# Patient Record
Sex: MALE | Race: BLACK OR AFRICAN AMERICAN | NOT HISPANIC OR LATINO | Employment: UNEMPLOYED | URBAN - NONMETROPOLITAN AREA
[De-identification: names, ages, dates, MRNs, and addresses within clinical notes are randomized per-mention and may not be internally consistent; named-entity substitution may affect disease eponyms.]

---

## 2017-08-07 ENCOUNTER — HOSPITAL ENCOUNTER (EMERGENCY)
Facility: HOSPITAL | Age: 57
Discharge: HOME | End: 2017-08-07
Attending: EMERGENCY MEDICINE | Admitting: EMERGENCY MEDICINE

## 2017-08-07 VITALS
DIASTOLIC BLOOD PRESSURE: 85 MMHG | BODY MASS INDEX: 37.3 KG/M2 | HEART RATE: 61 BPM | SYSTOLIC BLOOD PRESSURE: 169 MMHG | OXYGEN SATURATION: 97 % | TEMPERATURE: 97.3 F | HEIGHT: 75 IN | WEIGHT: 300 LBS | RESPIRATION RATE: 17 BRPM

## 2017-08-07 DIAGNOSIS — K04.7 DENTAL ABSCESS: ICD-10-CM

## 2017-08-07 DIAGNOSIS — R22.0 RIGHT FACIAL SWELLING: Primary | ICD-10-CM

## 2017-08-07 PROCEDURE — 96372 THER/PROPH/DIAG INJ SC/IM: CPT

## 2017-08-07 PROCEDURE — 63710000001 PREDNISONE PER 1 MG: Performed by: EMERGENCY MEDICINE

## 2017-08-07 PROCEDURE — 99283 EMERGENCY DEPT VISIT LOW MDM: CPT

## 2017-08-07 PROCEDURE — 25010000002 KETOROLAC TROMETHAMINE PER 15 MG: Performed by: EMERGENCY MEDICINE

## 2017-08-07 RX ORDER — PREDNISONE 10 MG/1
TABLET ORAL
Qty: 20 TABLET | Refills: 0 | Status: SHIPPED | OUTPATIENT
Start: 2017-08-07 | End: 2017-10-02 | Stop reason: HOSPADM

## 2017-08-07 RX ORDER — KETOROLAC TROMETHAMINE 10 MG/1
10 TABLET, FILM COATED ORAL EVERY 6 HOURS PRN
Qty: 12 TABLET | Refills: 0 | Status: SHIPPED | OUTPATIENT
Start: 2017-08-07 | End: 2017-10-02 | Stop reason: HOSPADM

## 2017-08-07 RX ORDER — KETOROLAC TROMETHAMINE 30 MG/ML
60 INJECTION, SOLUTION INTRAMUSCULAR; INTRAVENOUS ONCE
Status: COMPLETED | OUTPATIENT
Start: 2017-08-07 | End: 2017-08-07

## 2017-08-07 RX ORDER — PREDNISONE 20 MG/1
60 TABLET ORAL ONCE
Status: COMPLETED | OUTPATIENT
Start: 2017-08-07 | End: 2017-08-07

## 2017-08-07 RX ORDER — CLINDAMYCIN HYDROCHLORIDE 150 MG/1
300 CAPSULE ORAL EVERY 6 HOURS SCHEDULED
Status: DISCONTINUED | OUTPATIENT
Start: 2017-08-07 | End: 2017-08-07 | Stop reason: HOSPADM

## 2017-08-07 RX ORDER — CLINDAMYCIN HYDROCHLORIDE 300 MG/1
300 CAPSULE ORAL 3 TIMES DAILY
Qty: 30 CAPSULE | Refills: 0 | Status: SHIPPED | OUTPATIENT
Start: 2017-08-07 | End: 2017-10-02 | Stop reason: HOSPADM

## 2017-08-07 RX ADMIN — PREDNISONE 60 MG: 20 TABLET ORAL at 05:10

## 2017-08-07 RX ADMIN — KETOROLAC TROMETHAMINE 60 MG: 30 INJECTION, SOLUTION INTRAMUSCULAR at 05:12

## 2017-08-07 RX ADMIN — CLINDAMYCIN HYDROCHLORIDE 300 MG: 150 CAPSULE ORAL at 05:24

## 2017-08-07 NOTE — ED NOTES
Pt states right sided facial swelling began around 01:00 this morning.  Upon assessment, pt's right side of the face is slightly swollen.     Lilo Wiseman RN  08/07/17 0403

## 2017-08-07 NOTE — ED PROVIDER NOTES
Subjective   HPI Comments: Patient complaining of facial pain on the right for the past 5 days.  He has noticed that the the right upper cheek has progressively become more inflamed.  He states that today the swelling was so advanced that if he felt like he had cut off the his ability to breathe through his right nose.  He use over-the-counter medicine that helps with sinus congestion which he states helped relieve that sensation.  Patient has not had any fever associated with the swelling.      History provided by:  Patient      Review of Systems   Constitutional: Negative for activity change, appetite change, chills, diaphoresis, fatigue and fever.   HENT: Positive for congestion, dental problem and facial swelling. Negative for ear pain, nosebleeds, postnasal drip and sinus pressure.    Eyes: Negative for photophobia and pain.   Respiratory: Negative for cough, chest tightness, shortness of breath and wheezing.    Cardiovascular: Negative for chest pain.   Gastrointestinal: Negative for abdominal pain, blood in stool, diarrhea, nausea and vomiting.   Endocrine: Negative for cold intolerance and heat intolerance.   Genitourinary: Negative for difficulty urinating, dysuria and flank pain.   Musculoskeletal: Negative for arthralgias, back pain, neck pain and neck stiffness.   Skin: Negative for color change and rash.   Neurological: Negative for dizziness, weakness and headaches.   Hematological: Negative for adenopathy. Does not bruise/bleed easily.   Psychiatric/Behavioral: Negative for confusion and sleep disturbance. The patient is not nervous/anxious.        Past Medical History:   Diagnosis Date   • Hyperlipidemia    • Hypertension    • Hypertrophic cardiomyopathy     s/p AICD   • Migraine    • Tachycardia    • Ventricular tachycardia        No Known Allergies    Past Surgical History:   Procedure Laterality Date   • CARDIAC ABLATION  01/28/2016 2/2016, 10/18/2016 (Dr. Doss in [place], [place])   • CARDIAC  CATHETERIZATION     • CARDIAC DEFIBRILLATOR PLACEMENT     • CARDIAC DEFIBRILLATOR PLACEMENT         Family History   Problem Relation Age of Onset   • Family history unknown: Yes       Social History     Social History   • Marital status: Single     Spouse name: N/A   • Number of children: N/A   • Years of education: N/A     Social History Main Topics   • Smoking status: Never Smoker   • Smokeless tobacco: None   • Alcohol use No   • Drug use: No   • Sexual activity: Defer     Other Topics Concern   • None     Social History Narrative   • None           Objective   Physical Exam   Constitutional: He is oriented to person, place, and time. He appears well-developed and well-nourished. No distress.   HENT:   Head: Normocephalic and atraumatic. Head is without contusion and without right periorbital erythema.       Mouth/Throat: Oropharynx is clear and moist and mucous membranes are normal. Abnormal dentition. Dental caries present. No dental abscesses. No oropharyngeal exudate or tonsillar abscesses.   Neck: Normal range of motion. No rigidity. Normal range of motion present.   Lymphadenopathy:     He has no cervical adenopathy.   Neurological: He is alert and oriented to person, place, and time. No cranial nerve deficit.   Skin: Skin is warm and dry. No erythema.   Psychiatric: He has a normal mood and affect. His behavior is normal. Judgment and thought content normal.   Nursing note and vitals reviewed.      Procedures         ED Course  ED Course                  MDM  Number of Diagnoses or Management Options  Dental abscess: new and requires workup  Right facial swelling: new and requires workup  Diagnosis management comments: Patient most likely has a abscess underneath the right upper teeth.  It is most likely the source of his pain and swelling.  I advised him that he needs to see a dentist for further evaluation.  In the meantime I will place him on steroids, NSAIDs and an antibiotic.  Patient will need  follow-up with his primary care.  Instructed to return to the ED if any further issues or new complaints.    Risk of Complications, Morbidity, and/or Mortality  Presenting problems: low  Diagnostic procedures: low  Management options: low    Patient Progress  Patient progress: stable      Final diagnoses:   Right facial swelling   Dental abscess            Vj Ruiz MD  08/07/17 0516

## 2017-09-30 ENCOUNTER — APPOINTMENT (OUTPATIENT)
Dept: GENERAL RADIOLOGY | Facility: HOSPITAL | Age: 57
End: 2017-09-30

## 2017-09-30 ENCOUNTER — HOSPITAL ENCOUNTER (INPATIENT)
Facility: HOSPITAL | Age: 57
LOS: 2 days | Discharge: ANOTHER HOSPITAL | End: 2017-10-02
Attending: EMERGENCY MEDICINE | Admitting: INTERNAL MEDICINE

## 2017-09-30 DIAGNOSIS — R07.9 CHEST PAIN IN ADULT: Primary | ICD-10-CM

## 2017-09-30 DIAGNOSIS — I47.20 VENTRICULAR TACHYCARDIA (HCC): ICD-10-CM

## 2017-09-30 LAB
ALBUMIN SERPL-MCNC: 3.9 G/DL (ref 3.5–5)
ALBUMIN/GLOB SERPL: 1.4 G/DL (ref 1.1–2.5)
ALP SERPL-CCNC: 54 U/L (ref 24–120)
ALT SERPL W P-5'-P-CCNC: 95 U/L (ref 0–54)
ANION GAP SERPL CALCULATED.3IONS-SCNC: 10 MMOL/L (ref 4–13)
ANION GAP SERPL CALCULATED.3IONS-SCNC: 13 MMOL/L (ref 4–13)
APTT PPP: 27.1 SECONDS (ref 24.1–34.8)
AST SERPL-CCNC: 65 U/L (ref 7–45)
BASOPHILS # BLD AUTO: 0.02 10*3/MM3 (ref 0–0.2)
BASOPHILS NFR BLD AUTO: 0.3 % (ref 0–2)
BILIRUB SERPL-MCNC: 0.4 MG/DL (ref 0.1–1)
BUN BLD-MCNC: 14 MG/DL (ref 5–21)
BUN BLD-MCNC: 15 MG/DL (ref 5–21)
BUN/CREAT SERPL: 10.8 (ref 7–25)
BUN/CREAT SERPL: 11.1 (ref 7–25)
CALCIUM SPEC-SCNC: 9 MG/DL (ref 8.4–10.4)
CALCIUM SPEC-SCNC: 9.1 MG/DL (ref 8.4–10.4)
CHLORIDE SERPL-SCNC: 106 MMOL/L (ref 98–110)
CHLORIDE SERPL-SCNC: 107 MMOL/L (ref 98–110)
CO2 SERPL-SCNC: 20 MMOL/L (ref 24–31)
CO2 SERPL-SCNC: 22 MMOL/L (ref 24–31)
CREAT BLD-MCNC: 1.3 MG/DL (ref 0.5–1.4)
CREAT BLD-MCNC: 1.35 MG/DL (ref 0.5–1.4)
D DIMER PPP FEU-MCNC: 1.26 MG/L (FEU) (ref 0–0.5)
DEPRECATED RDW RBC AUTO: 52.4 FL (ref 40–54)
EOSINOPHIL # BLD AUTO: 0.07 10*3/MM3 (ref 0–0.7)
EOSINOPHIL NFR BLD AUTO: 1.1 % (ref 0–4)
ERYTHROCYTE [DISTWIDTH] IN BLOOD BY AUTOMATED COUNT: 16.3 % (ref 12–15)
GFR SERPL CREATININE-BSD FRML MDRD: 66 ML/MIN/1.73
GFR SERPL CREATININE-BSD FRML MDRD: 69 ML/MIN/1.73
GLOBULIN UR ELPH-MCNC: 2.7 GM/DL
GLUCOSE BLD-MCNC: 109 MG/DL (ref 70–100)
GLUCOSE BLD-MCNC: 133 MG/DL (ref 70–100)
HCT VFR BLD AUTO: 46.5 % (ref 40–52)
HGB BLD-MCNC: 15.7 G/DL (ref 14–18)
HOLD SPECIMEN: NORMAL
HOLD SPECIMEN: NORMAL
IMM GRANULOCYTES # BLD: 0.01 10*3/MM3 (ref 0–0.03)
IMM GRANULOCYTES NFR BLD: 0.2 % (ref 0–5)
INR PPP: 1.09 (ref 0.91–1.09)
LYMPHOCYTES # BLD AUTO: 1.99 10*3/MM3 (ref 0.72–4.86)
LYMPHOCYTES NFR BLD AUTO: 31.8 % (ref 15–45)
MAGNESIUM SERPL-MCNC: 2.2 MG/DL (ref 1.4–2.2)
MCH RBC QN AUTO: 30.4 PG (ref 28–32)
MCHC RBC AUTO-ENTMCNC: 33.8 G/DL (ref 33–36)
MCV RBC AUTO: 90.1 FL (ref 82–95)
MONOCYTES # BLD AUTO: 0.47 10*3/MM3 (ref 0.19–1.3)
MONOCYTES NFR BLD AUTO: 7.5 % (ref 4–12)
NEUTROPHILS # BLD AUTO: 3.69 10*3/MM3 (ref 1.87–8.4)
NEUTROPHILS NFR BLD AUTO: 59.1 % (ref 39–78)
NT-PROBNP SERPL-MCNC: 1980 PG/ML (ref 0–900)
PLATELET # BLD AUTO: 147 10*3/MM3 (ref 130–400)
PMV BLD AUTO: 13.9 FL (ref 6–12)
POTASSIUM BLD-SCNC: 4.5 MMOL/L (ref 3.5–5.3)
POTASSIUM BLD-SCNC: 4.9 MMOL/L (ref 3.5–5.3)
PROT SERPL-MCNC: 6.6 G/DL (ref 6.3–8.7)
PROTHROMBIN TIME: 14.5 SECONDS (ref 11.9–14.6)
RBC # BLD AUTO: 5.16 10*6/MM3 (ref 4.8–5.9)
SODIUM BLD-SCNC: 139 MMOL/L (ref 135–145)
SODIUM BLD-SCNC: 139 MMOL/L (ref 135–145)
TROPONIN I SERPL-MCNC: 0.03 NG/ML (ref 0–0.03)
TROPONIN I SERPL-MCNC: 0.04 NG/ML (ref 0–0.03)
TROPONIN I SERPL-MCNC: 0.04 NG/ML (ref 0–0.03)
TROPONIN I SERPL-MCNC: 0.05 NG/ML (ref 0–0.03)
WBC NRBC COR # BLD: 6.25 10*3/MM3 (ref 4.8–10.8)
WHOLE BLOOD HOLD SPECIMEN: NORMAL
WHOLE BLOOD HOLD SPECIMEN: NORMAL

## 2017-09-30 PROCEDURE — 80053 COMPREHEN METABOLIC PANEL: CPT | Performed by: EMERGENCY MEDICINE

## 2017-09-30 PROCEDURE — 83880 ASSAY OF NATRIURETIC PEPTIDE: CPT | Performed by: EMERGENCY MEDICINE

## 2017-09-30 PROCEDURE — 93005 ELECTROCARDIOGRAM TRACING: CPT

## 2017-09-30 PROCEDURE — 84484 ASSAY OF TROPONIN QUANT: CPT | Performed by: INTERNAL MEDICINE

## 2017-09-30 PROCEDURE — 85025 COMPLETE CBC W/AUTO DIFF WBC: CPT | Performed by: EMERGENCY MEDICINE

## 2017-09-30 PROCEDURE — 25010000002 AMIODARONE IN DEXTROSE 5% 150-4.21 MG/100ML-% SOLUTION: Performed by: EMERGENCY MEDICINE

## 2017-09-30 PROCEDURE — 83735 ASSAY OF MAGNESIUM: CPT | Performed by: EMERGENCY MEDICINE

## 2017-09-30 PROCEDURE — 84484 ASSAY OF TROPONIN QUANT: CPT | Performed by: EMERGENCY MEDICINE

## 2017-09-30 PROCEDURE — 99223 1ST HOSP IP/OBS HIGH 75: CPT | Performed by: INTERNAL MEDICINE

## 2017-09-30 PROCEDURE — 85730 THROMBOPLASTIN TIME PARTIAL: CPT | Performed by: EMERGENCY MEDICINE

## 2017-09-30 PROCEDURE — 25010000002 AMIODARONE IN DEXTROSE 5% 360-4.14 MG/200ML-% SOLUTION: Performed by: EMERGENCY MEDICINE

## 2017-09-30 PROCEDURE — 25010000002 MAGNESIUM SULFATE 2 GM/50ML SOLUTION: Performed by: INTERNAL MEDICINE

## 2017-09-30 PROCEDURE — 85379 FIBRIN DEGRADATION QUANT: CPT | Performed by: EMERGENCY MEDICINE

## 2017-09-30 PROCEDURE — 93010 ELECTROCARDIOGRAM REPORT: CPT | Performed by: INTERNAL MEDICINE

## 2017-09-30 PROCEDURE — 71010 HC CHEST PA OR AP: CPT

## 2017-09-30 PROCEDURE — 25010000002 LIDOCAINE PER 10 MG: Performed by: INTERNAL MEDICINE

## 2017-09-30 PROCEDURE — 99284 EMERGENCY DEPT VISIT MOD MDM: CPT

## 2017-09-30 PROCEDURE — 85610 PROTHROMBIN TIME: CPT | Performed by: EMERGENCY MEDICINE

## 2017-09-30 RX ORDER — MIDAZOLAM HYDROCHLORIDE 1 MG/ML
INJECTION INTRAMUSCULAR; INTRAVENOUS
Status: DISCONTINUED
Start: 2017-09-30 | End: 2017-09-30 | Stop reason: WASHOUT

## 2017-09-30 RX ORDER — MAGNESIUM SULFATE HEPTAHYDRATE 40 MG/ML
2 INJECTION, SOLUTION INTRAVENOUS ONCE
Status: COMPLETED | OUTPATIENT
Start: 2017-09-30 | End: 2017-09-30

## 2017-09-30 RX ORDER — LIDOCAINE HYDROCHLORIDE ANHYDROUS AND DEXTROSE MONOHYDRATE 5; 400 G/100ML; MG/100ML
2 INJECTION, SOLUTION INTRAVENOUS CONTINUOUS
Status: DISCONTINUED | OUTPATIENT
Start: 2017-09-30 | End: 2017-09-30

## 2017-09-30 RX ORDER — ATORVASTATIN CALCIUM 10 MG/1
10 TABLET, FILM COATED ORAL DAILY
Status: DISCONTINUED | OUTPATIENT
Start: 2017-09-30 | End: 2017-10-02 | Stop reason: HOSPADM

## 2017-09-30 RX ORDER — SODIUM CHLORIDE 0.9 % (FLUSH) 0.9 %
1-10 SYRINGE (ML) INJECTION AS NEEDED
Status: DISCONTINUED | OUTPATIENT
Start: 2017-09-30 | End: 2017-10-02 | Stop reason: HOSPADM

## 2017-09-30 RX ORDER — CLONIDINE HYDROCHLORIDE 0.1 MG/1
0.1 TABLET ORAL 2 TIMES DAILY
Status: DISCONTINUED | OUTPATIENT
Start: 2017-09-30 | End: 2017-10-02 | Stop reason: HOSPADM

## 2017-09-30 RX ORDER — LIDOCAINE HYDROCHLORIDE ANHYDROUS AND DEXTROSE MONOHYDRATE 5; 400 G/100ML; MG/100ML
2 INJECTION, SOLUTION INTRAVENOUS CONTINUOUS
Status: DISCONTINUED | OUTPATIENT
Start: 2017-09-30 | End: 2017-10-02 | Stop reason: HOSPADM

## 2017-09-30 RX ORDER — METOPROLOL TARTRATE 5 MG/5ML
5 INJECTION INTRAVENOUS EVERY 6 HOURS PRN
Status: DISCONTINUED | OUTPATIENT
Start: 2017-09-30 | End: 2017-10-02 | Stop reason: HOSPADM

## 2017-09-30 RX ORDER — VERAPAMIL HYDROCHLORIDE 80 MG/1
80 TABLET ORAL EVERY 8 HOURS SCHEDULED
Status: DISCONTINUED | OUTPATIENT
Start: 2017-09-30 | End: 2017-10-02 | Stop reason: HOSPADM

## 2017-09-30 RX ORDER — VALPROIC ACID 250 MG/1
250 CAPSULE, LIQUID FILLED ORAL DAILY
Status: DISCONTINUED | OUTPATIENT
Start: 2017-09-30 | End: 2017-10-02 | Stop reason: HOSPADM

## 2017-09-30 RX ORDER — AMIODARONE HYDROCHLORIDE 200 MG/1
200 TABLET ORAL EVERY 12 HOURS SCHEDULED
Status: DISCONTINUED | OUTPATIENT
Start: 2017-09-30 | End: 2017-10-02 | Stop reason: HOSPADM

## 2017-09-30 RX ORDER — KETOROLAC TROMETHAMINE 10 MG/1
10 TABLET, FILM COATED ORAL EVERY 6 HOURS PRN
Status: DISCONTINUED | OUTPATIENT
Start: 2017-09-30 | End: 2017-10-02 | Stop reason: HOSPADM

## 2017-09-30 RX ADMIN — AMIODARONE HYDROCHLORIDE 200 MG: 200 TABLET ORAL at 10:50

## 2017-09-30 RX ADMIN — MAGNESIUM SULFATE HEPTAHYDRATE 2 G: 40 INJECTION, SOLUTION INTRAVENOUS at 06:16

## 2017-09-30 RX ADMIN — CLONIDINE HYDROCHLORIDE 0.1 MG: 0.1 TABLET ORAL at 17:46

## 2017-09-30 RX ADMIN — VERAPAMIL HYDROCHLORIDE 80 MG: 80 TABLET, FILM COATED ORAL at 06:16

## 2017-09-30 RX ADMIN — VERAPAMIL HYDROCHLORIDE 80 MG: 80 TABLET, FILM COATED ORAL at 23:04

## 2017-09-30 RX ADMIN — ATORVASTATIN CALCIUM 10 MG: 10 TABLET, FILM COATED ORAL at 08:04

## 2017-09-30 RX ADMIN — AMIODARONE HYDROCHLORIDE 1 MG/MIN: 1.8 INJECTION, SOLUTION INTRAVENOUS at 08:04

## 2017-09-30 RX ADMIN — VERAPAMIL HYDROCHLORIDE 80 MG: 80 TABLET, FILM COATED ORAL at 14:42

## 2017-09-30 RX ADMIN — AMIODARONE HYDROCHLORIDE 200 MG: 200 TABLET ORAL at 23:03

## 2017-09-30 RX ADMIN — CLONIDINE HYDROCHLORIDE 0.1 MG: 0.1 TABLET ORAL at 08:04

## 2017-09-30 RX ADMIN — LIDOCAINE HYDROCHLORIDE 2 MG/MIN: 4 INJECTION, SOLUTION INTRAVENOUS at 06:16

## 2017-09-30 RX ADMIN — AMIODARONE HYDROCHLORIDE 150 MG: 1.5 INJECTION, SOLUTION INTRAVENOUS at 01:32

## 2017-09-30 RX ADMIN — VALPROIC ACID 250 MG: 250 CAPSULE, LIQUID FILLED ORAL at 08:04

## 2017-09-30 RX ADMIN — LIDOCAINE HYDROCHLORIDE 2 MG/MIN: 4 INJECTION, SOLUTION INTRAVENOUS at 23:49

## 2017-09-30 RX ADMIN — AMIODARONE HYDROCHLORIDE 1 MG/MIN: 1.8 INJECTION, SOLUTION INTRAVENOUS at 01:59

## 2017-10-01 LAB
ANION GAP SERPL CALCULATED.3IONS-SCNC: 7 MMOL/L (ref 4–13)
BUN BLD-MCNC: 12 MG/DL (ref 5–21)
BUN/CREAT SERPL: 9.6 (ref 7–25)
CALCIUM SPEC-SCNC: 8.4 MG/DL (ref 8.4–10.4)
CHLORIDE SERPL-SCNC: 108 MMOL/L (ref 98–110)
CO2 SERPL-SCNC: 23 MMOL/L (ref 24–31)
CREAT BLD-MCNC: 1.25 MG/DL (ref 0.5–1.4)
GFR SERPL CREATININE-BSD FRML MDRD: 72 ML/MIN/1.73
GLUCOSE BLD-MCNC: 91 MG/DL (ref 70–100)
POTASSIUM BLD-SCNC: 4.9 MMOL/L (ref 3.5–5.3)
SODIUM BLD-SCNC: 138 MMOL/L (ref 135–145)

## 2017-10-01 PROCEDURE — 80048 BASIC METABOLIC PNL TOTAL CA: CPT | Performed by: INTERNAL MEDICINE

## 2017-10-01 PROCEDURE — 99233 SBSQ HOSP IP/OBS HIGH 50: CPT | Performed by: INTERNAL MEDICINE

## 2017-10-01 PROCEDURE — 25010000002 LIDOCAINE PER 10 MG: Performed by: INTERNAL MEDICINE

## 2017-10-01 RX ORDER — ECHINACEA PURPUREA EXTRACT 125 MG
2 TABLET ORAL AS NEEDED
Status: DISCONTINUED | OUTPATIENT
Start: 2017-10-01 | End: 2017-10-02 | Stop reason: HOSPADM

## 2017-10-01 RX ORDER — GUAIFENESIN 600 MG/1
600 TABLET, EXTENDED RELEASE ORAL EVERY 12 HOURS SCHEDULED
Status: DISCONTINUED | OUTPATIENT
Start: 2017-10-01 | End: 2017-10-02 | Stop reason: HOSPADM

## 2017-10-01 RX ADMIN — VERAPAMIL HYDROCHLORIDE 80 MG: 80 TABLET, FILM COATED ORAL at 06:33

## 2017-10-01 RX ADMIN — AMIODARONE HYDROCHLORIDE 200 MG: 200 TABLET ORAL at 21:08

## 2017-10-01 RX ADMIN — VERAPAMIL HYDROCHLORIDE 80 MG: 80 TABLET, FILM COATED ORAL at 13:31

## 2017-10-01 RX ADMIN — CLONIDINE HYDROCHLORIDE 0.1 MG: 0.1 TABLET ORAL at 17:20

## 2017-10-01 RX ADMIN — VERAPAMIL HYDROCHLORIDE 80 MG: 80 TABLET, FILM COATED ORAL at 21:07

## 2017-10-01 RX ADMIN — CLONIDINE HYDROCHLORIDE 0.1 MG: 0.1 TABLET ORAL at 08:46

## 2017-10-01 RX ADMIN — ATORVASTATIN CALCIUM 10 MG: 10 TABLET, FILM COATED ORAL at 08:46

## 2017-10-01 RX ADMIN — AMIODARONE HYDROCHLORIDE 200 MG: 200 TABLET ORAL at 08:46

## 2017-10-01 RX ADMIN — LIDOCAINE HYDROCHLORIDE 2 MG/MIN: 4 INJECTION, SOLUTION INTRAVENOUS at 19:47

## 2017-10-01 RX ADMIN — VALPROIC ACID 250 MG: 250 CAPSULE, LIQUID FILLED ORAL at 08:46

## 2017-10-01 RX ADMIN — GUAIFENESIN 600 MG: 600 TABLET, EXTENDED RELEASE ORAL at 21:08

## 2017-10-01 RX ADMIN — GUAIFENESIN 600 MG: 600 TABLET, EXTENDED RELEASE ORAL at 12:40

## 2017-10-02 VITALS
DIASTOLIC BLOOD PRESSURE: 82 MMHG | TEMPERATURE: 97.4 F | HEART RATE: 61 BPM | HEIGHT: 75 IN | OXYGEN SATURATION: 100 % | BODY MASS INDEX: 37.72 KG/M2 | SYSTOLIC BLOOD PRESSURE: 134 MMHG | WEIGHT: 303.4 LBS | RESPIRATION RATE: 15 BRPM

## 2017-10-02 LAB
ANION GAP SERPL CALCULATED.3IONS-SCNC: 12 MMOL/L (ref 4–13)
BUN BLD-MCNC: 12 MG/DL (ref 5–21)
BUN/CREAT SERPL: 9.6 (ref 7–25)
CALCIUM SPEC-SCNC: 8.8 MG/DL (ref 8.4–10.4)
CHLORIDE SERPL-SCNC: 107 MMOL/L (ref 98–110)
CO2 SERPL-SCNC: 21 MMOL/L (ref 24–31)
CREAT BLD-MCNC: 1.25 MG/DL (ref 0.5–1.4)
GFR SERPL CREATININE-BSD FRML MDRD: 72 ML/MIN/1.73
GLUCOSE BLD-MCNC: 121 MG/DL (ref 70–100)
POTASSIUM BLD-SCNC: 4.5 MMOL/L (ref 3.5–5.3)
SODIUM BLD-SCNC: 140 MMOL/L (ref 135–145)

## 2017-10-02 PROCEDURE — 80048 BASIC METABOLIC PNL TOTAL CA: CPT | Performed by: INTERNAL MEDICINE

## 2017-10-02 RX ORDER — METOPROLOL TARTRATE 5 MG/5ML
5 INJECTION INTRAVENOUS EVERY 6 HOURS PRN
Qty: 15 ML
Start: 2017-10-02

## 2017-10-02 RX ORDER — KETOROLAC TROMETHAMINE 10 MG/1
10 TABLET, FILM COATED ORAL EVERY 6 HOURS PRN
Start: 2017-10-02 | End: 2017-10-04

## 2017-10-02 RX ORDER — VERAPAMIL HYDROCHLORIDE 80 MG/1
80 TABLET ORAL EVERY 8 HOURS SCHEDULED
Start: 2017-10-02

## 2017-10-02 RX ORDER — ECHINACEA PURPUREA EXTRACT 125 MG
2 TABLET ORAL AS NEEDED
Refills: 12
Start: 2017-10-02 | End: 2017-10-08

## 2017-10-02 RX ORDER — GUAIFENESIN 600 MG/1
600 TABLET, EXTENDED RELEASE ORAL EVERY 12 HOURS SCHEDULED
Start: 2017-10-02

## 2017-10-02 RX ORDER — LIDOCAINE HYDROCHLORIDE ANHYDROUS AND DEXTROSE MONOHYDRATE 5; 400 G/100ML; MG/100ML
2 INJECTION, SOLUTION INTRAVENOUS CONTINUOUS
Start: 2017-10-02

## 2017-10-02 RX ORDER — AMIODARONE HYDROCHLORIDE 200 MG/1
200 TABLET ORAL EVERY 12 HOURS SCHEDULED
Start: 2017-10-02

## 2017-10-02 RX ADMIN — GUAIFENESIN 600 MG: 600 TABLET, EXTENDED RELEASE ORAL at 08:10

## 2017-10-02 RX ADMIN — ATORVASTATIN CALCIUM 10 MG: 10 TABLET, FILM COATED ORAL at 08:10

## 2017-10-02 RX ADMIN — VALPROIC ACID 250 MG: 250 CAPSULE, LIQUID FILLED ORAL at 08:10

## 2017-10-02 RX ADMIN — VERAPAMIL HYDROCHLORIDE 80 MG: 80 TABLET, FILM COATED ORAL at 08:11

## 2017-10-02 RX ADMIN — CLONIDINE HYDROCHLORIDE 0.1 MG: 0.1 TABLET ORAL at 08:10

## 2017-10-02 RX ADMIN — AMIODARONE HYDROCHLORIDE 200 MG: 200 TABLET ORAL at 08:10

## 2017-10-02 RX ADMIN — KETOROLAC TROMETHAMINE 10 MG: 10 TABLET, FILM COATED ORAL at 02:28

## 2017-10-02 RX ADMIN — Medication 2 SPRAY: at 01:04

## 2018-09-29 ENCOUNTER — APPOINTMENT (OUTPATIENT)
Dept: GENERAL RADIOLOGY | Facility: HOSPITAL | Age: 58
End: 2018-09-29

## 2018-09-29 ENCOUNTER — HOSPITAL ENCOUNTER (EMERGENCY)
Facility: HOSPITAL | Age: 58
Discharge: HOME | End: 2018-09-29
Attending: EMERGENCY MEDICINE | Admitting: EMERGENCY MEDICINE

## 2018-09-29 VITALS
RESPIRATION RATE: 19 BRPM | HEART RATE: 52 BPM | DIASTOLIC BLOOD PRESSURE: 71 MMHG | TEMPERATURE: 98.3 F | WEIGHT: 299 LBS | BODY MASS INDEX: 37.18 KG/M2 | SYSTOLIC BLOOD PRESSURE: 132 MMHG | OXYGEN SATURATION: 98 % | HEIGHT: 75 IN

## 2018-09-29 DIAGNOSIS — R07.9 CHEST PAIN, UNSPECIFIED TYPE: Primary | ICD-10-CM

## 2018-09-29 LAB
ALBUMIN SERPL-MCNC: 3.9 G/DL (ref 3.5–5)
ALBUMIN/GLOB SERPL: 1.3 G/DL (ref 1.1–2.5)
ALP SERPL-CCNC: 61 U/L (ref 24–120)
ALT SERPL W P-5'-P-CCNC: 26 U/L (ref 0–54)
ANION GAP SERPL CALCULATED.3IONS-SCNC: 10 MMOL/L (ref 4–13)
AST SERPL-CCNC: 23 U/L (ref 7–45)
BASOPHILS # BLD AUTO: 0.03 10*3/MM3 (ref 0–0.2)
BASOPHILS NFR BLD AUTO: 0.6 % (ref 0–2)
BILIRUB SERPL-MCNC: 0.6 MG/DL (ref 0.1–1)
BUN BLD-MCNC: 19 MG/DL (ref 5–21)
BUN/CREAT SERPL: 16.2 (ref 7–25)
CALCIUM SPEC-SCNC: 9.1 MG/DL (ref 8.4–10.4)
CHLORIDE SERPL-SCNC: 108 MMOL/L (ref 98–110)
CO2 SERPL-SCNC: 22 MMOL/L (ref 24–31)
CREAT BLD-MCNC: 1.17 MG/DL (ref 0.5–1.4)
DEPRECATED RDW RBC AUTO: 50.3 FL (ref 40–54)
EOSINOPHIL # BLD AUTO: 0.08 10*3/MM3 (ref 0–0.7)
EOSINOPHIL NFR BLD AUTO: 1.7 % (ref 0–4)
ERYTHROCYTE [DISTWIDTH] IN BLOOD BY AUTOMATED COUNT: 16.8 % (ref 12–15)
GFR SERPL CREATININE-BSD FRML MDRD: 77 ML/MIN/1.73
GLOBULIN UR ELPH-MCNC: 3.1 GM/DL
GLUCOSE BLD-MCNC: 105 MG/DL (ref 70–100)
HCT VFR BLD AUTO: 49.5 % (ref 40–52)
HGB BLD-MCNC: 16.8 G/DL (ref 14–18)
HOLD SPECIMEN: NORMAL
HOLD SPECIMEN: NORMAL
INR PPP: 0.97 (ref 0.91–1.09)
LYMPHOCYTES # BLD AUTO: 1.84 10*3/MM3 (ref 0.72–4.86)
LYMPHOCYTES NFR BLD AUTO: 39 % (ref 15–45)
MCH RBC QN AUTO: 29.1 PG (ref 28–32)
MCHC RBC AUTO-ENTMCNC: 33.9 G/DL (ref 33–36)
MCV RBC AUTO: 85.8 FL (ref 82–95)
MONOCYTES # BLD AUTO: 0.54 10*3/MM3 (ref 0.19–1.3)
MONOCYTES NFR BLD AUTO: 11.4 % (ref 4–12)
NEUTROPHILS # BLD AUTO: 2.21 10*3/MM3 (ref 1.87–8.4)
NEUTROPHILS NFR BLD AUTO: 46.9 % (ref 39–78)
PLATELET # BLD AUTO: 166 10*3/MM3 (ref 130–400)
PMV BLD AUTO: 13 FL (ref 6–12)
POTASSIUM BLD-SCNC: 4.3 MMOL/L (ref 3.5–5.3)
PROT SERPL-MCNC: 7 G/DL (ref 6.3–8.7)
PROTHROMBIN TIME: 13.2 SECONDS (ref 11.9–14.6)
RBC # BLD AUTO: 5.77 10*6/MM3 (ref 4.8–5.9)
SODIUM BLD-SCNC: 140 MMOL/L (ref 135–145)
TROPONIN I SERPL-MCNC: 0.02 NG/ML (ref 0–0.03)
TROPONIN I SERPL-MCNC: 0.02 NG/ML (ref 0–0.03)
WBC NRBC COR # BLD: 4.72 10*3/MM3 (ref 4.8–10.8)
WHOLE BLOOD HOLD SPECIMEN: NORMAL
WHOLE BLOOD HOLD SPECIMEN: NORMAL

## 2018-09-29 PROCEDURE — 85610 PROTHROMBIN TIME: CPT | Performed by: EMERGENCY MEDICINE

## 2018-09-29 PROCEDURE — 93005 ELECTROCARDIOGRAM TRACING: CPT | Performed by: EMERGENCY MEDICINE

## 2018-09-29 PROCEDURE — 93010 ELECTROCARDIOGRAM REPORT: CPT | Performed by: INTERNAL MEDICINE

## 2018-09-29 PROCEDURE — 80053 COMPREHEN METABOLIC PANEL: CPT | Performed by: EMERGENCY MEDICINE

## 2018-09-29 PROCEDURE — 99284 EMERGENCY DEPT VISIT MOD MDM: CPT

## 2018-09-29 PROCEDURE — 84484 ASSAY OF TROPONIN QUANT: CPT | Performed by: EMERGENCY MEDICINE

## 2018-09-29 PROCEDURE — 93005 ELECTROCARDIOGRAM TRACING: CPT

## 2018-09-29 PROCEDURE — 71045 X-RAY EXAM CHEST 1 VIEW: CPT

## 2018-09-29 PROCEDURE — 85025 COMPLETE CBC W/AUTO DIFF WBC: CPT | Performed by: EMERGENCY MEDICINE

## 2018-09-29 RX ORDER — SILDENAFIL 50 MG/1
50 TABLET, FILM COATED ORAL DAILY PRN
COMMUNITY

## 2018-09-29 RX ORDER — CYCLOBENZAPRINE HCL 10 MG
10 TABLET ORAL 3 TIMES DAILY PRN
COMMUNITY

## 2018-09-29 RX ORDER — COLCHICINE 0.6 MG/1
0.6 TABLET ORAL DAILY
COMMUNITY

## 2018-09-29 RX ORDER — FUROSEMIDE 40 MG/1
40 TABLET ORAL 2 TIMES DAILY
COMMUNITY

## 2018-09-29 RX ORDER — PRAVASTATIN SODIUM 40 MG
40 TABLET ORAL DAILY
COMMUNITY

## 2018-09-29 RX ORDER — AMOXICILLIN AND CLAVULANATE POTASSIUM 875; 125 MG/1; MG/1
1 TABLET, FILM COATED ORAL 2 TIMES DAILY
COMMUNITY

## 2018-09-29 RX ORDER — INDOMETHACIN 50 MG/1
50 CAPSULE ORAL 3 TIMES DAILY PRN
COMMUNITY

## 2018-09-29 RX ORDER — ASPIRIN 81 MG/1
324 TABLET, CHEWABLE ORAL ONCE
Status: COMPLETED | OUTPATIENT
Start: 2018-09-29 | End: 2018-09-29

## 2018-09-29 RX ORDER — SODIUM CHLORIDE 0.9 % (FLUSH) 0.9 %
10 SYRINGE (ML) INJECTION AS NEEDED
Status: DISCONTINUED | OUTPATIENT
Start: 2018-09-29 | End: 2018-09-29 | Stop reason: HOSPADM

## 2018-09-29 RX ORDER — ALLOPURINOL 300 MG/1
300 TABLET ORAL DAILY
COMMUNITY

## 2018-09-29 RX ORDER — CLONIDINE HYDROCHLORIDE 0.1 MG/1
0.1 TABLET ORAL ONCE
Status: COMPLETED | OUTPATIENT
Start: 2018-09-29 | End: 2018-09-29

## 2018-09-29 RX ORDER — LISINOPRIL 20 MG/1
20 TABLET ORAL DAILY
COMMUNITY

## 2018-09-29 RX ORDER — TRAMADOL HYDROCHLORIDE 50 MG/1
50 TABLET ORAL EVERY 6 HOURS PRN
COMMUNITY

## 2018-09-29 RX ADMIN — CLONIDINE HYDROCHLORIDE 0.1 MG: 0.1 TABLET ORAL at 10:45

## 2018-09-29 RX ADMIN — ASPIRIN 81 MG CHEWABLE TABLET 81 MG: 81 TABLET CHEWABLE at 10:09

## 2018-10-18 RX ORDER — SIMVASTATIN 20 MG
20 TABLET ORAL NIGHTLY
COMMUNITY

## 2018-10-18 RX ORDER — CLONIDINE HYDROCHLORIDE 0.1 MG/1
0.2 TABLET ORAL DAILY
Status: ON HOLD | COMMUNITY
End: 2022-03-19 | Stop reason: HOSPADM

## 2018-11-01 ENCOUNTER — TELEPHONE (OUTPATIENT)
Dept: GASTROENTEROLOGY | Age: 58
End: 2018-11-01

## 2019-06-30 ENCOUNTER — HOSPITAL ENCOUNTER (EMERGENCY)
Facility: HOSPITAL | Age: 59
Discharge: HOME | End: 2019-07-01
Admitting: EMERGENCY MEDICINE

## 2019-06-30 ENCOUNTER — APPOINTMENT (OUTPATIENT)
Dept: GENERAL RADIOLOGY | Facility: HOSPITAL | Age: 59
End: 2019-06-30

## 2019-06-30 DIAGNOSIS — R60.0 LOWER EXTREMITY EDEMA: ICD-10-CM

## 2019-06-30 DIAGNOSIS — I47.29 NON-SUSTAINED VENTRICULAR TACHYCARDIA (HCC): Primary | ICD-10-CM

## 2019-06-30 LAB
ALBUMIN SERPL-MCNC: 4.3 G/DL (ref 3.5–5)
ALBUMIN/GLOB SERPL: 1.3 G/DL (ref 1.1–2.5)
ALP SERPL-CCNC: 85 U/L (ref 24–120)
ALT SERPL W P-5'-P-CCNC: 34 U/L (ref 0–54)
ANION GAP SERPL CALCULATED.3IONS-SCNC: 11 MMOL/L (ref 4–13)
AST SERPL-CCNC: 31 U/L (ref 7–45)
BASOPHILS # BLD AUTO: 0.03 10*3/MM3 (ref 0–0.2)
BASOPHILS NFR BLD AUTO: 0.4 % (ref 0–2)
BILIRUB SERPL-MCNC: 1.1 MG/DL (ref 0.1–1)
BUN BLD-MCNC: 12 MG/DL (ref 5–21)
BUN/CREAT SERPL: 9 (ref 7–25)
CALCIUM SPEC-SCNC: 9.3 MG/DL (ref 8.4–10.4)
CHLORIDE SERPL-SCNC: 110 MMOL/L (ref 98–110)
CO2 SERPL-SCNC: 23 MMOL/L (ref 24–31)
CREAT BLD-MCNC: 1.33 MG/DL (ref 0.5–1.4)
DEPRECATED RDW RBC AUTO: 50.4 FL (ref 40–54)
EOSINOPHIL # BLD AUTO: 0.04 10*3/MM3 (ref 0–0.7)
EOSINOPHIL NFR BLD AUTO: 0.5 % (ref 0–4)
ERYTHROCYTE [DISTWIDTH] IN BLOOD BY AUTOMATED COUNT: 17 % (ref 12–15)
GFR SERPL CREATININE-BSD FRML MDRD: 66 ML/MIN/1.73
GLOBULIN UR ELPH-MCNC: 3.2 GM/DL
GLUCOSE BLD-MCNC: 116 MG/DL (ref 70–100)
HCT VFR BLD AUTO: 48.4 % (ref 40–52)
HGB BLD-MCNC: 16.4 G/DL (ref 14–18)
HOLD SPECIMEN: NORMAL
HOLD SPECIMEN: NORMAL
IMM GRANULOCYTES # BLD AUTO: 0.04 10*3/MM3 (ref 0–0.05)
IMM GRANULOCYTES NFR BLD AUTO: 0.5 % (ref 0–5)
LYMPHOCYTES # BLD AUTO: 1.24 10*3/MM3 (ref 0.72–4.86)
LYMPHOCYTES NFR BLD AUTO: 15.8 % (ref 15–45)
MCH RBC QN AUTO: 28.8 PG (ref 28–32)
MCHC RBC AUTO-ENTMCNC: 33.9 G/DL (ref 33–36)
MCV RBC AUTO: 85.1 FL (ref 82–95)
MONOCYTES # BLD AUTO: 0.61 10*3/MM3 (ref 0.19–1.3)
MONOCYTES NFR BLD AUTO: 7.8 % (ref 4–12)
NEUTROPHILS # BLD AUTO: 5.9 10*3/MM3 (ref 1.87–8.4)
NEUTROPHILS NFR BLD AUTO: 75 % (ref 39–78)
NRBC BLD AUTO-RTO: 0 /100 WBC (ref 0–0.2)
PLATELET # BLD AUTO: 170 10*3/MM3 (ref 130–400)
PMV BLD AUTO: 12 FL (ref 6–12)
POTASSIUM BLD-SCNC: 4.1 MMOL/L (ref 3.5–5.3)
PROT SERPL-MCNC: 7.5 G/DL (ref 6.3–8.7)
RBC # BLD AUTO: 5.69 10*6/MM3 (ref 4.8–5.9)
SODIUM BLD-SCNC: 144 MMOL/L (ref 135–145)
TROPONIN I SERPL-MCNC: 0.03 NG/ML (ref 0–0.03)
WBC NRBC COR # BLD: 7.86 10*3/MM3 (ref 4.8–10.8)
WHOLE BLOOD HOLD SPECIMEN: NORMAL
WHOLE BLOOD HOLD SPECIMEN: NORMAL

## 2019-06-30 PROCEDURE — 85025 COMPLETE CBC W/AUTO DIFF WBC: CPT

## 2019-06-30 PROCEDURE — 84484 ASSAY OF TROPONIN QUANT: CPT

## 2019-06-30 PROCEDURE — 80053 COMPREHEN METABOLIC PANEL: CPT

## 2019-06-30 PROCEDURE — 93010 ELECTROCARDIOGRAM REPORT: CPT | Performed by: INTERNAL MEDICINE

## 2019-06-30 PROCEDURE — 71045 X-RAY EXAM CHEST 1 VIEW: CPT

## 2019-06-30 PROCEDURE — 99284 EMERGENCY DEPT VISIT MOD MDM: CPT

## 2019-06-30 PROCEDURE — 93005 ELECTROCARDIOGRAM TRACING: CPT

## 2019-06-30 RX ORDER — ASPIRIN 81 MG/1
324 TABLET, CHEWABLE ORAL ONCE
Status: COMPLETED | OUTPATIENT
Start: 2019-06-30 | End: 2019-06-30

## 2019-06-30 RX ORDER — SODIUM CHLORIDE 0.9 % (FLUSH) 0.9 %
10 SYRINGE (ML) INJECTION AS NEEDED
Status: DISCONTINUED | OUTPATIENT
Start: 2019-06-30 | End: 2019-07-01 | Stop reason: HOSPADM

## 2019-06-30 RX ADMIN — ASPIRIN 81 MG 324 MG: 81 TABLET ORAL at 22:01

## 2019-07-01 VITALS
HEART RATE: 50 BPM | OXYGEN SATURATION: 99 % | WEIGHT: 299 LBS | BODY MASS INDEX: 37.18 KG/M2 | HEIGHT: 75 IN | SYSTOLIC BLOOD PRESSURE: 158 MMHG | RESPIRATION RATE: 16 BRPM | DIASTOLIC BLOOD PRESSURE: 79 MMHG | TEMPERATURE: 98.9 F

## 2019-07-01 PROCEDURE — 93010 ELECTROCARDIOGRAM REPORT: CPT | Performed by: INTERNAL MEDICINE

## 2019-07-01 PROCEDURE — 93005 ELECTROCARDIOGRAM TRACING: CPT

## 2019-07-01 RX ORDER — FUROSEMIDE 20 MG/1
20 TABLET ORAL DAILY
Qty: 3 TABLET | Refills: 0 | Status: SHIPPED | OUTPATIENT
Start: 2019-07-01

## 2019-07-01 NOTE — ED PROVIDER NOTES
Subjective   59-year-old male presents with a chief complaint of feeling his heart rate elevating.  He reports he has a pacemaker defibrillator.  He notes that this is happened in the past multiple times and usually resolves, however tonight he said it lasted longer.  The patient denies any symptoms presently he has no chest pain nausea vomiting diarrhea or shortness of breath.  He reports he was in an argument and this is what prompted it.  No other complaints.            Review of Systems   All other systems reviewed and are negative.      Past Medical History:   Diagnosis Date   • Hyperlipidemia    • Hypertension    • Hypertrophic cardiomyopathy (CMS/HCC)     s/p AICD   • Migraine    • Tachycardia    • Ventricular tachycardia (CMS/HCC)    • Ventricular tachycardia, sustained (CMS/HCC) 10/14/2016       No Known Allergies    Past Surgical History:   Procedure Laterality Date   • CARDIAC ABLATION  01/28/2016 2/2016, 10/18/2016 (Dr. Doss in [place], [place])   • CARDIAC ABLATION      2018   • CARDIAC CATHETERIZATION     • CARDIAC DEFIBRILLATOR PLACEMENT     • CARDIAC DEFIBRILLATOR PLACEMENT         Family History   Family history unknown: Yes       Social History     Socioeconomic History   • Marital status: Single     Spouse name: Not on file   • Number of children: Not on file   • Years of education: Not on file   • Highest education level: Not on file   Tobacco Use   • Smoking status: Never Smoker   Substance and Sexual Activity   • Alcohol use: No   • Drug use: No   • Sexual activity: Defer           Objective   Physical Exam   Constitutional: He is oriented to person, place, and time. He appears well-developed and well-nourished.   HENT:   Head: Normocephalic and atraumatic.   Eyes: EOM are normal. Pupils are equal, round, and reactive to light.   Neck: Normal range of motion. Neck supple.   Cardiovascular: Normal rate and regular rhythm.   Pulmonary/Chest: Effort normal and breath sounds normal.   Abdominal:  Soft. Bowel sounds are normal.   Musculoskeletal: Normal range of motion. He exhibits edema.   +2 bilateral lower extremity edema   Neurological: He is alert and oriented to person, place, and time. No cranial nerve deficit. Coordination normal.   Skin: Skin is warm and dry. Capillary refill takes less than 2 seconds.   Psychiatric: He has a normal mood and affect. His behavior is normal.   Nursing note and vitals reviewed.      Procedures           ED Course        XR Chest 1 View   Final Result   1. Stable chest exam without acute process.   2. Underlying prominent cardiomegaly with AICD. No pulmonary edema.           This report was finalized on 06/30/2019 21:37 by Dr Rafat Thomas, .        Labs Reviewed   COMPREHENSIVE METABOLIC PANEL - Abnormal; Notable for the following components:       Result Value    Glucose 116 (*)     CO2 23.0 (*)     Total Bilirubin 1.1 (*)     All other components within normal limits    Narrative:     GFR Normal >60  Chronic Kidney Disease <60  Kidney Failure <15   CBC WITH AUTO DIFFERENTIAL - Abnormal; Notable for the following components:    RDW 17.0 (*)     All other components within normal limits   TROPONIN (IN-HOUSE) - Normal   RAINBOW DRAW    Narrative:     The following orders were created for panel order [place] Draw.  Procedure                               Abnormality         Status                     ---------                               -----------         ------                     Light Blue Top[[id number]]                                   Final result               Green Top (Gel)[[id number]]                                  Final result               Lavender Top[[id number]]                                     Final result               Red Top[[id number]]                                          Final result                 Please view results for these tests on the individual orders.   TROPONIN (IN-HOUSE)   CBC AND DIFFERENTIAL    Narrative:     The following orders  were created for panel order CBC & Differential.  Procedure                               Abnormality         Status                     ---------                               -----------         ------                     CBC Auto Differential[[id number]]        Abnormal            Final result                 Please view results for these tests on the individual orders.   LIGHT BLUE TOP   GREEN TOP   LAVENDER TOP   RED TOP               MDM  Number of Diagnoses or Management Options  Lower extremity edema: new and requires workup  Non-sustained ventricular tachycardia (CMS/HCC): new and requires workup  Diagnosis management comments: The patient had his pacemaker interrogated which revealed multiple episodes of nonsustained V. tach the longest of which lasting 7 seconds.  Patient does take amiodarone, I have consulted Dr. Smith who does not recommend IV amiodarone or any other physical treatments.  He reports it appears his pacemaker is working appropriately the patient has not received any shock therapy only paced terminated episodes have been recorded.  The patient at present is in no acute distress and is stable for discharge.  Dr. Smith does however recommend he follows up with Dr. Josue.    Patient requested evaluation upon discharge of lower extremity edema, the patients cxr did not reveal pulmonary edema, discussed dependant edema with venous insufficiency, chf, high sodium diet as causative pathologies for lower extremity edema, gave recommendations and will send home with compression stockings with 3  Day supply of lasix and referral to cardiology for further evaluation        Amount and/or Complexity of Data Reviewed  Clinical lab tests: ordered and reviewed  Tests in the radiology section of CPT®: reviewed and ordered  Tests in the medicine section of CPT®: reviewed and ordered  Decide to obtain previous medical records or to obtain history from someone other than the patient: yes    Risk of Complications,  Morbidity, and/or Mortality  Presenting problems: moderate  Diagnostic procedures: moderate  Management options: moderate    Patient Progress  Patient progress: stable        Final diagnoses:   Non-sustained ventricular tachycardia (CMS/HCC)   Lower extremity edema            Luis Alberto Salgado PA-C  07/01/19 0021       Luis Alberto Salgado PA-C  07/01/19 0041

## 2019-07-01 NOTE — ED NOTES
Received call from Edward at [facility], pt has had several episodes of nonsustained vtach episodes, the longest being 7 seconds.      Heather Mullins RN  06/30/19 6091

## 2019-07-09 ENCOUNTER — APPOINTMENT (OUTPATIENT)
Dept: GENERAL RADIOLOGY | Facility: HOSPITAL | Age: 59
End: 2019-07-09

## 2019-07-09 ENCOUNTER — HOSPITAL ENCOUNTER (EMERGENCY)
Facility: HOSPITAL | Age: 59
Discharge: HOME | End: 2019-07-10
Attending: EMERGENCY MEDICINE | Admitting: EMERGENCY MEDICINE

## 2019-07-09 DIAGNOSIS — R07.9 CHEST PAIN, UNSPECIFIED TYPE: Primary | ICD-10-CM

## 2019-07-09 LAB
ALBUMIN SERPL-MCNC: 4.1 G/DL (ref 3.5–5)
ALBUMIN/GLOB SERPL: 1.4 G/DL (ref 1.1–2.5)
ALP SERPL-CCNC: 74 U/L (ref 24–120)
ALT SERPL W P-5'-P-CCNC: 23 U/L (ref 0–54)
ANION GAP SERPL CALCULATED.3IONS-SCNC: 13 MMOL/L (ref 4–13)
AST SERPL-CCNC: 28 U/L (ref 7–45)
BASOPHILS # BLD AUTO: 0.03 10*3/MM3 (ref 0–0.2)
BASOPHILS NFR BLD AUTO: 0.5 % (ref 0–2)
BILIRUB SERPL-MCNC: 1 MG/DL (ref 0.1–1)
BUN BLD-MCNC: 11 MG/DL (ref 5–21)
BUN/CREAT SERPL: 9.1 (ref 7–25)
CALCIUM SPEC-SCNC: 9.2 MG/DL (ref 8.4–10.4)
CHLORIDE SERPL-SCNC: 108 MMOL/L (ref 98–110)
CO2 SERPL-SCNC: 22 MMOL/L (ref 24–31)
CREAT BLD-MCNC: 1.21 MG/DL (ref 0.5–1.4)
DEPRECATED RDW RBC AUTO: 52.2 FL (ref 40–54)
EOSINOPHIL # BLD AUTO: 0.08 10*3/MM3 (ref 0–0.7)
EOSINOPHIL NFR BLD AUTO: 1.4 % (ref 0–4)
ERYTHROCYTE [DISTWIDTH] IN BLOOD BY AUTOMATED COUNT: 16.3 % (ref 12–15)
GFR SERPL CREATININE-BSD FRML MDRD: 74 ML/MIN/1.73
GLOBULIN UR ELPH-MCNC: 3 GM/DL
GLUCOSE BLD-MCNC: 122 MG/DL (ref 70–100)
HCT VFR BLD AUTO: 48.3 % (ref 40–52)
HGB BLD-MCNC: 15.8 G/DL (ref 14–18)
HOLD SPECIMEN: NORMAL
HOLD SPECIMEN: NORMAL
IMM GRANULOCYTES # BLD AUTO: 0.03 10*3/MM3 (ref 0–0.05)
IMM GRANULOCYTES NFR BLD AUTO: 0.5 % (ref 0–5)
LYMPHOCYTES # BLD AUTO: 2.05 10*3/MM3 (ref 0.72–4.86)
LYMPHOCYTES NFR BLD AUTO: 36.2 % (ref 15–45)
MAGNESIUM SERPL-MCNC: 2.3 MG/DL (ref 1.4–2.2)
MCH RBC QN AUTO: 28.8 PG (ref 28–32)
MCHC RBC AUTO-ENTMCNC: 32.7 G/DL (ref 33–36)
MCV RBC AUTO: 88.1 FL (ref 82–95)
MONOCYTES # BLD AUTO: 0.56 10*3/MM3 (ref 0.19–1.3)
MONOCYTES NFR BLD AUTO: 9.9 % (ref 4–12)
NEUTROPHILS # BLD AUTO: 2.91 10*3/MM3 (ref 1.87–8.4)
NEUTROPHILS NFR BLD AUTO: 51.5 % (ref 39–78)
NRBC BLD AUTO-RTO: 0 /100 WBC (ref 0–0.2)
NT-PROBNP SERPL-MCNC: 456 PG/ML (ref 0–900)
PHOSPHATE SERPL-MCNC: 3.1 MG/DL (ref 2.5–4.5)
PLATELET # BLD AUTO: 186 10*3/MM3 (ref 130–400)
PMV BLD AUTO: 10.9 FL (ref 6–12)
POTASSIUM BLD-SCNC: 4 MMOL/L (ref 3.5–5.3)
PROT SERPL-MCNC: 7.1 G/DL (ref 6.3–8.7)
RBC # BLD AUTO: 5.48 10*6/MM3 (ref 4.8–5.9)
SODIUM BLD-SCNC: 143 MMOL/L (ref 135–145)
TROPONIN I SERPL-MCNC: 0.04 NG/ML (ref 0–0.03)
WBC NRBC COR # BLD: 5.66 10*3/MM3 (ref 4.8–10.8)
WHOLE BLOOD HOLD SPECIMEN: NORMAL
WHOLE BLOOD HOLD SPECIMEN: NORMAL

## 2019-07-09 PROCEDURE — 71045 X-RAY EXAM CHEST 1 VIEW: CPT

## 2019-07-09 PROCEDURE — 85025 COMPLETE CBC W/AUTO DIFF WBC: CPT

## 2019-07-09 PROCEDURE — 80053 COMPREHEN METABOLIC PANEL: CPT | Performed by: EMERGENCY MEDICINE

## 2019-07-09 PROCEDURE — 99284 EMERGENCY DEPT VISIT MOD MDM: CPT

## 2019-07-09 PROCEDURE — 84484 ASSAY OF TROPONIN QUANT: CPT | Performed by: EMERGENCY MEDICINE

## 2019-07-09 PROCEDURE — 84100 ASSAY OF PHOSPHORUS: CPT | Performed by: EMERGENCY MEDICINE

## 2019-07-09 PROCEDURE — 93005 ELECTROCARDIOGRAM TRACING: CPT | Performed by: EMERGENCY MEDICINE

## 2019-07-09 PROCEDURE — 93005 ELECTROCARDIOGRAM TRACING: CPT

## 2019-07-09 PROCEDURE — 83880 ASSAY OF NATRIURETIC PEPTIDE: CPT | Performed by: EMERGENCY MEDICINE

## 2019-07-09 PROCEDURE — 83735 ASSAY OF MAGNESIUM: CPT | Performed by: EMERGENCY MEDICINE

## 2019-07-09 PROCEDURE — 93010 ELECTROCARDIOGRAM REPORT: CPT | Performed by: INTERNAL MEDICINE

## 2019-07-09 RX ORDER — ASPIRIN 81 MG/1
324 TABLET, CHEWABLE ORAL ONCE
Status: DISCONTINUED | OUTPATIENT
Start: 2019-07-09 | End: 2019-07-09

## 2019-07-09 RX ORDER — ASPIRIN 81 MG/1
162 TABLET, CHEWABLE ORAL ONCE
Status: DISCONTINUED | OUTPATIENT
Start: 2019-07-09 | End: 2019-07-09

## 2019-07-09 RX ORDER — SODIUM CHLORIDE 0.9 % (FLUSH) 0.9 %
10 SYRINGE (ML) INJECTION AS NEEDED
Status: DISCONTINUED | OUTPATIENT
Start: 2019-07-09 | End: 2019-07-10 | Stop reason: HOSPADM

## 2019-07-09 RX ORDER — ASPIRIN 81 MG/1
324 TABLET, CHEWABLE ORAL ONCE
Status: COMPLETED | OUTPATIENT
Start: 2019-07-09 | End: 2019-07-09

## 2019-07-09 RX ADMIN — ASPIRIN 81 MG 324 MG: 81 TABLET ORAL at 22:49

## 2019-07-10 VITALS
SYSTOLIC BLOOD PRESSURE: 150 MMHG | OXYGEN SATURATION: 95 % | HEART RATE: 52 BPM | HEIGHT: 75 IN | DIASTOLIC BLOOD PRESSURE: 83 MMHG | WEIGHT: 300 LBS | RESPIRATION RATE: 18 BRPM | TEMPERATURE: 97.8 F | BODY MASS INDEX: 37.3 KG/M2

## 2019-07-10 LAB — TROPONIN I SERPL-MCNC: 0.03 NG/ML (ref 0–0.03)

## 2019-07-10 PROCEDURE — 84484 ASSAY OF TROPONIN QUANT: CPT | Performed by: EMERGENCY MEDICINE

## 2019-07-10 PROCEDURE — 93005 ELECTROCARDIOGRAM TRACING: CPT | Performed by: EMERGENCY MEDICINE

## 2019-07-10 PROCEDURE — 93010 ELECTROCARDIOGRAM REPORT: CPT | Performed by: INTERNAL MEDICINE

## 2019-07-10 NOTE — ED PROVIDER NOTES
"Subjective   58 y/o male with history of AICD implanted for V-tach who sees Dr. Saleh arrives for evaluation of possible v-tach. He tells me that when he has episodes of v-tach he will often have tightness across his back. He noted this today and thus presents for evaluation. He denies any actual cp or SOB or any discharge from his defibrillator. He tells me he has been cath in the past and that his \"heart is fine.\" He denies any medicaiton changes and states he is taking his amio as directed. He denies any vomiting, diarrhea, fevers or chills. He arrives in NAD.             Family, social and past history reviewed as below, prior documentation of H and Ps and other documentation are reviewed:    Past Medical History:  No date: Hyperlipidemia  No date: Hypertension  No date: Hypertrophic cardiomyopathy (CMS/HCC)      Comment:  s/p AICD  No date: Migraine  No date: Tachycardia  No date: Ventricular tachycardia (CMS/HCC)  10/14/2016: Ventricular tachycardia, sustained (CMS/HCC)    Past Surgical History:  01/28/2016: CARDIAC ABLATION      Comment:  2/2016, 10/18/2016 (Dr. Doss in [place], [place])  No date: CARDIAC ABLATION      Comment:  2018  No date: CARDIAC CATHETERIZATION  No date: CARDIAC DEFIBRILLATOR PLACEMENT  No date: CARDIAC DEFIBRILLATOR PLACEMENT    Social History    Socioeconomic History      Marital status: Single      Spouse name: Not on file      Number of children: Not on file      Years of education: Not on file      Highest education level: Not on file    Tobacco Use      Smoking status: Never Smoker    Substance and Sexual Activity      Alcohol use: No      Drug use: No      Sexual activity: Defer      Family history: reviewed and noncontributory             Review of Systems   All other systems reviewed and are negative.      Past Medical History:   Diagnosis Date   • Hyperlipidemia    • Hypertension    • Hypertrophic cardiomyopathy (CMS/HCC)     s/p AICD   • Migraine    • Tachycardia    • " Ventricular tachycardia (CMS/HCC)    • Ventricular tachycardia, sustained (CMS/HCC) 10/14/2016       No Known Allergies    Past Surgical History:   Procedure Laterality Date   • CARDIAC ABLATION  01/28/2016 2/2016, 10/18/2016 (Dr. Doss in [place], [place])   • CARDIAC ABLATION      2018   • CARDIAC CATHETERIZATION     • CARDIAC DEFIBRILLATOR PLACEMENT     • CARDIAC DEFIBRILLATOR PLACEMENT         Family History   Family history unknown: Yes       Social History     Socioeconomic History   • Marital status: Single     Spouse name: Not on file   • Number of children: Not on file   • Years of education: Not on file   • Highest education level: Not on file   Tobacco Use   • Smoking status: Never Smoker   Substance and Sexual Activity   • Alcohol use: No   • Drug use: No   • Sexual activity: Defer           Objective   Physical Exam   Constitutional: He is oriented to person, place, and time. He appears well-developed and well-nourished.   HENT:   Head: Normocephalic.   Nose: Nose normal.   Eyes: Conjunctivae and EOM are normal. Pupils are equal, round, and reactive to light.   Neck: Normal range of motion. Neck supple.   Cardiovascular: Regular rhythm, normal heart sounds and intact distal pulses.   Bradycardia    Pulmonary/Chest: Effort normal and breath sounds normal.   Abdominal: Soft. Bowel sounds are normal.   Musculoskeletal: Normal range of motion.   Neurological: He is alert and oriented to person, place, and time.   Skin: Skin is warm. Capillary refill takes less than 2 seconds.   Psychiatric: He has a normal mood and affect. His behavior is normal.   Vitals reviewed.      ECG 12 Lead    Date/Time: 7/10/2019 10:41 PM  Performed by: Mauro Sage MD  Authorized by: Mauro Sage MD   Interpreted by physician  Rhythm: paced  Rate: normal  BPM: 73  QRS axis: normal      ECG 12 Lead    Date/Time: 7/10/2019 1:26 AM  Performed by: Mauro Sage MD  Authorized by: Mauro Sage MD    Interpreted by physician  Rhythm: paced  Rate: bradycardic  BPM: 52  QRS axis: normal                   ED Course      No V-tach on interrogation.     CE and EKG x2 are unrevealing (of note he has a baseline grey zone trop which is unchanged) He has never had CP and is actually sleeping soundly on re-evaluation. I do feel he is safe for Dc.         XR Chest 1 View   ED Interpretation   nad        Labs Reviewed   COMPREHENSIVE METABOLIC PANEL - Abnormal; Notable for the following components:       Result Value    Glucose 122 (*)     CO2 22.0 (*)     All other components within normal limits    Narrative:     GFR Normal >60  Chronic Kidney Disease <60  Kidney Failure <15   TROPONIN (IN-HOUSE) - Abnormal; Notable for the following components:    Troponin I 0.035 (*)     All other components within normal limits   CBC WITH AUTO DIFFERENTIAL - Abnormal; Notable for the following components:    MCHC 32.7 (*)     RDW 16.3 (*)     All other components within normal limits   MAGNESIUM - Abnormal; Notable for the following components:    Magnesium 2.3 (*)     All other components within normal limits   TROPONIN (IN-HOUSE) - Normal   BNP (IN-HOUSE) - Normal   PHOSPHORUS - Normal   RAINBOW DRAW    Narrative:     The following orders were created for panel order [place] Draw.  Procedure                               Abnormality         Status                     ---------                               -----------         ------                     Light Blue Top[[id number]]                                   Final result               Green Top (Gel)[[id number]]                                  Final result               Lavender Top[[id number]]                                     Final result               Red Top[[id number]]                                          Final result                 Please view results for these tests on the individual orders.   CBC AND DIFFERENTIAL    Narrative:     The following orders were created  for panel order CBC & Differential.  Procedure                               Abnormality         Status                     ---------                               -----------         ------                     CBC Auto Differential[[id number]]        Abnormal            Final result                 Please view results for these tests on the individual orders.   LIGHT BLUE TOP   GREEN TOP   LAVENDER TOP   RED TOP               HEART Score (for prediction of 6-week risk of major adverse cardiac event) reviewed and/or performed as part of the patient evaluation and treatment planning process.  The result associated with this review/performance is: 3       MDM      Final diagnoses:   Chest pain, unspecified type            Mauro Sage MD  07/10/19 0224

## 2019-07-10 NOTE — ED NOTES
[employer] PACEMAKER INTERROGATED AT BEDSIDE AND FAXED FOR READING.     Tom Posadas RN  07/09/19 3820

## 2019-07-10 NOTE — ED NOTES
PT INITIALLY REQUESTED IV TO BE REMOVED DUE TO PAIN AT THE SITE, BUT UPON STAFF REENTERING THE ROOM TO REMOVE AND RESTART THE IV THE PATIENT STATES THE PAIN HAS RESOLVED AND HE DOES NOT WISH FOR IT TO BE MOVED.  PT IN NO ACUTE DISTRESS AND IS RESTING IN POSITION OF COMFORT WITH FAMILY AT BEDSIDE.      Tom Posadas, RN  07/09/19 8579

## 2019-07-12 ENCOUNTER — HOSPITAL ENCOUNTER (EMERGENCY)
Facility: HOSPITAL | Age: 59
Discharge: HOME | End: 2019-07-12
Attending: FAMILY MEDICINE | Admitting: FAMILY MEDICINE

## 2019-07-12 ENCOUNTER — APPOINTMENT (OUTPATIENT)
Dept: ULTRASOUND IMAGING | Facility: HOSPITAL | Age: 59
End: 2019-07-12

## 2019-07-12 VITALS
SYSTOLIC BLOOD PRESSURE: 175 MMHG | RESPIRATION RATE: 16 BRPM | HEIGHT: 75 IN | DIASTOLIC BLOOD PRESSURE: 70 MMHG | HEART RATE: 70 BPM | TEMPERATURE: 98 F | WEIGHT: 300 LBS | BODY MASS INDEX: 37.3 KG/M2 | OXYGEN SATURATION: 99 %

## 2019-07-12 DIAGNOSIS — S86.112A STRAIN OF GASTROCNEMIUS MUSCLE OF LEFT LOWER EXTREMITY, INITIAL ENCOUNTER: Primary | ICD-10-CM

## 2019-07-12 PROCEDURE — 93970 EXTREMITY STUDY: CPT

## 2019-07-12 PROCEDURE — 99284 EMERGENCY DEPT VISIT MOD MDM: CPT

## 2019-07-12 PROCEDURE — 99283 EMERGENCY DEPT VISIT LOW MDM: CPT

## 2019-07-12 PROCEDURE — 93970 EXTREMITY STUDY: CPT | Performed by: SURGERY

## 2019-07-12 RX ORDER — IBUPROFEN 600 MG/1
600 TABLET ORAL EVERY 6 HOURS PRN
Qty: 20 TABLET | Refills: 0 | Status: SHIPPED | OUTPATIENT
Start: 2019-07-12

## 2019-07-12 RX ORDER — IBUPROFEN 800 MG/1
800 TABLET ORAL ONCE
Status: COMPLETED | OUTPATIENT
Start: 2019-07-12 | End: 2019-07-12

## 2019-07-12 RX ADMIN — IBUPROFEN 800 MG: 800 TABLET, FILM COATED ORAL at 14:49

## 2019-07-12 NOTE — ED PROVIDER NOTES
Subjective   Patient is a 59-year-old gentleman who today was walking when he felt a sudden pop in his cough muscle and since he has had difficulty ambulating.  Denies any chest pain shortness of breath fever or other systemic symptoms            Review of Systems   Musculoskeletal: Positive for myalgias.   All other systems reviewed and are negative.      Past Medical History:   Diagnosis Date   • Hyperlipidemia    • Hypertension    • Hypertrophic cardiomyopathy (CMS/HCC)     s/p AICD   • Migraine    • Tachycardia    • Ventricular tachycardia (CMS/HCC)    • Ventricular tachycardia, sustained (CMS/HCC) 10/14/2016       No Known Allergies    Past Surgical History:   Procedure Laterality Date   • CARDIAC ABLATION  01/28/2016 2/2016, 10/18/2016 (Dr. Doss in [place], [place])   • CARDIAC ABLATION      2018   • CARDIAC CATHETERIZATION     • CARDIAC DEFIBRILLATOR PLACEMENT     • CARDIAC DEFIBRILLATOR PLACEMENT         Family History   Family history unknown: Yes       Social History     Socioeconomic History   • Marital status: Single     Spouse name: Not on file   • Number of children: Not on file   • Years of education: Not on file   • Highest education level: Not on file   Tobacco Use   • Smoking status: Never Smoker   Substance and Sexual Activity   • Alcohol use: No   • Drug use: No   • Sexual activity: Defer           Objective   Physical Exam   Constitutional: He is oriented to person, place, and time. He appears well-developed and well-nourished.   HENT:   Head: Normocephalic and atraumatic.   Eyes: Conjunctivae and EOM are normal.   Neck: Normal range of motion. Neck supple.   Cardiovascular: Normal rate and regular rhythm.   Pulmonary/Chest: Effort normal.   Musculoskeletal:   Cantu test was negative for Achilles tendon rupture.  There is focal tenderness and the belly of the gastrocnemius muscle   Neurological: He is alert and oriented to person, place, and time.   Skin: Skin is warm and dry.    Psychiatric: He has a normal mood and affect. His behavior is normal.   Nursing note and vitals reviewed.      Procedures           ED Course                  MDM    Clinical presentation and the story is compatible with a tear of the gastrocnemius muscle will treat as such with rest anti-inflammatories and ice.  Venous duplex was negative for DVT.  The patient was discharged in stable condition    Final diagnoses:   Strain of gastrocnemius muscle of left lower extremity, initial encounter            Brett Mendez MD  07/12/19 1922

## 2019-07-17 NOTE — ED NOTES
"ED Call Back Questions    1. How are you doing since leaving the Emergency Department? Doing better than I was, but I still have pain     2. Do you have any questions about your discharge instructions? No     3. Have you filled your new prescriptions yet? Yes   a. Do you have any questions about those medications? No     4. Were you able to make a follow-up appointment with the physician? Yes     5. Do you have a primary care physician? Yes   a. If No, would you like for me to set you up with one? N/A  i. If Yes, “I will have our ED  give you a call right back at this number to work with you on the best time for an appointment.”    6. We are always looking to get better at what we do. Do you have any suggestions for what we can do to be even better? N/A  a. If Yes, \"Thank you for sharing your concerns. I apologize. I will follow up with our manager and patient . Would you like someone to call you back?\" N/A    7. Is there anything else I can do for you? N/A good visit       Volodymyr Resendiz  07/17/19 3619    "

## 2019-07-18 ENCOUNTER — HOSPITAL ENCOUNTER (EMERGENCY)
Age: 59
Discharge: HOME | End: 2019-07-18
Attending: EMERGENCY MEDICINE
Payer: MEDICARE

## 2019-07-18 ENCOUNTER — APPOINTMENT (OUTPATIENT)
Dept: CT IMAGING | Age: 59
End: 2019-07-18
Payer: MEDICARE

## 2019-07-18 VITALS
SYSTOLIC BLOOD PRESSURE: 158 MMHG | RESPIRATION RATE: 20 BRPM | OXYGEN SATURATION: 98 % | HEIGHT: 75 IN | HEART RATE: 78 BPM | DIASTOLIC BLOOD PRESSURE: 88 MMHG | BODY MASS INDEX: 36.68 KG/M2 | WEIGHT: 295 LBS | TEMPERATURE: 98 F

## 2019-07-18 DIAGNOSIS — L03.221 CELLULITIS OF NECK: Primary | ICD-10-CM

## 2019-07-18 LAB
ALBUMIN SERPL-MCNC: 3.6 G/DL (ref 3.5–5.2)
ALP BLD-CCNC: 67 U/L (ref 40–130)
ALT SERPL-CCNC: 17 U/L (ref 5–41)
ANION GAP SERPL CALCULATED.3IONS-SCNC: 11 MMOL/L (ref 7–19)
AST SERPL-CCNC: 17 U/L (ref 5–40)
BASOPHILS ABSOLUTE: 0 K/UL (ref 0–0.2)
BASOPHILS RELATIVE PERCENT: 0.6 % (ref 0–1)
BILIRUB SERPL-MCNC: 0.9 MG/DL (ref 0.2–1.2)
BUN BLDV-MCNC: 15 MG/DL (ref 8–23)
CALCIUM SERPL-MCNC: 8.9 MG/DL (ref 8.8–10.2)
CHLORIDE BLD-SCNC: 106 MMOL/L (ref 98–111)
CO2: 20 MMOL/L (ref 22–29)
CREAT SERPL-MCNC: 1.2 MG/DL (ref 0.5–1.2)
EOSINOPHILS ABSOLUTE: 0.1 K/UL (ref 0–0.6)
EOSINOPHILS RELATIVE PERCENT: 0.8 % (ref 0–5)
GFR NON-AFRICAN AMERICAN: >60
GLUCOSE BLD-MCNC: 116 MG/DL (ref 74–109)
HCT VFR BLD CALC: 47.7 % (ref 42–52)
HEMOGLOBIN: 15.3 G/DL (ref 14–18)
LACTIC ACID: 2.5 MMOL/L (ref 0.5–1.9)
LYMPHOCYTES ABSOLUTE: 1.4 K/UL (ref 1.1–4.5)
LYMPHOCYTES RELATIVE PERCENT: 21.9 % (ref 20–40)
MCH RBC QN AUTO: 29.3 PG (ref 27–31)
MCHC RBC AUTO-ENTMCNC: 32.1 G/DL (ref 33–37)
MCV RBC AUTO: 91.4 FL (ref 80–94)
MONOCYTES ABSOLUTE: 0.5 K/UL (ref 0–0.9)
MONOCYTES RELATIVE PERCENT: 7.4 % (ref 0–10)
NEUTROPHILS ABSOLUTE: 4.4 K/UL (ref 1.5–7.5)
NEUTROPHILS RELATIVE PERCENT: 68.8 % (ref 50–65)
PDW BLD-RTO: 16.3 % (ref 11.5–14.5)
PLATELET # BLD: 146 K/UL (ref 130–400)
PMV BLD AUTO: 11.6 FL (ref 9.4–12.4)
POTASSIUM SERPL-SCNC: 4 MMOL/L (ref 3.5–5)
RBC # BLD: 5.22 M/UL (ref 4.7–6.1)
SODIUM BLD-SCNC: 137 MMOL/L (ref 136–145)
TOTAL PROTEIN: 6.3 G/DL (ref 6.6–8.7)
WBC # BLD: 6.4 K/UL (ref 4.8–10.8)

## 2019-07-18 PROCEDURE — 99283 EMERGENCY DEPT VISIT LOW MDM: CPT

## 2019-07-18 PROCEDURE — 80053 COMPREHEN METABOLIC PANEL: CPT

## 2019-07-18 PROCEDURE — 6360000004 HC RX CONTRAST MEDICATION: Performed by: EMERGENCY MEDICINE

## 2019-07-18 PROCEDURE — 36415 COLL VENOUS BLD VENIPUNCTURE: CPT

## 2019-07-18 PROCEDURE — 99284 EMERGENCY DEPT VISIT MOD MDM: CPT | Performed by: EMERGENCY MEDICINE

## 2019-07-18 PROCEDURE — 83605 ASSAY OF LACTIC ACID: CPT

## 2019-07-18 PROCEDURE — 85025 COMPLETE CBC W/AUTO DIFF WBC: CPT

## 2019-07-18 PROCEDURE — 70491 CT SOFT TISSUE NECK W/DYE: CPT

## 2019-07-18 RX ORDER — LISINOPRIL 20 MG/1
TABLET ORAL
COMMUNITY
Start: 2019-07-04

## 2019-07-18 RX ORDER — FUROSEMIDE 20 MG/1
TABLET ORAL
COMMUNITY
Start: 2019-07-01 | End: 2019-12-16 | Stop reason: ALTCHOICE

## 2019-07-18 RX ORDER — HYDROCODONE BITARTRATE AND ACETAMINOPHEN 5; 325 MG/1; MG/1
1 TABLET ORAL EVERY 6 HOURS PRN
Qty: 10 TABLET | Refills: 0 | Status: SHIPPED | OUTPATIENT
Start: 2019-07-18 | End: 2019-07-25

## 2019-07-18 RX ORDER — AMIODARONE HYDROCHLORIDE 400 MG/1
400 TABLET ORAL DAILY
Status: ON HOLD | COMMUNITY
Start: 2015-12-18 | End: 2022-03-17

## 2019-07-18 RX ORDER — SULFAMETHOXAZOLE AND TRIMETHOPRIM 800; 160 MG/1; MG/1
1 TABLET ORAL 2 TIMES DAILY
Qty: 20 TABLET | Refills: 0 | Status: ON HOLD | OUTPATIENT
Start: 2019-07-18 | End: 2019-07-29 | Stop reason: HOSPADM

## 2019-07-18 RX ADMIN — IOPAMIDOL 90 ML: 755 INJECTION, SOLUTION INTRAVENOUS at 21:36

## 2019-07-18 SDOH — HEALTH STABILITY: MENTAL HEALTH: HOW OFTEN DO YOU HAVE A DRINK CONTAINING ALCOHOL?: NEVER

## 2019-07-18 ASSESSMENT — ENCOUNTER SYMPTOMS
TROUBLE SWALLOWING: 0
FACIAL SWELLING: 1
SHORTNESS OF BREATH: 0
VOICE CHANGE: 0
SORE THROAT: 0

## 2019-07-19 NOTE — ED PROVIDER NOTES
Vitals:    Vitals:    07/18/19 1835 07/18/19 2134 07/18/19 2202 07/18/19 2258   BP: (!) 185/97 (!) 143/88 (!) 154/83 (!) 158/88   Pulse: 83   78   Resp: 17   20   Temp: 98.5 °F (36.9 °C)   98 °F (36.7 °C)   TempSrc: Oral      SpO2: 100% 99% 96% 98%   Weight: 295 lb (133.8 kg)      Height: 6' 3\" (1.905 m)          MDM    Reassessment    I discussed with patient the importance of returning to the ED should his swelling become worse. He was started on oral antibiotics. PROCEDURES:  Unless otherwise noted below, none     Procedures    FINAL IMPRESSION      1. Cellulitis of neck          DISPOSITION/PLAN   DISPOSITION Decision To Discharge 07/18/2019 10:52:15 PM      PATIENT REFERRED TO:  Emiliano Carl, APRN  [address]  [place] [address]  [phone]            DISCHARGE MEDICATIONS:  Discharge Medication List as of 7/18/2019 10:54 PM      START taking these medications    Details   sulfamethoxazole-trimethoprim (BACTRIM DS) 800-160 MG per tablet Take 1 tablet by mouth 2 times daily for 10 days, Disp-20 tablet, R-0Print      HYDROcodone-acetaminophen (NORCO) 5-325 MG per tablet Take 1 tablet by mouth every 6 hours as needed for Pain for up to 7 days. , Disp-10 tablet, R-0Print                (Please note that portions of this note were completed with a voice recognition program.  Efforts were made to edit thedictations but occasionally words are mis-transcribed.)    Riri Van MD (electronically signed)  Attending Emergency Physician         Riri Van MD  07/18/19 8364

## 2019-07-26 ENCOUNTER — HOSPITAL ENCOUNTER (OUTPATIENT)
Age: 59
Setting detail: OBSERVATION
Discharge: HOME | End: 2019-07-29
Attending: EMERGENCY MEDICINE | Admitting: HOSPITALIST
Payer: MEDICARE

## 2019-07-26 ENCOUNTER — APPOINTMENT (OUTPATIENT)
Dept: CT IMAGING | Age: 59
End: 2019-07-26
Payer: MEDICARE

## 2019-07-26 DIAGNOSIS — L03.221 CELLULITIS OF NECK: Primary | ICD-10-CM

## 2019-07-26 PROBLEM — E87.20 LACTIC ACIDOSIS: Status: ACTIVE | Noted: 2019-07-26

## 2019-07-26 PROBLEM — I42.2 HYPERTROPHIC CARDIOMYOPATHY (HCC): Status: ACTIVE | Noted: 2019-07-26

## 2019-07-26 LAB
ALBUMIN SERPL-MCNC: 3.8 G/DL (ref 3.5–5.2)
ALP BLD-CCNC: 82 U/L (ref 40–130)
ALT SERPL-CCNC: 20 U/L (ref 5–41)
ANION GAP SERPL CALCULATED.3IONS-SCNC: 11 MMOL/L (ref 7–19)
AST SERPL-CCNC: 22 U/L (ref 5–40)
BASOPHILS ABSOLUTE: 0 K/UL (ref 0–0.2)
BASOPHILS RELATIVE PERCENT: 0.7 % (ref 0–1)
BILIRUB SERPL-MCNC: 0.3 MG/DL (ref 0.2–1.2)
BUN BLDV-MCNC: 13 MG/DL (ref 8–23)
C-REACTIVE PROTEIN: 1.58 MG/DL (ref 0–0.5)
CALCIUM SERPL-MCNC: 8.9 MG/DL (ref 8.8–10.2)
CHLORIDE BLD-SCNC: 107 MMOL/L (ref 98–111)
CO2: 21 MMOL/L (ref 22–29)
CREAT SERPL-MCNC: 1.1 MG/DL (ref 0.5–1.2)
EOSINOPHILS ABSOLUTE: 0.1 K/UL (ref 0–0.6)
EOSINOPHILS RELATIVE PERCENT: 2.1 % (ref 0–5)
GFR NON-AFRICAN AMERICAN: >60
GLUCOSE BLD-MCNC: 164 MG/DL (ref 74–109)
HCT VFR BLD CALC: 46.7 % (ref 42–52)
HEMOGLOBIN: 15 G/DL (ref 14–18)
LACTIC ACID: 2.1 MMOL/L (ref 0.5–1.9)
LYMPHOCYTES ABSOLUTE: 1.2 K/UL (ref 1.1–4.5)
LYMPHOCYTES RELATIVE PERCENT: 28.5 % (ref 20–40)
MCH RBC QN AUTO: 29.3 PG (ref 27–31)
MCHC RBC AUTO-ENTMCNC: 32.1 G/DL (ref 33–37)
MCV RBC AUTO: 91.2 FL (ref 80–94)
MONOCYTES ABSOLUTE: 0.2 K/UL (ref 0–0.9)
MONOCYTES RELATIVE PERCENT: 4.9 % (ref 0–10)
NEUTROPHILS ABSOLUTE: 2.7 K/UL (ref 1.5–7.5)
NEUTROPHILS RELATIVE PERCENT: 63.1 % (ref 50–65)
PDW BLD-RTO: 16.6 % (ref 11.5–14.5)
PLATELET # BLD: 159 K/UL (ref 130–400)
PMV BLD AUTO: 11.4 FL (ref 9.4–12.4)
POTASSIUM SERPL-SCNC: 4.1 MMOL/L (ref 3.5–5)
RBC # BLD: 5.12 M/UL (ref 4.7–6.1)
SODIUM BLD-SCNC: 139 MMOL/L (ref 136–145)
TOTAL PROTEIN: 6.7 G/DL (ref 6.6–8.7)
WBC # BLD: 4.3 K/UL (ref 4.8–10.8)

## 2019-07-26 PROCEDURE — G0378 HOSPITAL OBSERVATION PER HR: HCPCS

## 2019-07-26 PROCEDURE — 83605 ASSAY OF LACTIC ACID: CPT

## 2019-07-26 PROCEDURE — 36415 COLL VENOUS BLD VENIPUNCTURE: CPT

## 2019-07-26 PROCEDURE — 6360000004 HC RX CONTRAST MEDICATION: Performed by: NURSE PRACTITIONER

## 2019-07-26 PROCEDURE — 96375 TX/PRO/DX INJ NEW DRUG ADDON: CPT

## 2019-07-26 PROCEDURE — 6360000002 HC RX W HCPCS: Performed by: FAMILY MEDICINE

## 2019-07-26 PROCEDURE — 70491 CT SOFT TISSUE NECK W/DYE: CPT

## 2019-07-26 PROCEDURE — 85025 COMPLETE CBC W/AUTO DIFF WBC: CPT

## 2019-07-26 PROCEDURE — 99222 1ST HOSP IP/OBS MODERATE 55: CPT | Performed by: FAMILY MEDICINE

## 2019-07-26 PROCEDURE — 2580000003 HC RX 258: Performed by: NURSE PRACTITIONER

## 2019-07-26 PROCEDURE — 96374 THER/PROPH/DIAG INJ IV PUSH: CPT

## 2019-07-26 PROCEDURE — 80053 COMPREHEN METABOLIC PANEL: CPT

## 2019-07-26 PROCEDURE — 6370000000 HC RX 637 (ALT 250 FOR IP): Performed by: FAMILY MEDICINE

## 2019-07-26 PROCEDURE — 99285 EMERGENCY DEPT VISIT HI MDM: CPT | Performed by: EMERGENCY MEDICINE

## 2019-07-26 PROCEDURE — 86140 C-REACTIVE PROTEIN: CPT

## 2019-07-26 PROCEDURE — 99285 EMERGENCY DEPT VISIT HI MDM: CPT

## 2019-07-26 PROCEDURE — 2580000003 HC RX 258: Performed by: FAMILY MEDICINE

## 2019-07-26 PROCEDURE — 96366 THER/PROPH/DIAG IV INF ADDON: CPT

## 2019-07-26 PROCEDURE — 96365 THER/PROPH/DIAG IV INF INIT: CPT

## 2019-07-26 PROCEDURE — 6360000002 HC RX W HCPCS: Performed by: NURSE PRACTITIONER

## 2019-07-26 RX ORDER — HYDROCODONE BITARTRATE AND ACETAMINOPHEN 5; 325 MG/1; MG/1
1 TABLET ORAL EVERY 4 HOURS PRN
Status: DISCONTINUED | OUTPATIENT
Start: 2019-07-26 | End: 2019-07-29 | Stop reason: HOSPADM

## 2019-07-26 RX ORDER — CLONIDINE HYDROCHLORIDE 0.1 MG/1
0.1 TABLET ORAL 2 TIMES DAILY
Status: DISCONTINUED | OUTPATIENT
Start: 2019-07-26 | End: 2019-07-29 | Stop reason: HOSPADM

## 2019-07-26 RX ORDER — CEFEPIME HYDROCHLORIDE 2 G/50ML
2 INJECTION, SOLUTION INTRAVENOUS EVERY 12 HOURS
Status: DISCONTINUED | OUTPATIENT
Start: 2019-07-26 | End: 2019-07-26 | Stop reason: SDUPTHER

## 2019-07-26 RX ORDER — ASPIRIN 81 MG/1
162 TABLET, CHEWABLE ORAL DAILY
Status: DISCONTINUED | OUTPATIENT
Start: 2019-07-27 | End: 2019-07-29 | Stop reason: HOSPADM

## 2019-07-26 RX ORDER — ACETAMINOPHEN 325 MG/1
650 TABLET ORAL EVERY 6 HOURS PRN
Status: DISCONTINUED | OUTPATIENT
Start: 2019-07-26 | End: 2019-07-29 | Stop reason: HOSPADM

## 2019-07-26 RX ORDER — FUROSEMIDE 20 MG/1
20 TABLET ORAL DAILY
Status: DISCONTINUED | OUTPATIENT
Start: 2019-07-27 | End: 2019-07-29 | Stop reason: HOSPADM

## 2019-07-26 RX ORDER — SODIUM CHLORIDE 0.9 % (FLUSH) 0.9 %
10 SYRINGE (ML) INJECTION EVERY 12 HOURS SCHEDULED
Status: DISCONTINUED | OUTPATIENT
Start: 2019-07-26 | End: 2019-07-29 | Stop reason: HOSPADM

## 2019-07-26 RX ORDER — ONDANSETRON 2 MG/ML
4 INJECTION INTRAMUSCULAR; INTRAVENOUS EVERY 6 HOURS PRN
Status: DISCONTINUED | OUTPATIENT
Start: 2019-07-26 | End: 2019-07-29 | Stop reason: HOSPADM

## 2019-07-26 RX ORDER — SIMVASTATIN 20 MG
20 TABLET ORAL NIGHTLY
Status: DISCONTINUED | OUTPATIENT
Start: 2019-07-26 | End: 2019-07-29 | Stop reason: HOSPADM

## 2019-07-26 RX ORDER — LISINOPRIL 20 MG/1
20 TABLET ORAL DAILY
Status: DISCONTINUED | OUTPATIENT
Start: 2019-07-27 | End: 2019-07-29 | Stop reason: HOSPADM

## 2019-07-26 RX ORDER — AMIODARONE HYDROCHLORIDE 200 MG/1
200 TABLET ORAL 2 TIMES DAILY
Status: DISCONTINUED | OUTPATIENT
Start: 2019-07-26 | End: 2019-07-29 | Stop reason: HOSPADM

## 2019-07-26 RX ORDER — HYDROCODONE BITARTRATE AND ACETAMINOPHEN 5; 325 MG/1; MG/1
2 TABLET ORAL EVERY 4 HOURS PRN
Status: DISCONTINUED | OUTPATIENT
Start: 2019-07-26 | End: 2019-07-29 | Stop reason: HOSPADM

## 2019-07-26 RX ORDER — SODIUM CHLORIDE 0.9 % (FLUSH) 0.9 %
10 SYRINGE (ML) INJECTION PRN
Status: DISCONTINUED | OUTPATIENT
Start: 2019-07-26 | End: 2019-07-29 | Stop reason: HOSPADM

## 2019-07-26 RX ADMIN — AMIODARONE HYDROCHLORIDE 200 MG: 200 TABLET ORAL at 22:19

## 2019-07-26 RX ADMIN — Medication 1000 MG: at 19:16

## 2019-07-26 RX ADMIN — VANCOMYCIN HYDROCHLORIDE 750 MG: 750 INJECTION, POWDER, LYOPHILIZED, FOR SOLUTION INTRAVENOUS at 22:19

## 2019-07-26 RX ADMIN — CLONIDINE HYDROCHLORIDE 0.1 MG: 0.1 TABLET ORAL at 22:18

## 2019-07-26 RX ADMIN — IOPAMIDOL 90 ML: 755 INJECTION, SOLUTION INTRAVENOUS at 18:39

## 2019-07-26 RX ADMIN — SIMVASTATIN 20 MG: 20 TABLET, FILM COATED ORAL at 22:19

## 2019-07-26 RX ADMIN — CEFEPIME HYDROCHLORIDE 2 G: 2 INJECTION, POWDER, FOR SOLUTION INTRAVENOUS at 19:16

## 2019-07-26 RX ADMIN — Medication 10 ML: at 22:19

## 2019-07-26 ASSESSMENT — ENCOUNTER SYMPTOMS
VOMITING: 0
TROUBLE SWALLOWING: 0

## 2019-07-26 ASSESSMENT — PAIN SCALES - GENERAL
PAINLEVEL_OUTOF10: 9
PAINLEVEL_OUTOF10: 8

## 2019-07-26 NOTE — ED NOTES
Pt transported to CT with radiology tech via ER chair.      Abisai, [facility]  07/26/19 8380

## 2019-07-26 NOTE — ED PROVIDER NOTES
(PACERONE) 400 MG tablet Take 1 tablet three times a day for one week then take 1 tablet twice a dayHistorical Med      lisinopril (PRINIVIL;ZESTRIL) 20 MG tablet lisinopril 20 mg tabletHistorical Med      furosemide (LASIX) 20 MG tablet furosemide 20 mg tabletHistorical Med      aspirin 81 MG tablet Take 162 mg by mouth dailyHistorical Med      simvastatin (ZOCOR) 20 MG tablet Take 20 mg by mouth nightlyHistorical Med      cloNIDine (CATAPRES) 0.1 MG tablet Take 0.1 mg by mouth 2 times dailyHistorical Med             ALLERGIES     Patient has no known allergies. FAMILY HISTORY     No family history on file.        SOCIAL HISTORY       Social History     Socioeconomic History    Marital status: Single     Spouse name: Not on file    Number of children: Not on file    Years of education: Not on file    Highest education level: Not on file   Occupational History    Not on file   Social Needs    Financial resource strain: Not on file    Food insecurity:     Worry: Not on file     Inability: Not on file    Transportation needs:     Medical: Not on file     Non-medical: Not on file   Tobacco Use    Smoking status: Never Smoker    Smokeless tobacco: Never Used   Substance and Sexual Activity    Alcohol use: Never     Frequency: Never    Drug use: Never    Sexual activity: Not on file   Lifestyle    Physical activity:     Days per week: Not on file     Minutes per session: Not on file    Stress: Not on file   Relationships    Social connections:     Talks on phone: Not on file     Gets together: Not on file     Attends Sabianist service: Not on file     Active member of club or organization: Not on file     Attends meetings of clubs or organizations: Not on file     Relationship status: Not on file    Intimate partner violence:     Fear of current or ex partner: Not on file     Emotionally abused: Not on file     Physically abused: Not on file     Forced sexual activity: Not on file   Other Topics

## 2019-07-27 LAB
ANION GAP SERPL CALCULATED.3IONS-SCNC: 13 MMOL/L (ref 7–19)
BASOPHILS ABSOLUTE: 0 K/UL (ref 0–0.2)
BASOPHILS RELATIVE PERCENT: 0.9 % (ref 0–1)
BUN BLDV-MCNC: 12 MG/DL (ref 8–23)
C-REACTIVE PROTEIN: 1.22 MG/DL (ref 0–0.5)
CALCIUM SERPL-MCNC: 8.8 MG/DL (ref 8.8–10.2)
CHLORIDE BLD-SCNC: 107 MMOL/L (ref 98–111)
CO2: 21 MMOL/L (ref 22–29)
CREAT SERPL-MCNC: 1 MG/DL (ref 0.5–1.2)
EOSINOPHILS ABSOLUTE: 0.1 K/UL (ref 0–0.6)
EOSINOPHILS RELATIVE PERCENT: 2.4 % (ref 0–5)
GFR NON-AFRICAN AMERICAN: >60
GLUCOSE BLD-MCNC: 107 MG/DL (ref 70–99)
GLUCOSE BLD-MCNC: 95 MG/DL (ref 74–109)
HCT VFR BLD CALC: 45 % (ref 42–52)
HEMOGLOBIN: 14.5 G/DL (ref 14–18)
LYMPHOCYTES ABSOLUTE: 1.9 K/UL (ref 1.1–4.5)
LYMPHOCYTES RELATIVE PERCENT: 40.2 % (ref 20–40)
MCH RBC QN AUTO: 29.8 PG (ref 27–31)
MCHC RBC AUTO-ENTMCNC: 32.2 G/DL (ref 33–37)
MCV RBC AUTO: 92.4 FL (ref 80–94)
MONOCYTES ABSOLUTE: 0.4 K/UL (ref 0–0.9)
MONOCYTES RELATIVE PERCENT: 9.4 % (ref 0–10)
NEUTROPHILS ABSOLUTE: 2.2 K/UL (ref 1.5–7.5)
NEUTROPHILS RELATIVE PERCENT: 46.2 % (ref 50–65)
PDW BLD-RTO: 16.7 % (ref 11.5–14.5)
PERFORMED ON: ABNORMAL
PLATELET # BLD: 151 K/UL (ref 130–400)
PMV BLD AUTO: 12.5 FL (ref 9.4–12.4)
POTASSIUM REFLEX MAGNESIUM: 4.7 MMOL/L (ref 3.5–5)
RBC # BLD: 4.87 M/UL (ref 4.7–6.1)
SODIUM BLD-SCNC: 141 MMOL/L (ref 136–145)
WBC # BLD: 4.7 K/UL (ref 4.8–10.8)

## 2019-07-27 PROCEDURE — 85025 COMPLETE CBC W/AUTO DIFF WBC: CPT

## 2019-07-27 PROCEDURE — G0378 HOSPITAL OBSERVATION PER HR: HCPCS

## 2019-07-27 PROCEDURE — 2580000003 HC RX 258: Performed by: FAMILY MEDICINE

## 2019-07-27 PROCEDURE — 96372 THER/PROPH/DIAG INJ SC/IM: CPT

## 2019-07-27 PROCEDURE — 36415 COLL VENOUS BLD VENIPUNCTURE: CPT

## 2019-07-27 PROCEDURE — 6370000000 HC RX 637 (ALT 250 FOR IP): Performed by: FAMILY MEDICINE

## 2019-07-27 PROCEDURE — 6360000002 HC RX W HCPCS: Performed by: FAMILY MEDICINE

## 2019-07-27 PROCEDURE — 6360000002 HC RX W HCPCS: Performed by: NURSE PRACTITIONER

## 2019-07-27 PROCEDURE — 99226 PR SBSQ OBSERVATION CARE/DAY 35 MINUTES: CPT | Performed by: HOSPITALIST

## 2019-07-27 PROCEDURE — 2580000003 HC RX 258: Performed by: NURSE PRACTITIONER

## 2019-07-27 PROCEDURE — 82948 REAGENT STRIP/BLOOD GLUCOSE: CPT

## 2019-07-27 PROCEDURE — 96366 THER/PROPH/DIAG IV INF ADDON: CPT

## 2019-07-27 PROCEDURE — 86140 C-REACTIVE PROTEIN: CPT

## 2019-07-27 PROCEDURE — 80048 BASIC METABOLIC PNL TOTAL CA: CPT

## 2019-07-27 PROCEDURE — 96376 TX/PRO/DX INJ SAME DRUG ADON: CPT

## 2019-07-27 RX ADMIN — CEFEPIME HYDROCHLORIDE 2 G: 2 INJECTION, POWDER, FOR SOLUTION INTRAVENOUS at 08:39

## 2019-07-27 RX ADMIN — LISINOPRIL 20 MG: 20 TABLET ORAL at 08:40

## 2019-07-27 RX ADMIN — Medication 10 ML: at 08:39

## 2019-07-27 RX ADMIN — FUROSEMIDE 20 MG: 20 TABLET ORAL at 08:40

## 2019-07-27 RX ADMIN — Medication 10 ML: at 20:49

## 2019-07-27 RX ADMIN — CLONIDINE HYDROCHLORIDE 0.1 MG: 0.1 TABLET ORAL at 20:48

## 2019-07-27 RX ADMIN — AMIODARONE HYDROCHLORIDE 200 MG: 200 TABLET ORAL at 20:48

## 2019-07-27 RX ADMIN — CLONIDINE HYDROCHLORIDE 0.1 MG: 0.1 TABLET ORAL at 08:40

## 2019-07-27 RX ADMIN — SIMVASTATIN 20 MG: 20 TABLET, FILM COATED ORAL at 20:48

## 2019-07-27 RX ADMIN — AMIODARONE HYDROCHLORIDE 200 MG: 200 TABLET ORAL at 08:40

## 2019-07-27 RX ADMIN — VANCOMYCIN HYDROCHLORIDE 1750 MG: 10 INJECTION, POWDER, LYOPHILIZED, FOR SOLUTION INTRAVENOUS at 08:39

## 2019-07-27 RX ADMIN — ENOXAPARIN SODIUM 40 MG: 40 INJECTION, SOLUTION INTRAVENOUS; SUBCUTANEOUS at 08:39

## 2019-07-27 RX ADMIN — CEFEPIME HYDROCHLORIDE 2 G: 2 INJECTION, POWDER, FOR SOLUTION INTRAVENOUS at 18:54

## 2019-07-27 RX ADMIN — ASPIRIN 81 MG 162 MG: 81 TABLET ORAL at 08:40

## 2019-07-27 RX ADMIN — VANCOMYCIN HYDROCHLORIDE 1750 MG: 10 INJECTION, POWDER, LYOPHILIZED, FOR SOLUTION INTRAVENOUS at 20:48

## 2019-07-27 NOTE — PROGRESS NOTES
field-of-view. However, no abscess is   identified. Signed by Dr Garrison Davalos. Wili on 7/26/2019 6:51 PM        Micro: No results found for: BC, No components found for: LABURINE  Patient Active Problem List    Diagnosis Date Noted    Cellulitis of neck 07/26/2019    Hypertrophic cardiomyopathy ([facility] [address] 07/26/2019    Lactic acidosis 07/26/2019       Assessment/plan:   Principal Problem:    Cellulitis of neck  Active Problems:    Hypertrophic cardiomyopathy (HCC)    Lactic acidosis  Resolved Problems:    * No resolved hospital problems.  *      Plan:   - cont IV abx,  and painful, pain control, warm compress to area  - trend labs, CRP elevated  - cont home meds    DVT Prophylaxis: jann Walker MD  Hospitalist Service  7/27/2019  12:12 PM

## 2019-07-27 NOTE — H&P
Hospital Medicine  History and Physical    Patient:  Sharron Tate  MRN: [id number]    CHIEF COMPLAINT:  Insect bite/sting    History Obtained From: patient, chart    PCP: BALBIR Schilling    HISTORY OF PRESENT ILLNESS:  60year old black male presents to the emergency department with complaint of painful swelling of his left submental/submandibular neck since a bite or sting sustained within his home about 10 days ago. He states that he squished the bug before he thought to examine it and is unsure what it was. He was seen in the ED July 18 and prescribed Bactrim DS. He states that he has taken this but it has worsened. He saw his PCP, who instructed him to come to the ED if it continued to worsen to prevent swelling from impacting his airway. He is breathing fine at present and feels no encroachment on his airway, but he is concerned. Pain is uncontrolled on OTC medications. REVIEW OF SYSTEMS:  14 systems reviewed and negative except as noted above. Past Medical History:      Diagnosis Date    Cardiomyopathy [facility])     per pt    CHF (congestive heart failure) (HCC)     Hyperlipidemia     Hypertension        Past Surgical History:      Procedure Laterality Date    CARDIAC DEFIBRILLATOR PLACEMENT      PACEMAKER INSERTION         Medications Prior to Admission:    Prior to Admission medications    Medication Sig Start Date End Date Taking?  Authorizing Provider   amiodarone (PACERONE) 400 MG tablet Take 1 tablet three times a day for one week then take 1 tablet twice a day 12/18/15   Historical Provider, MD   lisinopril (PRINIVIL;ZESTRIL) 20 MG tablet lisinopril 20 mg tablet 7/4/19   Historical Provider, MD   furosemide (LASIX) 20 MG tablet furosemide 20 mg tablet 7/1/19   Historical Provider, MD   aspirin 81 MG tablet Take 162 mg by mouth daily    Historical Provider, MD   sulfamethoxazole-trimethoprim (BACTRIM DS) 800-160 MG per tablet Take 1 tablet by mouth 2 times daily for 10 days 7/18/19 7/28/19  Anuj Ramirez

## 2019-07-27 NOTE — ED PROVIDER NOTES
the following components:       Result Value    WBC 4.3 (*)     MCHC 32.1 (*)     RDW 16.6 (*)     All other components within normal limits   COMPREHENSIVE METABOLIC PANEL - Abnormal; Notable for the following components:    CO2 21 (*)     Glucose 164 (*)     All other components within normal limits   LACTIC ACID, PLASMA - Abnormal; Notable for the following components:    Lactic Acid 2.1 (*)     All other components within normal limits    Narrative:     CALL  [facility]  [facility] tel. ,  Chemistry results called to and read back by Nohemi De Jesus in, 07/26/2019 16:19,  by Ti Macias   C-REACTIVE PROTEIN - Abnormal; Notable for the following components:    CRP 1.58 (*)     All other components within normal limits   BASIC METABOLIC PANEL W/ REFLEX TO MG FOR LOW K   CBC WITH AUTO DIFFERENTIAL   C-REACTIVE PROTEIN       All other labs were within normal range or not returned as of this dictation. EMERGENCY DEPARTMENT COURSE and DIFFERENTIAL DIAGNOSIS/MDM:   Vitals:    Vitals:    07/26/19 1512 07/26/19 1800 07/26/19 1945 07/26/19 2054   BP: (!) 173/91 (!) 159/88 (!) 163/90 (!) 161/91   Pulse: 63 74 59 57   Resp: 16 16 16 18   Temp: 98.2 °F (36.8 °C)  97.4 °F (36.3 °C) 96.9 °F (36.1 °C)   TempSrc: Oral   Temporal   SpO2: 95% 96%  100%   Weight: 295 lb (133.8 kg)   296 lb (134.3 kg)   Height: 6' 3\" (1.905 m)          MDM      CONSULTS:  PHARMACY TO DOSE VANCOMYCIN    PROCEDURES:  Unless otherwise noted below, none      Procedures    FINAL IMPRESSION      1.  Cellulitis of neck          DISPOSITION/PLAN   DISPOSITION Admitted    PATIENT REFERRED TO:  Maxi Sexton, APRN  [address]  [place] [address]  [phone]            DISCHARGE MEDICATIONS:  Current Discharge Medication List             (Please note that portions of this note were completed with a voice recognition program.  Efforts were made to edit the dictations but occasionally words aremis-transcribed.)    Reynaldo Shultz MD (electronically signed)  Attending Emergency Physician            Yani Joyce MD  07/26/19 9977

## 2019-07-28 ENCOUNTER — APPOINTMENT (OUTPATIENT)
Dept: ULTRASOUND IMAGING | Age: 59
End: 2019-07-28
Payer: MEDICARE

## 2019-07-28 LAB
ANION GAP SERPL CALCULATED.3IONS-SCNC: 11 MMOL/L (ref 7–19)
BASOPHILS ABSOLUTE: 0 K/UL (ref 0–0.2)
BASOPHILS RELATIVE PERCENT: 0.7 % (ref 0–1)
BUN BLDV-MCNC: 13 MG/DL (ref 8–23)
C-REACTIVE PROTEIN: 0.76 MG/DL (ref 0–0.5)
CALCIUM SERPL-MCNC: 8.8 MG/DL (ref 8.8–10.2)
CHLORIDE BLD-SCNC: 103 MMOL/L (ref 98–111)
CO2: 23 MMOL/L (ref 22–29)
CREAT SERPL-MCNC: 1.2 MG/DL (ref 0.5–1.2)
EOSINOPHILS ABSOLUTE: 0.1 K/UL (ref 0–0.6)
EOSINOPHILS RELATIVE PERCENT: 3.1 % (ref 0–5)
GFR NON-AFRICAN AMERICAN: >60
GLUCOSE BLD-MCNC: 107 MG/DL (ref 74–109)
HCT VFR BLD CALC: 47.8 % (ref 42–52)
HEMOGLOBIN: 15.4 G/DL (ref 14–18)
LYMPHOCYTES ABSOLUTE: 1.1 K/UL (ref 1.1–4.5)
LYMPHOCYTES RELATIVE PERCENT: 26.9 % (ref 20–40)
MCH RBC QN AUTO: 29.4 PG (ref 27–31)
MCHC RBC AUTO-ENTMCNC: 32.2 G/DL (ref 33–37)
MCV RBC AUTO: 91.2 FL (ref 80–94)
MONOCYTES ABSOLUTE: 0.3 K/UL (ref 0–0.9)
MONOCYTES RELATIVE PERCENT: 7.2 % (ref 0–10)
NEUTROPHILS ABSOLUTE: 2.6 K/UL (ref 1.5–7.5)
NEUTROPHILS RELATIVE PERCENT: 61.4 % (ref 50–65)
PDW BLD-RTO: 16.7 % (ref 11.5–14.5)
PLATELET # BLD: 145 K/UL (ref 130–400)
PMV BLD AUTO: 12.5 FL (ref 9.4–12.4)
POTASSIUM REFLEX MAGNESIUM: 4.8 MMOL/L (ref 3.5–5)
RBC # BLD: 5.24 M/UL (ref 4.7–6.1)
SODIUM BLD-SCNC: 137 MMOL/L (ref 136–145)
VANCOMYCIN TROUGH: 14.8 UG/ML (ref 10–20)
WBC # BLD: 4.2 K/UL (ref 4.8–10.8)

## 2019-07-28 PROCEDURE — 80048 BASIC METABOLIC PNL TOTAL CA: CPT

## 2019-07-28 PROCEDURE — 99226 PR SBSQ OBSERVATION CARE/DAY 35 MINUTES: CPT | Performed by: HOSPITALIST

## 2019-07-28 PROCEDURE — 80202 ASSAY OF VANCOMYCIN: CPT

## 2019-07-28 PROCEDURE — 86140 C-REACTIVE PROTEIN: CPT

## 2019-07-28 PROCEDURE — 36415 COLL VENOUS BLD VENIPUNCTURE: CPT

## 2019-07-28 PROCEDURE — 96376 TX/PRO/DX INJ SAME DRUG ADON: CPT

## 2019-07-28 PROCEDURE — 96366 THER/PROPH/DIAG IV INF ADDON: CPT

## 2019-07-28 PROCEDURE — 6360000002 HC RX W HCPCS: Performed by: NURSE PRACTITIONER

## 2019-07-28 PROCEDURE — 2580000003 HC RX 258: Performed by: NURSE PRACTITIONER

## 2019-07-28 PROCEDURE — G0378 HOSPITAL OBSERVATION PER HR: HCPCS

## 2019-07-28 PROCEDURE — 76536 US EXAM OF HEAD AND NECK: CPT

## 2019-07-28 PROCEDURE — 85025 COMPLETE CBC W/AUTO DIFF WBC: CPT

## 2019-07-28 PROCEDURE — 2580000003 HC RX 258: Performed by: FAMILY MEDICINE

## 2019-07-28 PROCEDURE — 6360000002 HC RX W HCPCS: Performed by: FAMILY MEDICINE

## 2019-07-28 PROCEDURE — 96372 THER/PROPH/DIAG INJ SC/IM: CPT

## 2019-07-28 PROCEDURE — 6370000000 HC RX 637 (ALT 250 FOR IP): Performed by: FAMILY MEDICINE

## 2019-07-28 RX ADMIN — ENOXAPARIN SODIUM 40 MG: 40 INJECTION, SOLUTION INTRAVENOUS; SUBCUTANEOUS at 12:02

## 2019-07-28 RX ADMIN — LISINOPRIL 20 MG: 20 TABLET ORAL at 09:04

## 2019-07-28 RX ADMIN — CEFEPIME HYDROCHLORIDE 2 G: 2 INJECTION, POWDER, FOR SOLUTION INTRAVENOUS at 09:10

## 2019-07-28 RX ADMIN — VANCOMYCIN HYDROCHLORIDE 1750 MG: 10 INJECTION, POWDER, LYOPHILIZED, FOR SOLUTION INTRAVENOUS at 12:01

## 2019-07-28 RX ADMIN — CLONIDINE HYDROCHLORIDE 0.1 MG: 0.1 TABLET ORAL at 09:04

## 2019-07-28 RX ADMIN — CLONIDINE HYDROCHLORIDE 0.1 MG: 0.1 TABLET ORAL at 20:25

## 2019-07-28 RX ADMIN — FUROSEMIDE 20 MG: 20 TABLET ORAL at 09:04

## 2019-07-28 RX ADMIN — Medication 10 ML: at 09:11

## 2019-07-28 RX ADMIN — AMIODARONE HYDROCHLORIDE 200 MG: 200 TABLET ORAL at 09:04

## 2019-07-28 RX ADMIN — AMIODARONE HYDROCHLORIDE 200 MG: 200 TABLET ORAL at 20:25

## 2019-07-28 RX ADMIN — VANCOMYCIN HYDROCHLORIDE 1750 MG: 10 INJECTION, POWDER, LYOPHILIZED, FOR SOLUTION INTRAVENOUS at 19:21

## 2019-07-28 RX ADMIN — CEFEPIME HYDROCHLORIDE 2 G: 2 INJECTION, POWDER, FOR SOLUTION INTRAVENOUS at 19:21

## 2019-07-28 RX ADMIN — ASPIRIN 81 MG 162 MG: 81 TABLET ORAL at 09:04

## 2019-07-28 RX ADMIN — SIMVASTATIN 20 MG: 20 TABLET, FILM COATED ORAL at 20:25

## 2019-07-28 ASSESSMENT — PAIN SCALES - GENERAL: PAINLEVEL_OUTOF10: 7

## 2019-07-29 VITALS
TEMPERATURE: 97.5 F | SYSTOLIC BLOOD PRESSURE: 131 MMHG | WEIGHT: 296 LBS | DIASTOLIC BLOOD PRESSURE: 80 MMHG | OXYGEN SATURATION: 97 % | HEART RATE: 51 BPM | BODY MASS INDEX: 36.8 KG/M2 | HEIGHT: 75 IN | RESPIRATION RATE: 18 BRPM

## 2019-07-29 LAB
ANION GAP SERPL CALCULATED.3IONS-SCNC: 10 MMOL/L (ref 7–19)
BASOPHILS ABSOLUTE: 0 K/UL (ref 0–0.2)
BASOPHILS RELATIVE PERCENT: 0.4 % (ref 0–1)
BUN BLDV-MCNC: 14 MG/DL (ref 8–23)
C-REACTIVE PROTEIN: 0.6 MG/DL (ref 0–0.5)
CALCIUM SERPL-MCNC: 9 MG/DL (ref 8.8–10.2)
CHLORIDE BLD-SCNC: 104 MMOL/L (ref 98–111)
CO2: 21 MMOL/L (ref 22–29)
CREAT SERPL-MCNC: 1.1 MG/DL (ref 0.5–1.2)
EOSINOPHILS ABSOLUTE: 0.1 K/UL (ref 0–0.6)
EOSINOPHILS RELATIVE PERCENT: 2.6 % (ref 0–5)
GFR NON-AFRICAN AMERICAN: >60
GLUCOSE BLD-MCNC: 105 MG/DL (ref 74–109)
HCT VFR BLD CALC: 47.7 % (ref 42–52)
HEMOGLOBIN: 14.9 G/DL (ref 14–18)
LYMPHOCYTES ABSOLUTE: 1.8 K/UL (ref 1.1–4.5)
LYMPHOCYTES RELATIVE PERCENT: 35.3 % (ref 20–40)
MCH RBC QN AUTO: 28.9 PG (ref 27–31)
MCHC RBC AUTO-ENTMCNC: 31.2 G/DL (ref 33–37)
MCV RBC AUTO: 92.4 FL (ref 80–94)
MONOCYTES ABSOLUTE: 0.4 K/UL (ref 0–0.9)
MONOCYTES RELATIVE PERCENT: 8.2 % (ref 0–10)
NEUTROPHILS ABSOLUTE: 2.6 K/UL (ref 1.5–7.5)
NEUTROPHILS RELATIVE PERCENT: 52.7 % (ref 50–65)
PDW BLD-RTO: 16.7 % (ref 11.5–14.5)
PLATELET # BLD: 156 K/UL (ref 130–400)
PMV BLD AUTO: 12 FL (ref 9.4–12.4)
POTASSIUM REFLEX MAGNESIUM: 4.4 MMOL/L (ref 3.5–5)
RBC # BLD: 5.16 M/UL (ref 4.7–6.1)
SODIUM BLD-SCNC: 135 MMOL/L (ref 136–145)
WBC # BLD: 5 K/UL (ref 4.8–10.8)

## 2019-07-29 PROCEDURE — 6360000002 HC RX W HCPCS: Performed by: NURSE PRACTITIONER

## 2019-07-29 PROCEDURE — 96376 TX/PRO/DX INJ SAME DRUG ADON: CPT

## 2019-07-29 PROCEDURE — 96366 THER/PROPH/DIAG IV INF ADDON: CPT

## 2019-07-29 PROCEDURE — 36415 COLL VENOUS BLD VENIPUNCTURE: CPT

## 2019-07-29 PROCEDURE — 96372 THER/PROPH/DIAG INJ SC/IM: CPT

## 2019-07-29 PROCEDURE — 86140 C-REACTIVE PROTEIN: CPT

## 2019-07-29 PROCEDURE — 85025 COMPLETE CBC W/AUTO DIFF WBC: CPT

## 2019-07-29 PROCEDURE — 6370000000 HC RX 637 (ALT 250 FOR IP): Performed by: FAMILY MEDICINE

## 2019-07-29 PROCEDURE — G0378 HOSPITAL OBSERVATION PER HR: HCPCS

## 2019-07-29 PROCEDURE — 2580000003 HC RX 258: Performed by: NURSE PRACTITIONER

## 2019-07-29 PROCEDURE — 2580000003 HC RX 258: Performed by: FAMILY MEDICINE

## 2019-07-29 PROCEDURE — 6360000002 HC RX W HCPCS: Performed by: FAMILY MEDICINE

## 2019-07-29 PROCEDURE — 99217 PR OBSERVATION CARE DISCHARGE MANAGEMENT: CPT | Performed by: HOSPITALIST

## 2019-07-29 PROCEDURE — 80048 BASIC METABOLIC PNL TOTAL CA: CPT

## 2019-07-29 RX ORDER — CLINDAMYCIN HYDROCHLORIDE 300 MG/1
300 CAPSULE ORAL 3 TIMES DAILY
Qty: 30 CAPSULE | Refills: 0 | Status: SHIPPED | OUTPATIENT
Start: 2019-07-29 | End: 2019-08-08

## 2019-07-29 RX ORDER — OXYCODONE HYDROCHLORIDE AND ACETAMINOPHEN 5; 325 MG/1; MG/1
1 TABLET ORAL EVERY 6 HOURS PRN
Qty: 10 TABLET | Refills: 0 | Status: SHIPPED | OUTPATIENT
Start: 2019-07-29 | End: 2019-08-01

## 2019-07-29 RX ADMIN — CEFEPIME HYDROCHLORIDE 2 G: 2 INJECTION, POWDER, FOR SOLUTION INTRAVENOUS at 08:13

## 2019-07-29 RX ADMIN — AMIODARONE HYDROCHLORIDE 200 MG: 200 TABLET ORAL at 10:12

## 2019-07-29 RX ADMIN — Medication 10 ML: at 10:13

## 2019-07-29 RX ADMIN — CLONIDINE HYDROCHLORIDE 0.1 MG: 0.1 TABLET ORAL at 10:13

## 2019-07-29 RX ADMIN — VANCOMYCIN HYDROCHLORIDE 1750 MG: 10 INJECTION, POWDER, LYOPHILIZED, FOR SOLUTION INTRAVENOUS at 08:13

## 2019-07-29 RX ADMIN — FUROSEMIDE 20 MG: 20 TABLET ORAL at 10:13

## 2019-07-29 RX ADMIN — ASPIRIN 81 MG 162 MG: 81 TABLET ORAL at 10:12

## 2019-07-29 RX ADMIN — LISINOPRIL 20 MG: 20 TABLET ORAL at 10:12

## 2019-07-29 RX ADMIN — ENOXAPARIN SODIUM 40 MG: 40 INJECTION, SOLUTION INTRAVENOUS; SUBCUTANEOUS at 10:12

## 2019-07-29 ASSESSMENT — PAIN SCALES - GENERAL: PAINLEVEL_OUTOF10: 2

## 2019-07-29 NOTE — DISCHARGE SUMMARY
home about 10 days ago. He states that he squished the bug before he thought to examine it and is unsure what it was. He was seen in the ED July 18 and prescribed Bactrim DS. He states that he has taken this but it has worsened. He saw his PCP, who instructed him to come to the ED if it continued to worsen to prevent swelling from impacting his airway. He is breathing fine at present and feels no encroachment on his airway, but he is concerned. Pain is uncontrolled on OTC medications. Patient placed on IV abx and pain medication, his symptoms improved and his inflammatory markers trended down. He had imaging of neck done that showed no abscess formation and reactive lymph nodes. Patient will go home on oral abx and oral pain meds. Patient also recommended to apply warm compress to left side of neck. He has no issues swallowing and pain is much improved as well as the swelling has improved. Physical Exam:    Vitals: /80   Pulse 51   Temp 97.5 °F (36.4 °C) (Temporal)   Resp 18   Ht 6' 3\" (1.905 m)   Wt 296 lb (134.3 kg)   SpO2 97%   BMI 37.00 kg/m²   General appearance: alert, appears stated age and cooperative  Skin: Skin color, texture, turgor normal. No rashes or lesions   HEENT: Head: Normocephalic, no lesions, without obvious abnormality.   Neck: Tenderness and edema to left side of neck, indurated  Lungs: clear to auscultation bilaterally  Heart: regular rate and rhythm, S1, S2 normal, no murmur, click, rub or gallop  Abdomen: soft, non-tender; bowel sounds normal; no masses,  no organomegaly  Extremities: extremities normal, atraumatic, no cyanosis or edema  Neurologic: Mental status: Alert, oriented, thought content appropriate    Discharge Medications:       Kassandra Patino   Home Medication Instructions CIZ:[id number]    Printed on:07/29/19 1023   Medication Information                      amiodarone (PACERONE) 400 MG tablet  Take 1 tablet three times a day for one week then take 1 tablet

## 2019-08-09 ENCOUNTER — HOSPITAL ENCOUNTER (EMERGENCY)
Age: 59
Discharge: HOME | End: 2019-08-10
Attending: EMERGENCY MEDICINE
Payer: MEDICARE

## 2019-08-09 ENCOUNTER — APPOINTMENT (OUTPATIENT)
Dept: GENERAL RADIOLOGY | Age: 59
End: 2019-08-09
Payer: MEDICARE

## 2019-08-09 DIAGNOSIS — R00.2 PALPITATIONS: Primary | ICD-10-CM

## 2019-08-09 PROCEDURE — 71045 X-RAY EXAM CHEST 1 VIEW: CPT

## 2019-08-09 PROCEDURE — 93005 ELECTROCARDIOGRAM TRACING: CPT

## 2019-08-09 PROCEDURE — 99285 EMERGENCY DEPT VISIT HI MDM: CPT

## 2019-08-10 VITALS
BODY MASS INDEX: 35.68 KG/M2 | HEIGHT: 75 IN | DIASTOLIC BLOOD PRESSURE: 90 MMHG | SYSTOLIC BLOOD PRESSURE: 150 MMHG | RESPIRATION RATE: 17 BRPM | TEMPERATURE: 97.3 F | OXYGEN SATURATION: 95 % | WEIGHT: 287 LBS | HEART RATE: 50 BPM

## 2019-08-10 LAB
ALBUMIN SERPL-MCNC: 4 G/DL (ref 3.5–5.2)
ALP BLD-CCNC: 77 U/L (ref 40–130)
ALT SERPL-CCNC: 18 U/L (ref 5–41)
ANION GAP SERPL CALCULATED.3IONS-SCNC: 12 MMOL/L (ref 7–19)
APTT: 27.2 SEC (ref 26–36.2)
AST SERPL-CCNC: 22 U/L (ref 5–40)
BASOPHILS ABSOLUTE: 0 K/UL (ref 0–0.2)
BASOPHILS RELATIVE PERCENT: 0.5 % (ref 0–1)
BILIRUB SERPL-MCNC: 0.6 MG/DL (ref 0.2–1.2)
BUN BLDV-MCNC: 17 MG/DL (ref 8–23)
CALCIUM SERPL-MCNC: 9.2 MG/DL (ref 8.8–10.2)
CHLORIDE BLD-SCNC: 103 MMOL/L (ref 98–111)
CO2: 26 MMOL/L (ref 22–29)
CREAT SERPL-MCNC: 1.5 MG/DL (ref 0.5–1.2)
EOSINOPHILS ABSOLUTE: 0 K/UL (ref 0–0.6)
EOSINOPHILS RELATIVE PERCENT: 0.6 % (ref 0–5)
GFR NON-AFRICAN AMERICAN: 48
GLUCOSE BLD-MCNC: 88 MG/DL (ref 74–109)
HCT VFR BLD CALC: 46.6 % (ref 42–52)
HEMOGLOBIN: 15.2 G/DL (ref 14–18)
INR BLD: 1.22 (ref 0.88–1.18)
LYMPHOCYTES ABSOLUTE: 2 K/UL (ref 1.1–4.5)
LYMPHOCYTES RELATIVE PERCENT: 29.8 % (ref 20–40)
MAGNESIUM: 2.1 MG/DL (ref 1.6–2.4)
MCH RBC QN AUTO: 29.5 PG (ref 27–31)
MCHC RBC AUTO-ENTMCNC: 32.6 G/DL (ref 33–37)
MCV RBC AUTO: 90.3 FL (ref 80–94)
MONOCYTES ABSOLUTE: 0.5 K/UL (ref 0–0.9)
MONOCYTES RELATIVE PERCENT: 7.9 % (ref 0–10)
NEUTROPHILS ABSOLUTE: 4 K/UL (ref 1.5–7.5)
NEUTROPHILS RELATIVE PERCENT: 60.9 % (ref 50–65)
PDW BLD-RTO: 16.7 % (ref 11.5–14.5)
PLATELET # BLD: 163 K/UL (ref 130–400)
PMV BLD AUTO: 10.9 FL (ref 9.4–12.4)
POTASSIUM SERPL-SCNC: 4 MMOL/L (ref 3.5–5)
PRO-BNP: 472 PG/ML (ref 0–900)
PROTHROMBIN TIME: 14.8 SEC (ref 12–14.6)
RBC # BLD: 5.16 M/UL (ref 4.7–6.1)
SODIUM BLD-SCNC: 141 MMOL/L (ref 136–145)
TOTAL PROTEIN: 7.1 G/DL (ref 6.6–8.7)
TROPONIN: 0.01 NG/ML (ref 0–0.03)
TROPONIN: 0.01 NG/ML (ref 0–0.03)
TSH REFLEX FT4: 0.76 UIU/ML (ref 0.35–5.5)
WBC # BLD: 6.6 K/UL (ref 4.8–10.8)

## 2019-08-10 PROCEDURE — 84484 ASSAY OF TROPONIN QUANT: CPT

## 2019-08-10 PROCEDURE — 83735 ASSAY OF MAGNESIUM: CPT

## 2019-08-10 PROCEDURE — 85610 PROTHROMBIN TIME: CPT

## 2019-08-10 PROCEDURE — 80053 COMPREHEN METABOLIC PANEL: CPT

## 2019-08-10 PROCEDURE — 99284 EMERGENCY DEPT VISIT MOD MDM: CPT | Performed by: EMERGENCY MEDICINE

## 2019-08-10 PROCEDURE — 85730 THROMBOPLASTIN TIME PARTIAL: CPT

## 2019-08-10 PROCEDURE — 36415 COLL VENOUS BLD VENIPUNCTURE: CPT

## 2019-08-10 PROCEDURE — 84443 ASSAY THYROID STIM HORMONE: CPT

## 2019-08-10 PROCEDURE — 85025 COMPLETE CBC W/AUTO DIFF WBC: CPT

## 2019-08-10 PROCEDURE — 83880 ASSAY OF NATRIURETIC PEPTIDE: CPT

## 2019-08-10 ASSESSMENT — ENCOUNTER SYMPTOMS
ABDOMINAL PAIN: 0
BACK PAIN: 0
SORE THROAT: 0
RHINORRHEA: 0
NAUSEA: 0
SHORTNESS OF BREATH: 1
COUGH: 0
DIARRHEA: 0
VOMITING: 0

## 2019-08-10 NOTE — ED NOTES
[facility]       Anup Esparza RN  08/10/19 [id number]

## 2019-08-10 NOTE — ED PROVIDER NOTES
[facility] EMERGENCY DEPT  eMERGENCY dEPARTMENT eNCOUnter      Pt Name: Yoandy Fagan  MRN: [id number]  [place] 1960  Date of evaluation: 8/9/2019  Provider: Davy Salcedo MD    CHIEF COMPLAINT       Chief Complaint   Patient presents with    Shortness of Breath     felt his heart racing, dyspneic, wants pacemaker interrogated     Assumed care at end of shift. Pt with palpitations and dyspnea. currently resolved. Radha Mentor in the past. Pacer interrogation was negative for [facility] episodes. EKG normal. No ho CAD. Repeat ekg. LBBB. Paced. No acute. Pt pain free. Comfortable with plan for dishcharge home.     PHYSICAL EXAM    (up to 7 for level 4, 8 or more for level 5)     ED Triage Vitals   BP Temp Temp Source Pulse Resp SpO2 Height Weight   08/09/19 2015 08/09/19 2013 08/09/19 2013 08/09/19 2015 08/09/19 2015 08/09/19 2015 08/09/19 2014 08/09/19 2014   (!) 162/78 97.3 °F (36.3 °C) Tympanic 71 16 95 % 6' 3\" (1.905 m) 287 lb (130.2 kg)       Physical Exam    DIAGNOSTIC RESULTS     EKG: All EKG's are interpreted by theEmergency Department Physician who either signs or Co-signs this chart in the absence of a cardiologist.      RADIOLOGY:   Non-plain film images such as CT, Ultrasound and MRI are read by the radiologist. Plain radiographic images are visualized and preliminarily interpreted by the emergencyphysician with the below findings:        Interpretation per the Radiologist below, if available at the time of thisnote:    XR CHEST PORTABLE    (Results Pending)         ED BEDSIDE ULTRASOUND:   Performed by ED Physician - none    LABS:  Labs Reviewed   CBC WITH AUTO DIFFERENTIAL - Abnormal; Notable for the following components:       Result Value    MCHC 32.6 (*)     RDW 16.7 (*)     All other components within normal limits   COMPREHENSIVE METABOLIC PANEL - Abnormal; Notable for the following components:    CREATININE 1.5 (*)     GFR Non- 48 (*)     All other components within normal limits   PROTIME-INR - Abnormal; Notable for the following components:    Protime 14.8 (*)     INR 1.22 (*)     All other components within normal limits   APTT   MAGNESIUM   TROPONIN   TSH WITH REFLEX TO FT4   BRAIN NATRIURETIC PEPTIDE   TROPONIN       All other labs were within normal range or not returned as of this dictation. EMERGENCY DEPARTMENT COURSE and DIFFERENTIAL DIAGNOSIS/MDM:   Vitals:    Vitals:    08/09/19 2014 08/09/19 2015 08/10/19 0032 08/10/19 0154   BP:  (!) 162/78 (!) 153/82 (!) 146/76   Pulse:  71 50 (!) 46   Resp:  16 20 18   Temp:       TempSrc:       SpO2:  95% 92% 96%   Weight: 287 lb (130.2 kg)      Height: 6' 3\" (1.905 m)          MDM      CONSULTS:  None    PROCEDURES:  Unless otherwise noted below, none      Procedures    FINAL IMPRESSION      1.  Palpitations          DISPOSITION/PLAN   DISPOSITION     PATIENT REFERRED TO:  BALBIR Smith  [address]  [phone]    In 2 days      Aspen Schwartz MD  [address] [facility] [address] [facility] [place]  [phone]    In 2 days        DISCHARGE MEDICATIONS:  New Prescriptions    No medications on file          (Please note that portions of this note were completed with a voice recognition program.  Efforts were made to edit the dictations but occasionally words aremis-transcribed.)    Merry Clayton MD (electronically signed)  Attending Emergency Physician            Merry Clayton MD  08/10/19 4316

## 2019-08-10 NOTE — ED PROVIDER NOTES
Discharge Medication List as of 8/10/2019  2:11 AM      CONTINUE these medications which have NOT CHANGED    Details   amiodarone (PACERONE) 400 MG tablet Take 1 tablet three times a day for one week then take 1 tablet twice a dayHistorical Med      lisinopril (PRINIVIL;ZESTRIL) 20 MG tablet lisinopril 20 mg tabletHistorical Med      furosemide (LASIX) 20 MG tablet furosemide 20 mg tabletHistorical Med      aspirin 81 MG tablet Take 162 mg by mouth dailyHistorical Med      simvastatin (ZOCOR) 20 MG tablet Take 20 mg by mouth nightlyHistorical Med      cloNIDine (CATAPRES) 0.1 MG tablet Take 0.1 mg by mouth 2 times dailyHistorical Med             ALLERGIES     Patient has no known allergies. FAMILY HISTORY     No family history on file.        SOCIAL HISTORY       Social History     Socioeconomic History    Marital status: Single     Spouse name: Not on file    Number of children: Not on file    Years of education: Not on file    Highest education level: Not on file   Occupational History    Not on file   Social Needs    Financial resource strain: Not on file    Food insecurity:     Worry: Not on file     Inability: Not on file    Transportation needs:     Medical: Not on file     Non-medical: Not on file   Tobacco Use    Smoking status: Never Smoker    Smokeless tobacco: Never Used   Substance and Sexual Activity    Alcohol use: Never     Frequency: Never    Drug use: Never    Sexual activity: Not on file   Lifestyle    Physical activity:     Days per week: Not on file     Minutes per session: Not on file    Stress: Not on file   Relationships    Social connections:     Talks on phone: Not on file     Gets together: Not on file     Attends Episcopalian service: Not on file     Active member of club or organization: Not on file     Attends meetings of clubs or organizations: Not on file     Relationship status: Not on file    Intimate partner violence:     Fear of current or ex partner: Not on TO:  Jerel Barba, APRN  [address]  [phone]    In 2 days      Kenroy Menendez [address] [place] [address] [place]  [phone]    In 2 days        DISCHARGE MEDICATIONS:  Discharge Medication List as of 8/10/2019  2:11 AM             (Please note that portions of this note were completed with a voice recognition program.  Efforts were made to edit thedictations but occasionally words are mis-transcribed.)    Lynn Mariano MD (electronically signed)  Attending Emergency Physician        Lazara Zacarias MD  08/10/19 6635

## 2019-08-13 LAB
EKG P AXIS: 149 DEGREES
EKG P AXIS: 159 DEGREES
EKG P-R INTERVAL: 266 MS
EKG P-R INTERVAL: 288 MS
EKG Q-T INTERVAL: 526 MS
EKG Q-T INTERVAL: 538 MS
EKG QRS DURATION: 152 MS
EKG QRS DURATION: 154 MS
EKG QTC CALCULATION (BAZETT): 512 MS
EKG QTC CALCULATION (BAZETT): 521 MS
EKG T AXIS: -169 DEGREES
EKG T AXIS: 156 DEGREES

## 2019-11-04 ENCOUNTER — OFFICE VISIT (OUTPATIENT)
Dept: UROLOGY | Age: 59
End: 2019-11-04
Payer: MEDICARE

## 2019-11-04 VITALS — TEMPERATURE: 96.6 F | WEIGHT: 315 LBS | BODY MASS INDEX: 39.17 KG/M2 | HEIGHT: 75 IN

## 2019-11-04 DIAGNOSIS — R97.20 ELEVATED PSA: Primary | ICD-10-CM

## 2019-11-04 LAB
APPEARANCE FLUID: NORMAL
BILIRUBIN, POC: NORMAL
BLOOD URINE, POC: NORMAL
CLARITY, POC: CLEAR
COLOR, POC: YELLOW
GLUCOSE URINE, POC: NORMAL
KETONES, POC: NORMAL
LEUKOCYTE EST, POC: NORMAL
NITRITE, POC: NORMAL
PH, POC: 6
PROTEIN, POC: NORMAL
SPECIFIC GRAVITY, POC: 1.02
UROBILINOGEN, POC: 1

## 2019-11-04 PROCEDURE — 99203 OFFICE O/P NEW LOW 30 MIN: CPT | Performed by: UROLOGY

## 2019-11-04 PROCEDURE — G8427 DOCREV CUR MEDS BY ELIG CLIN: HCPCS | Performed by: UROLOGY

## 2019-11-04 PROCEDURE — 1036F TOBACCO NON-USER: CPT | Performed by: UROLOGY

## 2019-11-04 PROCEDURE — 3017F COLORECTAL CA SCREEN DOC REV: CPT | Performed by: UROLOGY

## 2019-11-04 PROCEDURE — 81002 URINALYSIS NONAUTO W/O SCOPE: CPT | Performed by: UROLOGY

## 2019-11-04 PROCEDURE — G8417 CALC BMI ABV UP PARAM F/U: HCPCS | Performed by: UROLOGY

## 2019-11-04 PROCEDURE — G8484 FLU IMMUNIZE NO ADMIN: HCPCS | Performed by: UROLOGY

## 2019-11-04 RX ORDER — NITROGLYCERIN 0.4 MG/1
0.4 TABLET SUBLINGUAL EVERY 5 MIN PRN
COMMUNITY

## 2019-11-04 RX ORDER — COLCHICINE 0.6 MG/1
0.6 TABLET ORAL DAILY PRN
COMMUNITY

## 2019-11-04 RX ORDER — VALPROIC ACID 250 MG/5ML
1 SOLUTION ORAL
COMMUNITY
End: 2019-12-16 | Stop reason: ALTCHOICE

## 2019-11-04 RX ORDER — FLUTICASONE PROPIONATE 50 MCG
2 SPRAY, SUSPENSION (ML) NASAL DAILY PRN
COMMUNITY

## 2019-11-04 RX ORDER — INDOMETHACIN 50 MG/1
50 CAPSULE ORAL DAILY PRN
COMMUNITY
End: 2020-02-26 | Stop reason: SDUPTHER

## 2019-11-04 RX ORDER — CARVEDILOL 12.5 MG/1
12.5 TABLET ORAL DAILY
Refills: 6 | COMMUNITY
Start: 2019-08-19

## 2019-11-04 ASSESSMENT — ENCOUNTER SYMPTOMS
BLOOD IN STOOL: 0
WHEEZING: 0
EYE PAIN: 0
EYE REDNESS: 0
ABDOMINAL DISTENTION: 0
EYE DISCHARGE: 0
RHINORRHEA: 0
DIARRHEA: 0
VOMITING: 0
SINUS PRESSURE: 0
COLOR CHANGE: 0
ABDOMINAL PAIN: 0
CONSTIPATION: 0
COUGH: 0
SHORTNESS OF BREATH: 0
SORE THROAT: 0
FACIAL SWELLING: 0
NAUSEA: 0
BACK PAIN: 0

## 2019-12-16 ENCOUNTER — OFFICE VISIT (OUTPATIENT)
Dept: UROLOGY | Age: 59
End: 2019-12-16
Payer: MEDICARE

## 2019-12-16 VITALS — HEIGHT: 75 IN | TEMPERATURE: 97.6 F | WEIGHT: 315 LBS | BODY MASS INDEX: 39.17 KG/M2

## 2019-12-16 DIAGNOSIS — R97.20 ELEVATED PSA: Primary | ICD-10-CM

## 2019-12-16 DIAGNOSIS — R97.20 ELEVATED PSA: ICD-10-CM

## 2019-12-16 LAB — PROSTATE SPECIFIC ANTIGEN: 9.37 NG/ML (ref 0–4)

## 2019-12-16 PROCEDURE — G8484 FLU IMMUNIZE NO ADMIN: HCPCS | Performed by: UROLOGY

## 2019-12-16 PROCEDURE — G8427 DOCREV CUR MEDS BY ELIG CLIN: HCPCS | Performed by: UROLOGY

## 2019-12-16 PROCEDURE — 3017F COLORECTAL CA SCREEN DOC REV: CPT | Performed by: UROLOGY

## 2019-12-16 PROCEDURE — 99214 OFFICE O/P EST MOD 30 MIN: CPT | Performed by: UROLOGY

## 2019-12-16 PROCEDURE — G8417 CALC BMI ABV UP PARAM F/U: HCPCS | Performed by: UROLOGY

## 2019-12-16 PROCEDURE — 1036F TOBACCO NON-USER: CPT | Performed by: UROLOGY

## 2019-12-16 RX ORDER — CIPROFLOXACIN 500 MG/1
500 TABLET, FILM COATED ORAL 2 TIMES DAILY
Qty: 8 TABLET | Refills: 0 | Status: SHIPPED | OUTPATIENT
Start: 2019-12-16 | End: 2019-12-27

## 2019-12-16 ASSESSMENT — ENCOUNTER SYMPTOMS
EYE REDNESS: 0
ABDOMINAL PAIN: 0
BLOOD IN STOOL: 0
NAUSEA: 0
CONSTIPATION: 0
DIARRHEA: 0
EYE PAIN: 0
FACIAL SWELLING: 0
EYE DISCHARGE: 0
SINUS PRESSURE: 0
COUGH: 0
ABDOMINAL DISTENTION: 0
VOMITING: 0
SHORTNESS OF BREATH: 0
SORE THROAT: 0
BACK PAIN: 0
COLOR CHANGE: 0
RHINORRHEA: 0
WHEEZING: 0

## 2019-12-27 ENCOUNTER — HOSPITAL ENCOUNTER (OUTPATIENT)
Dept: PREADMISSION TESTING | Age: 59
Discharge: HOME | End: 2019-12-31
Payer: MEDICARE

## 2019-12-27 VITALS — BODY MASS INDEX: 37.18 KG/M2 | HEIGHT: 75 IN | WEIGHT: 299 LBS

## 2019-12-27 LAB
ANION GAP SERPL CALCULATED.3IONS-SCNC: 16 MMOL/L (ref 7–19)
BASOPHILS ABSOLUTE: 0 K/UL (ref 0–0.2)
BASOPHILS RELATIVE PERCENT: 0.6 % (ref 0–1)
BUN BLDV-MCNC: 17 MG/DL (ref 8–23)
CALCIUM SERPL-MCNC: 9.3 MG/DL (ref 8.8–10.2)
CHLORIDE BLD-SCNC: 102 MMOL/L (ref 98–111)
CO2: 20 MMOL/L (ref 22–29)
CREAT SERPL-MCNC: 1.3 MG/DL (ref 0.5–1.2)
EOSINOPHILS ABSOLUTE: 0.1 K/UL (ref 0–0.6)
EOSINOPHILS RELATIVE PERCENT: 1.2 % (ref 0–5)
GFR NON-AFRICAN AMERICAN: 56
GLUCOSE BLD-MCNC: 120 MG/DL (ref 74–109)
HCT VFR BLD CALC: 49.3 % (ref 42–52)
HEMOGLOBIN: 16.4 G/DL (ref 14–18)
IMMATURE GRANULOCYTES #: 0 K/UL
LYMPHOCYTES ABSOLUTE: 1.6 K/UL (ref 1.1–4.5)
LYMPHOCYTES RELATIVE PERCENT: 30.8 % (ref 20–40)
MCH RBC QN AUTO: 29.7 PG (ref 27–31)
MCHC RBC AUTO-ENTMCNC: 33.3 G/DL (ref 33–37)
MCV RBC AUTO: 89.2 FL (ref 80–94)
MONOCYTES ABSOLUTE: 0.5 K/UL (ref 0–0.9)
MONOCYTES RELATIVE PERCENT: 10.4 % (ref 0–10)
NEUTROPHILS ABSOLUTE: 2.9 K/UL (ref 1.5–7.5)
NEUTROPHILS RELATIVE PERCENT: 56.6 % (ref 50–65)
PDW BLD-RTO: 15.5 % (ref 11.5–14.5)
PLATELET # BLD: 180 K/UL (ref 130–400)
PMV BLD AUTO: 12.3 FL (ref 9.4–12.4)
POTASSIUM SERPL-SCNC: 4.7 MMOL/L (ref 3.5–5)
RBC # BLD: 5.53 M/UL (ref 4.7–6.1)
SODIUM BLD-SCNC: 138 MMOL/L (ref 136–145)
WBC # BLD: 5.1 K/UL (ref 4.8–10.8)

## 2019-12-27 PROCEDURE — 80048 BASIC METABOLIC PNL TOTAL CA: CPT

## 2019-12-27 PROCEDURE — 93005 ELECTROCARDIOGRAM TRACING: CPT | Performed by: UROLOGY

## 2019-12-27 PROCEDURE — 85025 COMPLETE CBC W/AUTO DIFF WBC: CPT

## 2019-12-27 RX ORDER — CIPROFLOXACIN 2 MG/ML
400 INJECTION, SOLUTION INTRAVENOUS ONCE
Status: CANCELLED | OUTPATIENT
Start: 2019-12-31

## 2019-12-29 LAB
EKG P AXIS: NORMAL DEGREES
EKG P-R INTERVAL: NORMAL MS
EKG Q-T INTERVAL: 516 MS
EKG QRS DURATION: 196 MS
EKG QTC CALCULATION (BAZETT): 509 MS
EKG T AXIS: 97 DEGREES

## 2019-12-29 PROCEDURE — 93010 ELECTROCARDIOGRAM REPORT: CPT | Performed by: INTERNAL MEDICINE

## 2019-12-31 ENCOUNTER — APPOINTMENT (OUTPATIENT)
Dept: GENERAL RADIOLOGY | Age: 59
End: 2019-12-31
Attending: UROLOGY
Payer: MEDICARE

## 2019-12-31 ENCOUNTER — ANESTHESIA (OUTPATIENT)
Dept: OPERATING ROOM | Age: 59
End: 2019-12-31
Payer: MEDICARE

## 2019-12-31 ENCOUNTER — ANESTHESIA EVENT (OUTPATIENT)
Dept: OPERATING ROOM | Age: 59
End: 2019-12-31
Payer: MEDICARE

## 2019-12-31 ENCOUNTER — HOSPITAL ENCOUNTER (OUTPATIENT)
Age: 59
Setting detail: OUTPATIENT SURGERY
Discharge: HOME | End: 2019-12-31
Attending: UROLOGY | Admitting: UROLOGY
Payer: MEDICARE

## 2019-12-31 VITALS — DIASTOLIC BLOOD PRESSURE: 62 MMHG | SYSTOLIC BLOOD PRESSURE: 107 MMHG | TEMPERATURE: 96.8 F | OXYGEN SATURATION: 90 %

## 2019-12-31 VITALS
SYSTOLIC BLOOD PRESSURE: 110 MMHG | HEIGHT: 75 IN | RESPIRATION RATE: 16 BRPM | TEMPERATURE: 97.2 F | DIASTOLIC BLOOD PRESSURE: 71 MMHG | OXYGEN SATURATION: 97 % | HEART RATE: 79 BPM | WEIGHT: 299 LBS | BODY MASS INDEX: 37.18 KG/M2

## 2019-12-31 PROBLEM — R97.20 ELEVATED PSA: Status: ACTIVE | Noted: 2019-12-31

## 2019-12-31 PROCEDURE — 7100000001 HC PACU RECOVERY - ADDTL 15 MIN: Performed by: UROLOGY

## 2019-12-31 PROCEDURE — 2580000003 HC RX 258: Performed by: ANESTHESIOLOGY

## 2019-12-31 PROCEDURE — 7100000000 HC PACU RECOVERY - FIRST 15 MIN: Performed by: UROLOGY

## 2019-12-31 PROCEDURE — 55700 PR BIOPSY OF PROSTATE,NEEDLE/PUNCH: CPT | Performed by: UROLOGY

## 2019-12-31 PROCEDURE — 6360000002 HC RX W HCPCS: Performed by: UROLOGY

## 2019-12-31 PROCEDURE — 3600000014 HC SURGERY LEVEL 4 ADDTL 15MIN: Performed by: UROLOGY

## 2019-12-31 PROCEDURE — 7100000010 HC PHASE II RECOVERY - FIRST 15 MIN: Performed by: UROLOGY

## 2019-12-31 PROCEDURE — 3700000000 HC ANESTHESIA ATTENDED CARE: Performed by: UROLOGY

## 2019-12-31 PROCEDURE — 6360000002 HC RX W HCPCS: Performed by: ANESTHESIOLOGY

## 2019-12-31 PROCEDURE — 2580000003 HC RX 258: Performed by: NURSE ANESTHETIST, CERTIFIED REGISTERED

## 2019-12-31 PROCEDURE — 2709999900 HC NON-CHARGEABLE SUPPLY: Performed by: UROLOGY

## 2019-12-31 PROCEDURE — 3700000001 HC ADD 15 MINUTES (ANESTHESIA): Performed by: UROLOGY

## 2019-12-31 PROCEDURE — 88305 TISSUE EXAM BY PATHOLOGIST: CPT

## 2019-12-31 PROCEDURE — 6360000002 HC RX W HCPCS: Performed by: NURSE ANESTHETIST, CERTIFIED REGISTERED

## 2019-12-31 PROCEDURE — 6370000000 HC RX 637 (ALT 250 FOR IP): Performed by: ANESTHESIOLOGY

## 2019-12-31 PROCEDURE — 2500000003 HC RX 250 WO HCPCS: Performed by: NURSE ANESTHETIST, CERTIFIED REGISTERED

## 2019-12-31 PROCEDURE — 76872 US TRANSRECTAL: CPT | Performed by: UROLOGY

## 2019-12-31 PROCEDURE — 3600000004 HC SURGERY LEVEL 4 BASE: Performed by: UROLOGY

## 2019-12-31 RX ORDER — SCOLOPAMINE TRANSDERMAL SYSTEM 1 MG/1
1 PATCH, EXTENDED RELEASE TRANSDERMAL ONCE
Status: DISCONTINUED | OUTPATIENT
Start: 2019-12-31 | End: 2019-12-31 | Stop reason: HOSPADM

## 2019-12-31 RX ORDER — MIDAZOLAM HYDROCHLORIDE 1 MG/ML
2 INJECTION INTRAMUSCULAR; INTRAVENOUS
Status: COMPLETED | OUTPATIENT
Start: 2019-12-31 | End: 2019-12-31

## 2019-12-31 RX ORDER — SODIUM CHLORIDE 0.9 % (FLUSH) 0.9 %
10 SYRINGE (ML) INJECTION EVERY 12 HOURS SCHEDULED
Status: DISCONTINUED | OUTPATIENT
Start: 2019-12-31 | End: 2019-12-31 | Stop reason: HOSPADM

## 2019-12-31 RX ORDER — CARVEDILOL 12.5 MG/1
12.5 TABLET ORAL ONCE
Status: COMPLETED | OUTPATIENT
Start: 2019-12-31 | End: 2019-12-31

## 2019-12-31 RX ORDER — PROPOFOL 10 MG/ML
INJECTION, EMULSION INTRAVENOUS CONTINUOUS PRN
Status: DISCONTINUED | OUTPATIENT
Start: 2019-12-31 | End: 2019-12-31 | Stop reason: SDUPTHER

## 2019-12-31 RX ORDER — HYDRALAZINE HYDROCHLORIDE 20 MG/ML
5 INJECTION INTRAMUSCULAR; INTRAVENOUS EVERY 10 MIN PRN
Status: DISCONTINUED | OUTPATIENT
Start: 2019-12-31 | End: 2019-12-31 | Stop reason: HOSPADM

## 2019-12-31 RX ORDER — SODIUM CHLORIDE, SODIUM LACTATE, POTASSIUM CHLORIDE, CALCIUM CHLORIDE 600; 310; 30; 20 MG/100ML; MG/100ML; MG/100ML; MG/100ML
INJECTION, SOLUTION INTRAVENOUS CONTINUOUS
Status: DISCONTINUED | OUTPATIENT
Start: 2019-12-31 | End: 2019-12-31 | Stop reason: HOSPADM

## 2019-12-31 RX ORDER — LIDOCAINE HYDROCHLORIDE 10 MG/ML
1 INJECTION, SOLUTION EPIDURAL; INFILTRATION; INTRACAUDAL; PERINEURAL
Status: DISCONTINUED | OUTPATIENT
Start: 2019-12-31 | End: 2019-12-31 | Stop reason: HOSPADM

## 2019-12-31 RX ORDER — OXYCODONE HYDROCHLORIDE AND ACETAMINOPHEN 5; 325 MG/1; MG/1
2 TABLET ORAL EVERY 4 HOURS PRN
Status: DISCONTINUED | OUTPATIENT
Start: 2019-12-31 | End: 2019-12-31 | Stop reason: HOSPADM

## 2019-12-31 RX ORDER — MORPHINE SULFATE 4 MG/ML
2 INJECTION, SOLUTION INTRAMUSCULAR; INTRAVENOUS EVERY 4 HOURS PRN
Status: DISCONTINUED | OUTPATIENT
Start: 2019-12-31 | End: 2019-12-31 | Stop reason: HOSPADM

## 2019-12-31 RX ORDER — METOCLOPRAMIDE HYDROCHLORIDE 5 MG/ML
10 INJECTION INTRAMUSCULAR; INTRAVENOUS
Status: DISCONTINUED | OUTPATIENT
Start: 2019-12-31 | End: 2019-12-31 | Stop reason: HOSPADM

## 2019-12-31 RX ORDER — LABETALOL 20 MG/4 ML (5 MG/ML) INTRAVENOUS SYRINGE
5 EVERY 10 MIN PRN
Status: DISCONTINUED | OUTPATIENT
Start: 2019-12-31 | End: 2019-12-31 | Stop reason: HOSPADM

## 2019-12-31 RX ORDER — ONDANSETRON 2 MG/ML
4 INJECTION INTRAMUSCULAR; INTRAVENOUS EVERY 4 HOURS PRN
Status: DISCONTINUED | OUTPATIENT
Start: 2019-12-31 | End: 2019-12-31 | Stop reason: HOSPADM

## 2019-12-31 RX ORDER — MIDAZOLAM HYDROCHLORIDE 1 MG/ML
INJECTION INTRAMUSCULAR; INTRAVENOUS PRN
Status: DISCONTINUED | OUTPATIENT
Start: 2019-12-31 | End: 2019-12-31 | Stop reason: SDUPTHER

## 2019-12-31 RX ORDER — PROMETHAZINE HYDROCHLORIDE 25 MG/ML
6.25 INJECTION, SOLUTION INTRAMUSCULAR; INTRAVENOUS
Status: DISCONTINUED | OUTPATIENT
Start: 2019-12-31 | End: 2019-12-31 | Stop reason: HOSPADM

## 2019-12-31 RX ORDER — MIDAZOLAM HYDROCHLORIDE 1 MG/ML
2 INJECTION INTRAMUSCULAR; INTRAVENOUS
Status: DISCONTINUED | OUTPATIENT
Start: 2019-12-31 | End: 2019-12-31 | Stop reason: HOSPADM

## 2019-12-31 RX ORDER — DEXAMETHASONE SODIUM PHOSPHATE 10 MG/ML
INJECTION INTRAMUSCULAR; INTRAVENOUS PRN
Status: DISCONTINUED | OUTPATIENT
Start: 2019-12-31 | End: 2019-12-31 | Stop reason: SDUPTHER

## 2019-12-31 RX ORDER — ENALAPRILAT 2.5 MG/2ML
1.25 INJECTION INTRAVENOUS
Status: DISCONTINUED | OUTPATIENT
Start: 2019-12-31 | End: 2019-12-31 | Stop reason: HOSPADM

## 2019-12-31 RX ORDER — DIPHENHYDRAMINE HYDROCHLORIDE 50 MG/ML
12.5 INJECTION INTRAMUSCULAR; INTRAVENOUS
Status: DISCONTINUED | OUTPATIENT
Start: 2019-12-31 | End: 2019-12-31 | Stop reason: HOSPADM

## 2019-12-31 RX ORDER — SODIUM CHLORIDE 0.9 % (FLUSH) 0.9 %
10 SYRINGE (ML) INJECTION PRN
Status: DISCONTINUED | OUTPATIENT
Start: 2019-12-31 | End: 2019-12-31 | Stop reason: HOSPADM

## 2019-12-31 RX ORDER — MORPHINE SULFATE 4 MG/ML
4 INJECTION, SOLUTION INTRAMUSCULAR; INTRAVENOUS
Status: DISCONTINUED | OUTPATIENT
Start: 2019-12-31 | End: 2019-12-31 | Stop reason: HOSPADM

## 2019-12-31 RX ORDER — FENTANYL CITRATE 50 UG/ML
50 INJECTION, SOLUTION INTRAMUSCULAR; INTRAVENOUS
Status: DISCONTINUED | OUTPATIENT
Start: 2019-12-31 | End: 2019-12-31 | Stop reason: HOSPADM

## 2019-12-31 RX ORDER — SODIUM CHLORIDE, SODIUM LACTATE, POTASSIUM CHLORIDE, CALCIUM CHLORIDE 600; 310; 30; 20 MG/100ML; MG/100ML; MG/100ML; MG/100ML
INJECTION, SOLUTION INTRAVENOUS CONTINUOUS
Status: DISCONTINUED | OUTPATIENT
Start: 2019-12-31 | End: 2019-12-31 | Stop reason: SDUPTHER

## 2019-12-31 RX ORDER — SODIUM CHLORIDE 9 MG/ML
INJECTION, SOLUTION INTRAVENOUS CONTINUOUS
Status: DISCONTINUED | OUTPATIENT
Start: 2019-12-31 | End: 2019-12-31 | Stop reason: HOSPADM

## 2019-12-31 RX ORDER — FENTANYL CITRATE 50 UG/ML
INJECTION, SOLUTION INTRAMUSCULAR; INTRAVENOUS PRN
Status: DISCONTINUED | OUTPATIENT
Start: 2019-12-31 | End: 2019-12-31 | Stop reason: SDUPTHER

## 2019-12-31 RX ORDER — LIDOCAINE HYDROCHLORIDE 10 MG/ML
INJECTION, SOLUTION INFILTRATION; PERINEURAL PRN
Status: DISCONTINUED | OUTPATIENT
Start: 2019-12-31 | End: 2019-12-31 | Stop reason: SDUPTHER

## 2019-12-31 RX ORDER — MEPERIDINE HYDROCHLORIDE 50 MG/ML
12.5 INJECTION INTRAMUSCULAR; INTRAVENOUS; SUBCUTANEOUS EVERY 5 MIN PRN
Status: DISCONTINUED | OUTPATIENT
Start: 2019-12-31 | End: 2019-12-31 | Stop reason: HOSPADM

## 2019-12-31 RX ORDER — CIPROFLOXACIN 2 MG/ML
400 INJECTION, SOLUTION INTRAVENOUS ONCE
Status: COMPLETED | OUTPATIENT
Start: 2019-12-31 | End: 2019-12-31

## 2019-12-31 RX ORDER — ONDANSETRON 2 MG/ML
INJECTION INTRAMUSCULAR; INTRAVENOUS PRN
Status: DISCONTINUED | OUTPATIENT
Start: 2019-12-31 | End: 2019-12-31 | Stop reason: SDUPTHER

## 2019-12-31 RX ORDER — SODIUM CHLORIDE, SODIUM LACTATE, POTASSIUM CHLORIDE, CALCIUM CHLORIDE 600; 310; 30; 20 MG/100ML; MG/100ML; MG/100ML; MG/100ML
INJECTION, SOLUTION INTRAVENOUS CONTINUOUS PRN
Status: DISCONTINUED | OUTPATIENT
Start: 2019-12-31 | End: 2019-12-31 | Stop reason: SDUPTHER

## 2019-12-31 RX ADMIN — SODIUM CHLORIDE, POTASSIUM CHLORIDE, SODIUM LACTATE AND CALCIUM CHLORIDE: 600; 310; 30; 20 INJECTION, SOLUTION INTRAVENOUS at 07:49

## 2019-12-31 RX ADMIN — CARVEDILOL 12.5 MG: 12.5 TABLET, FILM COATED ORAL at 09:07

## 2019-12-31 RX ADMIN — CIPROFLOXACIN 400 MG: 2 INJECTION, SOLUTION INTRAVENOUS at 10:34

## 2019-12-31 RX ADMIN — SODIUM CHLORIDE, SODIUM LACTATE, POTASSIUM CHLORIDE, AND CALCIUM CHLORIDE: 600; 310; 30; 20 INJECTION, SOLUTION INTRAVENOUS at 10:22

## 2019-12-31 RX ADMIN — PROPOFOL 200 MCG/KG/MIN: 10 INJECTION, EMULSION INTRAVENOUS at 10:31

## 2019-12-31 RX ADMIN — FENTANYL CITRATE 50 MCG: 50 INJECTION INTRAMUSCULAR; INTRAVENOUS at 10:31

## 2019-12-31 RX ADMIN — ONDANSETRON HYDROCHLORIDE 4 MG: 2 INJECTION, SOLUTION INTRAMUSCULAR; INTRAVENOUS at 10:46

## 2019-12-31 RX ADMIN — GENTAMICIN SULFATE 100 MG: 100 INJECTION, SOLUTION INTRAVENOUS at 07:56

## 2019-12-31 RX ADMIN — MIDAZOLAM 2 MG: 1 INJECTION INTRAMUSCULAR; INTRAVENOUS at 10:18

## 2019-12-31 RX ADMIN — MIDAZOLAM 2 MG: 1 INJECTION INTRAMUSCULAR; INTRAVENOUS at 10:17

## 2019-12-31 RX ADMIN — LIDOCAINE HYDROCHLORIDE 50 MG: 10 INJECTION, SOLUTION INFILTRATION; PERINEURAL at 10:31

## 2019-12-31 RX ADMIN — DEXAMETHASONE SODIUM PHOSPHATE 10 MG: 10 INJECTION INTRAMUSCULAR; INTRAVENOUS at 10:34

## 2019-12-31 ASSESSMENT — PAIN - FUNCTIONAL ASSESSMENT: PAIN_FUNCTIONAL_ASSESSMENT: 0-10

## 2019-12-31 ASSESSMENT — PAIN SCALES - GENERAL: PAINLEVEL_OUTOF10: 0

## 2019-12-31 NOTE — H&P
Smokeless tobacco: Never Used   Substance and Sexual Activity    Alcohol use: Never     Frequency: Never    Drug use: Never    Sexual activity: None   Lifestyle    Physical activity:     Days per week: None     Minutes per session: None    Stress: None   Relationships    Social connections:     Talks on phone: None     Gets together: None     Attends Muslim service: None     Active member of club or organization: None     Attends meetings of clubs or organizations: None     Relationship status: None    Intimate partner violence:     Fear of current or ex partner: None     Emotionally abused: None     Physically abused: None     Forced sexual activity: None   Other Topics Concern    None   Social History Narrative    None       Family History   Family history unknown: Yes       REVIEW OF SYSTEMS:  Review of Systems   Constitutional: Negative for activity change, chills, fatigue and fever. HENT: Negative for congestion, ear discharge, ear pain, facial swelling, mouth sores, rhinorrhea, sinus pressure and sore throat. Eyes: Negative for pain, discharge and redness. Respiratory: Negative for cough, shortness of breath and wheezing. Cardiovascular: Negative for chest pain, palpitations and leg swelling. Gastrointestinal: Negative for abdominal distention, abdominal pain, blood in stool, constipation, diarrhea, nausea and vomiting. Endocrine: Negative for polydipsia, polyphagia and polyuria. Genitourinary: Positive for frequency. Negative for decreased urine volume, difficulty urinating, dysuria, enuresis, flank pain, genital sores, hematuria and urgency. Musculoskeletal: Negative for back pain, gait problem, joint swelling, neck pain and neck stiffness. Skin: Negative for color change, rash and wound. Allergic/Immunologic: Negative for environmental allergies and immunocompromised state. Neurological: Negative for dizziness, syncope, weakness, light-headedness, numbness and headaches.

## 2020-01-01 NOTE — OP NOTE
[id number]
performed. After  adequate sedation, I then performed a digital rectal examination. Due  to his body habitus, I could not feel the whole prostate but the half  that I could feel felt benign with no nodularity. The 7 MHz transrectal  ultrasound probe was then inserted in the anus and then up into the  rectum without difficulty. I then imaged the prostate in multiple  transverse and sagittal planes. This showed some enlargement of the  transition zone. I did not see any obvious hypoechoic regions  discretely. The seminal vesicles were normal.  Volume measurements were  taken and the prostatic volume was 97.8. His PSAD was 0.10. I then performed transrectal ultrasound-guided and transrectal needle  punch biopsies of the prostate. The standard 12-core template was used. Biopsies were taken from the left lateral base, left base, left lateral  mid gland, left mid gland, left lateral apex, and left apex. Total of 6  biopsies were taken from the left side. Biopsies were taken from the right lateral base, right base, right  lateral mid gland, right mid gland, right lateral apex, and right apex. Six biopsies were taken from the right. After completion of the biopsy,  the ultrasound probe was removed. There was minimal bleeding. He was  then awakened from his sedation and taken to the recovery room in stable  condition. We will contact him with the biopsy results and arrange  appropriate followup. He is to continue his antibiotics as prescribed  for the next 3 days. Estimated blood loss was 5 mL.       Sabine Pastrana MD    D: 12/31/2019 12:12:43      T: 12/31/2019 15:01:55     [id number]  Job#: [id number]     Doc#: [id number]    CC:

## 2020-01-10 ENCOUNTER — TELEPHONE (OUTPATIENT)
Dept: UROLOGY | Age: 60
End: 2020-01-10

## 2020-01-10 NOTE — TELEPHONE ENCOUNTER
I called the patient with his prostate biopsy report and this showed no cancer.   He will follow-up to see me in 3 months with a PSA prior please contact him and make those arrangements

## 2020-01-24 ENCOUNTER — HOSPITAL ENCOUNTER (EMERGENCY)
Age: 60
Discharge: HOME | End: 2020-01-24
Payer: MEDICARE

## 2020-01-24 ENCOUNTER — APPOINTMENT (OUTPATIENT)
Dept: GENERAL RADIOLOGY | Age: 60
End: 2020-01-24
Payer: MEDICARE

## 2020-01-24 VITALS
SYSTOLIC BLOOD PRESSURE: 183 MMHG | HEART RATE: 56 BPM | HEIGHT: 75 IN | TEMPERATURE: 98.5 F | DIASTOLIC BLOOD PRESSURE: 83 MMHG | OXYGEN SATURATION: 94 % | BODY MASS INDEX: 37.18 KG/M2 | WEIGHT: 299 LBS | RESPIRATION RATE: 17 BRPM

## 2020-01-24 PROCEDURE — 73502 X-RAY EXAM HIP UNI 2-3 VIEWS: CPT

## 2020-01-24 PROCEDURE — 96372 THER/PROPH/DIAG INJ SC/IM: CPT

## 2020-01-24 PROCEDURE — 72100 X-RAY EXAM L-S SPINE 2/3 VWS: CPT

## 2020-01-24 PROCEDURE — 6360000002 HC RX W HCPCS: Performed by: NURSE PRACTITIONER

## 2020-01-24 PROCEDURE — 93971 EXTREMITY STUDY: CPT

## 2020-01-24 PROCEDURE — 99284 EMERGENCY DEPT VISIT MOD MDM: CPT

## 2020-01-24 RX ORDER — NAPROXEN 500 MG/1
500 TABLET ORAL 2 TIMES DAILY
Qty: 30 TABLET | Refills: 0 | Status: ON HOLD | OUTPATIENT
Start: 2020-01-24 | End: 2022-03-19 | Stop reason: HOSPADM

## 2020-01-24 RX ORDER — OXYCODONE HYDROCHLORIDE AND ACETAMINOPHEN 5; 325 MG/1; MG/1
1 TABLET ORAL ONCE
Status: DISCONTINUED | OUTPATIENT
Start: 2020-01-24 | End: 2020-01-24

## 2020-01-24 RX ORDER — LIDOCAINE 4 G/G
1 PATCH TOPICAL DAILY
Status: DISCONTINUED | OUTPATIENT
Start: 2020-01-24 | End: 2020-01-24

## 2020-01-24 RX ORDER — MORPHINE SULFATE 4 MG/ML
4 INJECTION, SOLUTION INTRAMUSCULAR; INTRAVENOUS ONCE
Status: COMPLETED | OUTPATIENT
Start: 2020-01-24 | End: 2020-01-24

## 2020-01-24 RX ORDER — ORPHENADRINE CITRATE 30 MG/ML
60 INJECTION INTRAMUSCULAR; INTRAVENOUS ONCE
Status: COMPLETED | OUTPATIENT
Start: 2020-01-24 | End: 2020-01-24

## 2020-01-24 RX ORDER — METHOCARBAMOL 500 MG/1
500 TABLET, FILM COATED ORAL 3 TIMES DAILY
Qty: 20 TABLET | Refills: 0 | Status: SHIPPED | OUTPATIENT
Start: 2020-01-24 | End: 2020-01-31

## 2020-01-24 RX ORDER — HYDROCODONE BITARTRATE AND ACETAMINOPHEN 7.5; 325 MG/1; MG/1
1 TABLET ORAL EVERY 6 HOURS PRN
Qty: 12 TABLET | Refills: 0 | Status: SHIPPED | OUTPATIENT
Start: 2020-01-24 | End: 2020-01-27

## 2020-01-24 RX ADMIN — MORPHINE SULFATE 4 MG: 4 INJECTION, SOLUTION INTRAMUSCULAR; INTRAVENOUS at 14:28

## 2020-01-24 RX ADMIN — ORPHENADRINE CITRATE 60 MG: 30 INJECTION INTRAMUSCULAR; INTRAVENOUS at 14:27

## 2020-01-24 ASSESSMENT — PAIN DESCRIPTION - ORIENTATION: ORIENTATION: LEFT

## 2020-01-24 ASSESSMENT — PAIN SCALES - GENERAL: PAINLEVEL_OUTOF10: 10

## 2020-01-24 ASSESSMENT — ENCOUNTER SYMPTOMS
ABDOMINAL PAIN: 0
BACK PAIN: 1

## 2020-01-24 ASSESSMENT — PAIN DESCRIPTION - LOCATION: LOCATION: LEG

## 2020-01-24 NOTE — ED PROVIDER NOTES
[facility]  eMERGENCY dEPARTMENT eNCOUnter      Pt Name: Alexandro Goncalves  MRN: [id number]  Trevorgfjulieta 1960  Date of evaluation: 1/24/2020  Provider: Lilian Layne, [address]       Chief Complaint   Patient presents with    Leg Pain         HISTORY OF PRESENT ILLNESS   (Location/Symptom, Timing/Onset,Context/Setting, Quality, Duration, Modifying Factors, Severity)  Note limiting factors. Colby Trinidads a 63 y.o. male who presents to the emergency department for evaluation of leg pain. Pt tells me that he has had left hip and thigh pain over past 4 days after leg exercises. He describes sharp quality pain left thigh worse with movement of leg/walking. He tells me that he has had mild low back pain. He denies abdominal pain as well as fevers. He has had no saddle anesthesia or lower extremity numbness or weakness. [facility] [place]    Nursing Notes were reviewed. REVIEW OF SYSTEMS    (2-9 systems for level 4, 10 or more for level 5)     Review of Systems   Constitutional: Negative. Gastrointestinal: Negative for abdominal pain. Genitourinary: Negative for difficulty urinating. Musculoskeletal: Positive for arthralgias (left hip), back pain and myalgias (left thigh). Neurological: Negative for weakness and numbness. A complete review of systems was performed and is negative except as noted above in the HPI.        PAST MEDICAL HISTORY     Past Medical History:   Diagnosis Date    Cardiomyopathy [facility])     per pt    CHF (congestive heart failure) ([facility] [address]     Hyperlipidemia     Hypertension     Prolonged emergence from general anesthesia     patient states he does not come out of anesthesia well-he acts ugly         SURGICAL HISTORY       Past Surgical History:   Procedure Laterality Date    PACEMAKER INSERTION      PACEMAKER PLACEMENT      [employer]    ULTRASOUND PROSTATE/TRANSRECTAL N/A 12/31/2019    TRANSRECTAL ULTRASOUND BIOPSY OF PROSTATE performed by Alma Headley MD at [facility] CURRENT MEDICATIONS       Discharge Medication List as of 1/24/2020  3:18 PM      CONTINUE these medications which have NOT CHANGED    Details   carvedilol (COREG) 12.5 MG tablet Take 12.5 mg by mouth daily , R-6Historical Med      colchicine (COLCRYS) 0.6 MG tablet Take 0.6 mg by mouth daily as needed Historical Med      fluticasone (FLONASE) 50 MCG/ACT nasal spray 2 sprays by Nasal route daily as needed Historical Med      indomethacin (INDOCIN) 50 MG capsule Take 50 mg by mouth daily as needed Historical Med      nitroGLYCERIN (NITROSTAT) 0.4 MG SL tablet Place 0.4 mg under the tongue every 5 minutes as needed Historical Med      amiodarone (PACERONE) 400 MG tablet Take 400 mg by mouth daily Historical Med      lisinopril (PRINIVIL;ZESTRIL) 20 MG tablet lisinopril 20 mg tabletHistorical Med      aspirin 81 MG tablet Take 81 mg by mouth daily Historical Med      simvastatin (ZOCOR) 20 MG tablet Take 20 mg by mouth nightlyHistorical Med      cloNIDine (CATAPRES) 0.1 MG tablet Take 0.2 mg by mouth daily Historical Med             ALLERGIES     Patient has no known allergies.     FAMILY HISTORY       Family History   Family history unknown: Yes          SOCIAL HISTORY       Social History     Socioeconomic History    Marital status: Single     Spouse name: None    Number of children: None    Years of education: None    Highest education level: None   Occupational History    None   Social Needs    Financial resource strain: None    Food insecurity:     Worry: None     Inability: None    Transportation needs:     Medical: None     Non-medical: None   Tobacco Use    Smoking status: Never Smoker    Smokeless tobacco: Never Used   Substance and Sexual Activity    Alcohol use: Never     Frequency: Never    Drug use: Never    Sexual activity: None   Lifestyle    Physical activity:     Days per week: None     Minutes per session: None    Stress: None   Relationships    Social connections:     Talks on phone: None     Gets together: None     Attends Restorationism service: None     Active member of club or organization: None     Attends meetings of clubs or organizations: None     Relationship status: None    Intimate partner violence:     Fear of current or ex partner: None     Emotionally abused: None     Physically abused: None     Forced sexual activity: None   Other Topics Concern    None   Social History Narrative    None       SCREENINGS             PHYSICAL EXAM    (up to 7 for level 4, 8 or more for level 5)     ED Triage Vitals [01/24/20 1230]   BP Temp Temp Source Pulse Resp SpO2 Height Weight   (!) 183/83 98.5 °F (36.9 °C) Oral 56 17 94 % 6' 3\" (1.905 m) 299 lb (135.6 kg)       Physical Exam  Vitals signs reviewed. HENT:      Head: Normocephalic. Right Ear: External ear normal.      Left Ear: External ear normal.   Eyes:      Conjunctiva/sclera: Conjunctivae normal.      Pupils: Pupils are equal, round, and reactive to light. Neck:      Musculoskeletal: Normal range of motion. Cardiovascular:      Rate and Rhythm: Normal rate and regular rhythm. Heart sounds: Normal heart sounds. Pulmonary:      Effort: Pulmonary effort is normal.      Breath sounds: Normal breath sounds. Abdominal:      General: Bowel sounds are normal.      Palpations: Abdomen is soft. Tenderness: There is no tenderness. Musculoskeletal: Normal range of motion. Comments: No direct ttp lumbar spine-specifically no direct ttp L2  No direct ttp left hip  Painful flexion of left hip  Compartments of left lower extremity are soft  CMS intact left lower extremity    Skin:     General: Skin is warm and dry. Neurological:      Mental Status: He is alert and oriented to person, place, and time.          DIAGNOSTIC RESULTS     EKG: All EKG's are interpreted by the Emergency Department Physician who either signs or Co-signs this chart in the absence of acardiologist.        RADIOLOGY:   Non-plain film images such as CT, Ultrasound andMRI are read by the radiologist. Plain radiographic images are visualized and preliminarily interpreted by the emergency physician with the below findings:        Interpretation per the Radiologist below, if available at the time of this note:    VL Extremity Venous Left   Final Result      XR LUMBAR SPINE (2-3 VIEWS)   Final Result   1. Age-indeterminate anterior height loss of the L2 vertebral body,   approximately 25%. 2. Multilevel degenerative changes as described above. Signed by Dr Juvenal Leavitt on 1/24/2020 2:04 PM      XR HIP 2-3 VW W PELVIS LEFT   Final Result   1. Arthritic changes of the pelvis and hips with no acute bony   pathology of the pelvis or left hip visualized. Signed by Dr Ethan Castro on 1/24/2020 2:09 PM            ED BEDSIDE ULTRASOUND:   Performed by ED Physician - none    LABS:  Labs Reviewed - No data to display    All other labs were within normal range or not returned as of this dictation. RE-ASSESSMENT     Vascular Lab Prelim  Duplex venous scan of LLE shows no evidence for dvt, svt, reflux noted at this time. Final report pending. Discussed with patient changes noted on imaging of lumbar spine. He doesn't recall every having a compression injury of L2, he has no direct ttp lumbar spine and no specific injury. EMERGENCY DEPARTMENT COURSE and DIFFERENTIALDIAGNOSIS/MDM:   Vitals:    Vitals:    01/24/20 1230   BP: (!) 183/83   Pulse: 56   Resp: 17   Temp: 98.5 °F (36.9 °C)   TempSrc: Oral   SpO2: 94%   Weight: 299 lb (135.6 kg)   Height: 6' 3\" (1.905 m)       MDM      CONSULTS:  None    PROCEDURES:  Unless otherwise notedbelow, none     Procedures    FINAL IMPRESSION     1.  Acute pain of left thigh    2. Sciatica of left side          DISPOSITION/PLAN   DISPOSITION Decision To Discharge 01/24/2020 01:47:39 PM      PAT[facility]:  [facility]  [phone]    Schedule an appointment as soon as possible for a visit in 3 days  fail to improve      DISCHARGE MEDICATIONS:    Attestation: The Prescription Monitoring Report for this patient was reviewed today. ([id number]) BALBIR Novak)  Periodic Controlled Substance Monitoring: Possible medication side effects, risk of tolerance/dependence & alternative treatments discussed., No signs of potential drug abuse or diversion identified. BALBIR Novak)  Discharge Medication List as of 1/24/2020  3:18 PM           Medication List      START taking these medications    HYDROcodone-acetaminophen 7.5-325 MG per tablet  Commonly known as:  Norco  Take 1 tablet by mouth every 6 hours as needed for Pain for up to 3 days. Intended supply: 3 days.  Take lowest dose possible to manage pain     methocarbamol 500 MG tablet  Commonly known as:  Robaxin  Take 1 tablet by mouth 3 times daily for 7 days     naproxen 500 MG tablet  Commonly known as:  Naprosyn  Take 1 tablet by mouth 2 times daily for 15 days        ASK your doctor about these medications    amiodarone 400 MG tablet  Commonly known as:  PACERONE     aspirin 81 MG tablet     carvedilol 12.5 MG tablet  Commonly known as:  COREG     cloNIDine 0.1 MG tablet  Commonly known as:  CATAPRES     Colcrys 0.6 MG tablet  Generic drug:  colchicine     fluticasone 50 MCG/ACT nasal spray  Commonly known as:  FLONASE     indomethacin 50 MG capsule  Commonly known as:  INDOCIN     lisinopril 20 MG tablet  Commonly known as:  PRINIVIL;ZESTRIL     nitroGLYCERIN 0.4 MG SL tablet  Commonly known as:  NITROSTAT     simvastatin 20 MG tablet  Commonly known as:  ZOCOR           Where to Get Your Medications      You can get these medications from any pharmacy    Bring a paper prescription for each of these medications  · HYDROcodone-acetaminophen 7.5-325 MG per tablet  · methocarbamol 500 MG tablet  · naproxen 500 MG tablet         (Pleasenote that portions of this note were completed with a voice recognition program.  Efforts were made to edit the dictations but occasionally words are mis-transcribed.)              Daniel Shah, APRN  01/24/20 1534

## 2020-01-24 NOTE — PROGRESS NOTES
Vascular Lab Prelim  Duplex venous scan of LLE shows no evidence for dvt, svt, reflux noted at this time. Final report pending.

## 2020-01-26 ENCOUNTER — HOSPITAL ENCOUNTER (INPATIENT)
Age: 60
LOS: 1 days | Discharge: HOME | DRG: 293 | End: 2020-01-27
Attending: EMERGENCY MEDICINE | Admitting: HOSPITALIST
Payer: MEDICARE

## 2020-01-26 ENCOUNTER — APPOINTMENT (OUTPATIENT)
Dept: GENERAL RADIOLOGY | Age: 60
DRG: 293 | End: 2020-01-26
Payer: MEDICARE

## 2020-01-26 PROBLEM — I50.30 DIASTOLIC HEART FAILURE (HCC): Status: ACTIVE | Noted: 2020-01-26

## 2020-01-26 PROBLEM — I51.7 HYPERTROPHY OF INTER-ATRIAL SEPTUM: Status: ACTIVE | Noted: 2020-01-26

## 2020-01-26 PROBLEM — I50.43 CHF (CONGESTIVE HEART FAILURE), NYHA CLASS III, ACUTE ON CHRONIC, COMBINED (HCC): Status: ACTIVE | Noted: 2020-01-26

## 2020-01-26 PROBLEM — I47.29 VENTRICULAR TACHYCARDIA (PAROXYSMAL): Status: ACTIVE | Noted: 2020-01-26

## 2020-01-26 PROBLEM — R00.2 PALPITATIONS: Status: ACTIVE | Noted: 2020-01-26

## 2020-01-26 PROBLEM — T46.2X1A: Status: ACTIVE | Noted: 2020-01-26

## 2020-01-26 PROBLEM — I50.20 SYSTOLIC HEART FAILURE (HCC): Status: ACTIVE | Noted: 2020-01-26

## 2020-01-26 LAB
ALBUMIN SERPL-MCNC: 3.9 G/DL (ref 3.5–5.2)
ALP BLD-CCNC: 64 U/L (ref 40–130)
ALT SERPL-CCNC: 18 U/L (ref 5–41)
ANION GAP SERPL CALCULATED.3IONS-SCNC: 10 MMOL/L (ref 7–19)
ANION GAP SERPL CALCULATED.3IONS-SCNC: 12 MMOL/L (ref 7–19)
APTT: 36 SEC (ref 26–36.2)
AST SERPL-CCNC: 26 U/L (ref 5–40)
BASOPHILS ABSOLUTE: 0 K/UL (ref 0–0.2)
BASOPHILS RELATIVE PERCENT: 0.4 % (ref 0–1)
BILIRUB SERPL-MCNC: 0.5 MG/DL (ref 0.2–1.2)
BUN BLDV-MCNC: 16 MG/DL (ref 8–23)
BUN BLDV-MCNC: 16 MG/DL (ref 8–23)
CALCIUM SERPL-MCNC: 8.9 MG/DL (ref 8.8–10.2)
CALCIUM SERPL-MCNC: 9.2 MG/DL (ref 8.8–10.2)
CHLORIDE BLD-SCNC: 103 MMOL/L (ref 98–111)
CHLORIDE BLD-SCNC: 104 MMOL/L (ref 98–111)
CO2: 23 MMOL/L (ref 22–29)
CO2: 25 MMOL/L (ref 22–29)
CREAT SERPL-MCNC: 1.2 MG/DL (ref 0.5–1.2)
CREAT SERPL-MCNC: 1.2 MG/DL (ref 0.5–1.2)
EOSINOPHILS ABSOLUTE: 0.1 K/UL (ref 0–0.6)
EOSINOPHILS RELATIVE PERCENT: 1.3 % (ref 0–5)
GFR NON-AFRICAN AMERICAN: >60
GFR NON-AFRICAN AMERICAN: >60
GLUCOSE BLD-MCNC: 103 MG/DL (ref 74–109)
GLUCOSE BLD-MCNC: 129 MG/DL (ref 74–109)
HCT VFR BLD CALC: 49.6 % (ref 42–52)
HEMOGLOBIN: 15.9 G/DL (ref 14–18)
IMMATURE GRANULOCYTES #: 0 K/UL
INR BLD: 1.13 (ref 0.88–1.18)
LV EF: 53 %
LVEF MODALITY: NORMAL
LYMPHOCYTES ABSOLUTE: 2.3 K/UL (ref 1.1–4.5)
LYMPHOCYTES RELATIVE PERCENT: 33.5 % (ref 20–40)
MAGNESIUM: 2.4 MG/DL (ref 1.6–2.4)
MCH RBC QN AUTO: 28.9 PG (ref 27–31)
MCHC RBC AUTO-ENTMCNC: 32.1 G/DL (ref 33–37)
MCV RBC AUTO: 90.2 FL (ref 80–94)
MONOCYTES ABSOLUTE: 0.8 K/UL (ref 0–0.9)
MONOCYTES RELATIVE PERCENT: 11.3 % (ref 0–10)
NEUTROPHILS ABSOLUTE: 3.7 K/UL (ref 1.5–7.5)
NEUTROPHILS RELATIVE PERCENT: 52.9 % (ref 50–65)
PDW BLD-RTO: 15.9 % (ref 11.5–14.5)
PLATELET # BLD: 172 K/UL (ref 130–400)
PMV BLD AUTO: 12 FL (ref 9.4–12.4)
POTASSIUM SERPL-SCNC: 4.4 MMOL/L (ref 3.5–5)
POTASSIUM SERPL-SCNC: 4.5 MMOL/L (ref 3.5–5)
PRO-BNP: 450 PG/ML (ref 0–900)
PROTHROMBIN TIME: 13.9 SEC (ref 12–14.6)
RBC # BLD: 5.5 M/UL (ref 4.7–6.1)
SODIUM BLD-SCNC: 138 MMOL/L (ref 136–145)
SODIUM BLD-SCNC: 139 MMOL/L (ref 136–145)
TOTAL PROTEIN: 7.6 G/DL (ref 6.6–8.7)
TROPONIN: 0.02 NG/ML (ref 0–0.03)
TROPONIN: 0.02 NG/ML (ref 0–0.03)
TSH SERPL DL<=0.05 MIU/L-ACNC: 0.57 UIU/ML (ref 0.27–4.2)
TSH SERPL DL<=0.05 MIU/L-ACNC: 1.05 UIU/ML (ref 0.27–4.2)
WBC # BLD: 7 K/UL (ref 4.8–10.8)

## 2020-01-26 PROCEDURE — 6360000002 HC RX W HCPCS: Performed by: HOSPITALIST

## 2020-01-26 PROCEDURE — 6370000000 HC RX 637 (ALT 250 FOR IP): Performed by: HOSPITALIST

## 2020-01-26 PROCEDURE — 84443 ASSAY THYROID STIM HORMONE: CPT

## 2020-01-26 PROCEDURE — 83880 ASSAY OF NATRIURETIC PEPTIDE: CPT

## 2020-01-26 PROCEDURE — 85730 THROMBOPLASTIN TIME PARTIAL: CPT

## 2020-01-26 PROCEDURE — 84484 ASSAY OF TROPONIN QUANT: CPT

## 2020-01-26 PROCEDURE — 2580000003 HC RX 258: Performed by: HOSPITALIST

## 2020-01-26 PROCEDURE — 93005 ELECTROCARDIOGRAM TRACING: CPT | Performed by: EMERGENCY MEDICINE

## 2020-01-26 PROCEDURE — 96372 THER/PROPH/DIAG INJ SC/IM: CPT

## 2020-01-26 PROCEDURE — 2140000000 HC CCU INTERMEDIATE R&B

## 2020-01-26 PROCEDURE — C8929 TTE W OR WO FOL WCON,DOPPLER: HCPCS

## 2020-01-26 PROCEDURE — 99223 1ST HOSP IP/OBS HIGH 75: CPT | Performed by: INTERNAL MEDICINE

## 2020-01-26 PROCEDURE — 6360000004 HC RX CONTRAST MEDICATION: Performed by: INTERNAL MEDICINE

## 2020-01-26 PROCEDURE — 80053 COMPREHEN METABOLIC PANEL: CPT

## 2020-01-26 PROCEDURE — 83735 ASSAY OF MAGNESIUM: CPT

## 2020-01-26 PROCEDURE — 36415 COLL VENOUS BLD VENIPUNCTURE: CPT

## 2020-01-26 PROCEDURE — 85025 COMPLETE CBC W/AUTO DIFF WBC: CPT

## 2020-01-26 PROCEDURE — 71045 X-RAY EXAM CHEST 1 VIEW: CPT

## 2020-01-26 PROCEDURE — 99285 EMERGENCY DEPT VISIT HI MDM: CPT

## 2020-01-26 PROCEDURE — 85610 PROTHROMBIN TIME: CPT

## 2020-01-26 RX ORDER — NITROGLYCERIN 0.4 MG/1
0.4 TABLET SUBLINGUAL EVERY 5 MIN PRN
Status: DISCONTINUED | OUTPATIENT
Start: 2020-01-26 | End: 2020-01-27 | Stop reason: HOSPADM

## 2020-01-26 RX ORDER — CLONIDINE HYDROCHLORIDE 0.1 MG/1
0.2 TABLET ORAL DAILY
Status: DISCONTINUED | OUTPATIENT
Start: 2020-01-26 | End: 2020-01-27 | Stop reason: HOSPADM

## 2020-01-26 RX ORDER — SODIUM CHLORIDE 0.9 % (FLUSH) 0.9 %
10 SYRINGE (ML) INJECTION PRN
Status: DISCONTINUED | OUTPATIENT
Start: 2020-01-26 | End: 2020-01-27 | Stop reason: HOSPADM

## 2020-01-26 RX ORDER — AMIODARONE HYDROCHLORIDE 200 MG/1
400 TABLET ORAL DAILY
Status: DISCONTINUED | OUTPATIENT
Start: 2020-01-26 | End: 2020-01-27 | Stop reason: HOSPADM

## 2020-01-26 RX ORDER — LISINOPRIL 20 MG/1
20 TABLET ORAL DAILY
Status: DISCONTINUED | OUTPATIENT
Start: 2020-01-26 | End: 2020-01-27 | Stop reason: HOSPADM

## 2020-01-26 RX ORDER — SODIUM CHLORIDE 0.9 % (FLUSH) 0.9 %
10 SYRINGE (ML) INJECTION EVERY 12 HOURS SCHEDULED
Status: DISCONTINUED | OUTPATIENT
Start: 2020-01-26 | End: 2020-01-27 | Stop reason: HOSPADM

## 2020-01-26 RX ORDER — METHOCARBAMOL 500 MG/1
500 TABLET, FILM COATED ORAL 3 TIMES DAILY
Status: DISCONTINUED | OUTPATIENT
Start: 2020-01-26 | End: 2020-01-27 | Stop reason: HOSPADM

## 2020-01-26 RX ORDER — ASPIRIN 81 MG/1
81 TABLET ORAL DAILY
Status: DISCONTINUED | OUTPATIENT
Start: 2020-01-26 | End: 2020-01-27 | Stop reason: HOSPADM

## 2020-01-26 RX ORDER — COLCHICINE 0.6 MG/1
0.6 TABLET ORAL DAILY
Status: DISCONTINUED | OUTPATIENT
Start: 2020-01-26 | End: 2020-01-27 | Stop reason: HOSPADM

## 2020-01-26 RX ORDER — ONDANSETRON 2 MG/ML
4 INJECTION INTRAMUSCULAR; INTRAVENOUS EVERY 6 HOURS PRN
Status: DISCONTINUED | OUTPATIENT
Start: 2020-01-26 | End: 2020-01-27 | Stop reason: HOSPADM

## 2020-01-26 RX ORDER — HYDROCODONE BITARTRATE AND ACETAMINOPHEN 7.5; 325 MG/1; MG/1
1 TABLET ORAL EVERY 6 HOURS PRN
Status: DISCONTINUED | OUTPATIENT
Start: 2020-01-26 | End: 2020-01-27 | Stop reason: HOSPADM

## 2020-01-26 RX ORDER — SIMVASTATIN 20 MG
20 TABLET ORAL NIGHTLY
Status: DISCONTINUED | OUTPATIENT
Start: 2020-01-26 | End: 2020-01-27 | Stop reason: HOSPADM

## 2020-01-26 RX ORDER — CARVEDILOL 12.5 MG/1
12.5 TABLET ORAL DAILY
Status: DISCONTINUED | OUTPATIENT
Start: 2020-01-26 | End: 2020-01-27 | Stop reason: HOSPADM

## 2020-01-26 RX ORDER — FLUTICASONE PROPIONATE 50 MCG
2 SPRAY, SUSPENSION (ML) NASAL DAILY PRN
Status: DISCONTINUED | OUTPATIENT
Start: 2020-01-26 | End: 2020-01-27 | Stop reason: HOSPADM

## 2020-01-26 RX ORDER — NAPROXEN 500 MG/1
500 TABLET ORAL 2 TIMES DAILY
Status: DISCONTINUED | OUTPATIENT
Start: 2020-01-26 | End: 2020-01-27 | Stop reason: HOSPADM

## 2020-01-26 RX ADMIN — SODIUM CHLORIDE, PRESERVATIVE FREE 10 ML: 5 INJECTION INTRAVENOUS at 08:59

## 2020-01-26 RX ADMIN — PERFLUTREN 1.65 MG: 6.52 INJECTION, SUSPENSION INTRAVENOUS at 11:00

## 2020-01-26 RX ADMIN — AMIODARONE HYDROCHLORIDE 400 MG: 200 TABLET ORAL at 08:58

## 2020-01-26 RX ADMIN — NAPROXEN 500 MG: 500 TABLET ORAL at 08:58

## 2020-01-26 RX ADMIN — ENOXAPARIN SODIUM 40 MG: 40 INJECTION SUBCUTANEOUS at 08:57

## 2020-01-26 RX ADMIN — ASPIRIN 81 MG: 81 TABLET, COATED ORAL at 08:58

## 2020-01-26 RX ADMIN — NAPROXEN 500 MG: 500 TABLET ORAL at 21:30

## 2020-01-26 RX ADMIN — CARVEDILOL 12.5 MG: 12.5 TABLET, FILM COATED ORAL at 08:58

## 2020-01-26 RX ADMIN — SIMVASTATIN 20 MG: 20 TABLET, FILM COATED ORAL at 21:30

## 2020-01-26 RX ADMIN — SODIUM CHLORIDE, PRESERVATIVE FREE 10 ML: 5 INJECTION INTRAVENOUS at 21:30

## 2020-01-26 RX ADMIN — LISINOPRIL 20 MG: 20 TABLET ORAL at 08:58

## 2020-01-26 RX ADMIN — CLONIDINE HYDROCHLORIDE 0.2 MG: 0.1 TABLET ORAL at 08:58

## 2020-01-26 RX ADMIN — COLCHICINE 0.6 MG: 0.6 TABLET, FILM COATED ORAL at 08:58

## 2020-01-26 ASSESSMENT — ENCOUNTER SYMPTOMS
NAUSEA: 0
VOMITING: 0
DIARRHEA: 0
ABDOMINAL PAIN: 0
RHINORRHEA: 0
SHORTNESS OF BREATH: 0
BACK PAIN: 0
SORE THROAT: 0

## 2020-01-26 ASSESSMENT — PAIN SCALES - GENERAL
PAINLEVEL_OUTOF10: 6
PAINLEVEL_OUTOF10: 3
PAINLEVEL_OUTOF10: 4

## 2020-01-26 ASSESSMENT — PAIN DESCRIPTION - DESCRIPTORS
DESCRIPTORS: PRESSURE
DESCRIPTORS: CONSTANT

## 2020-01-26 ASSESSMENT — PAIN DESCRIPTION - ORIENTATION
ORIENTATION: LEFT
ORIENTATION: LEFT

## 2020-01-26 ASSESSMENT — PAIN DESCRIPTION - LOCATION
LOCATION: CHEST
LOCATION: TOE (COMMENT WHICH ONE)
LOCATION: CHEST

## 2020-01-26 ASSESSMENT — PAIN DESCRIPTION - PAIN TYPE
TYPE: CHRONIC PAIN
TYPE: ACUTE PAIN

## 2020-01-26 NOTE — ED NOTES
Patient placed on cardiac monitor, continuous pulse oximeter, and NIBP monitor. Monitor alarms on.       Patient placed in a [address] [facility] [address], RN  01/26/20 2097

## 2020-01-26 NOTE — PROGRESS NOTES
4 Eyes Skin Assessment    Maisha Cuba is being assessed upon: Admission    I agree that I, Katheren Babinski, along with raj (either 2 RN's or 1 LPN and 1 RN) have performed a thorough Head to Toe Skin Assessment on the patient. ALL assessment sites listed below have been assessed. Areas assessed by both nurses:     [x]   Head, Face, and Ears   [x]   Shoulders, Back, and Chest  [x]   Arms, Elbows, and Hands   [x]   Coccyx, Sacrum, and Ischium  [x]   Legs, Feet, and Heels    Does the Patient have Skin Breakdown?  No    Mal Prevention initiated: No  Wound Care Orders initiated: No    [facility] nurse consulted for Pressure Injury (Stage 3,4, Unstageable, DTI, NWPT, and Complex wounds) and New or Established Ostomies: No        Primary Nurse eSignature: Katheren Babinski, RN on 1/26/2020 at 5:08 AM      Co-Signer eSignature: {Esignature:[id number]}

## 2020-01-26 NOTE — ED PROVIDER NOTES
 Prolonged emergence from general anesthesia     patient states he does not come out of anesthesia well-he acts ugly         SURGICAL HISTORY       Past Surgical History:   Procedure Laterality Date    PACEMAKER INSERTION      PACEMAKER PLACEMENT      [employer]    ULTRASOUND PROSTATE/TRANSRECTAL N/A 12/31/2019    TRANSRECTAL ULTRASOUND BIOPSY OF PROSTATE performed by Naveed Browne MD at [address]       Previous Medications    AMIODARONE (PACERONE) 400 MG TABLET    Take 400 mg by mouth daily     ASPIRIN 81 MG TABLET    Take 81 mg by mouth daily     CARVEDILOL (COREG) 12.5 MG TABLET    Take 12.5 mg by mouth daily     CLONIDINE (CATAPRES) 0.1 MG TABLET    Take 0.2 mg by mouth daily     COLCHICINE (COLCRYS) 0.6 MG TABLET    Take 0.6 mg by mouth daily as needed     FLUTICASONE (FLONASE) 50 MCG/ACT NASAL SPRAY    2 sprays by Nasal route daily as needed     HYDROCODONE-ACETAMINOPHEN (NORCO) 7.5-325 MG PER TABLET    Take 1 tablet by mouth every 6 hours as needed for Pain for up to 3 days. Intended supply: 3 days. Take lowest dose possible to manage pain    INDOMETHACIN (INDOCIN) 50 MG CAPSULE    Take 50 mg by mouth daily as needed     LISINOPRIL (PRINIVIL;ZESTRIL) 20 MG TABLET    lisinopril 20 mg tablet    METHOCARBAMOL (ROBAXIN) 500 MG TABLET    Take 1 tablet by mouth 3 times daily for 7 days    NAPROXEN (NAPROSYN) 500 MG TABLET    Take 1 tablet by mouth 2 times daily for 15 days    NITROGLYCERIN (NITROSTAT) 0.4 MG SL TABLET    Place 0.4 mg under the tongue every 5 minutes as needed     SIMVASTATIN (ZOCOR) 20 MG TABLET    Take 20 mg by mouth nightly       ALLERGIES     Patient has no known allergies.     FAMILY HISTORY       Family History   Family history unknown: Yes          SOCIAL HISTORY       Social History     Socioeconomic History    Marital status: Single     Spouse name: None    Number of children: None    Years of education: None    Highest education level: None Occupational History    None   Social Needs    Financial resource strain: None    Food insecurity:     Worry: None     Inability: None    Transportation needs:     Medical: None     Non-medical: None   Tobacco Use    Smoking status: Never Smoker    Smokeless tobacco: Never Used   Substance and Sexual Activity    Alcohol use: Never     Frequency: Never    Drug use: Never    Sexual activity: None   Lifestyle    Physical activity:     Days per week: None     Minutes per session: None    Stress: None   Relationships    Social connections:     Talks on phone: None     Gets together: None     Attends Yazdanism service: None     Active member of club or organization: None     Attends meetings of clubs or organizations: None     Relationship status: None    Intimate partner violence:     Fear of current or ex partner: None     Emotionally abused: None     Physically abused: None     Forced sexual activity: None   Other Topics Concern    None   Social History Narrative    None       SCREENINGS    Pooja Coma Scale  Eye Opening: Spontaneous  Best Verbal Response: Oriented  Best Motor Response: Obeys commands  Pooja Coma Scale Score: 15        PHYSICAL EXAM    (up to 7 for level 4, 8 or more for level 5)     ED Triage Vitals [01/26/20 0025]   BP Temp Temp Source Pulse Resp SpO2 Height Weight   (!) 150/75 98.5 °F (36.9 °C) Oral 59 17 97 % 6' 3\" (1.905 m) 299 lb (135.6 kg)       Physical Exam  Vitals signs and nursing note reviewed. Constitutional:       General: He is not in acute distress. Appearance: He is well-developed. He is not diaphoretic. HENT:      Head: Normocephalic and atraumatic. Eyes:      Pupils: Pupils are equal, round, and reactive to light. Neck:      Musculoskeletal: Normal range of motion and neck supple. Cardiovascular:      Rate and Rhythm: Normal rate and regular rhythm. Heart sounds: Normal heart sounds.    Pulmonary:      Effort: Pulmonary effort is normal. No respiratory distress. Breath sounds: Normal breath sounds. Abdominal:      General: Bowel sounds are normal. There is no distension. Palpations: Abdomen is soft. Tenderness: There is no abdominal tenderness. Musculoskeletal: Normal range of motion. Skin:     General: Skin is warm and dry. Findings: No rash. Neurological:      Mental Status: He is alert and oriented to person, place, and time. Cranial Nerves: No cranial nerve deficit. Motor: No abnormal muscle tone. Coordination: Coordination normal.   Psychiatric:         Behavior: Behavior normal.         DIAGNOSTIC RESULTS     EKG: All EKG's are interpreted by the Emergency Department Physician who either signs or Co-signs this chart in the absence of a cardiologist.    Left bundle branch block paced rhythm   Repeat EKG unchanged from prior    RADIOLOGY:   Non-plain film images such as CT, Ultrasound and MRI are read by the radiologist. Angelica Nair images are visualized and preliminarily interpreted by the emergency physician with the below findings:    CXR unchanged from prior      Interpretation per the Radiologist below, if available at the time of this note:    XR CHEST PORTABLE    (Results Pending)         ED BEDSIDE ULTRASOUND:   Performed by ED Physician - none    LABS:  Labs Reviewed   CBC WITH AUTO DIFFERENTIAL - Abnormal; Notable for the following components:       Result Value    MCHC 32.1 (*)     RDW 15.9 (*)     Monocytes % 11.3 (*)     All other components within normal limits   COMPREHENSIVE METABOLIC PANEL   APTT   PROTIME-INR   TROPONIN   TSH WITHOUT REFLEX   BRAIN NATRIURETIC PEPTIDE   TROPONIN       All other labs were within normal range or not returned as of this dictation.     EMERGENCY DEPARTMENT COURSE and DIFFERENTIALDIAGNOSIS/MDM:   Vitals:    Vitals:    01/26/20 0025 01/26/20 0225   BP: (!) 150/75 123/67   Pulse: 59 (!) 49   Resp: 17 14   Temp: 98.5 °F (36.9 °C)    TempSrc: Oral    SpO2:

## 2020-01-26 NOTE — PROGRESS NOTES
Mary Perez arrived to room # 5   Presented with: chest pressure/flutter feeling  Mental Status: Patient is oriented and alert. Vitals:    01/26/20 0503   BP:    Pulse: 50   Resp:    Temp:    SpO2:      Patient safety contract and falls prevention contract reviewed with patient Yes. Oriented Patient to room. Call light within reach. Yes.   Needs, issues or concerns expressed at this time: no.      Electronically signed by Fredi Andrews RN on 1/26/2020 at 5:08 AM

## 2020-01-27 VITALS
BODY MASS INDEX: 37.47 KG/M2 | SYSTOLIC BLOOD PRESSURE: 121 MMHG | DIASTOLIC BLOOD PRESSURE: 71 MMHG | OXYGEN SATURATION: 94 % | HEIGHT: 75 IN | HEART RATE: 53 BPM | WEIGHT: 301.4 LBS | RESPIRATION RATE: 18 BRPM | TEMPERATURE: 97.9 F

## 2020-01-27 LAB
CHOLESTEROL, TOTAL: 173 MG/DL (ref 160–199)
EKG P AXIS: 52 DEGREES
EKG P AXIS: 59 DEGREES
EKG P-R INTERVAL: 218 MS
EKG P-R INTERVAL: 240 MS
EKG Q-T INTERVAL: 454 MS
EKG Q-T INTERVAL: 514 MS
EKG QRS DURATION: 156 MS
EKG QRS DURATION: 158 MS
EKG QTC CALCULATION (BAZETT): 455 MS
EKG QTC CALCULATION (BAZETT): 495 MS
EKG T AXIS: 115 DEGREES
EKG T AXIS: 128 DEGREES
HDLC SERPL-MCNC: 36 MG/DL (ref 55–121)
LDL CHOLESTEROL CALCULATED: 118 MG/DL
MAGNESIUM: 2.2 MG/DL (ref 1.6–2.4)
TRIGL SERPL-MCNC: 96 MG/DL (ref 0–149)

## 2020-01-27 PROCEDURE — 80061 LIPID PANEL: CPT

## 2020-01-27 PROCEDURE — 96372 THER/PROPH/DIAG INJ SC/IM: CPT

## 2020-01-27 PROCEDURE — 83735 ASSAY OF MAGNESIUM: CPT

## 2020-01-27 PROCEDURE — 6370000000 HC RX 637 (ALT 250 FOR IP): Performed by: HOSPITALIST

## 2020-01-27 PROCEDURE — 6360000002 HC RX W HCPCS: Performed by: HOSPITALIST

## 2020-01-27 PROCEDURE — 93010 ELECTROCARDIOGRAM REPORT: CPT | Performed by: INTERNAL MEDICINE

## 2020-01-27 PROCEDURE — 36415 COLL VENOUS BLD VENIPUNCTURE: CPT

## 2020-01-27 RX ADMIN — NAPROXEN 500 MG: 500 TABLET ORAL at 08:59

## 2020-01-27 RX ADMIN — ENOXAPARIN SODIUM 40 MG: 40 INJECTION SUBCUTANEOUS at 08:57

## 2020-01-27 RX ADMIN — CLONIDINE HYDROCHLORIDE 0.2 MG: 0.1 TABLET ORAL at 08:59

## 2020-01-27 RX ADMIN — CARVEDILOL 12.5 MG: 12.5 TABLET, FILM COATED ORAL at 08:59

## 2020-01-27 RX ADMIN — LISINOPRIL 20 MG: 20 TABLET ORAL at 08:59

## 2020-01-27 RX ADMIN — ASPIRIN 81 MG: 81 TABLET, COATED ORAL at 08:59

## 2020-01-27 RX ADMIN — AMIODARONE HYDROCHLORIDE 400 MG: 200 TABLET ORAL at 08:59

## 2020-01-27 ASSESSMENT — PAIN SCALES - GENERAL: PAINLEVEL_OUTOF10: 9

## 2020-01-27 NOTE — DISCHARGE SUMMARY
Discharge Summary      Date:1/27/2020        Patient Gene Roland     YOB: 1960     Age:60 y.o. Admit Date:1/26/2020   Admission Condition:fair   Discharged Condition:stable  Discharge Date: 01/27/20       Discharge Diagnoses   Principal Problem:    Systolic heart failure ([facility] [address]  Active Problems:    Palpitations    Hypertrophy of inter-atrial septum    Diastolic heart failure (HCC)    CHF (congestive heart failure), NYHA class III, acute on chronic, combined ([facility] [address]  Resolved Problems:    * No resolved hospital problems. [facility] Stay   Narrative of Hospital Course:     79-year-old male who presented to the hospital with concerns of palpitation, thought was VT, in the ED his defibrillator was interrogated and showed no episodes of SVT or VT. Patient has a history of hypertrophic cardiomyopathy status post ICD placement. Had a transthoracic echocardiogram done at [facility] in [facility] 2/19 which showed the ventricular EF of 71%, grade 2 diastolic dysfunction, severe asymmetric hypertrophy of the septum, akinesis of the apical anterior, apical septal, apical lateral and apical myocardium. History states in July 2019 showed large infarct involving the inferior wall with extension into contigous lateral wall particularly towards the apex. No significant ischemia identified. Left heart cath 9/2019 showed minor luminal irregularities of LAD, first obtuse marginal and right coronary artery and otherwise angiography normal coronary arteries. Was seen by cardiology in-house and no further recommendations or changes were made to medication. Patient was encouraged to follow-up outpatient with his PCP as well as his cardiologist.      Physical examination  General: No acute distress lying comfortably in bed.   HEENT: Atraumatic normocephalic  Cardiac: Normal B3-Y1, + systolic murmur  Respiratory: clear To auscultation bilaterally, no rhonchi or rales  Abdomen: nontender to palpation, activity as tolerated      Discharge Medications         Medication List      CONTINUE taking these medications    amiodarone 400 MG tablet  Commonly known as:  PACERONE     aspirin 81 MG tablet     carvedilol 12.5 MG tablet  Commonly known as:  COREG     cloNIDine 0.1 MG tablet  Commonly known as:  CATAPRES     Colcrys 0.6 MG tablet  Generic drug:  colchicine     fluticasone 50 MCG/ACT nasal spray  Commonly known as:  FLONASE     HYDROcodone-acetaminophen 7.5-325 MG per tablet  Commonly known as:  Norco  Take 1 tablet by mouth every 6 hours as needed for Pain for up to 3 days. Intended supply: 3 days.  Take lowest dose possible to manage pain     indomethacin 50 MG capsule  Commonly known as:  INDOCIN     lisinopril 20 MG tablet  Commonly known as:  PRINIVIL;ZESTRIL     methocarbamol 500 MG tablet  Commonly known as:  Robaxin  Take 1 tablet by mouth 3 times daily for 7 days     naproxen 500 MG tablet  Commonly known as:  Naprosyn  Take 1 tablet by mouth 2 times daily for 15 days     nitroGLYCERIN 0.4 MG SL tablet  Commonly known as:  NITROSTAT     simvastatin 20 MG tablet  Commonly known as:  Ignacio            Electronically signed by Micheline Salcedo MD on 1/27/20 at 2:17 PM

## 2020-01-27 NOTE — DISCHARGE INSTR - DIET

## 2020-01-27 NOTE — PLAN OF CARE
Problem: Pain:  Description  Pain management should include both nonpharmacologic and pharmacologic interventions.   Goal: Pain level will decrease  Description  Pain level will decrease  Outcome: Ongoing  Goal: Control of acute pain  Description  Control of acute pain  Outcome: Ongoing  Goal: Control of chronic pain  Description  Control of chronic pain  Outcome: Ongoing   Electronically signed by Tete Ovalle RN on 1/27/2020 at 4:06 AM

## 2020-01-27 NOTE — CONSULTS
hypertrophy with a sigmoid shaped septum and maximum wall thickness of 2.4 cm in the 4-chamber view.  No obvious LVOT obstruction and no GIOVANI. 3.   Dilated left atrium. 4.   Dilated pulmonary artery which suggests pulmonary hypertension. ([place])  10/8/2016  VT ablation  10/21/2016  Echo  Severe cLVH  1/24/2019  VT ablation   7/2/2019:  Echo  LVEF 43%, Grade II diastolic dysfunction, Asymmetric septal hypertrophy 2.6cm, Posterior wall thickness 1.5cm  7/3/2019  lexiscan 1.  Left ventricular myocardial perfusion demonstrates a large infarct involving the inferior wall with extension into contiguous lateral wall particularly toward the apex.  There is no significant ischemia identified. 2.  The left ventricular ejection fraction (LVEF) is 35%. 3.  The left ventricular wall motion demonstrates decreased motion centered at the apex with extension into immediately contiguous portions of the other wall segments. 9/27/2019  Cath  Mild CAD ([place], [place])        Past Medical History:    Past Medical History:   Diagnosis Date    Cardiomyopathy ([address]     per pt    CHF [facility]congestive [facility] failure) ([facility])     Hyperlipidemia     Hypertension     Prolonged emergence from general anesthesia     patient states he does not come out of anesthesia well-he acts ugly         Past Surgical History:    Past Surgical History:   Procedure Laterality Date    PACEMAKER INSERTION      PACEMAKER PLACEMENT      [employer]    ULTRASOUND PROSTATE/TRANSRECTAL N/A 12/31/2019    TRANSRECTAL ULTRASOUND BIOPSY OF PROSTATE performed by Cydney Banks MD at [address] Medications:   Prior to Admission medications    Medication Sig Start Date End Date Taking?  Authorizing Provider   naproxen (NAPROSYN) 500 MG tablet Take 1 tablet by mouth 2 times daily for 15 days 1/24/20 2/8/20 Yes BALBIR Masters   methocarbamol (ROBAXIN) 500 MG tablet Take 1 tablet by mouth 3 times daily for 7 days 1/24/20 1/31/20 Yes BALBIR Masters carvedilol (COREG) 12.5 MG tablet Take 12.5 mg by mouth daily  8/19/19  Yes Historical Provider, MD   amiodarone (PACERONE) 400 MG tablet Take 400 mg by mouth daily  12/18/15  Yes Historical Provider, MD   lisinopril (PRINIVIL;ZESTRIL) 20 MG tablet lisinopril 20 mg tablet 7/4/19  Yes Historical Provider, MD   aspirin 81 MG tablet Take 81 mg by mouth daily    Yes Historical Provider, MD   simvastatin (ZOCOR) 20 MG tablet Take 20 mg by mouth nightly   Yes Historical Provider, MD   HYDROcodone-acetaminophen (NORCO) 7.5-325 MG per tablet Take 1 tablet by mouth every 6 hours as needed for Pain for up to 3 days. Intended supply: 3 days. Take lowest dose possible to manage pain 1/24/20 1/27/20  BALBIR Delvalle   colchicine (COLCRYS) 0.6 MG tablet Take 0.6 mg by mouth daily as needed     Historical Provider, MD   fluticasone (FLONASE) 50 MCG/ACT nasal spray 2 sprays by Nasal route daily as needed     Historical Provider, MD   indomethacin (INDOCIN) 50 MG capsule Take 50 mg by mouth daily as needed     Historical Provider, MD   nitroGLYCERIN (NITROSTAT) 0.4 MG SL tablet Place 0.4 mg under the tongue every 5 minutes as needed     Historical Provider, MD   cloNIDine (CATAPRES) 0.1 MG tablet Take 0.2 mg by mouth daily     Historical Provider, MD        Facility Administered Medications:    simvastatin  20 mg Oral Nightly    cloNIDine  0.2 mg Oral Daily    amiodarone  400 mg Oral Daily    aspirin  81 mg Oral Daily    carvedilol  12.5 mg Oral Daily    colchicine  0.6 mg Oral Daily    naproxen  500 mg Oral BID    methocarbamol  500 mg Oral TID    lisinopril  20 mg Oral Daily    sodium chloride flush  10 mL Intravenous 2 times per day    enoxaparin  40 mg Subcutaneous Daily       Allergies:  Patient has no known allergies.      Social History:       Social History     Socioeconomic History    Marital status: Single     Spouse name: Not on file    Number of children: Not on file    Years of education: Not on file  Highest education level: Not on file   Occupational History    Not on file   Social Needs    Financial resource strain: Not on file    Food insecurity:     Worry: Not on file     Inability: Not on file    Transportation needs:     Medical: Not on file     Non-medical: Not on file   Tobacco Use    Smoking status: Never Smoker    Smokeless tobacco: Never Used   Substance and Sexual Activity    Alcohol use: Never     Frequency: Never    Drug use: Never    Sexual activity: Not on file   Lifestyle    Physical activity:     Days per week: Not on file     Minutes per session: Not on file    Stress: Not on file   Relationships    Social connections:     Talks on phone: Not on file     Gets together: Not on file     Attends Restoration service: Not on file     Active member of club or organization: Not on file     Attends meetings of clubs or organizations: Not on file     Relationship status: Not on file    Intimate partner violence:     Fear of current or ex partner: Not on file     Emotionally abused: Not on file     Physically abused: Not on file     Forced sexual activity: Not on file   Other Topics Concern    Not on file   Social History Narrative    Not on file       Family History:     Family History   Family history unknown: Yes         REVIEW OF SYSTEMS:     Except as noted in the HPI, all other systems are negative        PHYSICAL EXAMINATION:     /63   Pulse 50   Temp 97.2 °F (36.2 °C) (Temporal)   Resp 16   Ht 6' 3\" (1.905 m)   Wt 298 lb 11.6 oz (135.5 kg)   SpO2 97%   BMI 37.34 kg/m²     GENERAL - well developed and well nourished, in no amount of generalized distress; is an active participant in this examination  HEENT -  PERRLA, Hearing appears normal, conjunctiva and lids are normal, ears and nose appear normal  NECK - no thyromegaly, no JVD, trachea is in the midline  CARDIOVASCULAR - PMI is in the left mid line clavicular position, Normal S1 and S2 with a grade 1/6 systolic murmur. No S3 or S4    PULMONARY - No respiratory distress. scattered wheezes and rales.   Breath sounds in both  lung fields are Decreased  ABDOMEN  - soft, non tender, no rebound, no hepatomegaly or splenomegaly  MUSCULOSKELETAL  - Prone/Supine, digitals and nails are without clubbing or cyanosis  EXTREMITIES - trace edema  NEUROLOGIC - cranial nerves, II-XII, are normal  SKIN - turgor is normal, no rash  PSYCHIATRIC - was normal mood and affect, alert and orientated x 3, judgement and insight appear appropriate      LABORATORY EVALUATION & TESTING:    I have personally reviewed and interpreted the results of the following diagnostic testing      EKG and or Telemetry:  which was personally reviewed me:  AV paced rhythm,   Pulse Readings from Last 1 Encounters:   01/26/20 50    bpm,  without Acute changes    Troponin:  negative myocardial necrosis (  0.02); the creatinine is normal    CBC:   Recent Labs     01/26/20 0031   WBC 7.0   HGB 15.9   HCT 49.6   MCV 90.2        BMP:   Recent Labs     01/26/20  0031 01/26/20  0918    139   K 4.5 4.4    104   CO2 23 25   BUN 16 16   CREATININE 1.2 1.2     Cardiac Enzymes:   Recent Labs     01/26/20  0031 01/26/20  0300   TROPONINI 0.02 0.02     PT/INR:   Recent Labs     01/26/20 0031   PROTIME 13.9   INR 1.13     APTT:   Recent Labs     01/26/20 0031   APTT 36.0     Liver Profile:  Lab Results   Component Value Date    AST 26 01/26/2020    ALT 18 01/26/2020    BILITOT 0.5 01/26/2020    ALKPHOS 64 01/26/2020   No results found for: CHOL, HDL, TRIG  TSH:  Lab Results   Component Value Date    TSH 0.573 01/26/2020     UA:   Lab Results   Component Value Date    NITRITE neg 11/04/2019    COLORU yellow 11/04/2019    COLORU YELLOW 12/09/2012    PHUR 6.0 11/04/2019    PHUR 6.5 12/09/2012    BACTERIA TRACE 12/09/2012    CLARITYU clear 11/04/2019    SPECGRAV 1.020 11/04/2019    LEUKOCYTESUR neg 11/04/2019    LEUKOCYTESUR NEGATIVE 12/09/2012    UROBILINOGEN 1.0 2. palpitations Initial presentation during this evaluation   Will have the ICD checked tomorrow   Yes: ICD interrogation Continue current medications:    Yes:            3. Concern regarding systemic blood pressure Initial presentation during this evaluation Systolic (69ORI), FAY:150 , Min:118 , ZAN:504    Diastolic (22VDV), RVQ:33, Min:63, Max:86   No Continue current medications:       yes         DISCUSSION AND PLAN:      1. I had a detailed discussion with the patient and / or family regarding the historical points, physical examination findings, and any diagnostic results supporting the admission / consultation diagnosis. The patient was educated on care and need for admission. Questions were invited and answered. The patient and / or family shows understanding of admission / consultation information and agrees to follow. 2. Continue present medications except for changes as noted above    3. Continue to monitor rhythm    4. Further orders per clinical course. 5. Echo review and ICD check    6. I have discussed the risks, benefits and options with the patient and his family. They appear to understand, have no questions, and wish to proceed. This procedure is scheduled for tomorrow. Discussed with patient and adult child and nursing.     I greatly appreciate the opportunity and your confidence in allowing me to participate in the care of [place] Mika    Electronically signed by Amanda Garduno MD on 1/26/20     [facility] [place] [facility]

## 2020-02-25 ENCOUNTER — HOSPITAL ENCOUNTER (EMERGENCY)
Age: 60
Discharge: HOME | End: 2020-02-25
Payer: MEDICARE

## 2020-02-25 VITALS
HEART RATE: 68 BPM | DIASTOLIC BLOOD PRESSURE: 86 MMHG | SYSTOLIC BLOOD PRESSURE: 155 MMHG | OXYGEN SATURATION: 94 % | HEIGHT: 75 IN | WEIGHT: 299 LBS | BODY MASS INDEX: 37.18 KG/M2 | TEMPERATURE: 97.8 F | RESPIRATION RATE: 18 BRPM

## 2020-02-25 LAB
ALBUMIN SERPL-MCNC: 4 G/DL (ref 3.5–5.2)
ALP BLD-CCNC: 62 U/L (ref 40–130)
ALT SERPL-CCNC: 23 U/L (ref 5–41)
ANION GAP SERPL CALCULATED.3IONS-SCNC: 9 MMOL/L (ref 7–19)
AST SERPL-CCNC: 28 U/L (ref 5–40)
BASOPHILS ABSOLUTE: 0 K/UL (ref 0–0.2)
BASOPHILS RELATIVE PERCENT: 0.6 % (ref 0–1)
BILIRUB SERPL-MCNC: 0.7 MG/DL (ref 0.2–1.2)
BUN BLDV-MCNC: 14 MG/DL (ref 8–23)
CALCIUM SERPL-MCNC: 9.3 MG/DL (ref 8.8–10.2)
CHLORIDE BLD-SCNC: 103 MMOL/L (ref 98–111)
CO2: 29 MMOL/L (ref 22–29)
CREAT SERPL-MCNC: 1.1 MG/DL (ref 0.5–1.2)
EOSINOPHILS ABSOLUTE: 0.1 K/UL (ref 0–0.6)
EOSINOPHILS RELATIVE PERCENT: 1 % (ref 0–5)
GFR NON-AFRICAN AMERICAN: >60
GLUCOSE BLD-MCNC: 86 MG/DL (ref 74–109)
HCT VFR BLD CALC: 50.6 % (ref 42–52)
HEMOGLOBIN: 15.9 G/DL (ref 14–18)
IMMATURE GRANULOCYTES #: 0 K/UL
LYMPHOCYTES ABSOLUTE: 1.2 K/UL (ref 1.1–4.5)
LYMPHOCYTES RELATIVE PERCENT: 24.6 % (ref 20–40)
MCH RBC QN AUTO: 29.2 PG (ref 27–31)
MCHC RBC AUTO-ENTMCNC: 31.4 G/DL (ref 33–37)
MCV RBC AUTO: 92.8 FL (ref 80–94)
MONOCYTES ABSOLUTE: 0.7 K/UL (ref 0–0.9)
MONOCYTES RELATIVE PERCENT: 13.7 % (ref 0–10)
NEUTROPHILS ABSOLUTE: 2.9 K/UL (ref 1.5–7.5)
NEUTROPHILS RELATIVE PERCENT: 59.7 % (ref 50–65)
PDW BLD-RTO: 17.2 % (ref 11.5–14.5)
PLATELET # BLD: 150 K/UL (ref 130–400)
PMV BLD AUTO: 11.8 FL (ref 9.4–12.4)
POTASSIUM SERPL-SCNC: 5.1 MMOL/L (ref 3.5–5)
RBC # BLD: 5.45 M/UL (ref 4.7–6.1)
SODIUM BLD-SCNC: 141 MMOL/L (ref 136–145)
TOTAL PROTEIN: 7 G/DL (ref 6.6–8.7)
URIC ACID, SERUM: 6.2 MG/DL (ref 3.4–7)
WBC # BLD: 4.9 K/UL (ref 4.8–10.8)

## 2020-02-25 PROCEDURE — 85025 COMPLETE CBC W/AUTO DIFF WBC: CPT

## 2020-02-25 PROCEDURE — 6370000000 HC RX 637 (ALT 250 FOR IP): Performed by: NURSE PRACTITIONER

## 2020-02-25 PROCEDURE — 36415 COLL VENOUS BLD VENIPUNCTURE: CPT

## 2020-02-25 PROCEDURE — 93971 EXTREMITY STUDY: CPT

## 2020-02-25 PROCEDURE — 99284 EMERGENCY DEPT VISIT MOD MDM: CPT

## 2020-02-25 PROCEDURE — 84550 ASSAY OF BLOOD/URIC ACID: CPT

## 2020-02-25 PROCEDURE — 80053 COMPREHEN METABOLIC PANEL: CPT

## 2020-02-25 RX ORDER — INDOMETHACIN 25 MG/1
50 CAPSULE ORAL ONCE
Status: COMPLETED | OUTPATIENT
Start: 2020-02-25 | End: 2020-02-25

## 2020-02-25 RX ADMIN — INDOMETHACIN 50 MG: 25 CAPSULE ORAL at 20:31

## 2020-02-25 ASSESSMENT — ENCOUNTER SYMPTOMS: BACK PAIN: 0

## 2020-02-25 ASSESSMENT — PAIN SCALES - GENERAL
PAINLEVEL_OUTOF10: 9
PAINLEVEL_OUTOF10: 8

## 2020-02-25 ASSESSMENT — PAIN DESCRIPTION - LOCATION: LOCATION: LEG

## 2020-02-25 ASSESSMENT — PAIN DESCRIPTION - PAIN TYPE: TYPE: ACUTE PAIN

## 2020-02-25 ASSESSMENT — PAIN DESCRIPTION - ORIENTATION: ORIENTATION: LEFT

## 2020-02-26 RX ORDER — HYDROCODONE BITARTRATE AND ACETAMINOPHEN 5; 325 MG/1; MG/1
1 TABLET ORAL EVERY 6 HOURS PRN
Qty: 10 TABLET | Refills: 0 | Status: SHIPPED | OUTPATIENT
Start: 2020-02-26 | End: 2020-03-04

## 2020-02-26 RX ORDER — INDOMETHACIN 50 MG/1
50 CAPSULE ORAL DAILY PRN
Qty: 30 CAPSULE | Refills: 0 | Status: SHIPPED | OUTPATIENT
Start: 2020-02-26

## 2020-02-26 NOTE — ED PROVIDER NOTES
Lifestyle    Physical activity:     Days per week: None     Minutes per session: None    Stress: None   Relationships    Social connections:     Talks on phone: None     Gets together: None     Attends Anabaptism service: None     Active member of club or organization: None     Attends meetings of clubs or organizations: None     Relationship status: None    Intimate partner violence:     Fear of current or ex partner: None     Emotionally abused: None     Physically abused: None     Forced sexual activity: None   Other Topics Concern    None   Social History Narrative    None       SCREENINGS      @FLOW([id number])@      PHYSICAL EXAM    (up to 7 for level 4, 8 or more for level 5)     ED Triage Vitals   BP Temp Temp Source Pulse Resp SpO2 Height Weight   02/25/20 1508 02/25/20 1508 02/25/20 1508 02/25/20 1508 02/25/20 1508 02/25/20 1508 02/25/20 1504 02/25/20 1504   (!) 158/86 97.8 °F (36.6 °C) Temporal 59 18 95 % 6' 3\" (1.905 m) 299 lb (135.6 kg)       Physical Exam  Vitals signs and nursing note reviewed. Constitutional:       Appearance: He is well-developed. HENT:      Head: Normocephalic and atraumatic. Eyes:      General: No scleral icterus. Right eye: No discharge. Left eye: No discharge. Neck:      Musculoskeletal: Normal range of motion and neck supple. Cardiovascular:      Rate and Rhythm: Normal rate. Pulmonary:      Effort: No respiratory distress. Musculoskeletal:      Left upper leg: He exhibits tenderness and swelling. He exhibits no edema and no deformity. Legs:       Comments: rom normal 5+ strength to lower legs bilaterally  2+ dorsalis pedis   Neurological:      Mental Status: He is alert.    Psychiatric:         Behavior: Behavior normal.         DIAGNOSTIC RESULTS     EKG: All EKG's are interpreted by the Emergency Department Physician who either signs or Co-signsthis chart in the absence of a cardiologist.        RADIOLOGY:   Tanisha gupta as CT, Ultrasound and MRI are read by the radiologist. Plain radiographic images are visualized and preliminarily interpreted by the emergency physician with the below findings:      Interpretation per the Radiologist below, if available at the time of this note:    VL Extremity Venous Left         neg      ED BEDSIDEULTRASOUND:   Performed by ED Physician -none    LABS:  Labs Reviewed   CBC WITH AUTO DIFFERENTIAL - Abnormal; Notable for the following components:       Result Value    MCHC 31.4 (*)     RDW 17.2 (*)     Monocytes % 13.7 (*)     All other components within normal limits   COMPREHENSIVE METABOLIC PANEL - Abnormal; Notable for the following components:    Potassium 5.1 (*)     All other components within normal limits   URIC ACID   PROTIME-INR       All other labs were within normal range or not returned as of this dictation. EMERGENCY DEPARTMENT COURSE and DIFFERENTIALDIAGNOSIS/MDM:   Vitals:    Vitals:    02/25/20 1830 02/25/20 1901 02/25/20 2013 02/25/20 2032   BP: 132/85 (!) 141/80 138/84 (!) 155/86   Pulse: 59 69 68    Resp:       Temp:       TempSrc:       SpO2: 95% 97% 94%    Weight:       Height:               MDM  Pt left before getting prescriptions   Will call him      CONSULTS:  None    PROCEDURES:  Unless otherwise noted below, none     Procedures    FINAL IMPRESSION      1. Left leg pain        DISPOSITION/PLAN   DISPOSITION        PATIENT REFERRED TO:  Sobia Arcos MD  [address]  [phone]            DISCHARGE MEDICATIONS:  Discharge Medication List as of 2/25/2020  8:50 PM        Attestation: The Prescription Monitoring Report for this patient was reviewed today. ([id number]) BALBIR Streeter)    (Please note that portions of this note were completed with a voice recognitionprogram.  Efforts were made to edit the dictations but occasionally words are mis-transcribed.)    BALBIR Streeter (electronically signed)          John Bauer

## 2020-04-29 DIAGNOSIS — R97.20 ELEVATED PSA: ICD-10-CM

## 2020-04-29 LAB — PROSTATE SPECIFIC ANTIGEN: 10.57 NG/ML (ref 0–4)

## 2020-11-16 ENCOUNTER — OFFICE VISIT (OUTPATIENT)
Dept: UROLOGY | Age: 60
End: 2020-11-16
Payer: MEDICARE

## 2020-11-16 VITALS — BODY MASS INDEX: 38.3 KG/M2 | HEIGHT: 75 IN | WEIGHT: 308 LBS | TEMPERATURE: 98 F

## 2020-11-16 LAB
APPEARANCE FLUID: CLEAR
BILIRUBIN, POC: NEGATIVE
BLOOD URINE, POC: NEGATIVE
CLARITY, POC: CLEAR
COLOR, POC: YELLOW
GLUCOSE URINE, POC: NEGATIVE
KETONES, POC: NEGATIVE
LEUKOCYTE EST, POC: NEGATIVE
NITRITE, POC: NEGATIVE
PH, POC: 8
PROTEIN, POC: NEGATIVE
SPECIFIC GRAVITY, POC: 1.02
UROBILINOGEN, POC: 2

## 2020-11-16 PROCEDURE — 81002 URINALYSIS NONAUTO W/O SCOPE: CPT | Performed by: UROLOGY

## 2020-11-16 PROCEDURE — 3017F COLORECTAL CA SCREEN DOC REV: CPT | Performed by: UROLOGY

## 2020-11-16 PROCEDURE — 1036F TOBACCO NON-USER: CPT | Performed by: UROLOGY

## 2020-11-16 PROCEDURE — G8417 CALC BMI ABV UP PARAM F/U: HCPCS | Performed by: UROLOGY

## 2020-11-16 PROCEDURE — 99214 OFFICE O/P EST MOD 30 MIN: CPT | Performed by: UROLOGY

## 2020-11-16 PROCEDURE — G8427 DOCREV CUR MEDS BY ELIG CLIN: HCPCS | Performed by: UROLOGY

## 2020-11-16 PROCEDURE — G8484 FLU IMMUNIZE NO ADMIN: HCPCS | Performed by: UROLOGY

## 2020-11-16 ASSESSMENT — ENCOUNTER SYMPTOMS
BACK PAIN: 0
NAUSEA: 0
COUGH: 0
WHEEZING: 0
SORE THROAT: 0
VOMITING: 0
EYE PAIN: 0

## 2020-11-16 NOTE — PROGRESS NOTES
Karly Sánchez is a 63 y.o. male who presents today   Chief Complaint   Patient presents with    Follow-up     i am here for my follow up for elevated PSA. Elevated PSA:  Patient is here today for history of an elevated PSA. His last several PSA values are as follows:  Lab Results   Component Value Date    PSA 10.57 (H) 04/29/2020    PSA 9.37 (H) 12/16/2019   PSA done at outside lab on 8/20/2020 was 10.3  PSA done 8/23/2019 was 9.0  Overall the PSA level is: increased but has been greater than 10 previously  Recent history of urinary tract infection/prostatitis? no  Previous prostate biopsy?  yes -biopsy in 2007, and biopsy done 12/31/2019 for PSA of 9.0 prostatic volume was 97.8  Lower urinary tract symptoms: urgency, frequency and nocturia, 3 times per night      Past Medical History:   Diagnosis Date    [facility] [address]     per pt    CHF (congestive heart failure) ([facility] [address]     Hyperlipidemia     Hypertension     Prolonged emergence from general anesthesia     patient states he does not come out of anesthesia well-he acts ugly       Past Surgical History:   Procedure Laterality Date    PACEMAKER INSERTION      PACEMAKER PLACEMENT      [employer]    ULTRASOUND PROSTATE/TRANSRECTAL N/A 12/31/2019    TRANSRECTAL ULTRASOUND BIOPSY OF PROSTATE performed by Rama Zuniga MD at [facility] OR       Current Outpatient Medications   Medication Sig Dispense Refill    indomethacin (INDOCIN) 50 MG capsule Take 1 capsule by mouth daily as needed for Pain 30 capsule 0    carvedilol (COREG) 12.5 MG tablet Take 12.5 mg by mouth daily   6    colchicine (COLCRYS) 0.6 MG tablet Take 0.6 mg by mouth daily as needed       fluticasone (FLONASE) 50 MCG/ACT nasal spray 2 sprays by Nasal route daily as needed       amiodarone (PACERONE) 400 MG tablet Take 400 mg by mouth daily       lisinopril (PRINIVIL;ZESTRIL) 20 MG tablet lisinopril 20 mg tablet      aspirin 81 MG tablet Take 81 mg by mouth daily       simvastatin (ZOCOR) 20 MG tablet Take 20 mg by mouth nightly      cloNIDine (CATAPRES) 0.1 MG tablet Take 0.2 mg by mouth daily       naproxen (NAPROSYN) 500 MG tablet Take 1 tablet by mouth 2 times daily for 15 days 30 tablet 0    nitroGLYCERIN (NITROSTAT) 0.4 MG SL tablet Place 0.4 mg under the tongue every 5 minutes as needed        No current facility-administered medications for this visit. No Known Allergies    Social History     Socioeconomic History    Marital status: Single     Spouse name: None    Number of children: None    Years of education: None    Highest education level: None   Occupational History    None   Social Needs    Financial resource strain: None    Food insecurity     Worry: None     Inability: None    Transportation needs     Medical: None     Non-medical: None   Tobacco Use    Smoking status: Never Smoker    Smokeless tobacco: Never Used   Substance and Sexual Activity    Alcohol use: Never     Frequency: Never    Drug use: Never    Sexual activity: None   Lifestyle    Physical activity     Days per week: None     Minutes per session: None    Stress: None   Relationships    Social connections     Talks on phone: None     Gets together: None     Attends Baptism service: None     Active member of club or organization: None     Attends meetings of clubs or organizations: None     Relationship status: None    Intimate partner violence     Fear of current or ex partner: None     Emotionally abused: None     Physically abused: None     Forced sexual activity: None   Other Topics Concern    None   Social History Narrative    None       Family History   Family history unknown: Yes       REVIEW OF SYSTEMS:  Review of Systems   Constitutional: Negative for chills and fever. HENT: Negative for congestion and sore throat. Eyes: Negative for pain and visual disturbance. Respiratory: Negative for cough and wheezing. Cardiovascular: Negative for chest pain and palpitations. Gastrointestinal: Negative for nausea and vomiting. Endocrine: Negative for polyphagia and polyuria. Genitourinary: Negative for decreased urine volume, difficulty urinating, discharge, dysuria, enuresis, flank pain, frequency, genital sores, hematuria, penile pain, penile swelling, scrotal swelling, testicular pain and urgency. Musculoskeletal: Negative for back pain and neck pain. Skin: Negative for rash and wound. Allergic/Immunologic: Negative for environmental allergies and food allergies. Neurological: Negative for dizziness and headaches. Hematological: Negative for adenopathy. Does not bruise/bleed easily. Psychiatric/Behavioral: Negative for confusion and hallucinations. All other systems reviewed and are negative. PHYSICAL EXAM:  Temp 98 °F (36.7 °C) (Temporal)   Ht 6' 3\" (1.905 m)   Wt (!) 308 lb (139.7 kg)   BMI 38.50 kg/m²   Physical Exam  Constitutional:       General: He is not in acute distress. Appearance: Normal appearance. He is well-developed. HENT:      Head: Normocephalic and atraumatic. Nose: Nose normal.   Eyes:      General: No scleral icterus. Conjunctiva/sclera: Conjunctivae normal.      Pupils: Pupils are equal, round, and reactive to light. Neck:      Musculoskeletal: Normal range of motion and neck supple. Trachea: No tracheal deviation. Cardiovascular:      Rate and Rhythm: Normal rate and regular rhythm. Pulmonary:      Effort: Pulmonary effort is normal. No respiratory distress. Breath sounds: No stridor. Abdominal:      General: There is no distension. Palpations: Abdomen is soft. There is no mass. Tenderness: There is no abdominal tenderness. Genitourinary:     Penis: Normal.       Scrotum/Testes: Normal.      Prostate: Enlarged ( g smooth no nodules). No nodules present. Musculoskeletal: Normal range of motion. General: No tenderness. Lymphadenopathy:      Cervical: No cervical adenopathy.

## 2021-02-10 ENCOUNTER — TELEPHONE (OUTPATIENT)
Dept: UROLOGY | Age: 61
End: 2021-02-10

## 2021-05-06 ENCOUNTER — APPOINTMENT (OUTPATIENT)
Dept: GENERAL RADIOLOGY | Age: 61
End: 2021-05-06
Payer: MEDICARE

## 2021-05-06 ENCOUNTER — HOSPITAL ENCOUNTER (EMERGENCY)
Age: 61
Discharge: HOME | End: 2021-05-06
Payer: MEDICARE

## 2021-05-06 VITALS
WEIGHT: 300 LBS | HEART RATE: 75 BPM | OXYGEN SATURATION: 95 % | TEMPERATURE: 98.2 F | BODY MASS INDEX: 37.3 KG/M2 | RESPIRATION RATE: 20 BRPM | DIASTOLIC BLOOD PRESSURE: 86 MMHG | HEIGHT: 75 IN | SYSTOLIC BLOOD PRESSURE: 159 MMHG

## 2021-05-06 DIAGNOSIS — L03.012 PARONYCHIA OF FINGER OF LEFT HAND: Primary | ICD-10-CM

## 2021-05-06 PROCEDURE — 87070 CULTURE OTHR SPECIMN AEROBIC: CPT

## 2021-05-06 PROCEDURE — 90471 IMMUNIZATION ADMIN: CPT | Performed by: PHYSICIAN ASSISTANT

## 2021-05-06 PROCEDURE — 87186 SC STD MICRODIL/AGAR DIL: CPT

## 2021-05-06 PROCEDURE — 87205 SMEAR GRAM STAIN: CPT

## 2021-05-06 PROCEDURE — 73140 X-RAY EXAM OF FINGER(S): CPT

## 2021-05-06 PROCEDURE — 90715 TDAP VACCINE 7 YRS/> IM: CPT | Performed by: PHYSICIAN ASSISTANT

## 2021-05-06 PROCEDURE — 99282 EMERGENCY DEPT VISIT SF MDM: CPT

## 2021-05-06 PROCEDURE — 6360000002 HC RX W HCPCS: Performed by: PHYSICIAN ASSISTANT

## 2021-05-06 PROCEDURE — 26010 DRAINAGE OF FINGER ABSCESS: CPT

## 2021-05-06 RX ORDER — CEPHALEXIN 500 MG/1
500 CAPSULE ORAL 2 TIMES DAILY
Qty: 14 CAPSULE | Refills: 0 | Status: SHIPPED | OUTPATIENT
Start: 2021-05-06 | End: 2021-05-13

## 2021-05-06 RX ADMIN — TETANUS TOXOID, REDUCED DIPHTHERIA TOXOID AND ACELLULAR PERTUSSIS VACCINE, ADSORBED 0.5 ML: 5; 2.5; 8; 8; 2.5 SUSPENSION INTRAMUSCULAR at 13:59

## 2021-05-06 ASSESSMENT — PAIN SCALES - GENERAL: PAINLEVEL_OUTOF10: 5

## 2021-05-06 ASSESSMENT — ENCOUNTER SYMPTOMS
EYE ITCHING: 0
APNEA: 0
SHORTNESS OF BREATH: 0
COUGH: 0
COLOR CHANGE: 0
PHOTOPHOBIA: 0
BACK PAIN: 0
EYE DISCHARGE: 0

## 2021-05-06 NOTE — ED PROVIDER NOTES
[facility] EMERGENCY DEPT  eMERGENCYdEPARTMENT eNCOUnter      Pt Name: Ward Villarreal  MRN: [id number]  [place] 1960  Date of evaluation: 5/6/2021  Provider:HARPAL Johnson    CHIEF COMPLAINT       Chief Complaint   Patient presents with    Hand Pain     presents with 3rd lt finger swelling         HISTORY OF PRESENT ILLNESS  (Location/Symptom, Timing/Onset, Context/Setting, Quality, Duration, Modifying Factors, Severity.)   Ward Villarreal is a 57 y.o. male who presents to the emergency department with complaints of middle finger left side paronychia no active drainage pre diabetic status 3 day onset. Not sure of tetanus status. No prior osteomyelitis. Full ROM against resistance denies fever or chills. HPI    Nursing Notes were reviewed and I agree. REVIEW OF SYSTEMS    (2-9 systems for level 4, 10 or more for level 5)     Review of Systems   Constitutional: Negative for activity change, appetite change, chills and fever. HENT: Negative for congestion and dental problem. Eyes: Negative for photophobia, discharge and itching. Respiratory: Negative for apnea, cough and shortness of breath. Cardiovascular: Negative for chest pain. Musculoskeletal: Negative for arthralgias, back pain, gait problem, myalgias and neck pain. Skin: Negative for color change, pallor and rash. Abscess about left middle finger nail   Neurological: Negative for dizziness, seizures and syncope. Psychiatric/Behavioral: Negative for agitation. The patient is not nervous/anxious. Except as noted above the remainder of the review of systems was reviewed and negative.        PAST MEDICAL HISTORY     Past Medical History:   Diagnosis Date    Cardiomyopathy [facility])     per pt    CHF (congestive heart failure) (HCC)     Hyperlipidemia     Hypertension     Prolonged emergence from general anesthesia     patient states he does not come out of anesthesia well-he acts ugly         SURGICAL HISTORY       Past Surgical History: Procedure Laterality Date    PACEMAKER INSERTION      PACEMAKER PLACEMENT      [employer]    ULTRASOUND PROSTATE/TRANSRECTAL N/A 12/31/2019    TRANSRECTAL ULTRASOUND BIOPSY OF PROSTATE performed by Lindsay Perez MD at [address] [place] [address]       Discharge Medication List as of 5/6/2021  3:38 PM      CONTINUE these medications which have NOT CHANGED    Details   indomethacin (INDOCIN) 50 MG capsule Take 1 capsule by mouth daily as needed for Pain, Disp-30 capsule, R-0Print      naproxen (NAPROSYN) 500 MG tablet Take 1 tablet by mouth 2 times daily for 15 days, Disp-30 tablet, R-0Print      carvedilol (COREG) 12.5 MG tablet Take 12.5 mg by mouth daily , R-6Historical Med      colchicine (COLCRYS) 0.6 MG tablet Take 0.6 mg by mouth daily as needed Historical Med      fluticasone (FLONASE) 50 MCG/ACT nasal spray 2 sprays by Nasal route daily as needed Historical Med      nitroGLYCERIN (NITROSTAT) 0.4 MG SL tablet Place 0.4 mg under the tongue every 5 minutes as needed Historical Med      amiodarone (PACERONE) 400 MG tablet Take 400 mg by mouth daily Historical Med      lisinopril (PRINIVIL;ZESTRIL) 20 MG tablet lisinopril 20 mg tabletHistorical Med      aspirin 81 MG tablet Take 81 mg by mouth daily Historical Med      simvastatin (ZOCOR) 20 MG tablet Take 20 mg by mouth nightlyHistorical Med      cloNIDine (CATAPRES) 0.1 MG tablet Take 0.2 mg by mouth daily Historical Med             ALLERGIES     Patient has no known allergies.     FAMILY HISTORY       Family History   Family history unknown: Yes          SOCIAL HISTORY       Social History     Socioeconomic History    Marital status: Single     Spouse name: None    Number of children: None    Years of education: None    Highest education level: None   Occupational History    None   Social Needs    Financial resource strain: None    Food insecurity     Worry: None     Inability: None    Transportation needs     Medical: None     Non-medical: None   Tobacco Use    Smoking status: Never Smoker    Smokeless tobacco: Never Used   Substance and Sexual Activity    Alcohol use: Never     Frequency: Never    Drug use: Never    Sexual activity: None   Lifestyle    Physical activity     Days per week: None     Minutes per session: None    Stress: None   Relationships    Social connections     Talks on phone: None     Gets together: None     Attends Mandaeism service: None     Active member of club or organization: None     Attends meetings of clubs or organizations: None     Relationship status: None    Intimate partner violence     Fear of current or ex partner: None     Emotionally abused: None     Physically abused: None     Forced sexual activity: None   Other Topics Concern    None   Social History Narrative    None       SCREENINGS           PHYSICAL EXAM    (up to 7 forlevel 4, 8 or more for level 5)     ED Triage Vitals [05/06/21 1323]   BP Temp Temp src Pulse Resp SpO2 Height Weight   (!) 159/86 98.2 °F (36.8 °C) -- 75 20 95 % 6' 3\" (1.905 m) 300 lb (136.1 kg)       Physical Exam  Vitals signs and nursing note reviewed. Constitutional:       General: He is not in acute distress. Appearance: Normal appearance. He is well-developed. He is not diaphoretic. HENT:      Head: Normocephalic and atraumatic. Right Ear: External ear normal.      Left Ear: External ear normal.   Eyes:      Pupils: Pupils are equal, round, and reactive to light. Neck:      Musculoskeletal: Normal range of motion and neck supple. Trachea: No tracheal deviation. Cardiovascular:      Rate and Rhythm: Normal rate and regular rhythm. Pulses: Normal pulses. Heart sounds: Normal heart sounds. No murmur. Pulmonary:      Effort: Pulmonary effort is normal.      Breath sounds: Normal breath sounds. No stridor. No wheezing. Chest:      Chest wall: No tenderness. Abdominal:      General: Bowel sounds are normal. There is no distension. Palpations: Abdomen is soft. Tenderness: There is no abdominal tenderness. Musculoskeletal: Normal range of motion. General: Tenderness present. Hands:    Skin:     General: Skin is warm and dry. Capillary Refill: Capillary refill takes less than 2 seconds. Neurological:      Mental Status: He is alert and oriented to person, place, and time. Psychiatric:         Behavior: Behavior normal.           DIAGNOSTIC RESULTS     RADIOLOGY:   Non-plain film images such as CT, Ultrasound and MRI are read by the radiologist. Plain radiographic images are visualized and preliminarilyinterpreted by No att. providers found with the below findings:      Interpretation per the Radiologist below, if available at the time of this note:    XR FINGER LEFT (MIN 2 VIEWS)   Final Result   No acute findings. Signed by Dr Sukhdev Sexton on 5/6/2021 2:59 PM          LABS:  Labs Reviewed   CULTURE, WOUND - Abnormal; Notable for the following components:       Result Value    Gram Stain Result   (*)     Value: Moderate WBC's (Mononuclear) present  Moderate WBC's (Polymorphonuclear) present  Rare Gram positive cocci  in pairs  Few Gram negative rods present  Rare Gram positive rods present      Organism Proteus mirabilis (*)     Organism Staphylococcus epidermidis (*)     All other components within normal limits    Narrative:     ORDER#: [id number]                          ORDERED BY: LIEN DUBOSE  SOURCE: Finger Left 3rd                    COLLECTED:  05/06/21 15:40  ANTIBIOTICS AT [facility].:                      RECEIVED :  05/06/21 15:44       All other labs were within normal range or notreturned as of this dictation.     RE-ASSESSMENT        EMERGENCY DEPARTMENT COURSE and DIFFERENTIAL DIAGNOSIS/MDM:   Vitals:    Vitals:    05/06/21 1323   BP: (!) 159/86   Pulse: 75   Resp: 20   Temp: 98.2 °F (36.8 °C)   SpO2: 95%   Weight: 300 lb (136.1 kg)   Height: 6' 3\" (1.905 m)       MDM  Patient tolerates procedure well we are able to obtain a wound culture we express all the purulent discharge from the finger without any damage to the nailbed. Start on Keflex he will need to have wound evaluation in 2 days. Culture pending. We will discharge at this time. No osteo on his x-ray. PROCEDURES:    Incision/Drainage    Date/Time: 5/8/2021 10:07 AM  Performed by: HARPAL Coats  Authorized by: HARPAL Coats     Consent:     Consent obtained:  Verbal    Consent given by:  Patient    Risks discussed:  Bleeding, pain, incomplete drainage and infection  Location:     Type:  Abscess    Location:  Upper extremity    Upper extremity location:  Finger    Finger location:  L long finger  Pre-procedure details:     Skin preparation:  Betadine  Procedure type:     Complexity:  Simple  Procedure details:     Incision types:  Stab incision    Wound management:  Irrigated with saline    Drainage:  Purulent and bloody    Drainage amount:  Scant    Wound treatment:  Wound left open    Packing materials:  None  Post-procedure details:     Patient tolerance of procedure: Tolerated well, no immediate complications          FINAL IMPRESSION      1.  Paronychia of finger of left hand          DISPOSITION/PLAN   DISPOSITION Decision To Discharge 05/06/2021 03:15:32 PM      PATIENT REFERRED TO:  [facility] EMERGENCY DEPT  [facility]  [phone]  In 2 days  If symptoms worsen, For wound re-check      DISCHARGE MEDICATIONS:  Discharge Medication List as of 5/6/2021  3:38 PM      START taking these medications    Details   cephALEXin (KEFLEX) 500 MG capsule Take 1 capsule by mouth 2 times daily for 7 days, Disp-14 capsule, R-0Normal             (Please note that portions of this note were completed with a voice recognition program.  Efforts were made to edit the dictations but occasionallywords are mis-transcribed.)    Tammie Coats [phone]  05/08/21 1007

## 2021-05-09 LAB
GRAM STAIN RESULT: ABNORMAL
ORGANISM: ABNORMAL
WOUND/ABSCESS: ABNORMAL

## 2021-05-11 DIAGNOSIS — R97.20 ELEVATED PSA: ICD-10-CM

## 2021-05-14 LAB
PROSTATE SPECIFIC ANTIGEN FREE: 1.9 NG/ML
PROSTATE SPECIFIC ANTIGEN PERCENT FREE: 17 %
PROSTATE SPECIFIC ANTIGEN: 11.2 NG/ML (ref 0–4)

## 2021-05-17 ENCOUNTER — OFFICE VISIT (OUTPATIENT)
Dept: UROLOGY | Age: 61
End: 2021-05-17
Payer: MEDICARE

## 2021-05-17 VITALS — WEIGHT: 308 LBS | BODY MASS INDEX: 38.3 KG/M2 | HEIGHT: 75 IN

## 2021-05-17 DIAGNOSIS — N40.1 BENIGN PROSTATIC HYPERPLASIA WITH URINARY FREQUENCY: ICD-10-CM

## 2021-05-17 DIAGNOSIS — R35.0 BENIGN PROSTATIC HYPERPLASIA WITH URINARY FREQUENCY: ICD-10-CM

## 2021-05-17 DIAGNOSIS — R97.20 ELEVATED PSA: Primary | ICD-10-CM

## 2021-05-17 LAB
BILIRUBIN, POC: NORMAL
BLOOD URINE, POC: NORMAL
CLARITY, POC: CLEAR
COLOR, POC: YELLOW
GLUCOSE URINE, POC: NORMAL
KETONES, POC: NORMAL
LEUKOCYTE EST, POC: NORMAL
NITRITE, POC: NORMAL
PH, POC: 6.5
PROTEIN, POC: NORMAL
SPECIFIC GRAVITY, POC: 1.01
UROBILINOGEN, POC: 2

## 2021-05-17 PROCEDURE — G8427 DOCREV CUR MEDS BY ELIG CLIN: HCPCS | Performed by: UROLOGY

## 2021-05-17 PROCEDURE — 81003 URINALYSIS AUTO W/O SCOPE: CPT | Performed by: UROLOGY

## 2021-05-17 PROCEDURE — 3017F COLORECTAL CA SCREEN DOC REV: CPT | Performed by: UROLOGY

## 2021-05-17 PROCEDURE — 1036F TOBACCO NON-USER: CPT | Performed by: UROLOGY

## 2021-05-17 PROCEDURE — 99214 OFFICE O/P EST MOD 30 MIN: CPT | Performed by: UROLOGY

## 2021-05-17 PROCEDURE — G8417 CALC BMI ABV UP PARAM F/U: HCPCS | Performed by: UROLOGY

## 2021-05-17 RX ORDER — FINASTERIDE 5 MG/1
5 TABLET, FILM COATED ORAL DAILY
Qty: 30 TABLET | Refills: 11 | Status: SHIPPED | OUTPATIENT
Start: 2021-05-17

## 2021-05-17 ASSESSMENT — ENCOUNTER SYMPTOMS
WHEEZING: 0
SORE THROAT: 0
NAUSEA: 0
BACK PAIN: 0
COUGH: 0
VOMITING: 0
EYE PAIN: 0

## 2021-05-17 NOTE — PROGRESS NOTES
colchicine (COLCRYS) 0.6 MG tablet Take 0.6 mg by mouth daily as needed       fluticasone (FLONASE) 50 MCG/ACT nasal spray 2 sprays by Nasal route daily as needed       nitroGLYCERIN (NITROSTAT) 0.4 MG SL tablet Place 0.4 mg under the tongue every 5 minutes as needed       amiodarone (PACERONE) 400 MG tablet Take 400 mg by mouth daily       lisinopril (PRINIVIL;ZESTRIL) 20 MG tablet lisinopril 20 mg tablet      aspirin 81 MG tablet Take 81 mg by mouth daily       simvastatin (ZOCOR) 20 MG tablet Take 20 mg by mouth nightly      cloNIDine (CATAPRES) 0.1 MG tablet Take 0.2 mg by mouth daily       naproxen (NAPROSYN) 500 MG tablet Take 1 tablet by mouth 2 times daily for 15 days 30 tablet 0     No current facility-administered medications for this visit. No Known Allergies    Social History     Socioeconomic History    Marital status: Single     Spouse name: None    Number of children: None    Years of education: None    Highest education level: None   Occupational History    None   Tobacco Use    Smoking status: Never Smoker    Smokeless tobacco: Never Used   Vaping Use    Vaping Use: Never used   Substance and Sexual Activity    Alcohol use: Never    Drug use: Never    Sexual activity: None   Other Topics Concern    None   Social History Narrative    None     Social Determinants of Health     Financial Resource Strain:     Difficulty of Paying Living Expenses:    Food Insecurity:     Worried About Running Out of Food in the Last Year:     Ran Out of Food in the Last Year:    Transportation Needs:     Lack of Transportation (Medical):      Lack of Transportation (Non-Medical):    Physical Activity:     Days of Exercise per Week:     Minutes of Exercise per Session:    Stress:     Feeling of Stress :    Social Connections:     Frequency of Communication with Friends and Family:     Frequency of Social Gatherings with Friends and Family:     Attends Mandaen Services:     Active PSA, Diagnostic; Future    2. Benign prostatic hyperplasia with urinary frequency  He has moderate symptoms of very large prostate with we will start finasteride  - POCT Urinalysis No Micro (Auto)  - finasteride (PROSCAR) 5 MG tablet; Take 1 tablet by mouth daily  Dispense: 30 tablet; Refill: 11      Orders Placed This Encounter   Procedures    PSA, Diagnostic     PSA in 6 month     Standing Status:   Future     Standing Expiration Date:   5/17/2022    POCT Urinalysis No Micro (Auto)        Return in about 6 months (around 11/17/2021) for PSA prior to vext visit. All information inputted into the note by the MA to include chief complaint, past medical history, past surgical history, medications, allergies, social and family history and review of systems has been reviewed and updated as needed by me. EMR Dragon/transcription disclaimer: Much of this documentt is electronic  transcription/translation of spoken language to printed text. The  electronic translation of spoken language may be erroneous, or at times,  nonsensical words or phrases may be inadvertently transcribed.  Although I  have reviewed the document for such errors, some may still exist.

## 2021-06-14 ENCOUNTER — OFFICE VISIT (OUTPATIENT)
Dept: GASTROENTEROLOGY | Age: 61
End: 2021-06-14

## 2021-06-14 VITALS
WEIGHT: 301.6 LBS | OXYGEN SATURATION: 97 % | DIASTOLIC BLOOD PRESSURE: 78 MMHG | HEART RATE: 55 BPM | BODY MASS INDEX: 37.5 KG/M2 | SYSTOLIC BLOOD PRESSURE: 138 MMHG | HEIGHT: 75 IN

## 2021-06-14 DIAGNOSIS — Z79.01 CURRENT USE OF ANTICOAGULANT THERAPY: ICD-10-CM

## 2021-06-14 DIAGNOSIS — Z12.11 COLON CANCER SCREENING: Primary | ICD-10-CM

## 2021-06-14 PROCEDURE — 99999 PR OFFICE/OUTPT VISIT,PROCEDURE ONLY: CPT | Performed by: NURSE PRACTITIONER

## 2021-06-14 ASSESSMENT — ENCOUNTER SYMPTOMS
ABDOMINAL PAIN: 0
RECTAL PAIN: 0
DIARRHEA: 0
ANAL BLEEDING: 0
CHOKING: 0
ABDOMINAL DISTENTION: 0
NAUSEA: 0
COUGH: 0
VOMITING: 0
TROUBLE SWALLOWING: 0
SHORTNESS OF BREATH: 0
BLOOD IN STOOL: 0
CONSTIPATION: 0

## 2021-06-14 NOTE — PROGRESS NOTES
Subjective:     Patient ID: Sherif Escalera is a 57 y.o. male  PCP: Emil Teran DO  Referring Provider: Nohemi eL DO    HPI  Patient presents to the office today with the following complaints: Colonoscopy      Pt here today for colonoscopy. Patient denies any lower GI symptoms such as constipation, diarrhea, change in bowel habits, rectal bleeding, and rectal pain. No previous Colonoscopy   Denies any family history of colon cancer or colon polyps    Hx CHF  Hx Cardiomyopathy  Pacemaker in place   Daily use of Eliquis    Assessment:     1. Colon cancer screening    2. Current use of anticoagulant therapy            Plan:   - Schedule colonoscopy  - Obtain cardiac clearance and clearance to hold Eliquis  Instruct on bowel prep. Nothing to eat or drink after midnight the day of the exam.  Unable to drive for 24 hours after the procedure. No aspirin or nonsteroidal anti-inflammatories for 5 days before procedure. I have discussed the benefits, alternatives, and risks (including bleeding, perforation and death)  for pursuing Endoscopy (EGD/Colonscopy/EUS/ERCP) with the patient and they are willing to continue. We also discussed the need for anesthesia, IV access, proper dietary changes, medication changes if necessary, and need for bowel prep (if ordered) prior to their Endoscopic procedure. They are aware they must have someone accompany them to their scheduled procedure to drive them home - they agree to the above and are willing to continue. Time spent on visit > 30 minutes  Pt had several questions regarding screening for colon cancer and colonoscopy procedure     Orders  No orders of the defined types were placed in this encounter. Medications  No orders of the defined types were placed in this encounter.         Patient History:     Past Medical History:   Diagnosis Date    Cardiomyopathy [facility])     per pt    CHF (congestive heart failure) (HCC)     Hyperlipidemia     Hypertension     Prolonged emergence from general anesthesia     patient states he does not come out of anesthesia well-he acts ugly       Past Surgical History:   Procedure Laterality Date    PACEMAKER INSERTION      PACEMAKER PLACEMENT      [employer]    ULTRASOUND PROSTATE/TRANSRECTAL N/A 12/31/2019    TRANSRECTAL ULTRASOUND BIOPSY OF PROSTATE performed by [place] Abdalla MD at [address]      Not sure who did it and done a long time ago       Family History   Problem Relation Age of Onset    Colon Cancer Neg Hx     Colon Polyps Neg Hx     Esophageal Cancer Neg Hx     Liver Cancer Neg Hx     Liver Disease Neg Hx     Rectal Cancer Neg Hx     Stomach Cancer Neg Hx        Social History     Socioeconomic History    Marital status: Single     Spouse name: None    Number of children: None    Years of education: None    Highest education level: None   Occupational History    None   Tobacco Use    Smoking status: Never Smoker    Smokeless tobacco: Never Used   Vaping Use    Vaping Use: Never used   Substance and Sexual Activity    Alcohol use: Never    Drug use: Never    Sexual activity: None   Other Topics Concern    None   Social History Narrative    None     Social Determinants of Health     Financial Resource Strain:     Difficulty of Paying Living Expenses:    Food Insecurity:     Worried About Running Out of Food in the Last Year:     Ran Out of Food in the Last Year:    Transportation Needs:     Lack of Transportation (Medical):      Lack of Transportation (Non-Medical):    Physical Activity:     Days of Exercise per Week:     Minutes of Exercise per Session:    Stress:     Feeling of Stress :    Social Connections:     Frequency of Communication with Friends and Family:     Frequency of Social Gatherings with Friends and Family:     Attends Orthodox Services:     Active Member of Clubs or Organizations:     Attends Club or Organization Meetings:     Marital Status:    Intimate Partner Violence:     Fear of Current or Ex-Partner:     Emotionally Abused:     Physically Abused:     Sexually Abused:        Current Outpatient Medications   Medication Sig Dispense Refill    finasteride (PROSCAR) 5 MG tablet Take 1 tablet by mouth daily 30 tablet 11    indomethacin (INDOCIN) 50 MG capsule Take 1 capsule by mouth daily as needed for Pain 30 capsule 0    naproxen (NAPROSYN) 500 MG tablet Take 1 tablet by mouth 2 times daily for 15 days 30 tablet 0    carvedilol (COREG) 12.5 MG tablet Take 12.5 mg by mouth daily   6    colchicine (COLCRYS) 0.6 MG tablet Take 0.6 mg by mouth daily as needed       fluticasone (FLONASE) 50 MCG/ACT nasal spray 2 sprays by Nasal route daily as needed       nitroGLYCERIN (NITROSTAT) 0.4 MG SL tablet Place 0.4 mg under the tongue every 5 minutes as needed       amiodarone (PACERONE) 400 MG tablet Take 400 mg by mouth daily       lisinopril (PRINIVIL;ZESTRIL) 20 MG tablet lisinopril 20 mg tablet      aspirin 81 MG tablet Take 81 mg by mouth daily       simvastatin (ZOCOR) 20 MG tablet Take 20 mg by mouth nightly      cloNIDine (CATAPRES) 0.1 MG tablet Take 0.2 mg by mouth daily        No current facility-administered medications for this visit. No Known Allergies    Review of Systems   Constitutional: Negative for activity change, appetite change, fatigue, fever and unexpected weight change. HENT: Negative for trouble swallowing. Respiratory: Negative for cough, choking and shortness of breath. Cardiovascular: Negative for chest pain. Gastrointestinal: Negative for abdominal distention, abdominal pain, anal bleeding, blood in stool, constipation, diarrhea, nausea, rectal pain and vomiting. Allergic/Immunologic: Negative for food allergies. All other systems reviewed and are negative.         Objective:     /78 (Site: Left Upper Arm, Position: Sitting, Cuff Size: Medium Adult)   Pulse 55   Ht 6' 3\" (1.905 m) Wt (!) 301 lb 9.6 oz (136.8 kg)   SpO2 97%   BMI 37.70 kg/m²     Physical Exam  Vitals reviewed. Constitutional:       General: He is not in acute distress. Appearance: He is well-developed. HENT:      Head: Normocephalic and atraumatic. Right Ear: External ear normal.      Left Ear: External ear normal.      Nose: Nose normal.      Comments: Mask on     Mouth/Throat:      Comments: Mask on  Eyes:      Conjunctiva/sclera: Conjunctivae normal.      Pupils: Pupils are equal, round, and reactive to light. Cardiovascular:      Rate and Rhythm: Normal rate and regular rhythm. Heart sounds: Normal heart sounds. No murmur heard. No friction rub. No gallop. Pulmonary:      Effort: Pulmonary effort is normal. No respiratory distress. Breath sounds: Normal breath sounds. Abdominal:      General: Bowel sounds are normal. There is no distension. Palpations: Abdomen is soft. There is no mass. Tenderness: There is no abdominal tenderness. There is no guarding or rebound. Musculoskeletal:         General: Normal range of motion. Cervical back: Normal range of motion and neck supple. Skin:     General: Skin is warm and dry. Findings: No rash. Nails: There is no clubbing. Neurological:      Mental Status: He is alert and oriented to person, place, and time. Gait: Gait normal.   Psychiatric:         Behavior: Behavior normal.         Thought Content:  Thought content normal.

## 2021-06-14 NOTE — PATIENT INSTRUCTIONS
Schedule colonoscopy. No aspirin, ibuprofen, naproxen, fish oil or vitamin E for 5 days before procedure. Do not eat or drink after midnight the day of the procedure. Allowed medications can be taken with a small sip of water. Please review your prep instructions for allowed medications. You will not be able to drive for 24 hours after the procedure due to sedation. You must have a responsible adult, 25 year or older, to accompany you and drive you home the day of your procedure. If you are on blood thinners, clearance from the prescribing physician will be obtained before your procedure is scheduled. If it is determined it is not safe to hold these medications for a short time an alternative procedure for evaluation may be recommended. Risks of colonoscopy include, but are not limited to, perforation, bleeding, and infection, Risk of perforation and bleeding are increased if there is a polyp removed. Anesthesia risks will be reviewed with you before the procedure by a member of the anesthesia department. Your physician may also schedule a follow up appointment with the nurse practitioner to discuss pathology, symptoms or to check if you have had any problems related to your procedure. If you prefer not to return to the office after your procedure please discuss this with your physician on the day of your colonoscopy. The physician will talk with you and/or your family after the procedure is completed. Final recommendations are based on the pathologist report if biopsies or specimens are taken. If polyps are removed during the procedure they will be sent to a pathologist for analysis. Unless you have a follow up appointment scheduled, you will be notified by mail of the pathology results within 4 weeks. If you have not received results after 4 weeks you may call the office to obtain this information. For Colonoscopy:   You will be given specific directions regarding restrictions to diet and bowel prep instructions including laxatives. Please read these instructions one week prior to your scheduled procedure to ensure that you are prepared. If you have any questions regarding these instructions please call our office Mon through Fri from 8:00 am to 4:00 pm.     Follow prep instructions provided for bowel prep. Take all of the bowel prep as directed. If you are having problems with nausea, stop your prep for 30-45 min to allow the nausea to subside before resuming your prep. It is important to drink plenty of fluids throughout the day before taking your laxatives. This will help to protect your kidneys, prevent dehydration and maximize the effect of the bowel prep. Your diet before a colonoscopy bowel preparation is very important to ensure a successful colon exam. It is recommended to consider certain changes to your diet three to four days prior to the procedure. Remember that your bowels need to be completely empty for the exam.    What foods are good to eat? Cut down on heavy solid foods three to four days before the procedure and start introducing lighter meals to your diet. The following food suggestions are a good part of your diet before a colonoscopy bowel preparation.  Light meat that is easily digestible such as chicken (without the skin)    Potatoes without skin    Cheese    Eggs    A light meal of steamed white fish    Light clear soups    Foods and drinks to avoid  Avoid foods that contain too much fiber. Stay clear of dark colored beverages. They can stick to the walls of the digestive tract and make it difficult to differentiate from blood.  Some of these foods are:   Red meat, rice, nuts and vegetables    Milk, other milk based fluids and cream    Most fruit and puddings    Whole grain pasta    Cereals, bran and seeds    Colored beverages, especially those that are red or purple in color    Red colored Jell-O     On the day before the

## 2021-07-12 ENCOUNTER — TELEPHONE (OUTPATIENT)
Dept: GASTROENTEROLOGY | Age: 61
End: 2021-07-12

## 2021-07-12 NOTE — TELEPHONE ENCOUNTER
Sent clearance to Kalia Hall MD at [facility], in [facility]. They were having problems getting it to fax back to  - I vd a verbal that it is ok for patient to stop ASA & Eliquis for 5 days. Patient: Woodson Osgood    YOB: 1960      Clearance was received on July 12, 2021.    for Endoscopy / Colonoscopy scheduled for: 7/16/21    Patient may discontinue the use of ELIQUIS & ASA for 5  days prior to the procedure.     IS Lovenox required:  NO    PATIENT NOTIFIED ON:  7/12/21

## 2021-07-16 ENCOUNTER — ANESTHESIA (OUTPATIENT)
Dept: ENDOSCOPY | Age: 61
End: 2021-07-16
Payer: MEDICARE

## 2021-07-16 ENCOUNTER — HOSPITAL ENCOUNTER (OUTPATIENT)
Age: 61
Setting detail: OUTPATIENT SURGERY
Discharge: HOME | End: 2021-07-16
Attending: INTERNAL MEDICINE | Admitting: INTERNAL MEDICINE
Payer: MEDICARE

## 2021-07-16 ENCOUNTER — ANESTHESIA EVENT (OUTPATIENT)
Dept: ENDOSCOPY | Age: 61
End: 2021-07-16
Payer: MEDICARE

## 2021-07-16 VITALS
RESPIRATION RATE: 16 BRPM | SYSTOLIC BLOOD PRESSURE: 139 MMHG | TEMPERATURE: 98 F | OXYGEN SATURATION: 97 % | DIASTOLIC BLOOD PRESSURE: 80 MMHG | BODY MASS INDEX: 37.05 KG/M2 | HEIGHT: 75 IN | WEIGHT: 298 LBS | HEART RATE: 50 BPM

## 2021-07-16 VITALS
SYSTOLIC BLOOD PRESSURE: 108 MMHG | RESPIRATION RATE: 17 BRPM | DIASTOLIC BLOOD PRESSURE: 59 MMHG | TEMPERATURE: 98 F | OXYGEN SATURATION: 97 %

## 2021-07-16 DIAGNOSIS — K62.9 ANAL LESION: Primary | ICD-10-CM

## 2021-07-16 PROCEDURE — 3700000000 HC ANESTHESIA ATTENDED CARE: Performed by: INTERNAL MEDICINE

## 2021-07-16 PROCEDURE — G0121 COLON CA SCRN NOT HI RSK IND: HCPCS | Performed by: INTERNAL MEDICINE

## 2021-07-16 PROCEDURE — 7100000011 HC PHASE II RECOVERY - ADDTL 15 MIN: Performed by: INTERNAL MEDICINE

## 2021-07-16 PROCEDURE — 2500000003 HC RX 250 WO HCPCS: Performed by: NURSE ANESTHETIST, CERTIFIED REGISTERED

## 2021-07-16 PROCEDURE — 3700000001 HC ADD 15 MINUTES (ANESTHESIA): Performed by: INTERNAL MEDICINE

## 2021-07-16 PROCEDURE — 6360000002 HC RX W HCPCS: Performed by: NURSE ANESTHETIST, CERTIFIED REGISTERED

## 2021-07-16 PROCEDURE — 2709999900 HC NON-CHARGEABLE SUPPLY: Performed by: INTERNAL MEDICINE

## 2021-07-16 PROCEDURE — 2580000003 HC RX 258: Performed by: INTERNAL MEDICINE

## 2021-07-16 PROCEDURE — 3609027000 HC COLONOSCOPY: Performed by: INTERNAL MEDICINE

## 2021-07-16 PROCEDURE — 7100000010 HC PHASE II RECOVERY - FIRST 15 MIN: Performed by: INTERNAL MEDICINE

## 2021-07-16 RX ORDER — SODIUM CHLORIDE, SODIUM LACTATE, POTASSIUM CHLORIDE, CALCIUM CHLORIDE 600; 310; 30; 20 MG/100ML; MG/100ML; MG/100ML; MG/100ML
INJECTION, SOLUTION INTRAVENOUS CONTINUOUS
Status: DISCONTINUED | OUTPATIENT
Start: 2021-07-16 | End: 2021-07-16 | Stop reason: HOSPADM

## 2021-07-16 RX ORDER — LIDOCAINE HYDROCHLORIDE 20 MG/ML
INJECTION, SOLUTION INFILTRATION; PERINEURAL PRN
Status: DISCONTINUED | OUTPATIENT
Start: 2021-07-16 | End: 2021-07-16 | Stop reason: SDUPTHER

## 2021-07-16 RX ORDER — PROPOFOL 10 MG/ML
INJECTION, EMULSION INTRAVENOUS PRN
Status: DISCONTINUED | OUTPATIENT
Start: 2021-07-16 | End: 2021-07-16 | Stop reason: SDUPTHER

## 2021-07-16 RX ADMIN — SODIUM CHLORIDE, POTASSIUM CHLORIDE, SODIUM LACTATE AND CALCIUM CHLORIDE: 600; 310; 30; 20 INJECTION, SOLUTION INTRAVENOUS at 13:36

## 2021-07-16 RX ADMIN — PROPOFOL 200 MG: 10 INJECTION, EMULSION INTRAVENOUS at 14:51

## 2021-07-16 RX ADMIN — LIDOCAINE HYDROCHLORIDE 60 MG: 20 INJECTION, SOLUTION INFILTRATION; PERINEURAL at 14:51

## 2021-07-16 NOTE — PROGRESS NOTES
Pt has been unable to get his ride to come pick him up. He has spoken to her several times. He has tried to contact other people without success. He has been fully dressed sitting in chair in [facility] for last few hours waiting for some one to pick him up. Pt knows nurse needs to escort him to car to give instructions and obtain signature. Pt left at this time without notifying staff.

## 2021-07-16 NOTE — H&P
under the tongue every 5 minutes as needed     Historical Provider, MD   aspirin 81 MG tablet Take 81 mg by mouth daily     Historical Provider, MD       Past Medical History:  Past Medical History:   Diagnosis Date    A-fib ([facility]     Cardiomyopathy ([facility]     per pt    CHF (congestive heart failure) ([facility]     Hyperlipidemia     Hypertension     Prolonged emergence from general anesthesia     patient states he does not come out of anesthesia well-he acts ugly    V tach ([facility]        Past Surgical History:  Past Surgical History:   Procedure Laterality Date    CARDIAC DEFIBRILLATOR PLACEMENT      CARDIAC DEFIBRILLATOR PLACEMENT Left     PACEMAKER INSERTION      PACEMAKER PLACEMENT      [employer]    ULTRASOUND PROSTATE/TRANSRECTAL N/A 12/31/2019    TRANSRECTAL ULTRASOUND BIOPSY OF PROSTATE performed by Adamaris Fitzpatrick MD at [facility]      Not sure who did it and done a long time ago       Social History:  Social History     Tobacco Use    Smoking status: Never Smoker    Smokeless tobacco: Never Used   Vaping Use    Vaping Use: Never used   Substance Use Topics    Alcohol use: Never    Drug use: Never       Vital Signs:   Vitals:    07/16/21 1331   BP: (!) 145/79   Pulse: 51   Resp: 16   Temp: 97.1 °F (36.2 °C)   SpO2: 100%        Physical Exam:  Cardiac:  [x]WNL  []Comments:  Pulmonary:  [x]WNL   []Comments:  Neuro/Mental Status:  [x]WNL  []Comments:  Abdominal:  [x]WNL    []Comments:  Other:   []WNL  []Comments:    Informed Consent:  The risks and benefits of the procedure have been discussed with either the patient or if they cannot consent, their representative. Assessment:  Patient examined and appropriate for planned sedation and procedure. Plan:  Proceed with planned sedation and procedure as above.     Shelli Bean MD

## 2021-07-16 NOTE — ANESTHESIA PRE PROCEDURE
Department of Anesthesiology  Preprocedure Note       Name:  David Cintron   Age:  57 y.o.  :  1960                                          MRN:  [id number]         Date:  2021      Surgeon: Hazel York):  Shelli Bean MD    Procedure: Procedure(s):  COLORECTAL CANCER SCREENING, NOT HIGH RISK    Medications prior to admission:   Prior to Admission medications    Medication Sig Start Date End Date Taking?  Authorizing Provider   finasteride (PROSCAR) 5 MG tablet Take 1 tablet by mouth daily 21  Yes Adamaris Fitzpatrick MD   indomethacin (INDOCIN) 50 MG capsule Take 1 capsule by mouth daily as needed for Pain 20  Yes BALBIR Padilla   carvedilol (COREG) 12.5 MG tablet Take 12.5 mg by mouth daily  19  Yes Historical Provider, MD   amiodarone (PACERONE) 400 MG tablet Take 400 mg by mouth daily  12/18/15  Yes Historical Provider, MD   lisinopril (PRINIVIL;ZESTRIL) 20 MG tablet lisinopril 20 mg tablet 19  Yes Historical Provider, MD   simvastatin (ZOCOR) 20 MG tablet Take 20 mg by mouth nightly   Yes Historical Provider, MD   cloNIDine (CATAPRES) 0.1 MG tablet Take 0.2 mg by mouth daily    Yes Historical Provider, MD   naproxen (NAPROSYN) 500 MG tablet Take 1 tablet by mouth 2 times daily for 15 days 20  BALBIR Goldstein   colchicine (COLCRYS) 0.6 MG tablet Take 0.6 mg by mouth daily as needed     Historical Provider, MD   fluticasone (FLONASE) 50 MCG/ACT nasal spray 2 sprays by Nasal route daily as needed     Historical Provider, MD   nitroGLYCERIN (NITROSTAT) 0.4 MG SL tablet Place 0.4 mg under the tongue every 5 minutes as needed     Historical Provider, MD   aspirin 81 MG tablet Take 81 mg by mouth daily     Historical Provider, MD       Current medications:    Current Facility-Administered Medications   Medication Dose Route Frequency Provider Last Rate Last Admin    lactated ringers infusion   Intravenous Continuous Shelli Bean  mL/hr at 21 1336 New Bag at 07/16/21 1336       Allergies:  No Known Allergies    Problem List:    Patient Active Problem List   Diagnosis Code    Cellulitis of neck L03.221    Hypertrophic cardiomyopathy (HCC) I42.2    Lactic acidosis E87.2    Elevated PSA R97.20    Palpitations R00.2    Hypertrophy of inter-atrial septum R95.7    Systolic heart failure (HCC) W55.67    Diastolic heart failure (HCC) I50.30    Ventricular tachycardia (paroxysmal) (HCC) I47.2    CHF (congestive heart failure), NYHA class III, acute on chronic, combined ([address] I50.43       Past Medical History:        Diagnosis Date    [facility]     Cardiomyopathy ([facility]     per pt    CHF (congestive heart failure) ([facility]     Hyperlipidemia     Hypertension     Prolonged emergence from general anesthesia     patient states he does not come out of anesthesia well-he acts ugly    V tach ([facility]        Past Surgical History:        Procedure Laterality Date    CARDIAC DEFIBRILLATOR PLACEMENT      CARDIAC DEFIBRILLATOR PLACEMENT Left     PACEMAKER INSERTION      PACEMAKER PLACEMENT      [employer]    ULTRASOUND PROSTATE/TRANSRECTAL N/A 12/31/2019    TRANSRECTAL ULTRASOUND BIOPSY OF PROSTATE performed by Kiran Ch MD at [address] [place] [address]      Not sure who did it and done a long time ago       Social History:    Social History     Tobacco Use    Smoking status: Never Smoker    Smokeless tobacco: Never Used   Substance Use Topics    Alcohol use: Never                                Counseling given: Not Answered      Vital Signs (Current):   Vitals:    07/16/21 1331   BP: (!) 145/79   Pulse: 51   Resp: 16   Temp: 97.1 °F (36.2 °C)   TempSrc: Temporal   SpO2: 100%   Weight: 298 lb (135.2 kg)   Height: 6' 3\" (1.905 m)                                              BP Readings from Last 3 Encounters:   07/16/21 (!) 145/79   06/14/21 138/78   05/06/21 (!) 159/86       NPO Status: Time of last liquid consumption: 1800 reviewed      Echocardiogram reviewed         Beta Blocker:  Dose within 24 Hrs      ROS comment: EF 55% per echo     Neuro/Psych:               GI/Hepatic/Renal:             Endo/Other:                     Abdominal:   (+) obese,           Vascular: Other Findings:             Anesthesia Plan      general and TIVA     ASA 3       Induction: intravenous. Anesthetic plan and risks discussed with patient.         Attending anesthesiologist reviewed and agrees with Preprocedure content              BALBIR Bradley CRNA   7/16/2021

## 2021-07-16 NOTE — ANESTHESIA POSTPROCEDURE EVALUATION
Department of Anesthesiology  Postprocedure Note    Patient: Calista Chilel  MRN: [id number]  YOB: 1960  Date of evaluation: 7/16/2021  Time:  3:05 PM     Procedure Summary     Date: 07/16/21 Room / Location: [address]    Anesthesia Start: 6953 Anesthesia Stop: 3612    Procedure: COLORECTAL CANCER SCREENING, NOT HIGH RISK (N/A ) Diagnosis: (SCREEN)    Surgeons: Cruz Appiah MD Responsible Provider: BALBIR Fleming CRNA    Anesthesia Type: general, TIVA ASA Status: 3          Anesthesia Type: general, TIVA    Debbie Phase I: Debbie Score: 10    Debbie Phase II:      Last vitals: Reviewed and per EMR flowsheets.        Anesthesia Post Evaluation    Patient location during evaluation: PACU  Patient participation: waiting for patient participation  Level of consciousness: sleepy but conscious  Pain score: 0  Airway patency: patent  Nausea & Vomiting: no nausea and no vomiting  Complications: no  Cardiovascular status: hemodynamically stable  Respiratory status: acceptable, spontaneous ventilation and room air  Hydration status: euvolemic

## 2022-03-16 ENCOUNTER — HOSPITAL ENCOUNTER (INPATIENT)
Age: 62
LOS: 1 days | Discharge: HOME | DRG: 287 | End: 2022-03-19
Attending: EMERGENCY MEDICINE | Admitting: HOSPITALIST
Payer: MEDICARE

## 2022-03-16 ENCOUNTER — APPOINTMENT (OUTPATIENT)
Dept: GENERAL RADIOLOGY | Age: 62
DRG: 287 | End: 2022-03-16
Payer: MEDICARE

## 2022-03-16 DIAGNOSIS — Z86.79 HISTORY OF CARDIOMYOPATHY: ICD-10-CM

## 2022-03-16 DIAGNOSIS — R07.9 CHEST PAIN, UNSPECIFIED TYPE: Primary | ICD-10-CM

## 2022-03-16 LAB
ALBUMIN SERPL-MCNC: 4.1 G/DL (ref 3.5–5.2)
ALP BLD-CCNC: 69 U/L (ref 40–130)
ALT SERPL-CCNC: 9 U/L (ref 5–41)
ANION GAP SERPL CALCULATED.3IONS-SCNC: 12 MMOL/L (ref 7–19)
AST SERPL-CCNC: 15 U/L (ref 5–40)
BILIRUB SERPL-MCNC: 0.4 MG/DL (ref 0.2–1.2)
BUN BLDV-MCNC: 13 MG/DL (ref 8–23)
CALCIUM SERPL-MCNC: 9 MG/DL (ref 8.8–10.2)
CHLORIDE BLD-SCNC: 104 MMOL/L (ref 98–111)
CO2: 22 MMOL/L (ref 22–29)
CREAT SERPL-MCNC: 1 MG/DL (ref 0.5–1.2)
GFR AFRICAN AMERICAN: >59
GFR NON-AFRICAN AMERICAN: >60
GLUCOSE BLD-MCNC: 96 MG/DL (ref 74–109)
POTASSIUM SERPL-SCNC: 4.4 MMOL/L (ref 3.5–5)
PRO-BNP: 471 PG/ML (ref 0–900)
SODIUM BLD-SCNC: 138 MMOL/L (ref 136–145)
TOTAL PROTEIN: 6.9 G/DL (ref 6.6–8.7)
TROPONIN: 0.04 NG/ML (ref 0–0.03)

## 2022-03-16 PROCEDURE — 99285 EMERGENCY DEPT VISIT HI MDM: CPT

## 2022-03-16 PROCEDURE — 93005 ELECTROCARDIOGRAM TRACING: CPT | Performed by: EMERGENCY MEDICINE

## 2022-03-16 PROCEDURE — 71045 X-RAY EXAM CHEST 1 VIEW: CPT

## 2022-03-16 RX ORDER — AMLODIPINE BESYLATE 2.5 MG/1
2.5 TABLET ORAL DAILY
Status: ON HOLD | COMMUNITY
End: 2022-03-17

## 2022-03-16 RX ORDER — ERGOCALCIFEROL (VITAMIN D2) 1250 MCG
1250 CAPSULE ORAL DAILY
COMMUNITY

## 2022-03-16 ASSESSMENT — PAIN SCALES - GENERAL
PAINLEVEL_OUTOF10: 8
PAINLEVEL_OUTOF10: 8

## 2022-03-16 ASSESSMENT — PAIN DESCRIPTION - PAIN TYPE: TYPE: ACUTE PAIN

## 2022-03-16 ASSESSMENT — PAIN DESCRIPTION - LOCATION: LOCATION: CHEST;BACK

## 2022-03-16 ASSESSMENT — PAIN DESCRIPTION - PROGRESSION: CLINICAL_PROGRESSION: GRADUALLY WORSENING

## 2022-03-16 ASSESSMENT — PAIN DESCRIPTION - ONSET: ONSET: ON-GOING

## 2022-03-16 ASSESSMENT — PAIN DESCRIPTION - FREQUENCY: FREQUENCY: CONTINUOUS

## 2022-03-17 ENCOUNTER — APPOINTMENT (OUTPATIENT)
Dept: NUCLEAR MEDICINE | Age: 62
DRG: 287 | End: 2022-03-17
Payer: MEDICARE

## 2022-03-17 PROBLEM — R07.89 ATYPICAL CHEST PAIN: Status: ACTIVE | Noted: 2022-03-17

## 2022-03-17 LAB
BASOPHILS ABSOLUTE: 0 K/UL (ref 0–0.2)
BASOPHILS RELATIVE PERCENT: 0.4 % (ref 0–1)
D DIMER: 0.5 UG/ML FEU (ref 0–0.48)
EKG P AXIS: 43 DEGREES
EKG P AXIS: 57 DEGREES
EKG P-R INTERVAL: 138 MS
EKG P-R INTERVAL: 152 MS
EKG Q-T INTERVAL: 432 MS
EKG Q-T INTERVAL: 474 MS
EKG QRS DURATION: 148 MS
EKG QRS DURATION: 150 MS
EKG QTC CALCULATION (BAZETT): 431 MS
EKG QTC CALCULATION (BAZETT): 457 MS
EKG T AXIS: 113 DEGREES
EKG T AXIS: 124 DEGREES
EOSINOPHILS ABSOLUTE: 0.1 K/UL (ref 0–0.6)
EOSINOPHILS RELATIVE PERCENT: 1.1 % (ref 0–5)
HCT VFR BLD CALC: 50.2 % (ref 42–52)
HEMOGLOBIN: 16.2 G/DL (ref 14–18)
IMMATURE GRANULOCYTES #: 0 K/UL
LIPASE: 22 U/L (ref 13–60)
LV EF: 32 %
LV EF: 45 %
LVEF MODALITY: NORMAL
LVEF MODALITY: NORMAL
LYMPHOCYTES ABSOLUTE: 2.8 K/UL (ref 1.1–4.5)
LYMPHOCYTES RELATIVE PERCENT: 38 % (ref 20–40)
MAGNESIUM: 2.4 MG/DL (ref 1.6–2.4)
MCH RBC QN AUTO: 28.9 PG (ref 27–31)
MCHC RBC AUTO-ENTMCNC: 32.3 G/DL (ref 33–37)
MCV RBC AUTO: 89.6 FL (ref 80–94)
MONOCYTES ABSOLUTE: 0.8 K/UL (ref 0–0.9)
MONOCYTES RELATIVE PERCENT: 10.5 % (ref 0–10)
NEUTROPHILS ABSOLUTE: 3.6 K/UL (ref 1.5–7.5)
NEUTROPHILS RELATIVE PERCENT: 49.6 % (ref 50–65)
PDW BLD-RTO: 16.2 % (ref 11.5–14.5)
PLATELET # BLD: 172 K/UL (ref 130–400)
PMV BLD AUTO: 12.4 FL (ref 9.4–12.4)
RBC # BLD: 5.6 M/UL (ref 4.7–6.1)
SARS-COV-2, NAAT: NOT DETECTED
TROPONIN: 0.03 NG/ML (ref 0–0.03)
TROPONIN: 0.03 NG/ML (ref 0–0.03)
TSH SERPL DL<=0.05 MIU/L-ACNC: 1.08 UIU/ML (ref 0.27–4.2)
WBC # BLD: 7.2 K/UL (ref 4.8–10.8)

## 2022-03-17 PROCEDURE — 80053 COMPREHEN METABOLIC PANEL: CPT

## 2022-03-17 PROCEDURE — C8929 TTE W OR WO FOL WCON,DOPPLER: HCPCS

## 2022-03-17 PROCEDURE — G0378 HOSPITAL OBSERVATION PER HR: HCPCS

## 2022-03-17 PROCEDURE — 3430000000 HC RX DIAGNOSTIC RADIOPHARMACEUTICAL: Performed by: INTERNAL MEDICINE

## 2022-03-17 PROCEDURE — 2580000003 HC RX 258: Performed by: INTERNAL MEDICINE

## 2022-03-17 PROCEDURE — 2500000003 HC RX 250 WO HCPCS: Performed by: HOSPITALIST

## 2022-03-17 PROCEDURE — A9502 TC99M TETROFOSMIN: HCPCS | Performed by: INTERNAL MEDICINE

## 2022-03-17 PROCEDURE — 84443 ASSAY THYROID STIM HORMONE: CPT

## 2022-03-17 PROCEDURE — 85025 COMPLETE CBC W/AUTO DIFF WBC: CPT

## 2022-03-17 PROCEDURE — 85379 FIBRIN DEGRADATION QUANT: CPT

## 2022-03-17 PROCEDURE — 36415 COLL VENOUS BLD VENIPUNCTURE: CPT

## 2022-03-17 PROCEDURE — 96374 THER/PROPH/DIAG INJ IV PUSH: CPT

## 2022-03-17 PROCEDURE — 78452 HT MUSCLE IMAGE SPECT MULT: CPT

## 2022-03-17 PROCEDURE — 6370000000 HC RX 637 (ALT 250 FOR IP): Performed by: EMERGENCY MEDICINE

## 2022-03-17 PROCEDURE — 83880 ASSAY OF NATRIURETIC PEPTIDE: CPT

## 2022-03-17 PROCEDURE — 83690 ASSAY OF LIPASE: CPT

## 2022-03-17 PROCEDURE — 99222 1ST HOSP IP/OBS MODERATE 55: CPT | Performed by: INTERNAL MEDICINE

## 2022-03-17 PROCEDURE — 93005 ELECTROCARDIOGRAM TRACING: CPT | Performed by: EMERGENCY MEDICINE

## 2022-03-17 PROCEDURE — 84484 ASSAY OF TROPONIN QUANT: CPT

## 2022-03-17 PROCEDURE — 6360000002 HC RX W HCPCS: Performed by: INTERNAL MEDICINE

## 2022-03-17 PROCEDURE — 96376 TX/PRO/DX INJ SAME DRUG ADON: CPT

## 2022-03-17 PROCEDURE — 6360000004 HC RX CONTRAST MEDICATION: Performed by: HOSPITALIST

## 2022-03-17 PROCEDURE — 87635 SARS-COV-2 COVID-19 AMP PRB: CPT

## 2022-03-17 PROCEDURE — 83735 ASSAY OF MAGNESIUM: CPT

## 2022-03-17 PROCEDURE — 6370000000 HC RX 637 (ALT 250 FOR IP): Performed by: HOSPITALIST

## 2022-03-17 RX ORDER — SODIUM CHLORIDE 0.9 % (FLUSH) 0.9 %
5-40 SYRINGE (ML) INJECTION EVERY 12 HOURS SCHEDULED
Status: DISCONTINUED | OUTPATIENT
Start: 2022-03-17 | End: 2022-03-18 | Stop reason: SDUPTHER

## 2022-03-17 RX ORDER — AMIODARONE HYDROCHLORIDE 200 MG/1
100 TABLET ORAL DAILY
Status: DISCONTINUED | OUTPATIENT
Start: 2022-03-17 | End: 2022-03-19 | Stop reason: HOSPADM

## 2022-03-17 RX ORDER — AMIODARONE HYDROCHLORIDE 100 MG/1
100 TABLET ORAL DAILY
COMMUNITY
Start: 2022-01-31

## 2022-03-17 RX ORDER — OXYCODONE HYDROCHLORIDE AND ACETAMINOPHEN 5; 325 MG/1; MG/1
5-325 TABLET ORAL PRN
COMMUNITY

## 2022-03-17 RX ORDER — SODIUM CHLORIDE 0.9 % (FLUSH) 0.9 %
5-40 SYRINGE (ML) INJECTION PRN
Status: DISCONTINUED | OUTPATIENT
Start: 2022-03-17 | End: 2022-03-18 | Stop reason: SDUPTHER

## 2022-03-17 RX ORDER — ALBUTEROL SULFATE 90 UG/1
2 AEROSOL, METERED RESPIRATORY (INHALATION) PRN
Status: ACTIVE | OUTPATIENT
Start: 2022-03-17 | End: 2022-03-17

## 2022-03-17 RX ORDER — ASPIRIN 81 MG/1
324 TABLET, CHEWABLE ORAL ONCE
Status: COMPLETED | OUTPATIENT
Start: 2022-03-17 | End: 2022-03-17

## 2022-03-17 RX ORDER — FUROSEMIDE 20 MG/1
20 TABLET ORAL DAILY
Status: DISCONTINUED | OUTPATIENT
Start: 2022-03-17 | End: 2022-03-17

## 2022-03-17 RX ORDER — POLYETHYLENE GLYCOL 3350 17 G/17G
17 POWDER, FOR SOLUTION ORAL DAILY PRN
Status: DISCONTINUED | OUTPATIENT
Start: 2022-03-17 | End: 2022-03-19 | Stop reason: HOSPADM

## 2022-03-17 RX ORDER — ATORVASTATIN CALCIUM 10 MG/1
10 TABLET, FILM COATED ORAL DAILY
Status: DISCONTINUED | OUTPATIENT
Start: 2022-03-17 | End: 2022-03-19 | Stop reason: HOSPADM

## 2022-03-17 RX ORDER — LISINOPRIL 10 MG/1
10 TABLET ORAL DAILY
Status: DISCONTINUED | OUTPATIENT
Start: 2022-03-17 | End: 2022-03-19 | Stop reason: HOSPADM

## 2022-03-17 RX ORDER — ACETAMINOPHEN 650 MG/1
650 SUPPOSITORY RECTAL EVERY 6 HOURS PRN
Status: DISCONTINUED | OUTPATIENT
Start: 2022-03-17 | End: 2022-03-19 | Stop reason: HOSPADM

## 2022-03-17 RX ORDER — SILDENAFIL CITRATE 100 MG
100 TABLET ORAL PRN
COMMUNITY
Start: 2022-01-26

## 2022-03-17 RX ORDER — SODIUM CHLORIDE 9 MG/ML
25 INJECTION, SOLUTION INTRAVENOUS PRN
Status: DISCONTINUED | OUTPATIENT
Start: 2022-03-17 | End: 2022-03-18 | Stop reason: SDUPTHER

## 2022-03-17 RX ORDER — BUMETANIDE 0.25 MG/ML
1 INJECTION, SOLUTION INTRAMUSCULAR; INTRAVENOUS 2 TIMES DAILY
Status: DISCONTINUED | OUTPATIENT
Start: 2022-03-17 | End: 2022-03-18

## 2022-03-17 RX ORDER — ACETAMINOPHEN 325 MG/1
650 TABLET ORAL EVERY 6 HOURS PRN
Status: DISCONTINUED | OUTPATIENT
Start: 2022-03-17 | End: 2022-03-19 | Stop reason: HOSPADM

## 2022-03-17 RX ORDER — NITROGLYCERIN 0.4 MG/1
0.4 TABLET SUBLINGUAL EVERY 5 MIN PRN
Status: ACTIVE | OUTPATIENT
Start: 2022-03-17 | End: 2022-03-17

## 2022-03-17 RX ORDER — ATROPINE SULFATE 0.1 MG/ML
0.5 INJECTION INTRAVENOUS EVERY 5 MIN PRN
Status: ACTIVE | OUTPATIENT
Start: 2022-03-17 | End: 2022-03-17

## 2022-03-17 RX ORDER — NITROGLYCERIN 0.4 MG/1
0.4 TABLET SUBLINGUAL EVERY 5 MIN PRN
Status: DISCONTINUED | OUTPATIENT
Start: 2022-03-17 | End: 2022-03-19 | Stop reason: HOSPADM

## 2022-03-17 RX ORDER — IBUPROFEN 600 MG/1
600 TABLET ORAL PRN
Status: ON HOLD | COMMUNITY
End: 2022-03-19 | Stop reason: HOSPADM

## 2022-03-17 RX ORDER — SODIUM CHLORIDE 0.9 % (FLUSH) 0.9 %
5-40 SYRINGE (ML) INJECTION PRN
Status: ACTIVE | OUTPATIENT
Start: 2022-03-17 | End: 2022-03-17

## 2022-03-17 RX ORDER — PREDNISONE 20 MG/1
20 TABLET ORAL DAILY
Status: DISCONTINUED | OUTPATIENT
Start: 2022-03-17 | End: 2022-03-19 | Stop reason: HOSPADM

## 2022-03-17 RX ORDER — MEXILETINE HYDROCHLORIDE 150 MG/1
150 CAPSULE ORAL DAILY
COMMUNITY
Start: 2022-02-16

## 2022-03-17 RX ORDER — AMLODIPINE BESYLATE 5 MG/1
5 TABLET ORAL DAILY
COMMUNITY
Start: 2022-01-31

## 2022-03-17 RX ORDER — FINASTERIDE 5 MG/1
5 TABLET, FILM COATED ORAL DAILY
Status: DISCONTINUED | OUTPATIENT
Start: 2022-03-17 | End: 2022-03-19 | Stop reason: HOSPADM

## 2022-03-17 RX ORDER — CYCLOBENZAPRINE HCL 10 MG
10 TABLET ORAL PRN
COMMUNITY
Start: 2022-03-11

## 2022-03-17 RX ORDER — ASPIRIN 81 MG/1
81 TABLET, CHEWABLE ORAL DAILY
Status: DISCONTINUED | OUTPATIENT
Start: 2022-03-17 | End: 2022-03-19 | Stop reason: HOSPADM

## 2022-03-17 RX ORDER — AMINOPHYLLINE DIHYDRATE 25 MG/ML
50 INJECTION, SOLUTION INTRAVENOUS PRN
Status: ACTIVE | OUTPATIENT
Start: 2022-03-17 | End: 2022-03-17

## 2022-03-17 RX ORDER — ONDANSETRON 2 MG/ML
4 INJECTION INTRAMUSCULAR; INTRAVENOUS EVERY 6 HOURS PRN
Status: DISCONTINUED | OUTPATIENT
Start: 2022-03-17 | End: 2022-03-19 | Stop reason: HOSPADM

## 2022-03-17 RX ORDER — TOPIRAMATE 25 MG/1
25 TABLET ORAL DAILY
COMMUNITY

## 2022-03-17 RX ORDER — ATORVASTATIN CALCIUM 40 MG/1
40 TABLET, FILM COATED ORAL NIGHTLY
Status: DISCONTINUED | OUTPATIENT
Start: 2022-03-17 | End: 2022-03-17

## 2022-03-17 RX ORDER — PREDNISONE 20 MG/1
20 TABLET ORAL DAILY
COMMUNITY
Start: 2022-03-11

## 2022-03-17 RX ORDER — AMLODIPINE BESYLATE 5 MG/1
5 TABLET ORAL DAILY
Status: DISCONTINUED | OUTPATIENT
Start: 2022-03-17 | End: 2022-03-18

## 2022-03-17 RX ORDER — ONDANSETRON 4 MG/1
4 TABLET, ORALLY DISINTEGRATING ORAL EVERY 8 HOURS PRN
Status: DISCONTINUED | OUTPATIENT
Start: 2022-03-17 | End: 2022-03-19 | Stop reason: HOSPADM

## 2022-03-17 RX ORDER — METOPROLOL TARTRATE 5 MG/5ML
5 INJECTION INTRAVENOUS EVERY 5 MIN PRN
Status: ACTIVE | OUTPATIENT
Start: 2022-03-17 | End: 2022-03-17

## 2022-03-17 RX ORDER — CHLORTHALIDONE 50 MG/1
50 TABLET ORAL DAILY
COMMUNITY

## 2022-03-17 RX ORDER — SODIUM CHLORIDE 9 MG/ML
500 INJECTION, SOLUTION INTRAVENOUS CONTINUOUS PRN
Status: ACTIVE | OUTPATIENT
Start: 2022-03-17 | End: 2022-03-17

## 2022-03-17 RX ORDER — CHLORTHALIDONE 25 MG/1
50 TABLET ORAL DAILY
Status: DISCONTINUED | OUTPATIENT
Start: 2022-03-17 | End: 2022-03-19 | Stop reason: HOSPADM

## 2022-03-17 RX ORDER — FUROSEMIDE 20 MG/1
20 TABLET ORAL DAILY
COMMUNITY

## 2022-03-17 RX ORDER — CARVEDILOL 12.5 MG/1
12.5 TABLET ORAL DAILY
Status: DISCONTINUED | OUTPATIENT
Start: 2022-03-17 | End: 2022-03-18

## 2022-03-17 RX ADMIN — SODIUM CHLORIDE, PRESERVATIVE FREE 10 ML: 5 INJECTION INTRAVENOUS at 12:42

## 2022-03-17 RX ADMIN — APIXABAN 5 MG: 5 TABLET, FILM COATED ORAL at 13:05

## 2022-03-17 RX ADMIN — ASPIRIN 81 MG 324 MG: 81 TABLET ORAL at 02:31

## 2022-03-17 RX ADMIN — TETROFOSMIN 8 MILLICURIE: 1.38 INJECTION, POWDER, LYOPHILIZED, FOR SOLUTION INTRAVENOUS at 11:41

## 2022-03-17 RX ADMIN — ASPIRIN 81 MG 81 MG: 81 TABLET ORAL at 12:42

## 2022-03-17 RX ADMIN — AMIODARONE HYDROCHLORIDE 100 MG: 200 TABLET ORAL at 12:41

## 2022-03-17 RX ADMIN — TETROFOSMIN 24 MILLICURIE: 1.38 INJECTION, POWDER, LYOPHILIZED, FOR SOLUTION INTRAVENOUS at 11:41

## 2022-03-17 RX ADMIN — FINASTERIDE 5 MG: 5 TABLET, FILM COATED ORAL at 12:40

## 2022-03-17 RX ADMIN — NITROGLYCERIN 0.4 MG: 0.4 TABLET, ORALLY DISINTEGRATING SUBLINGUAL at 02:31

## 2022-03-17 RX ADMIN — CARVEDILOL 12.5 MG: 12.5 TABLET, FILM COATED ORAL at 12:41

## 2022-03-17 RX ADMIN — APIXABAN 5 MG: 5 TABLET, FILM COATED ORAL at 20:41

## 2022-03-17 RX ADMIN — REGADENOSON 0.4 MG: 0.08 INJECTION, SOLUTION INTRAVENOUS at 10:45

## 2022-03-17 RX ADMIN — PERFLUTREN 1.5 ML: 6.52 INJECTION, SUSPENSION INTRAVENOUS at 15:28

## 2022-03-17 RX ADMIN — LISINOPRIL 10 MG: 10 TABLET ORAL at 12:41

## 2022-03-17 RX ADMIN — AMLODIPINE BESYLATE 5 MG: 5 TABLET ORAL at 12:41

## 2022-03-17 RX ADMIN — ATORVASTATIN CALCIUM 10 MG: 10 TABLET, FILM COATED ORAL at 12:41

## 2022-03-17 RX ADMIN — CHLORTHALIDONE 50 MG: 25 TABLET ORAL at 12:41

## 2022-03-17 RX ADMIN — BUMETANIDE 1 MG: 0.25 INJECTION INTRAMUSCULAR; INTRAVENOUS at 20:41

## 2022-03-17 RX ADMIN — BUMETANIDE 1 MG: 0.25 INJECTION INTRAMUSCULAR; INTRAVENOUS at 12:40

## 2022-03-17 RX ADMIN — SODIUM CHLORIDE, PRESERVATIVE FREE 10 ML: 5 INJECTION INTRAVENOUS at 20:41

## 2022-03-17 RX ADMIN — PREDNISONE 20 MG: 20 TABLET ORAL at 12:40

## 2022-03-17 SDOH — HEALTH STABILITY: PHYSICAL HEALTH: ON AVERAGE, HOW MANY DAYS PER WEEK DO YOU ENGAGE IN MODERATE TO STRENUOUS EXERCISE (LIKE A BRISK WALK)?: 7 DAYS

## 2022-03-17 ASSESSMENT — ENCOUNTER SYMPTOMS
GASTROINTESTINAL NEGATIVE: 1
COUGH: 0
NAUSEA: 0
VOMITING: 0
SHORTNESS OF BREATH: 1
ABDOMINAL PAIN: 0
DIARRHEA: 0

## 2022-03-17 NOTE — ED PROVIDER NOTES
[facility] EMERGENCY DEPT  eMERGENCY dEPARTMENT eNCOUnter      Pt Name: Lala Guzman  MRN: [id number]  [place] 1960  Date of evaluation: 3/16/2022  Provider: Marcos Biggs MD    CHIEF COMPLAINT       Chief Complaint   Patient presents with    Chest Pain     startred earlier today radiating to back, chest tightness, having SOB          HISTORY OF PRESENT ILLNESS   (Location/Symptom, Timing/Onset,Context/Setting, Quality, Duration, Modifying Factors, Severity)  Note limiting factors. Lala Guzman is a 60 y.o. male who presents to the emergency department for evaluation regarding chest pain. Patient states that this pain began earlier today several hours prior to arrival.  He describes the pain as moderate in severity, tightness located across his upper chest.  It does radiate to his back. States he feels like he is also having some difficulty breathing. He denies previous history of coronary artery disease. No prior history of cardiac stents. He does have a previous history of fibrillation, cardiomyopathy with previous pacemaker implantation. Reports he just recently underwent replacement of his battery by his cardiologist in [facility]. He has not had any palpitations or syncopal events. No prior history of pulmonary embolism or DVT. HPI    NursingNotes were reviewed. REVIEW OF SYSTEMS    (2-9 systems for level 4, 10 or more for level 5)     Review of Systems   Constitutional: Negative for chills and fever. Respiratory: Positive for shortness of breath. Negative for cough. Cardiovascular: Positive for chest pain. Negative for palpitations and leg swelling. Gastrointestinal: Negative for abdominal pain, diarrhea, nausea and vomiting. Neurological: Negative for dizziness and syncope. All other systems reviewed and are negative.            PAST MEDICALHISTORY     Past Medical History:   Diagnosis Date    [facility]     Cardiomyopathy ([facility]     per pt    CHF (congestive heart failure) ([facility]  Hyperlipidemia     Hypertension     Prolonged emergence from general anesthesia     patient states he does not come out of anesthesia well-he acts ugly    V tach ([facility]          SURGICAL HISTORY       Past Surgical History:   Procedure Laterality Date    CARDIAC DEFIBRILLATOR PLACEMENT      CARDIAC DEFIBRILLATOR PLACEMENT Left     COLONOSCOPY  07/16/2021    Dr Elkins Fraction atypical appearing lesion was noted, appeared to be arising from the squamous tissue of the anus and no rectal in origin, surg referral, 10 yr recall    COLONOSCOPY N/A 7/16/2021    COLORECTAL CANCER SCREENING, NOT HIGH RISK performed by Fletcher Bullard MD at [facility]   [facility]    ULTRASOUND PROSTATE/TRANSRECTAL N/A 12/31/2019    TRANSRECTAL ULTRASOUND BIOPSY OF PROSTATE performed by Arabella Martin MD at [address]      Not sure who did it and done a long time ago         CURRENT MEDICATIONS     Previous Medications    AMIODARONE (PACERONE) 100 MG TABLET    Take 100 mg by mouth daily    AMIODARONE (PACERONE) 400 MG TABLET    Take 400 mg by mouth daily     AMLODIPINE (NORVASC) 2.5 MG TABLET    Take 2.5 mg by mouth daily    AMLODIPINE (NORVASC) 5 MG TABLET    Take 5 mg by mouth daily    APIXABAN (ELIQUIS) 5 MG TABS TABLET    Take 5 mg by mouth daily    ASPIRIN 81 MG TABLET    Take 81 mg by mouth daily     CARVEDILOL (COREG) 12.5 MG TABLET    Take 12.5 mg by mouth daily     CHLORTHALIDONE (HYGROTEN) 50 MG TABLET    Take 50 mg by mouth daily    CLONIDINE (CATAPRES) 0.1 MG TABLET    Take 0.2 mg by mouth daily     COLCHICINE (COLCRYS) 0.6 MG TABLET    Take 0.6 mg by mouth daily as needed     CYCLOBENZAPRINE (FLEXERIL) 10 MG TABLET    Take 10 mg by mouth as needed    ERGOCALCIFEROL (ERGOCALCIFEROL) 1.25 MG (75419 UT) CAPSULE    Take 1,250 mcg by mouth daily    FINASTERIDE (PROSCAR) 5 MG TABLET    Take 1 tablet by mouth daily    FLUTICASONE (FLONASE) 50 MCG/ACT NASAL SPRAY    2 sprays by Nasal route daily as needed     FUROSEMIDE (LASIX) 20 MG TABLET    Take 20 mg by mouth daily    IBUPROFEN (ADVIL;MOTRIN) 600 MG TABLET    Take 600 mg by mouth as needed    INDOMETHACIN (INDOCIN) 50 MG CAPSULE    Take 1 capsule by mouth daily as needed for Pain    LISINOPRIL (PRINIVIL;ZESTRIL) 20 MG TABLET    lisinopril 20 mg tablet    MEXILETINE (MEXITIL) 150 MG CAPSULE    Take 150 mg by mouth daily    NAPROXEN (NAPROSYN) 500 MG TABLET    Take 1 tablet by mouth 2 times daily for 15 days    NITROGLYCERIN (NITROSTAT) 0.4 MG SL TABLET    Place 0.4 mg under the tongue every 5 minutes as needed     OXYCODONE-ACETAMINOPHEN (PERCOCET) 5-325 MG PER TABLET    Take 5-325 tablets by mouth as needed. PREDNISONE (DELTASONE) 20 MG TABLET    Take 20 mg by mouth daily    SIMVASTATIN (ZOCOR) 20 MG TABLET    Take 20 mg by mouth nightly    TOPIRAMATE (TOPAMAX) 25 MG TABLET    Take 25 mg by mouth daily    VIAGRA 100 MG TABLET    Take 100 mg by mouth as needed       ALLERGIES     Patient has no known allergies.     FAMILY HISTORY       Family History   Problem Relation Age of Onset    Colon Cancer Neg Hx     Colon Polyps Neg Hx     Esophageal Cancer Neg Hx     Liver Cancer Neg Hx     Liver Disease Neg Hx     Rectal Cancer Neg Hx     Stomach Cancer Neg Hx           SOCIAL HISTORY       Social History     Socioeconomic History    Marital status: Single     Spouse name: Not on file    Number of children: Not on file    Years of education: Not on file    Highest education level: Not on file   Occupational History    Not on file   Tobacco Use    Smoking status: Never Smoker    Smokeless tobacco: Never Used   Vaping Use    Vaping Use: Never used   Substance and Sexual Activity    Alcohol use: Never    Drug use: Never    Sexual activity: Not on file   Other Topics Concern    Not on file   Social History Narrative    Not on file     Social Determinants of Health     Financial Resource Strain:     Difficulty of Paying Living Expenses: Not on file   Food Insecurity:     Worried About Running Out of Food in the Last Year: Not on file    Lorenzo of Food in the Last Year: Not on file   Transportation Needs:     Lack of Transportation (Medical): Not on file    Lack of Transportation (Non-Medical): Not on file   Physical Activity:     Days of Exercise per Week: Not on file    Minutes of Exercise per Session: Not on file   Stress:     Feeling of Stress : Not on file   Social Connections:     Frequency of Communication with Friends and Family: Not on file    Frequency of Social Gatherings with Friends and Family: Not on file    Attends [employer] Services: Not on file    Active Member of [address] [place] [address] [employer] or Organizations: Not on file    Attends Club or Organization Meetings: Not on file    Marital Status: Not on file   Intimate Partner Violence:     Fear of Current or Ex-Partner: Not on file    Emotionally Abused: Not on file    Physically Abused: Not on file    Sexually Abused: Not on file   Housing Stability:     Unable to Pay for Housing in the Last Year: Not on file    Number of [place] in the Last Year: Not on file    Unstable Housing in the Last Year: Not on file       SCREENINGS    [facility] Coma Scale  Eye Opening: Spontaneous  Best Verbal Response: Oriented  Best Motor Response: Obeys commands  [facility] Coma Scale Score: 15        PHYSICAL EXAM    (up to 7 for level 4, 8 or more for level 5)     ED Triage Vitals [03/16/22 2309]   BP Temp Temp src Pulse Resp SpO2 Height Weight   (!) 163/88 -- -- 50 20 94 % 6' 3\" (1.905 m) 300 lb (136.1 kg)       Physical Exam  Vitals and nursing note reviewed. HENT:      Head: Atraumatic. Mouth/Throat:      Mouth: Mucous membranes are moist. Mucous membranes are not dry. Eyes:      General: No scleral icterus. Pupils: Pupils are equal, round, and reactive to light. Neck:      Trachea: No tracheal deviation.    Cardiovascular: Rate and Rhythm: Normal rate and regular rhythm. Pulses: Normal pulses. Heart sounds: Normal heart sounds. No murmur heard. Pulmonary:      Effort: Pulmonary effort is normal. No respiratory distress. Breath sounds: Normal breath sounds. No stridor. Abdominal:      General: There is no distension. Palpations: Abdomen is soft. Tenderness: There is no abdominal tenderness. There is no guarding. Musculoskeletal:      Right lower leg: No edema. Left lower leg: No edema. Skin:     Capillary Refill: Capillary refill takes less than 2 seconds. Coloration: Skin is not pale. Findings: No rash. Neurological:      General: No focal deficit present. Mental Status: He is alert and oriented to person, place, and time. Psychiatric:         Mood and Affect: Mood normal.         Behavior: Behavior is cooperative. DIAGNOSTIC RESULTS     EKG: All EKG's areinterpreted by the Emergency Department Physician who either signs or Co-signs this chart in the absence of a cardiologist.    : Sinus rhythm at a rate of 59, interventricular conduction delay consistent with previous extensive infarction. Compared to previous EKG 1/2020 that appears similar. 0235: Normal sinus rhythm at a rate of 51, interventricular conduction delay is still present with ST segment change however unchanged from previous.         RADIOLOGY:  Non-plain film images such as CT, Ultrasound and MRI are read by the radiologist. Plain radiographic images are visualized and preliminarily interpreted bythe emergency physician with the below findings:    CXR: Mild pulmonary vascular congestion      XR CHEST PORTABLE    (Results Pending)           LABS:  Labs Reviewed   CBC WITH AUTO DIFFERENTIAL - Abnormal; Notable for the following components:       Result Value    MCHC 32.3 (*)     RDW 16.2 (*)     Neutrophils % 49.6 (*)     Monocytes % 10.5 (*)     All other components within normal limits TROPONIN - Abnormal; Notable for the following components:    Troponin 0.04 (*)     All other components within normal limits   COVID-19, RAPID   COMPREHENSIVE METABOLIC PANEL   BRAIN NATRIURETIC PEPTIDE   TROPONIN       All other labs were within normal range or not returned as of this dictation. EMERGENCY DEPARTMENT COURSE and DIFFERENTIAL DIAGNOSIS/MDM:   Vitals:    Vitals:    03/16/22 2309 03/16/22 2345 03/17/22 0115   BP: (!) 163/88 (!) 163/70 (!) 152/76   Pulse: 50 111 50   Resp: 20 13 16   SpO2: 94% 92% 93%   Weight: 300 lb (136.1 kg)     Height: 6' 3\" (1.905 m)         MDM    Reassessment    Patient's EKG does not demonstrate significant change from previous however this was from 2020. His initial cardiac biomarker is slightly elevated at 0.04, repeat troponin 0 0.03. Patient with relief of chest pain symptoms after sublingual nitro x1 here in the ED. Reportedly has been multiple years since cardiac catheterization was performed. CONSULTS:    Case was discussed with Dr. Matilde Messina regarding inpatient admission to the hospitalist service. PROCEDURES:  Unless otherwise noted below, none     Procedures    FINAL IMPRESSION      1. Chest pain, unspecified type    2.  History of cardiomyopathy          DISPOSITION/PLAN   DISPOSITION  Admitted      (Please note that portions of this note were completed with a voice recognition program.  Efforts were made to edit thedictations but occasionally words are mis-transcribed.)    Uzair Peña MD (electronically signed)  Attending Emergency Physician          Uzair Peña MD  04/11/22 5550

## 2022-03-17 NOTE — CARE COORDINATION
Initial CM Assessment    Initial Assessment Completed at bedside with:    [x]   Patient  []   Family/Caregiver/Guardian   []   Other:      Patient Contact Information:  [address] [facility] [phone] (home)   Above information verified? [x]   Yes, physical address is [address] [place] [address], [facility]  []   No    ADLS:   Patient lives at home alone. Prior to admission, he was independent with ADLs. Support System:    Brother  Plan to return to current housing:   [x]   Yes  []   No    Transportation plan for Discharge:  Self    Do you have any unmet social needs that would keep you from returning home safely:  []   Yes  [x]   No              Unmet Social Needs Notes:       Had [address] prior to admission:    []   Yes  [x]   No    Currently ACTIVE with Home Health CARE:    []   Yes  [x]   No  []   Interested at discharge  [facility]:      Current PCP:  Alyssa Escobedo, DO  PCP verified? [x]   Yes  []   No    Have you been vaccinated for COVID-19 (SARS-CoV-2)? [x]   Yes  []   No                   If so, when? Which :  []   Pfizer-BioNTech  []   Moderna  [x]   [employer]  []   Other:       Pharmacy:    [id number] - [address] [facility], [address] [place] [address]  [address] [facility] [place] [phone]  [address] [place] [address]  Phone: [phone] Fax: [phone]    Prefer to use Meds to Bed? []   Yes  [x]   No  Potential assistance purchasing medications?      []   Yes  [x]   No    Active with HD/PD prior to admission:           []   Yes  [x]   No  HD Center:       Financial:  Payor: Dolly Livers / Plan: MEDICARE PART A AND B / Product Type: *No Product type* /     Pre-Cert required for SNF:   []   Yes  [x]   No    Patient Deficits:  []   Yes   [x]   No    If yes:  []   Confusion/Memory  []   Visual  []   Motor/Sensory         []   Right arm         []   Right leg         []   Left arm         []   Left leg  []   Language/Speech         [] Aphasia         []   Dysarthria         []   Swallow         [facility] Coma Scale  Eye Opening: Spontaneous  Best Verbal Response: Oriented  Best Motor Response: Obeys commands  [place] Coma Scale Score: 15    Patient Deficit Notes:        Additional CM/SW Notes:       Kostas Dye and/or his family were provided with choice of provider:  [x]   Yes   []   No      Patient Admission Status:   Observation    Isaías Deshpande RN  [facility]  Electronically signed by Isaías Deshpande RN on 3/17/2022 at 9:05 AM

## 2022-03-17 NOTE — ED NOTES
Pt states chest and back pain gone after 1 sublingual nitro tablet     Belita Snellen, RN  03/17/22 0401

## 2022-03-17 NOTE — H&P
[place], [place], [address]    DEPARTMENT OF HOSPITALIST MEDICINE      HISTORY & PHYSICAL:          REASON FOR ADMISSION:  Chief Complaint   Patient presents with    Chest Pain     startred earlier today radiating to back, chest tightness, having SOB         HISTORY OF PRESENT ILLNESS:  Pablito Huertas is an 60 y.o. male with medical history significant for chest pains CHF atrial fibrillation presented to the emergency room with complaints of chest pains patient is morbidly obese male sex a little bit away now from middle age. Troponin was at the top normal of 0.04 and troponin had gone down to a 0.03 patient did not have the chest pain in the emergency room. We will continue to trend troponin. At this point patient may need to go for nuclear stress test to follow-up with care. Will obtain echocardiogram as well for care. Patient is observation this moment. Placed patient on antiplatelets with aspirin and also on statin with atorvastatin. Once patient rules out for an MI patient will undergo nuclear stress test and if this is negative then this will be rendered as an atypical chest pain and other source of chest pain could be sought after.     PAST MEDICAL HISTORY:  Past Medical History:   Diagnosis Date    A-fib ([address]     Cardiomyopathy ([address]     per pt    CHF (congestive heart failure) ([address]     Hyperlipidemia     Hypertension     Prolonged emergence from general anesthesia     patient states he does not come out of anesthesia well-he acts ugly    V tach ([address]          PAST SURGICAL HISTORY:  Past Surgical History:   Procedure Laterality Date    CARDIAC DEFIBRILLATOR PLACEMENT      CARDIAC DEFIBRILLATOR PLACEMENT Left     COLONOSCOPY  07/16/2021    Dr Elen Jorgensen atypical appearing lesion was noted, appeared to be arising from the squamous tissue of the anus and no rectal in origin, surg referral, 10 yr recall    COLONOSCOPY N/A 7/16/2021    COLORECTAL CANCER SCREENING, NOT HIGH RISK performed by Isabel Cyr MD at [address] [facility]    PACEMAKER INSERTION      PACEMAKER PLACEMENT      [employer]    ULTRASOUND PROSTATE/TRANSRECTAL N/A 12/31/2019    TRANSRECTAL ULTRASOUND BIOPSY OF PROSTATE performed by Peyton Cole MD at [address]      Not sure who did it and done a long time ago        SOCIAL HISTORY:  Social History     Socioeconomic History    Marital status: Single     Spouse name: Not on file    Number of children: Not on file    Years of education: Not on file    Highest education level: Not on file   Occupational History    Not on file   Tobacco Use    Smoking status: Never Smoker    Smokeless tobacco: Never Used   Vaping Use    Vaping Use: Never used   Substance and Sexual Activity    Alcohol use: Never    Drug use: Never    Sexual activity: Not on file   Other Topics Concern    Not on file   Social History Narrative    Not on file     Social Determinants of Health     Financial Resource Strain:     Difficulty of Paying Living Expenses: Not on file   Food Insecurity:     Worried About [address] in the Last Year: Not on file    [address] in the Last Year: Not on file   Transportation Needs:     Lack of Transportation (Medical): Not on file    Lack of Transportation (Non-Medical):  Not on file   Physical Activity:     Days of Exercise per Week: Not on file    Minutes of Exercise per Session: Not on file   Stress:     Feeling of Stress : Not on file   Social Connections:     Frequency of Communication with Friends and Family: Not on file    Frequency of Social Gatherings with Friends and Family: Not on file    Attends Latter-day Services: Not on file    Active Member of Clubs or Organizations: Not on file    Attends Club or Organization Meetings: Not on file    Marital Status: Not on file   Intimate Partner Violence:     Fear of Current or Ex-Partner: Not on file    Emotionally Abused: Not on file   Judson Morales Physically Abused: Not on file    Sexually Abused: Not on file   Housing Stability:     Unable to Pay for Housing in the Last Year: Not on file    Number of Places Lived in the Last Year: Not on file    Unstable Housing in the Last Year: Not on file        FAMILY HISTORY:  Family History   Problem Relation Age of Onset    Colon Cancer Neg Hx     Colon Polyps Neg Hx     Esophageal Cancer Neg Hx     Liver Cancer Neg Hx     Liver Disease Neg Hx     Rectal Cancer Neg Hx     Stomach Cancer Neg Hx          ALLERGIES:  No known allergy    PRIOR TO ADMISSION MEDS:       REVIEW OF SYSTEMS:  Constitutional:  No fevers, chills, nausea, vomiting, + tiredness & fatigue   Head:  No head injury, facial trauma   Eyes:  No acute visual changes, exudate, trauma   Ears:  No acute hearing loss, earaches   Nose: No nasal discharge, epistaxis   Neck: No new hoarseness, voice change, or new masses   Lungs:   No hemoptysis, pleurisy   Heart:  No chest pressure with exertion, palpitations,    Abdomen:   No new masses, no bright red blood per rectum   Extremities: No acute pain while ambulating, no new lesions   Skin: No new changes in skin color, no rashes or lesions   Neurologic: No new motor or sensory changes     14 point review of systems addressed with patient which is essentially negative except as specifically addressed above:    PHYSICAL EXAM:  BP (!) 156/77   Pulse 50   Resp 16   Ht 6' 3\" (1.905 m)   Wt 300 lb (136.1 kg)   SpO2 93%   BMI 37.50 kg/m²   No intake/output data recorded.       PHYSICAL EXAMINATION:    Vital Signs: Please see the chart   KALYANI:  Awake, alert, oriented x 3, patient appears tired and fatigued   Head/Eyes:  Normocephalic, atraumatic, EOMI and PERRLA bilaterally   ENT: Moist mucous membranes, nasal passages clear   Neck: Supple, full range of motion, no carotid bruit, trachea midline   Respiratory:   Bilateral fair air entry in both lung fields, mild B/L crackles, symmetric expansion of chest Cardiovascular:  Regular rate and rhythm, S1+S2+0, no murmurs/rubs   Urology: No bilateral CVA tenderness, no suprapubic tenderness   Abdomen:   Soft, non-tender, bowel sounds +ve, no organomegaly   Muscle/Joints: Moves all, full range of motion, no muscle spasms   Extremities: No clubbing, no cyanosis, no calf tenderness, no edema   Pulses: 2+ bilaterally, symmetrical   Skin: Warm, dry, no pallor/cyanosis/jaundice, no rashes/lesions   Neurologic: Awake, alert, oriented x 3, cranial nerves II-XII intact, no focal neurological deficits, sensory system intact   Psychiatric: Normal mood, non-suicidal         LABORATORY DATA:    CBC:  Recent Labs     03/16/22 2330   WBC 7.2   HGB 16.2   HCT 50.2        BMP:  Recent Labs     03/16/22 2330      K 4.4      CO2 22   BUN 13   CREATININE 1.0   CALCIUM 9.0     Recent Labs     03/16/22 2330   AST 15   ALT 9   BILITOT 0.4   ALKPHOS 69     Coag Panel: No results for input(s): INR, PROTIME, APTT in the last 72 hours. Cardiac Enzymes:   Recent Labs     03/16/22  2330 03/17/22  0230   TROPONINI 0.04* 0.03     ABGs:No results found for: PHART, PO2ART, AUD2NQX  Urinalysis:  Lab Results   Component Value Date    NITRU NEGATIVE 12/09/2012    BACTERIA TRACE 12/09/2012    BLOODU trace 05/17/2021    SPECGRAV 1.015 05/17/2021    GLUCOSEU neg 05/17/2021    GLUCOSEU NEGATIVE 12/09/2012     A1C: No results for input(s): LABA1C in the last 72 hours. ABG:No results for input(s): PHART, ZAE9TAU, PO2ART, XAE1QUD, BEART, HGBAE, B2IUTWKJ, CARBOXHGBART in the last 72 hours. EKG:   Please see chart      IMAGING:  No results found.       Assessment    Principal Problem:    Atypical chest pain             Plan:    Atypical chest pain  -Need to general medical floor with telemetry  -Follow through with trending the troponin  -Patient had 2 troponin already  -Patient to go for nuclear stress test  -This is negative patient will continue medical management and seek other source of chest pains  -Echocardiogram        Attestation:  Inpatient status is used for patients with an expected LOS extending past two midnights due to medical therapy and/or critical care needs  . .. all other patients are placed under OBServation status. Bailey Zuniga DO  4:45 AM 3/17/2022      DISCLAIMER: This note was created with electronic voice recognition which does have occasional errors. If you have any questions regarding the content within the note please do not hesitate to contact me. .. Thanks.

## 2022-03-17 NOTE — CONSULTS
[facility]) Cardiology   Consult Note   Tiera Gaspar       Requesting MD:  Nanette Smith, *   Admit Status:         History obtained from:   [x] Patient  [] Other (specify):     Patient:  Amaya Mcgregor  [id number]     Chief Complaint:   Chief Complaint   Patient presents with    Chest Pain     startred earlier today radiating to back, chest tightness, having SOB        HPI: Mr. Tomasz Hagen is a 60 y.o. male with a history of hypertrophic cardiomyopathy with diastolic congestive heart failure, history of V. tach status post V. tach ablations in [facility], status post AICD placement few years ago, history of atrial fibrillation on oral anticoagulation with Eliquis, history of hypertension hyperlipidemia    Patient mid to the hospital yesterday after he was complaining of new onset chest pain, his pain was central nonradiating associated with worsening shortness of breath felt as tightness, his pain has improved since admission, initial troponin was mildly elevated at 0.04 but subsequent troponins were down to 0.03, patient admitted to the hospital and placed on telemetry floor that showed no arrhythmia, cardiology was consulted after the patient was ordered to have 2D echo and nuclear stress test    When I saw him today during evaluation, he denies any active chest pain but he is worried about possibility of having coronary artery disease  Patient is non-smoker    Review of Systems:  Review of Systems   Constitutional: Negative. Respiratory: Positive for shortness of breath. Cardiovascular: Positive for chest pain. Gastrointestinal: Negative. Endocrine: Negative. Genitourinary: Negative. Musculoskeletal: Negative. Skin: Negative. Neurological: Negative. Hematological: Negative. All other systems reviewed and are negative.       Cardiac Specific Data:  Specialty Problems        Cardiology Problems    Hypertrophic cardiomyopathy (HCC)        CHF (congestive heart failure), NYHA class III, acute on chronic, combined (HCC)        Diastolic heart failure (HCC)        Hypertrophy of inter-atrial septum        Systolic heart failure (HCC)        Ventricular tachycardia (paroxysmal) (HCC)        * (Principal) Atypical chest pain              Past Medical History:  Past Medical History:   Diagnosis Date    [facility]     Cardiomyopathy ([facility]     per pt    CHF (congestive heart failure) ([facility]     Hyperlipidemia     Hypertension     Prolonged emergence from general anesthesia     patient states he does not come out of anesthesia well-he acts ugly    V tach ([facility]         Past Surgical History:  Past Surgical History:   Procedure Laterality Date    CARDIAC DEFIBRILLATOR PLACEMENT      CARDIAC DEFIBRILLATOR PLACEMENT Left     COLONOSCOPY  07/16/2021    Dr Mercy Mcdonald atypical appearing lesion was noted, appeared to be arising from the squamous tissue of the anus and no rectal in origin, surg referral, 10 yr recall    COLONOSCOPY N/A 7/16/2021    COLORECTAL CANCER SCREENING, NOT HIGH RISK performed by Rhys Valle MD at [facility]    PACEMAKER INSERTION      PACEMAKER PLACEMENT      [employer]    ULTRASOUND PROSTATE/TRANSRECTAL N/A 12/31/2019    TRANSRECTAL ULTRASOUND BIOPSY OF PROSTATE performed by Bailey Ovalle MD at [address] [place] [address]      Not sure who did it and done a long time ago       Past Family History:  Family History   Problem Relation Age of Onset    Colon Cancer Neg Hx     Colon Polyps Neg Hx     Esophageal Cancer Neg Hx     Liver Cancer Neg Hx     Liver Disease Neg Hx     Rectal Cancer Neg Hx     Stomach Cancer Neg Hx        Past Social History:  Social History     Socioeconomic History    Marital status: Single     Spouse name: Not on file    Number of children: Not on file    Years of education: Not on file    Highest education level: Not on file   Occupational History    Not on file   Tobacco Use    Smoking status: Never Smoker  Smokeless tobacco: Never Used   Vaping Use    Vaping Use: Never used   Substance and Sexual Activity    Alcohol use: Never    Drug use: Never    Sexual activity: Not on file   Other Topics Concern    Not on file   Social History Narrative    Not on file     Social Determinants of Health     Financial Resource Strain:     Difficulty of Paying Living Expenses: Not on file   Food Insecurity:     Worried About Running Out of Food in the Last Year: Not on file    Lorenzo of Food in the Last Year: Not on file   Transportation Needs:     Lack of Transportation (Medical): Not on file    Lack of Transportation (Non-Medical): Not on file   Physical Activity: Unknown    Days of Exercise per Week: 7 days    Minutes of Exercise per Session: Not on file   Stress:     Feeling of Stress : Not on file   Social Connections:     Frequency of Communication with Friends and Family: Not on file    Frequency of Social Gatherings with Friends and Family: Not on file    Attends [employer] Services: Not on file    Active Member of [address] [place] [address] or Organizations: Not on file    Attends Club or Organization Meetings: Not on file    Marital Status: Not on file   Intimate Partner Violence:     Fear of Current or Ex-Partner: Not on file    Emotionally Abused: Not on file    Physically Abused: Not on file    Sexually Abused: Not on file   Housing Stability:     Unable to Pay for Housing in the Last Year: Not on file    Number of [place] in the Last Year: Not on file    Unstable Housing in the Last Year: Not on file       Allergies:  No Known Allergies    Home Meds:  Prior to Admission medications    Medication Sig Start Date End Date Taking?  Authorizing Provider   apixaban (ELIQUIS) 5 MG TABS tablet Take 5 mg by mouth daily 3/29/21  Yes Historical Provider, MD   amLODIPine (NORVASC) 5 MG tablet Take 5 mg by mouth daily 1/31/22   Historical Provider, MD   chlorthalidone (HYGROTEN) 50 MG tablet Take 50 mg by mouth daily Historical Provider, MD   cyclobenzaprine (FLEXERIL) 10 MG tablet Take 10 mg by mouth as needed 3/11/22   Historical Provider, MD   furosemide (LASIX) 20 MG tablet Take 20 mg by mouth daily    Historical Provider, MD   ibuprofen (ADVIL;MOTRIN) 600 MG tablet Take 600 mg by mouth as needed    Historical Provider, MD   mexiletine (MEXITIL) 150 MG capsule Take 150 mg by mouth daily 2/16/22   Historical Provider, MD   oxyCODONE-acetaminophen (PERCOCET) 5-325 MG per tablet Take 5-325 tablets by mouth as needed.     Historical Provider, MD   predniSONE (DELTASONE) 20 MG tablet Take 20 mg by mouth daily 3/11/22   Historical Provider, MD   VIAGRA 100 MG tablet Take 100 mg by mouth as needed 1/26/22   Historical Provider, MD   topiramate (TOPAMAX) 25 MG tablet Take 25 mg by mouth daily    Historical Provider, MD   amiodarone (PACERONE) 100 MG tablet Take 100 mg by mouth daily 1/31/22   Historical Provider, MD   ergocalciferol (ERGOCALCIFEROL) 1.25 MG (93009 UT) capsule Take 1,250 mcg by mouth daily    Historical Provider, MD   finasteride (PROSCAR) 5 MG tablet Take 1 tablet by mouth daily 5/17/21   Yaritza Call, MD   indomethacin (INDOCIN) 50 MG capsule Take 1 capsule by mouth daily as needed for Pain 2/26/20   BALBIR Li   naproxen (NAPROSYN) 500 MG tablet Take 1 tablet by mouth 2 times daily for 15 days 1/24/20 6/14/21  BALBIR Estrella - CNP   carvedilol (COREG) 12.5 MG tablet Take 12.5 mg by mouth daily  8/19/19   Historical Provider, MD   colchicine (COLCRYS) 0.6 MG tablet Take 0.6 mg by mouth daily as needed     Historical Provider, MD   fluticasone (FLONASE) 50 MCG/ACT nasal spray 2 sprays by Nasal route daily as needed     Historical Provider, MD   nitroGLYCERIN (NITROSTAT) 0.4 MG SL tablet Place 0.4 mg under the tongue every 5 minutes as needed     Historical Provider, MD   lisinopril (PRINIVIL;ZESTRIL) 20 MG tablet lisinopril 20 mg tablet 7/4/19   Historical Provider, MD   aspirin 81 MG tablet Take 81 mg by mouth daily     Historical Provider, MD   simvastatin (ZOCOR) 20 MG tablet Take 20 mg by mouth nightly    Historical Provider, MD   cloNIDine (CATAPRES) 0.1 MG tablet Take 0.2 mg by mouth daily     Historical Provider, MD       Current Meds:   sodium chloride flush  5-40 mL IntraVENous 2 times per day    amiodarone  100 mg Oral Daily    amLODIPine  5 mg Oral Daily    apixaban  5 mg Oral Daily    aspirin  81 mg Oral Daily    carvedilol  12.5 mg Oral Daily    chlorthalidone  50 mg Oral Daily    finasteride  5 mg Oral Daily    lisinopril  10 mg Oral Daily    predniSONE  20 mg Oral Daily    atorvastatin  10 mg Oral Daily    bumetanide  1 mg IntraVENous BID       Current Infused Meds:   sodium chloride      sodium chloride         Physical Exam:  Vitals:    03/17/22 0628   BP: (!) 184/93   Pulse: 58   Resp: 16   Temp: 97.7 °F (36.5 °C)   SpO2: 94%       Intake/Output Summary (Last 24 hours) at 3/17/2022 1022  Last data filed at 3/17/2022 0930  Gross per 24 hour   Intake 0 ml   Output 600 ml   Net -600 ml     Estimated body mass index is 37.5 kg/m² as calculated from the following:    Height as of this encounter: 6' 3\" (1.905 m). Weight as of this encounter: 300 lb (136.1 kg). Physical Exam  Vitals and nursing note reviewed. Constitutional:       Appearance: Normal appearance. He is obese. HENT:      Head: Normocephalic and atraumatic. Mouth/Throat:      Mouth: Mucous membranes are moist.   Cardiovascular:      Rate and Rhythm: Normal rate and regular rhythm. Pulses: Normal pulses. Heart sounds: Normal heart sounds. No murmur heard. Pulmonary:      Effort: Pulmonary effort is normal.      Breath sounds: Normal breath sounds. Abdominal:      General: Bowel sounds are normal.      Palpations: Abdomen is soft. Tenderness: There is no abdominal tenderness. Musculoskeletal:         General: Normal range of motion. Right lower leg: No edema.       Left lower leg: No edema.   Skin:     General: Skin is warm. Neurological:      General: No focal deficit present. Mental Status: He is alert and oriented to person, place, and time. Psychiatric:         Mood and Affect: Mood normal.         Behavior: Behavior normal.         Labs:  Recent Labs     03/16/22 2330   WBC 7.2   HGB 16.2          Recent Labs     03/16/22 2330      K 4.4      CO2 22   BUN 13   CREATININE 1.0   LABGLOM >60   CALCIUM 9.0       CK, CKMB, Troponin:   Recent Labs     03/16/22  2330 03/17/22  0230 03/17/22  0909   TROPONINI 0.04* 0.03 0.03       Last 3 BNP:  Recent Labs     03/16/22 2330   PROBNP 471         IMAGING:    EKG -  Narrative & Impression    50 BPM  Sinus bradycardia  Possible left atrial abnormality  Intraventricular conduction defect with inferior Q waves and anterior Q waves -cannot exclude   prior extensive infarction  Comparison Summary: No significant change       ECHO     TTE procedure:ECHO 2D W/DOPPLER/COLOR/CONTRAST.      Study Location: Echo Lab  Technical Quality: Limited visualization due to body habitus.     Patient Status: Inpatient     Contrast Medium: Definity.     HR: 50 bpm BP: 151/90 mmHg     Indications:Dyspnea/SOB.      Conclusions      Summary   Technically difficult study with poor images due to body habitus   Normal left ventricular size with decreased systolic function due to mild   apical hypokinesis -estimated ejection fraction 45%   Severe concentric left ventricular hypertrophy with moderate [grade 2]   diastolic dysfunction   Mild left atrial enlargement   Poor visualization of the aortic valve without evidence of stenosis and   with mild insufficiency   Mild mitral annular calcification with poor visualization of the mitral   valve and no demonstrated regurgitation   No evidence of pulmonic stenosis or insufficiency   Normal right atrial size with mild right ventricular enlargement   Trace tricuspid regurgitation   IVC appears mildly dilated with decreased inspiratory motion consistent   with elevated right atrial filling pressures   Aortic root dimensions fall within normal limits with dilatation of the   ascending aorta measuring 3.7 cm   No significant pericardial effusion   Poor visualization of the aortic arch   AICD leads seen in right-sided chambers   Definity contrast utilized to better define endocardial borders      Signature      ----------------------------------------------------------------   Electronically signed by Namita Ward MD(Interpreting physician)   on 03/17/2022 04:45 PM   ----------------------------------------------------------------       Nuclear stress test      Lexiscan Nuclear Stress Test Report   Procedure date: 3/17/2022   Indications: chest pain   Procedure: Stress was performed with injection of 0.4 mg Lexiscan. Vital signs and EKG were monitored. Technetium-99 Myoview was injected   in divided doses, approximately 8.31 mCi and 23.9 mCi respectively for   rest and stress imaging. The patient was discharged in stable   condition. Results: Patient had symptoms of dyspnea during infusion that resolved   in recovery. Baseline EKG showed sinus bradycardia with nonspecific   ST/T changes. During stress there were no significantEKG changes or   rhythm changes. Baseline and peak blood pressures were 149/90, and   156/71 respectively.  Baseline and peak heart rates were 50 and  65   respectively. EF=32%   Lexiscan/Cardiolyte Nuclear Medicine Report   Date of Procedure: 3/17/2022   The patient was injected with 8.81 millicuries (mCi) of Technetium   (Tc99m).  After an appropriate level of stress the patient was   re-injected with 21.5 millicuries (mCi) of Technetium (Tc99m).  Repeat   gated images were then performed per standard protocol. Findings:   1.  Analysis of the the stress and rest images reveals large mid to   apical inferior and large apical contiguous defect without significant   reversibility.    2.  Analysis of the gated images reveals grossly normal left   ventricular function with a calculated ejection fraction of 32 %.         Impression   Impression:   There is a large contiguous mid to apical inferior and apical infarct   without significant ischemia, with a calculated ejection fraction of   32 %. Suggest: Clinical correlation is advised. Signed by Dr Migue Vargas    Result Date: 3/17/2022  1. Mild pulmonary vascular congestion and moderate cardiomegaly. 2. A dual-chamber cardiac pacer and defibrillator in place. Signed by Dr Valeriano Brooks and Plan:    28-year-old male who is known to have history of hypertrophic cardiomyopathy status post AICD due to inducible V. tach status post V. tach ablation with his electrophysiologist in [facility]. [place] [id number]    History of hypertension hyperlipidemia atrial fibrillation on anticoagulation  Admitted with significant history of chest pain shortness of breath, he was ruled out for acute coronary syndrome with negative troponins after initial 1 was borderline, echo and nuclear stress was done today  And both reported abnormal with large ischemia on the mid apical inferior defect with abnormal ejection fraction of 32% on nuclear stress test    I discussed with the patient the finding of the echo and the stress test, or next recommendation to do ischemic work-up to rule out coronary artery disease with heart catheterization, patient agreed to proceed with left heart cath/coronary angiogram with possible PCI tomorrow morning, he will be n.p.o. past midnight    Thank you for the consult, we appreciate the opportunity to provide care to your patients. Feel free to contact me if I can be of any further assistance.       Electronically signed by Alexy Huizar MD on 3/17/2022 at 10:22 AM    Alexy Huizar MD, [facility]  Interventional Cardiologist, Endovascular Specialist   Medical Director, [address] [place] [id number] [facility]       (Please note that portions of this note were completed with a voice recognition program.  Effortswere made to edit the dictations but occasionally words are mis-transcribed.)

## 2022-03-18 ENCOUNTER — APPOINTMENT (OUTPATIENT)
Dept: CT IMAGING | Age: 62
DRG: 287 | End: 2022-03-18
Payer: MEDICARE

## 2022-03-18 LAB
ALBUMIN SERPL-MCNC: 4.2 G/DL (ref 3.5–5.2)
ALP BLD-CCNC: 71 U/L (ref 40–130)
ALT SERPL-CCNC: 9 U/L (ref 5–41)
ANION GAP SERPL CALCULATED.3IONS-SCNC: 12 MMOL/L (ref 7–19)
ANION GAP SERPL CALCULATED.3IONS-SCNC: 13 MMOL/L (ref 7–19)
AST SERPL-CCNC: 12 U/L (ref 5–40)
BASOPHILS ABSOLUTE: 0 K/UL (ref 0–0.2)
BASOPHILS RELATIVE PERCENT: 0.2 % (ref 0–1)
BILIRUB SERPL-MCNC: 0.4 MG/DL (ref 0.2–1.2)
BUN BLDV-MCNC: 17 MG/DL (ref 8–23)
BUN BLDV-MCNC: 17 MG/DL (ref 8–23)
C-REACTIVE PROTEIN: 0.34 MG/DL (ref 0–0.5)
CALCIUM SERPL-MCNC: 9.7 MG/DL (ref 8.8–10.2)
CALCIUM SERPL-MCNC: 9.7 MG/DL (ref 8.8–10.2)
CHLORIDE BLD-SCNC: 100 MMOL/L (ref 98–111)
CHLORIDE BLD-SCNC: 101 MMOL/L (ref 98–111)
CO2: 25 MMOL/L (ref 22–29)
CO2: 26 MMOL/L (ref 22–29)
CREAT SERPL-MCNC: 1.1 MG/DL (ref 0.5–1.2)
CREAT SERPL-MCNC: 1.2 MG/DL (ref 0.5–1.2)
D DIMER: 0.46 UG/ML FEU (ref 0–0.48)
EKG P AXIS: 58 DEGREES
EKG P-R INTERVAL: 142 MS
EKG Q-T INTERVAL: 498 MS
EKG QRS DURATION: 158 MS
EKG QTC CALCULATION (BAZETT): 486 MS
EKG T AXIS: 114 DEGREES
EOSINOPHILS ABSOLUTE: 0.1 K/UL (ref 0–0.6)
EOSINOPHILS RELATIVE PERCENT: 0.9 % (ref 0–5)
GFR AFRICAN AMERICAN: >59
GFR AFRICAN AMERICAN: >59
GFR NON-AFRICAN AMERICAN: >60
GFR NON-AFRICAN AMERICAN: >60
GLUCOSE BLD-MCNC: 90 MG/DL (ref 74–109)
GLUCOSE BLD-MCNC: 96 MG/DL (ref 74–109)
HCT VFR BLD CALC: 49.6 % (ref 42–52)
HEMOGLOBIN: 15.9 G/DL (ref 14–18)
IMMATURE GRANULOCYTES #: 0 K/UL
LYMPHOCYTES ABSOLUTE: 2.3 K/UL (ref 1.1–4.5)
LYMPHOCYTES RELATIVE PERCENT: 27.1 % (ref 20–40)
MCH RBC QN AUTO: 29 PG (ref 27–31)
MCHC RBC AUTO-ENTMCNC: 32.1 G/DL (ref 33–37)
MCV RBC AUTO: 90.5 FL (ref 80–94)
MONOCYTES ABSOLUTE: 0.7 K/UL (ref 0–0.9)
MONOCYTES RELATIVE PERCENT: 8.6 % (ref 0–10)
NEUTROPHILS ABSOLUTE: 5.3 K/UL (ref 1.5–7.5)
NEUTROPHILS RELATIVE PERCENT: 62.8 % (ref 50–65)
PDW BLD-RTO: 16.1 % (ref 11.5–14.5)
PLATELET # BLD: 157 K/UL (ref 130–400)
PMV BLD AUTO: 11.7 FL (ref 9.4–12.4)
POTASSIUM REFLEX MAGNESIUM: 4.4 MMOL/L (ref 3.5–5)
POTASSIUM SERPL-SCNC: 4.2 MMOL/L (ref 3.5–5)
RBC # BLD: 5.48 M/UL (ref 4.7–6.1)
SEDIMENTATION RATE, ERYTHROCYTE: 1 MM/HR (ref 0–15)
SODIUM BLD-SCNC: 138 MMOL/L (ref 136–145)
SODIUM BLD-SCNC: 139 MMOL/L (ref 136–145)
TOTAL CK: 82 U/L (ref 39–308)
TOTAL PROTEIN: 6.5 G/DL (ref 6.6–8.7)
TROPONIN: 0.02 NG/ML (ref 0–0.03)
WBC # BLD: 8.5 K/UL (ref 4.8–10.8)

## 2022-03-18 PROCEDURE — B2111ZZ FLUOROSCOPY OF MULTIPLE CORONARY ARTERIES USING LOW OSMOLAR CONTRAST: ICD-10-PCS | Performed by: INTERNAL MEDICINE

## 2022-03-18 PROCEDURE — 99152 MOD SED SAME PHYS/QHP 5/>YRS: CPT | Performed by: INTERNAL MEDICINE

## 2022-03-18 PROCEDURE — G0378 HOSPITAL OBSERVATION PER HR: HCPCS

## 2022-03-18 PROCEDURE — 6370000000 HC RX 637 (ALT 250 FOR IP): Performed by: HOSPITALIST

## 2022-03-18 PROCEDURE — 93454 CORONARY ARTERY ANGIO S&I: CPT

## 2022-03-18 PROCEDURE — 2709999900 HC NON-CHARGEABLE SUPPLY

## 2022-03-18 PROCEDURE — 85025 COMPLETE CBC W/AUTO DIFF WBC: CPT

## 2022-03-18 PROCEDURE — C1769 GUIDE WIRE: HCPCS

## 2022-03-18 PROCEDURE — 6360000004 HC RX CONTRAST MEDICATION: Performed by: HOSPITALIST

## 2022-03-18 PROCEDURE — 86140 C-REACTIVE PROTEIN: CPT

## 2022-03-18 PROCEDURE — 99214 OFFICE O/P EST MOD 30 MIN: CPT | Performed by: INTERNAL MEDICINE

## 2022-03-18 PROCEDURE — 96376 TX/PRO/DX INJ SAME DRUG ADON: CPT

## 2022-03-18 PROCEDURE — 36415 COLL VENOUS BLD VENIPUNCTURE: CPT

## 2022-03-18 PROCEDURE — C1894 INTRO/SHEATH, NON-LASER: HCPCS

## 2022-03-18 PROCEDURE — 2580000003 HC RX 258: Performed by: HOSPITALIST

## 2022-03-18 PROCEDURE — C1887 CATHETER, GUIDING: HCPCS

## 2022-03-18 PROCEDURE — 93005 ELECTROCARDIOGRAM TRACING: CPT | Performed by: INTERNAL MEDICINE

## 2022-03-18 PROCEDURE — 99152 MOD SED SAME PHYS/QHP 5/>YRS: CPT

## 2022-03-18 PROCEDURE — 2500000003 HC RX 250 WO HCPCS: Performed by: HOSPITALIST

## 2022-03-18 PROCEDURE — 85652 RBC SED RATE AUTOMATED: CPT

## 2022-03-18 PROCEDURE — 84484 ASSAY OF TROPONIN QUANT: CPT

## 2022-03-18 PROCEDURE — 71275 CT ANGIOGRAPHY CHEST: CPT

## 2022-03-18 PROCEDURE — 6360000002 HC RX W HCPCS

## 2022-03-18 PROCEDURE — 2500000003 HC RX 250 WO HCPCS

## 2022-03-18 PROCEDURE — 82550 ASSAY OF CK (CPK): CPT

## 2022-03-18 PROCEDURE — 93010 ELECTROCARDIOGRAM REPORT: CPT | Performed by: INTERNAL MEDICINE

## 2022-03-18 PROCEDURE — 4A023N7 MEASUREMENT OF CARDIAC SAMPLING AND PRESSURE, LEFT HEART, PERCUTANEOUS APPROACH: ICD-10-PCS | Performed by: INTERNAL MEDICINE

## 2022-03-18 PROCEDURE — 80053 COMPREHEN METABOLIC PANEL: CPT

## 2022-03-18 PROCEDURE — 85379 FIBRIN DEGRADATION QUANT: CPT

## 2022-03-18 PROCEDURE — 2580000003 HC RX 258: Performed by: INTERNAL MEDICINE

## 2022-03-18 PROCEDURE — 93454 CORONARY ARTERY ANGIO S&I: CPT | Performed by: INTERNAL MEDICINE

## 2022-03-18 RX ORDER — SODIUM CHLORIDE 9 MG/ML
25 INJECTION, SOLUTION INTRAVENOUS PRN
Status: DISCONTINUED | OUTPATIENT
Start: 2022-03-18 | End: 2022-03-19 | Stop reason: HOSPADM

## 2022-03-18 RX ORDER — SODIUM CHLORIDE 9 MG/ML
INJECTION, SOLUTION INTRAVENOUS CONTINUOUS
Status: DISCONTINUED | OUTPATIENT
Start: 2022-03-18 | End: 2022-03-19

## 2022-03-18 RX ORDER — SODIUM CHLORIDE 9 MG/ML
INJECTION, SOLUTION INTRAVENOUS CONTINUOUS
Status: DISCONTINUED | OUTPATIENT
Start: 2022-03-18 | End: 2022-03-18

## 2022-03-18 RX ORDER — SODIUM CHLORIDE 0.9 % (FLUSH) 0.9 %
5-40 SYRINGE (ML) INJECTION EVERY 12 HOURS SCHEDULED
Status: DISCONTINUED | OUTPATIENT
Start: 2022-03-18 | End: 2022-03-19 | Stop reason: HOSPADM

## 2022-03-18 RX ORDER — SODIUM CHLORIDE 0.9 % (FLUSH) 0.9 %
5-40 SYRINGE (ML) INJECTION PRN
Status: DISCONTINUED | OUTPATIENT
Start: 2022-03-18 | End: 2022-03-19 | Stop reason: HOSPADM

## 2022-03-18 RX ORDER — ACETAMINOPHEN 325 MG/1
650 TABLET ORAL EVERY 4 HOURS PRN
Status: DISCONTINUED | OUTPATIENT
Start: 2022-03-18 | End: 2022-03-19 | Stop reason: HOSPADM

## 2022-03-18 RX ORDER — CARVEDILOL 6.25 MG/1
3.12 TABLET ORAL DAILY
Status: DISCONTINUED | OUTPATIENT
Start: 2022-03-19 | End: 2022-03-19 | Stop reason: HOSPADM

## 2022-03-18 RX ADMIN — ASPIRIN 81 MG 81 MG: 81 TABLET ORAL at 13:50

## 2022-03-18 RX ADMIN — IOPAMIDOL 90 ML: 755 INJECTION, SOLUTION INTRAVENOUS at 17:37

## 2022-03-18 RX ADMIN — BUMETANIDE 1 MG: 0.25 INJECTION INTRAMUSCULAR; INTRAVENOUS at 13:49

## 2022-03-18 RX ADMIN — IOPAMIDOL 52 ML: 612 INJECTION, SOLUTION INTRAVENOUS at 12:14

## 2022-03-18 RX ADMIN — PREDNISONE 20 MG: 20 TABLET ORAL at 13:50

## 2022-03-18 RX ADMIN — SODIUM CHLORIDE: 9 INJECTION, SOLUTION INTRAVENOUS at 18:14

## 2022-03-18 RX ADMIN — ATORVASTATIN CALCIUM 10 MG: 10 TABLET, FILM COATED ORAL at 13:50

## 2022-03-18 RX ADMIN — APIXABAN 5 MG: 5 TABLET, FILM COATED ORAL at 22:53

## 2022-03-18 RX ADMIN — SODIUM CHLORIDE: 9 INJECTION, SOLUTION INTRAVENOUS at 10:06

## 2022-03-18 RX ADMIN — LISINOPRIL 10 MG: 10 TABLET ORAL at 13:51

## 2022-03-18 RX ADMIN — AMIODARONE HYDROCHLORIDE 100 MG: 200 TABLET ORAL at 13:50

## 2022-03-18 RX ADMIN — AMLODIPINE BESYLATE 5 MG: 5 TABLET ORAL at 13:50

## 2022-03-18 RX ADMIN — Medication 5 ML: at 23:10

## 2022-03-18 RX ADMIN — FINASTERIDE 5 MG: 5 TABLET, FILM COATED ORAL at 13:51

## 2022-03-18 RX ADMIN — CARVEDILOL 12.5 MG: 12.5 TABLET, FILM COATED ORAL at 13:50

## 2022-03-18 RX ADMIN — CHLORTHALIDONE 50 MG: 25 TABLET ORAL at 13:49

## 2022-03-18 RX ADMIN — APIXABAN 5 MG: 5 TABLET, FILM COATED ORAL at 13:50

## 2022-03-18 NOTE — PROCEDURES
Cardiac Catherization        Patient Name: Carson Abarca   MRN: [id number]   Age: 60 y.o. : 1960   Admission Date: 3/16/2022   Room/Bed: [address] [place] [address]   PRIMARY CARE PROVIDER   Ambar Najera MD        DATE OF PROCEDURE: 3/18/2022   INDICATIONS:   This is a 60 y.o. male admitted for chest pain and abnormal nuclear stress test    PROCEDURE:   1. Coronary Angiogram   2. Left heart catheterization          ANESTHESIA   Moderate sedation. The patient was continously monitored by the trained experienced nurse under my supervision with respect to level of  consciousness, and vital signs/physiologic status throughout the case. For further details regarding specific medications and doses please refer to  cath lab procedural notes    DESCRIPTION OF PROCEDURE:   Carson Abarca was brought to the cath lab in a fasting state after informed consent was obtained. A pre procedural time out occurred to identify correct patient and procedure. Patient was prepped and draped in the usual sterile fashion. Right radial approach was used. Bleeding was stopped with TR band  Routine use of diagnostic catheters were used for angiogram and different projections. FINDINGS:   HEMODYNAMICS:   LVEDP: Not measured mmHg   BP: 120/70  HR: 65/min        CORONARY ANGIOGRAPHY:   Dominance: Right  Left Main: Normal  LAD: Left anterior descending was normal in caliber and wrapped around the apex for short distance. It was free of significant disease. Ramus was large in caliber and free of significant disease.   Diagonal branches were normal in caliber and free of significant disease  LCx: Left circumflex was normal in caliber and free of significant disease  RCA: Right coronary artery was normal in caliber and free of significant disease          ESTIMATED BLOOD LOSS: Minimal  COMPLICATIONS: None         CONCLUSIONS:   Insignificant obstructive epicardial disease  False positive nuclear stress test  RECOMMENDATIONS: From echocardiogram patient has had severe concentric hypertrophy of the left ventricle. He could be treated medically. From cardiac standpoint patient can be discharged to be followed by Dr Tito Yanez    This dictation was performed using Dragon software. Mistake and misspelling may have been created without  realizing them. Please notify me for clarification.   Thank you  Anup Arroyo MD  3/18/2022, 12:25 PM

## 2022-03-18 NOTE — PROGRESS NOTES
Cardiology Progress Note   Felisha Bell MD      Patient:    Gallito Dougherty  [id number]  1960    Patient Active Problem List    Diagnosis Date Noted    Atypical chest pain 03/17/2022     Priority: Low    Palpitations 01/26/2020     Priority: Low    Hypertrophy of inter-atrial septum 01/26/2020     Priority: Low     Overview Note:     2011  ICD place  7/1/2014  DSE negative for myocardial ischemia  2/5/2015  ICD generator change out  2/2/2016  Cardiac CT The left ventricle is normal in size with moderately reduced function. There is dyskinesis of the apical inferior and lateral segments and hypokinesis of the apical anterior and apical septum. LVEF = 40% by visual estimate. No LV thrombus. 2.   Asymmetric hypertrophy with a sigmoid shaped septum and maximum wall thickness of 2.4 cm in the 4-chamber view.  No obvious LVOT obstruction and no GIOVANI. 3.   Dilated left atrium. 4.   Dilated pulmonary artery which suggests pulmonary hypertension. ([place])  10/8/2016  VT ablation  10/21/2016  Echo  Severe cLVH  1/24/2019  VT ablation   7/2/2019:  Echo  LVEF 43%, Grade II diastolic dysfunction, Asymmetric septal hypertrophy 2.6cm, Posterior wall thickness 1.5cm  7/3/2019  lexiscan 1.  Left ventricular myocardial perfusion demonstrates a large infarct involving the inferior wall with extension into contiguous lateral wall particularly toward the apex.  There is no significant ischemia identified. 2.  The left ventricular ejection fraction (LVEF) is 35%. 3.  The left ventricular wall motion demonstrates decreased motion centered at the apex with extension into immediately contiguous portions of the other wall segments.    9/27/2019  Cath  Mild CAD ([place], [place])      Systolic heart failure ([address] 01/26/2020     Priority: Low    Diastolic heart failure ([address] 01/26/2020     Priority: Low    Ventricular tachycardia (paroxysmal) ([address] 01/26/2020     Priority: Low    CHF (congestive heart failure), NYHA class III, acute on chronic, combined ([facility] 01/26/2020     Priority: Low    Elevated PSA 12/31/2019     Priority: Low    Cellulitis of neck 07/26/2019     Priority: Low    Hypertrophic cardiomyopathy ([facility] 07/26/2019     Priority: Low    Lactic acidosis 07/26/2019     Priority: Low       Admit Date:  3/16/2022    Admission Problem List: Present on Admission:   Atypical chest pain       Cardiac Specific Data:  Specialty Problems        Cardiology Problems    Hypertrophic cardiomyopathy ([facility]        CHF (congestive heart failure), NYHA class III, acute on chronic, combined (HCC)        Diastolic heart failure (HCC)        Hypertrophy of inter-atrial septum        Systolic heart failure (HCC)        Ventricular tachycardia (paroxysmal) ([facility])        * (Principal) Atypical chest pain              Subjective:  Patient had 24 hours of substernal ache associated with little bit of a shortness of breath. He was admitted for investigation. Troponins was negative x3. Nuclear stress test showed large fixed defect in the inferoapical regions. Patient is known to have hypertension, diabetes and hyperlipidemia. His cardiac history dates back to 10 years ago. He was diagnosed to have hypertrophic cardiomyopathy. He underwent 3 ventricular tachycardia ablation. The last 1 was 6 years ago. Since then he has been doing reasonably well with no chest pain or shortness of breath. His last cardiac catheterization was at least 6 years ago. At that time it showed insignificant coronary artery disease. He is a non-smoker and nondrinker. He does not use recreational drug. EKG shows sinus rhythm with conduction delay. Possible a old inferior wall myocardial infarct. Echocardiogram show apical hypokinesis with estimated ejection fraction of 45%. There was severe concentric hypertrophy with grade 2 diastolic dysfunction.     Objective:   /63   Pulse 53   Temp 96.6 °F (35.9 °C) (Temporal)   Resp 18   Ht 6' 3\" (1.905 m)   Wt 300 lb (136.1 kg) SpO2 96%   BMI 37.50 kg/m²       Intake/Output Summary (Last 24 hours) at 3/18/2022 1002  Last data filed at 3/18/2022 0606  Gross per 24 hour   Intake 720 ml   Output 3400 ml   Net -2680 ml       Current Facility-Administered Medications   Medication Dose Route Frequency Provider Last Rate Last Admin    0.9 % sodium chloride infusion   IntraVENous Continuous Merline Joel MD        sodium chloride flush 0.9 % injection 5-40 mL  5-40 mL IntraVENous 2 times per day Merline Joel MD        sodium chloride flush 0.9 % injection 5-40 mL  5-40 mL IntraVENous PRN Merline Joel MD        0.9 % sodium chloride infusion  25 mL IntraVENous PRN Merline Joel MD        nitroGLYCERIN (NITROSTAT) SL tablet 0.4 mg  0.4 mg SubLINGual Q5 Min PRN Ana Caruso MD   0.4 mg at 03/17/22 0231    ondansetron (ZOFRAN-ODT) disintegrating tablet 4 mg  4 mg Oral Q8H PRN Patience Mary, DO        Or    ondansetron (ZOFRAN) injection 4 mg  4 mg IntraVENous Q6H PRN Patience Mary, DO        polyethylene glycol (GLYCOLAX) packet 17 g  17 g Oral Daily PRN Patience Mary, DO        acetaminophen (TYLENOL) tablet 650 mg  650 mg Oral Q6H PRN Patience Mary, DO        Or    acetaminophen (TYLENOL) suppository 650 mg  650 mg Rectal Q6H PRN Patience Mary, DO        amiodarone (CORDARONE) tablet 100 mg  100 mg Oral Daily Hernan Kessler MD   100 mg at 03/17/22 1241    amLODIPine (NORVASC) tablet 5 mg  5 mg Oral Daily Hernan Kessler MD   5 mg at 03/17/22 1241    aspirin chewable tablet 81 mg  81 mg Oral Daily Hernan Kessler MD   81 mg at 03/17/22 1242    carvedilol (COREG) tablet 12.5 mg  12.5 mg Oral Daily Hernan Kessler MD   12.5 mg at 03/17/22 1241    chlorthalidone (HYGROTON) tablet 50 mg  50 mg Oral Daily Hernan Kessler MD   50 mg at 03/17/22 1241    finasteride (PROSCAR) tablet 5 mg  5 mg Oral Daily Hernan Kessler MD   5 mg at 03/17/22 1240    lisinopril (PRINIVIL;ZESTRIL) tablet 10 mg  10 mg Oral Daily Corrie Monroy MD   10 mg at 03/17/22 1241    predniSONE (DELTASONE) tablet 20 mg  20 mg Oral Daily Corrie Monroy MD   20 mg at 03/17/22 1240    atorvastatin (LIPITOR) tablet 10 mg  10 mg Oral Daily Corrie Monroy MD   10 mg at 03/17/22 1241    bumetanide (BUMEX) injection 1 mg  1 mg IntraVENous BID Corrie Monroy MD   1 mg at 03/17/22 2041    apixaban (ELIQUIS) tablet 5 mg  5 mg Oral BID Corrie Monroy MD   5 mg at 03/17/22 2041    perflutren lipid microspheres (DEFINITY) injection 1.65 mg  1.5 mL IntraVENous ONCE PRN Corrie Monroy MD   1.5 mL at 03/17/22 1528         sodium chloride flush  5-40 mL IntraVENous 2 times per day    amiodarone  100 mg Oral Daily    amLODIPine  5 mg Oral Daily    aspirin  81 mg Oral Daily    carvedilol  12.5 mg Oral Daily    chlorthalidone  50 mg Oral Daily    finasteride  5 mg Oral Daily    lisinopril  10 mg Oral Daily    predniSONE  20 mg Oral Daily    atorvastatin  10 mg Oral Daily    bumetanide  1 mg IntraVENous BID    apixaban  5 mg Oral BID         Physical Exam:  General: NAD, alert  HEENT: normal  CHEST: clear to ascultation  CVS: S1 and S2 normal, no murmur, no JVD  ABD; nontender, bowel sounds normal, liver and spleen not palpable  MSK: normal  CNS: oriented X3, normal affect, normal CN  PVD:  all peripheral pulses normal, no swelling of the ankles      RECENT LABS:    Lab Data:  CBC:   Recent Labs     03/16/22 2330 03/18/22  0559   WBC 7.2 8.5   HGB 16.2 15.9   HCT 50.2 49.6   MCV 89.6 90.5    157     BMP:   Recent Labs     03/16/22 2330 03/18/22  0559    139   K 4.4 4.4    100   CO2 22 26   BUN 13 17   CREATININE 1.0 1.2     LIVER PROFILE:   Recent Labs     03/16/22 2330 03/17/22  0909 03/18/22  0559   AST 15  --  12   ALT 9  --  9   LIPASE  --  22  --    BILITOT 0.4  --  0.4   ALKPHOS 69  --  71     PT/INR: No results for input(s): PROTIME, INR in the last 72 hours.  APTT: No results for input(s): APTT in the last 72 hours. CK, CKMB, Troponin:   Recent Labs     03/17/22  0230 03/17/22  0909 03/18/22  0559   TROPONINI 0.03 0.03 0.02       Last 3 BNP:  Recent Labs     03/16/22  2330   PROBNP 471       IMAGING:  XR CHEST PORTABLE    Result Date: 3/17/2022  1. Mild pulmonary vascular congestion and moderate cardiomegaly. 2. A dual-chamber cardiac pacer and defibrillator in place. Signed by Dr Jewel Mariscal Test Nuclear Imaging    Result Date: 3/17/2022  Impression: There is a large contiguous mid to apical inferior and apical infarct without significant ischemia, with a calculated ejection fraction of 32 %. Suggest: Clinical correlation is advised. Signed by Dr Letitia Ferrell and Plan:    Chest pain with abnormal nuclear stress test and decreasing ejection fraction  History of ventricular tachycardia with 3 ventricular tachycardia ablation in the past, the last one was 6 years ago  Hypertension, diabetes and hyperlipidemia  No history of hypertrophic cardiomyopathy  ICD implant        Plan: We will proceed with cardiac catheterization. The risk and benefit of the procedure have been explained to the patient risks include but not limited to myocardial infarction, cerebral  vascular accident, kidney problem, leg problem, allergy to the dye and possibility of emergency bypass surgery. Patient has given the consent. ASA class: 3, Mallampati class III    I have spent 30 minutes reviewing the chart, taking history, examining the patient, discussion with the patient and the family concerning the finding, diagnoses and treatment plan. This dictation was performed using [address] [place]. Mistake and misspelling may have been created without  realizing them. Please notify me for clarification.   Thank you    Nurys Leong MD  3/18/2022 10:02 AM

## 2022-03-18 NOTE — PROGRESS NOTES
[facility] [place] MEDICINE  - PROGRESS NOTE    Admit Date: 3/16/2022         CC: chest pain and dyspnea     Subjective: chest pain better, still dyspnic, no cough, no wheezing, no abd pain, no N/V/D/C, no dysuria     Objective: mild to moderate distress, cardiac cath neg today    Diet: ADULT DIET; Regular;  Low Sodium (2 gm)  Pain is:Mild  Nausea:None  Bowel Movement/Flatus yes    Data:   Scheduled Meds: Reviewed  Current Facility-Administered Medications   Medication Dose Route Frequency Provider Last Rate Last Admin    sodium chloride flush 0.9 % injection 5-40 mL  5-40 mL IntraVENous 2 times per day Nasrin Gomez MD        sodium chloride flush 0.9 % injection 5-40 mL  5-40 mL IntraVENous PRN Nasrin Gomez MD        0.9 % sodium chloride infusion  25 mL IntraVENous PRN Nasrin Gomez MD        sodium chloride flush 0.9 % injection 5-40 mL  5-40 mL IntraVENous 2 times per day Nasrin Gomez MD        sodium chloride flush 0.9 % injection 5-40 mL  5-40 mL IntraVENous PRN Nasrin Gomez MD        0.9 % sodium chloride infusion  25 mL IntraVENous PRN Nasrin Gomez MD        acetaminophen (TYLENOL) tablet 650 mg  650 mg Oral Q4H PRN Nasrin Gomez MD        pantoprazole (PROTONIX) 40 mg in sodium chloride (PF) 10 mL injection  40 mg IntraVENous BID Faby Guzman MD        aluminum & magnesium hydroxide-simethicone (MAALOX) 30 mL, lidocaine viscous hcl (XYLOCAINE) 5 mL (GI COCKTAIL)   Oral Once Faby Guzman MD        [START ON 3/19/2022] carvedilol (COREG) tablet 3.125 mg  3.125 mg Oral Daily Faby Guzman MD        0.9 % sodium chloride infusion   IntraVENous Continuous Faby Guzman MD        nitroGLYCERIN (NITROSTAT) SL tablet 0.4 mg  0.4 mg SubLINGual Q5 Min PRN Ngoc Stoddard MD   0.4 mg at 03/17/22 0231    ondansetron (ZOFRAN-ODT) disintegrating tablet 4 mg  4 mg Oral Q8H PRN Patience Mary, DO        Or    ondansetron (ZOFRAN) injection 4 mg  4 mg IntraVENous Q6H PRN Patience Mary, DO        polyethylene glycol (GLYCOLAX) packet 17 g  17 g Oral Daily PRN Patience Mary, DO        acetaminophen (TYLENOL) tablet 650 mg  650 mg Oral Q6H PRN Patience Mary, DO        Or    acetaminophen (TYLENOL) suppository 650 mg  650 mg Rectal Q6H PRN Patience Mary, DO        amiodarone (CORDARONE) tablet 100 mg  100 mg Oral Daily Shivam Kam MD   100 mg at 03/18/22 1350    amLODIPine (NORVASC) tablet 5 mg  5 mg Oral Daily Shivam Kam MD   5 mg at 03/18/22 1350    aspirin chewable tablet 81 mg  81 mg Oral Daily Shivam Kam MD   81 mg at 03/18/22 1350    chlorthalidone (HYGROTON) tablet 50 mg  50 mg Oral Daily Shivam Kam MD   50 mg at 03/18/22 1349    finasteride (PROSCAR) tablet 5 mg  5 mg Oral Daily Shivam Kam MD   5 mg at 03/18/22 1351    lisinopril (PRINIVIL;ZESTRIL) tablet 10 mg  10 mg Oral Daily Shivam Kam MD   10 mg at 03/18/22 1351    predniSONE (DELTASONE) tablet 20 mg  20 mg Oral Daily Shivam Kam MD   20 mg at 03/18/22 1350    atorvastatin (LIPITOR) tablet 10 mg  10 mg Oral Daily Shivam Kam MD   10 mg at 03/18/22 1350    bumetanide (BUMEX) injection 1 mg  1 mg IntraVENous BID Shivam Kam MD   1 mg at 03/18/22 1349    apixaban (ELIQUIS) tablet 5 mg  5 mg Oral BID Shivam Kam MD   5 mg at 03/18/22 1350     DVT Prophylaxis: eliquis    Continuous Infusions:   sodium chloride      sodium chloride      sodium chloride         Intake/Output Summary (Last 24 hours) at 3/18/2022 1753  Last data filed at 3/18/2022 1448  Gross per 24 hour   Intake 1320 ml   Output 2850 ml   Net -1530 ml     CBC:   Recent Labs     03/16/22  2330 03/18/22  0559   WBC 7.2 8.5   HGB 16.2 15.9    157     BMP:  Recent Labs     03/16/22  2330 03/18/22  0559 03/18/22  1021    139 138   K 4.4 4.4 4.2    100 101   CO2 22 26 25   BUN 13 17 17   CREATININE 1.0 1.2 1.1   GLUCOSE 96 90 96     ABGs: No results found for: PHART, PO2ART, FKS8RJE  INR: No results for input(s): INR in the last 72 hours. Objective:   Vitals: /67   Pulse 53   Temp 97 °F (36.1 °C) (Temporal)   Resp 18   Ht 6' 3\" (1.905 m)   Wt 300 lb (136.1 kg)   SpO2 94%   BMI 37.50 kg/m²   General appearance: alert, appears stated age and cooperative  Skin: Skin color, texture, turgor normal.   HEENT: Head: Normocephalic, no lesions, without obvious abnormality.   Neck: no adenopathy, no carotid bruit, no JVD and supple, symmetrical, trachea midline  Lungs: clear to auscultation bilaterally  Heart: regular rate and rhythm, S1, S2 normal, no murmur, click, rub or gallop  Abdomen: soft, non-tender; bowel sounds normal; no masses,  no organomegaly  Extremities: extremities normal, atraumatic, no cyanosis or edema  Lymphatic: No significant lymph node enlargement papable  Neurologic: Mental status: Alert, oriented, thought content appropriate        Assessment & Plan:    · Chest pain - neg cardiac cath- CTA chest now  · PAF- on amiodarone and eliquis  · HTN- norvasc, coreg, hygroton, lisinopril   · BPH            See orders   Disposition: if CTA neg, dc home   Wood Flanagan MD

## 2022-03-19 VITALS
RESPIRATION RATE: 18 BRPM | HEART RATE: 51 BPM | BODY MASS INDEX: 37.3 KG/M2 | WEIGHT: 300 LBS | HEIGHT: 75 IN | TEMPERATURE: 96.4 F | DIASTOLIC BLOOD PRESSURE: 88 MMHG | OXYGEN SATURATION: 95 % | SYSTOLIC BLOOD PRESSURE: 135 MMHG

## 2022-03-19 PROBLEM — R07.9 CHEST PAIN: Status: ACTIVE | Noted: 2022-03-19

## 2022-03-19 LAB — PRO-BNP: 164 PG/ML (ref 0–900)

## 2022-03-19 PROCEDURE — G0378 HOSPITAL OBSERVATION PER HR: HCPCS

## 2022-03-19 PROCEDURE — 2580000003 HC RX 258: Performed by: INTERNAL MEDICINE

## 2022-03-19 PROCEDURE — 93970 EXTREMITY STUDY: CPT

## 2022-03-19 PROCEDURE — 2060000000 HC ICU INTERMEDIATE R&B

## 2022-03-19 PROCEDURE — 36415 COLL VENOUS BLD VENIPUNCTURE: CPT

## 2022-03-19 PROCEDURE — 6370000000 HC RX 637 (ALT 250 FOR IP): Performed by: HOSPITALIST

## 2022-03-19 PROCEDURE — 83880 ASSAY OF NATRIURETIC PEPTIDE: CPT

## 2022-03-19 RX ADMIN — AMIODARONE HYDROCHLORIDE 100 MG: 200 TABLET ORAL at 10:46

## 2022-03-19 RX ADMIN — Medication 10 ML: at 11:24

## 2022-03-19 RX ADMIN — SODIUM CHLORIDE, PRESERVATIVE FREE 10 ML: 5 INJECTION INTRAVENOUS at 10:40

## 2022-03-19 RX ADMIN — ASPIRIN 81 MG 81 MG: 81 TABLET ORAL at 10:27

## 2022-03-19 RX ADMIN — APIXABAN 5 MG: 5 TABLET, FILM COATED ORAL at 10:30

## 2022-03-19 RX ADMIN — LISINOPRIL 10 MG: 10 TABLET ORAL at 10:28

## 2022-03-19 RX ADMIN — CARVEDILOL 3.12 MG: 6.25 TABLET, FILM COATED ORAL at 10:32

## 2022-03-19 RX ADMIN — ATORVASTATIN CALCIUM 10 MG: 10 TABLET, FILM COATED ORAL at 10:29

## 2022-03-19 NOTE — PROGRESS NOTES
Physical Therapy  Pt states he has been up ad keith in room without difficulty and does not need skilled PT services at this time.    Electronically signed by Anuja Oconnor PT on 3/19/2022 at 10:33 AM

## 2022-03-19 NOTE — DISCHARGE SUMMARY
Physician Discharge Summary     Patient ID:  Gallito Dougherty  [id number]  23 y.o.  1960    Admit date: 3/16/2022    Discharge date and time: 3/19/2022    Admitting Physician: Shaji Banks MD     Discharge Physician: Shaji Banks MD     Discharge Diagnoses:     · Chest pain - neg cardiac cath  · PAF- on amiodarone and eliquis  · HTN  · BPH  · Single tiny PE, most likely chronic      Discharged Condition: good    Indication for Admission: chest pain    Hospital Course:     61 yo male with PMH hypertrophic cardiomyopathy with diastolic congestive heart failure, history of V. tach status post V. tach ablations in [facility], status post AICD placement few years ago, history of atrial fibrillation on oral anticoagulation with Eliquis, history of hypertension and hyperlipidemia presented with chest pain associated with shortness of breath. Initial troponin was mildly elevated at 0.04 but subsequent troponins were down to 0.03, telemetry showed no arrhythmia, cardiology was consulted and performed cardiac cath that showed insignificant obstructive epicardial disease. CTA shows single tiny pulmonary embolus within a posterior lower lobe branch   of the right pulmonary artery. Questionable chronic finding. Doppler of BL LEs neg for DVT. Patient is already on eliquis. Denies any furhter chest pains or dyspnea. Saturating 95% on RA.        Consults: cardiology    Significant Diagnostic Studies:     Discharge Exam:  /88   Pulse 51   Temp 96.4 °F (35.8 °C) (Temporal)   Resp 18   Ht 6' 3\" (1.905 m)   Wt 300 lb (136.1 kg)   SpO2 95%   BMI 37.50 kg/m²     General Appearance:    Alert, cooperative, no distress, appears stated age   Head:    Normocephalic, without obvious abnormality, atraumatic           Nose:   Nares normal, septum midline, mucosa normal, no drainage    or sinus tenderness       Neck:   Supple, symmetrical, trachea midline, no adenopathy;        thyroid:  No enlargement/tenderness/nodules; no carotid    bruit or JVD       Lungs:     Clear to auscultation bilaterally, respirations unlabored   Chest wall:    No tenderness or deformity   Heart:    Regular rate and rhythm, S1 and S2 normal, no murmur, rub   or gallop   Abdomen:     Soft, non-tender, bowel sounds active all four quadrants,     no masses, no organomegaly           Extremities:   Extremities normal, atraumatic, no cyanosis or edema   Pulses:   2+ and symmetric all extremities   Skin:   Skin color, texture, turgor normal, no rashes or lesions       Neurologic:   Normal strength, sensation and reflexes       throughout       Discharge Medications:         Medication List      CHANGE how you take these medications    amiodarone 100 MG tablet  Commonly known as: PACERONE  What changed: Another medication with the same name was removed. Continue taking this medication, and follow the directions you see here. amLODIPine 5 MG tablet  Commonly known as: NORVASC  What changed: Another medication with the same name was removed. Continue taking this medication, and follow the directions you see here.         CONTINUE taking these medications    aspirin 81 MG tablet     carvedilol 12.5 MG tablet  Commonly known as: COREG     chlorthalidone 50 MG tablet  Commonly known as: HYGROTEN     Colcrys 0.6 MG tablet  Generic drug: colchicine     cyclobenzaprine 10 MG tablet  Commonly known as: FLEXERIL     Eliquis 5 MG Tabs tablet  Generic drug: apixaban     ergocalciferol 1.25 MG (25145 UT) capsule  Commonly known as: ERGOCALCIFEROL     finasteride 5 MG tablet  Commonly known as: Proscar  Take 1 tablet by mouth daily     fluticasone 50 MCG/ACT nasal spray  Commonly known as: FLONASE     furosemide 20 MG tablet  Commonly known as: LASIX     indomethacin 50 MG capsule  Commonly known as: INDOCIN  Take 1 capsule by mouth daily as needed for Pain     lisinopril 20 MG tablet  Commonly known as: PRINIVIL;ZESTRIL     mexiletine 150 MG capsule  Commonly known as: MEXITIL     nitroGLYCERIN 0.4 MG SL tablet  Commonly known as: NITROSTAT     oxyCODONE-acetaminophen 5-325 MG per tablet  Commonly known as: PERCOCET     predniSONE 20 MG tablet  Commonly known as: DELTASONE     simvastatin 20 MG tablet  Commonly known as: ZOCOR     topiramate 25 MG tablet  Commonly known as: TOPAMAX     Viagra 100 MG tablet  Generic drug: sildenafil        STOP taking these medications    cloNIDine 0.1 MG tablet  Commonly known as: CATAPRES     ibuprofen 600 MG tablet  Commonly known as: ADVIL;MOTRIN     naproxen 500 MG tablet  Commonly known as: Naprosyn              Discharge Instructions: Follow up with Consuelo Day DO in 1 days. With Dr Norm Sorenson in 3 days. Take medications as directed. Resume activity as tolerated. Follow with cardiology as outpatient regarding your atrial fibrillation medications    Hold coreg for HR less than 50  Hold blood pressure medications for systolic blood pressure less than 100    Diet: ADULT DIET; Regular; Low Fat/Low Chol/High Fiber/LUISA; Low Sodium (2 gm)     Disposition: Patient is medically stable and will be discharged home.      DC 28 min

## 2022-03-19 NOTE — PROGRESS NOTES
Vascular Lab Prelim  Duplex venous scan of B/L LE shows no evidence for dvt or svt noted at this time. +Reflux left Popliteal vein. Final report pending.

## 2022-03-21 ENCOUNTER — CARE COORDINATION (OUTPATIENT)
Dept: CASE MANAGEMENT | Age: 62
End: 2022-03-21

## 2022-03-21 NOTE — CARE COORDINATION
[facility] Transitions Initial Follow Up Call    Call within 2 business days of discharge: Yes    Patient: Maine Mckeon Patient : 1960   MRN: [id number]  Reason for Admission:   Discharge Date: 3/19/22 RARS: Readmission Risk Score: 9.4 ( )      Last Discharge [facility]       Complaint Diagnosis Description Type Department Provider    3/16/22 Chest Pain Chest pain, unspecified type . .. ED to Hosp-Admission (Discharged) (ADMITTED) Sondra Ames MD; Elizabeth Goodman. .. Spoke with: N/A    Facility: [place] [id number]    Non-face-to-face services provided:  Reviewed encounter information for continuity of care prior to follow up phone call - chart notes, consults, progress notes, test results, med list, appointments, AVS, other information. Care Transitions 24 Hour Call    Care Transitions Interventions         Follow Up  : Attempted to make contact with Torito Cox for an initial follow up call post discharge from the hospital without success. Unable to leave a message regarding intent of call and call back information. Will try again my next business day. No future appointments.     Arlet Medrano RN

## 2022-03-22 ENCOUNTER — CARE COORDINATION (OUTPATIENT)
Dept: CASE MANAGEMENT | Age: 62
End: 2022-03-22

## 2022-03-22 NOTE — CARE COORDINATION
[facility] Transitions Initial Follow Up Call    Call within 2 business days of discharge: Yes    Patient: Torin Reese Patient : 1960   MRN: [id number]  Reason for Admission:   Discharge Date: 3/19/22 RARS: Readmission Risk Score: 9.4 ( )      Last Discharge [facility]       Complaint Diagnosis Description Type Department Provider    3/16/22 Chest Pain Chest pain, unspecified type . .. ED to Hosp-Admission (Discharged) (ADMITTED) Omar Lopez MD; Nely Howard. .. Spoke with: N/A    Facility: [place] [id number]    Non-face-to-face services provided:  Reviewed encounter information for continuity of care prior to follow up phone call - chart notes, consults, progress notes, test results, med list, appointments, AVS, other information. Care Transitions 24 Hour Call    Care Transitions Interventions         Follow Up : Attempt #2 to make contact with Cassia Rosales for an initial follow up call post discharge from the hospital without success. Unable to leave a message regarding intent of call and call back information. Will discharge from CTN services at this time due to inability to make contact. No future appointments.     Aleah Narvaez RN

## 2022-06-05 ENCOUNTER — HOSPITAL ENCOUNTER (OUTPATIENT)
Age: 62
Setting detail: OBSERVATION
Discharge: HOME | End: 2022-06-06
Attending: HOSPITALIST | Admitting: HOSPITALIST
Payer: MEDICARE

## 2022-06-05 ENCOUNTER — APPOINTMENT (OUTPATIENT)
Dept: GENERAL RADIOLOGY | Age: 62
End: 2022-06-05
Payer: MEDICARE

## 2022-06-05 DIAGNOSIS — R00.2 PALPITATIONS: Primary | ICD-10-CM

## 2022-06-05 DIAGNOSIS — I42.2 HYPERTROPHIC CARDIOMYOPATHY (HCC): ICD-10-CM

## 2022-06-05 PROBLEM — Z95.810 AICD (AUTOMATIC CARDIOVERTER/DEFIBRILLATOR) PRESENT: Status: ACTIVE | Noted: 2022-06-05

## 2022-06-05 LAB
ALBUMIN SERPL-MCNC: 4.1 G/DL (ref 3.5–5.2)
ALP BLD-CCNC: 68 U/L (ref 40–130)
ALT SERPL-CCNC: 14 U/L (ref 5–41)
ANION GAP SERPL CALCULATED.3IONS-SCNC: 8 MMOL/L (ref 7–19)
AST SERPL-CCNC: 22 U/L (ref 5–40)
BASOPHILS ABSOLUTE: 0.1 K/UL (ref 0–0.2)
BASOPHILS RELATIVE PERCENT: 0.9 % (ref 0–1)
BILIRUB SERPL-MCNC: 0.7 MG/DL (ref 0.2–1.2)
BILIRUBIN URINE: NEGATIVE
BLOOD, URINE: NEGATIVE
BUN BLDV-MCNC: 13 MG/DL (ref 8–23)
C-REACTIVE PROTEIN: 0.49 MG/DL (ref 0–0.5)
CALCIUM SERPL-MCNC: 9.7 MG/DL (ref 8.8–10.2)
CHLORIDE BLD-SCNC: 104 MMOL/L (ref 98–111)
CLARITY: CLEAR
CO2: 26 MMOL/L (ref 22–29)
COLOR: YELLOW
CREAT SERPL-MCNC: 1.1 MG/DL (ref 0.5–1.2)
D DIMER: 0.82 UG/ML FEU (ref 0–0.48)
EOSINOPHILS ABSOLUTE: 0.1 K/UL (ref 0–0.6)
EOSINOPHILS RELATIVE PERCENT: 2.3 % (ref 0–5)
GFR AFRICAN AMERICAN: >59
GFR NON-AFRICAN AMERICAN: >60
GLUCOSE BLD-MCNC: 104 MG/DL (ref 74–109)
GLUCOSE URINE: NEGATIVE MG/DL
HCT VFR BLD CALC: 51.2 % (ref 42–52)
HEMOGLOBIN: 16.2 G/DL (ref 14–18)
IMMATURE GRANULOCYTES #: 0 K/UL
KETONES, URINE: NEGATIVE MG/DL
LEUKOCYTE ESTERASE, URINE: NEGATIVE
LIPASE: 37 U/L (ref 13–60)
LYMPHOCYTES ABSOLUTE: 2 K/UL (ref 1.1–4.5)
LYMPHOCYTES RELATIVE PERCENT: 35.3 % (ref 20–40)
MAGNESIUM: 2.3 MG/DL (ref 1.6–2.4)
MCH RBC QN AUTO: 28.8 PG (ref 27–31)
MCHC RBC AUTO-ENTMCNC: 31.6 G/DL (ref 33–37)
MCV RBC AUTO: 90.9 FL (ref 80–94)
MONOCYTES ABSOLUTE: 0.6 K/UL (ref 0–0.9)
MONOCYTES RELATIVE PERCENT: 10 % (ref 0–10)
NEUTROPHILS ABSOLUTE: 2.9 K/UL (ref 1.5–7.5)
NEUTROPHILS RELATIVE PERCENT: 51.2 % (ref 50–65)
NITRITE, URINE: NEGATIVE
PDW BLD-RTO: 15.9 % (ref 11.5–14.5)
PH UA: 7.5 (ref 5–8)
PLATELET # BLD: 171 K/UL (ref 130–400)
PMV BLD AUTO: 11.5 FL (ref 9.4–12.4)
POTASSIUM REFLEX MAGNESIUM: 4.7 MMOL/L (ref 3.5–5)
PRO-BNP: 328 PG/ML (ref 0–900)
PROTEIN UA: NEGATIVE MG/DL
RBC # BLD: 5.63 M/UL (ref 4.7–6.1)
SARS-COV-2, NAAT: NOT DETECTED
SEDIMENTATION RATE, ERYTHROCYTE: 5 MM/HR (ref 0–15)
SODIUM BLD-SCNC: 138 MMOL/L (ref 136–145)
SPECIFIC GRAVITY UA: 1.02 (ref 1–1.03)
TOTAL CK: 325 U/L (ref 39–308)
TOTAL PROTEIN: 7.4 G/DL (ref 6.6–8.7)
TROPONIN: 0.04 NG/ML (ref 0–0.03)
TSH SERPL DL<=0.05 MIU/L-ACNC: 0.87 UIU/ML (ref 0.27–4.2)
URIC ACID, SERUM: 7 MG/DL (ref 3.4–7)
UROBILINOGEN, URINE: 1 E.U./DL
VITAMIN D 25-HYDROXY: 23.5 NG/ML
WBC # BLD: 5.7 K/UL (ref 4.8–10.8)

## 2022-06-05 PROCEDURE — 6360000002 HC RX W HCPCS: Performed by: HOSPITALIST

## 2022-06-05 PROCEDURE — 82550 ASSAY OF CK (CPK): CPT

## 2022-06-05 PROCEDURE — 2140000000 HC CCU INTERMEDIATE R&B

## 2022-06-05 PROCEDURE — 93005 ELECTROCARDIOGRAM TRACING: CPT | Performed by: NURSE PRACTITIONER

## 2022-06-05 PROCEDURE — 71045 X-RAY EXAM CHEST 1 VIEW: CPT

## 2022-06-05 PROCEDURE — 96374 THER/PROPH/DIAG INJ IV PUSH: CPT

## 2022-06-05 PROCEDURE — 83690 ASSAY OF LIPASE: CPT

## 2022-06-05 PROCEDURE — C9113 INJ PANTOPRAZOLE SODIUM, VIA: HCPCS | Performed by: HOSPITALIST

## 2022-06-05 PROCEDURE — 2580000003 HC RX 258: Performed by: HOSPITALIST

## 2022-06-05 PROCEDURE — 83735 ASSAY OF MAGNESIUM: CPT

## 2022-06-05 PROCEDURE — 82306 VITAMIN D 25 HYDROXY: CPT

## 2022-06-05 PROCEDURE — 85379 FIBRIN DEGRADATION QUANT: CPT

## 2022-06-05 PROCEDURE — 6370000000 HC RX 637 (ALT 250 FOR IP): Performed by: NURSE PRACTITIONER

## 2022-06-05 PROCEDURE — 87635 SARS-COV-2 COVID-19 AMP PRB: CPT

## 2022-06-05 PROCEDURE — 36415 COLL VENOUS BLD VENIPUNCTURE: CPT

## 2022-06-05 PROCEDURE — 86140 C-REACTIVE PROTEIN: CPT

## 2022-06-05 PROCEDURE — 71045 X-RAY EXAM CHEST 1 VIEW: CPT | Performed by: RADIOLOGY

## 2022-06-05 PROCEDURE — 84484 ASSAY OF TROPONIN QUANT: CPT

## 2022-06-05 PROCEDURE — G0378 HOSPITAL OBSERVATION PER HR: HCPCS

## 2022-06-05 PROCEDURE — 85025 COMPLETE CBC W/AUTO DIFF WBC: CPT

## 2022-06-05 PROCEDURE — 84550 ASSAY OF BLOOD/URIC ACID: CPT

## 2022-06-05 PROCEDURE — 84443 ASSAY THYROID STIM HORMONE: CPT

## 2022-06-05 PROCEDURE — 99285 EMERGENCY DEPT VISIT HI MDM: CPT

## 2022-06-05 PROCEDURE — 80053 COMPREHEN METABOLIC PANEL: CPT

## 2022-06-05 PROCEDURE — 85652 RBC SED RATE AUTOMATED: CPT

## 2022-06-05 PROCEDURE — 83880 ASSAY OF NATRIURETIC PEPTIDE: CPT

## 2022-06-05 PROCEDURE — 99213 OFFICE O/P EST LOW 20 MIN: CPT | Performed by: INTERNAL MEDICINE

## 2022-06-05 PROCEDURE — 6370000000 HC RX 637 (ALT 250 FOR IP): Performed by: HOSPITALIST

## 2022-06-05 RX ORDER — SODIUM CHLORIDE 0.9 % (FLUSH) 0.9 %
5-40 SYRINGE (ML) INJECTION EVERY 12 HOURS SCHEDULED
Status: DISCONTINUED | OUTPATIENT
Start: 2022-06-05 | End: 2022-06-06 | Stop reason: HOSPADM

## 2022-06-05 RX ORDER — CHLORTHALIDONE 25 MG/1
50 TABLET ORAL DAILY
Status: DISCONTINUED | OUTPATIENT
Start: 2022-06-06 | End: 2022-06-06 | Stop reason: HOSPADM

## 2022-06-05 RX ORDER — CYCLOBENZAPRINE HCL 10 MG
5 TABLET ORAL 3 TIMES DAILY PRN
Status: DISCONTINUED | OUTPATIENT
Start: 2022-06-05 | End: 2022-06-06 | Stop reason: HOSPADM

## 2022-06-05 RX ORDER — ATORVASTATIN CALCIUM 40 MG/1
40 TABLET, FILM COATED ORAL NIGHTLY
Status: DISCONTINUED | OUTPATIENT
Start: 2022-06-05 | End: 2022-06-06 | Stop reason: HOSPADM

## 2022-06-05 RX ORDER — PREDNISONE 20 MG/1
20 TABLET ORAL DAILY
Status: DISCONTINUED | OUTPATIENT
Start: 2022-06-06 | End: 2022-06-06 | Stop reason: HOSPADM

## 2022-06-05 RX ORDER — NITROGLYCERIN 0.4 MG/1
0.4 TABLET SUBLINGUAL EVERY 5 MIN PRN
Status: DISCONTINUED | OUTPATIENT
Start: 2022-06-05 | End: 2022-06-06 | Stop reason: HOSPADM

## 2022-06-05 RX ORDER — SODIUM CHLORIDE 9 MG/ML
INJECTION, SOLUTION INTRAVENOUS PRN
Status: DISCONTINUED | OUTPATIENT
Start: 2022-06-05 | End: 2022-06-06 | Stop reason: HOSPADM

## 2022-06-05 RX ORDER — ACETAMINOPHEN 650 MG/1
650 SUPPOSITORY RECTAL EVERY 6 HOURS PRN
Status: DISCONTINUED | OUTPATIENT
Start: 2022-06-05 | End: 2022-06-06 | Stop reason: HOSPADM

## 2022-06-05 RX ORDER — ENOXAPARIN SODIUM 100 MG/ML
40 INJECTION SUBCUTANEOUS DAILY
Status: DISCONTINUED | OUTPATIENT
Start: 2022-06-05 | End: 2022-06-05

## 2022-06-05 RX ORDER — ONDANSETRON 2 MG/ML
4 INJECTION INTRAMUSCULAR; INTRAVENOUS EVERY 6 HOURS PRN
Status: DISCONTINUED | OUTPATIENT
Start: 2022-06-05 | End: 2022-06-06 | Stop reason: HOSPADM

## 2022-06-05 RX ORDER — CARVEDILOL 12.5 MG/1
12.5 TABLET ORAL DAILY
Status: DISCONTINUED | OUTPATIENT
Start: 2022-06-06 | End: 2022-06-06 | Stop reason: HOSPADM

## 2022-06-05 RX ORDER — COLCHICINE 0.6 MG/1
0.6 TABLET ORAL DAILY
Status: DISCONTINUED | OUTPATIENT
Start: 2022-06-06 | End: 2022-06-06 | Stop reason: HOSPADM

## 2022-06-05 RX ORDER — ASPIRIN 81 MG/1
81 TABLET, CHEWABLE ORAL DAILY
Status: DISCONTINUED | OUTPATIENT
Start: 2022-06-06 | End: 2022-06-06 | Stop reason: HOSPADM

## 2022-06-05 RX ORDER — AMLODIPINE BESYLATE 5 MG/1
5 TABLET ORAL DAILY
Status: DISCONTINUED | OUTPATIENT
Start: 2022-06-06 | End: 2022-06-06 | Stop reason: HOSPADM

## 2022-06-05 RX ORDER — FLUTICASONE PROPIONATE 50 MCG
2 SPRAY, SUSPENSION (ML) NASAL DAILY
Status: DISCONTINUED | OUTPATIENT
Start: 2022-06-06 | End: 2022-06-06 | Stop reason: HOSPADM

## 2022-06-05 RX ORDER — SODIUM CHLORIDE 0.9 % (FLUSH) 0.9 %
10 SYRINGE (ML) INJECTION PRN
Status: DISCONTINUED | OUTPATIENT
Start: 2022-06-05 | End: 2022-06-06 | Stop reason: HOSPADM

## 2022-06-05 RX ORDER — FUROSEMIDE 40 MG/1
20 TABLET ORAL DAILY
Status: DISCONTINUED | OUTPATIENT
Start: 2022-06-06 | End: 2022-06-06 | Stop reason: HOSPADM

## 2022-06-05 RX ORDER — INDOMETHACIN 25 MG/1
50 CAPSULE ORAL DAILY PRN
Status: DISCONTINUED | OUTPATIENT
Start: 2022-06-05 | End: 2022-06-06

## 2022-06-05 RX ORDER — MEXILETINE HYDROCHLORIDE 150 MG/1
150 CAPSULE ORAL DAILY
Status: DISCONTINUED | OUTPATIENT
Start: 2022-06-06 | End: 2022-06-06 | Stop reason: HOSPADM

## 2022-06-05 RX ORDER — POLYETHYLENE GLYCOL 3350 17 G/17G
17 POWDER, FOR SOLUTION ORAL DAILY PRN
Status: DISCONTINUED | OUTPATIENT
Start: 2022-06-05 | End: 2022-06-06 | Stop reason: HOSPADM

## 2022-06-05 RX ORDER — ERGOCALCIFEROL 1.25 MG/1
50000 CAPSULE ORAL WEEKLY
Status: DISCONTINUED | OUTPATIENT
Start: 2022-06-05 | End: 2022-06-06 | Stop reason: HOSPADM

## 2022-06-05 RX ORDER — AMIODARONE HYDROCHLORIDE 200 MG/1
100 TABLET ORAL DAILY
Status: DISCONTINUED | OUTPATIENT
Start: 2022-06-05 | End: 2022-06-06 | Stop reason: HOSPADM

## 2022-06-05 RX ORDER — ASPIRIN 325 MG
325 TABLET ORAL ONCE
Status: COMPLETED | OUTPATIENT
Start: 2022-06-05 | End: 2022-06-05

## 2022-06-05 RX ORDER — ATORVASTATIN CALCIUM 10 MG/1
10 TABLET, FILM COATED ORAL DAILY
Status: DISCONTINUED | OUTPATIENT
Start: 2022-06-05 | End: 2022-06-05

## 2022-06-05 RX ORDER — ACETAMINOPHEN 325 MG/1
650 TABLET ORAL EVERY 6 HOURS PRN
Status: DISCONTINUED | OUTPATIENT
Start: 2022-06-05 | End: 2022-06-06 | Stop reason: HOSPADM

## 2022-06-05 RX ORDER — LISINOPRIL 10 MG/1
20 TABLET ORAL DAILY
Status: DISCONTINUED | OUTPATIENT
Start: 2022-06-06 | End: 2022-06-06 | Stop reason: HOSPADM

## 2022-06-05 RX ORDER — FINASTERIDE 5 MG/1
5 TABLET, FILM COATED ORAL DAILY
Status: DISCONTINUED | OUTPATIENT
Start: 2022-06-06 | End: 2022-06-06 | Stop reason: HOSPADM

## 2022-06-05 RX ORDER — OXYCODONE HYDROCHLORIDE AND ACETAMINOPHEN 5; 325 MG/1; MG/1
1 TABLET ORAL EVERY 8 HOURS PRN
Status: DISCONTINUED | OUTPATIENT
Start: 2022-06-05 | End: 2022-06-06 | Stop reason: HOSPADM

## 2022-06-05 RX ORDER — TOPIRAMATE 25 MG/1
25 TABLET ORAL DAILY
Status: DISCONTINUED | OUTPATIENT
Start: 2022-06-06 | End: 2022-06-06 | Stop reason: HOSPADM

## 2022-06-05 RX ORDER — ONDANSETRON 4 MG/1
4 TABLET, ORALLY DISINTEGRATING ORAL EVERY 8 HOURS PRN
Status: DISCONTINUED | OUTPATIENT
Start: 2022-06-05 | End: 2022-06-06 | Stop reason: HOSPADM

## 2022-06-05 RX ADMIN — ERGOCALCIFEROL 50000 UNITS: 1.25 CAPSULE ORAL at 22:33

## 2022-06-05 RX ADMIN — ASPIRIN 325 MG: 325 TABLET ORAL at 19:55

## 2022-06-05 RX ADMIN — ATORVASTATIN CALCIUM 40 MG: 40 TABLET, FILM COATED ORAL at 22:34

## 2022-06-05 RX ADMIN — AMIODARONE HYDROCHLORIDE 100 MG: 200 TABLET ORAL at 22:34

## 2022-06-05 RX ADMIN — SODIUM CHLORIDE, PRESERVATIVE FREE 40 MG: 5 INJECTION INTRAVENOUS at 22:34

## 2022-06-05 RX ADMIN — APIXABAN 5 MG: 5 TABLET, FILM COATED ORAL at 22:34

## 2022-06-05 RX ADMIN — SODIUM CHLORIDE, PRESERVATIVE FREE 10 ML: 5 INJECTION INTRAVENOUS at 22:35

## 2022-06-05 ASSESSMENT — ENCOUNTER SYMPTOMS
SORE THROAT: 0
SHORTNESS OF BREATH: 0
CHEST TIGHTNESS: 0
WHEEZING: 0
ABDOMINAL PAIN: 0
COUGH: 0
SINUS PAIN: 0
COLOR CHANGE: 0

## 2022-06-05 ASSESSMENT — PAIN - FUNCTIONAL ASSESSMENT: PAIN_FUNCTIONAL_ASSESSMENT: NONE - DENIES PAIN

## 2022-06-05 NOTE — ED NOTES
Pt here after feeling like his heart went out of rhythm. Pt has hx of arrhythmia and had AICD.  Pt states this started while he was having sex and has not resolved      Salvatore Gonzalez RN  06/05/22 0208

## 2022-06-05 NOTE — ED PROVIDER NOTES
anesthesia well-he acts ugly    V tach ([address]          SURGICALHISTORY       Past Surgical History:   Procedure Laterality Date    CARDIAC DEFIBRILLATOR PLACEMENT      CARDIAC DEFIBRILLATOR PLACEMENT Left     COLONOSCOPY  07/16/2021    Dr Alejandrina Martinez atypical appearing lesion was noted, appeared to be arising from the squamous tissue of the anus and no rectal in origin, surg referral, 10 yr recall    COLONOSCOPY N/A 7/16/2021    COLORECTAL CANCER SCREENING, NOT HIGH RISK performed by Zoe Phillip MD at [facility]    [address]    ULTRASOUND PROSTATE/TRANSRECTAL N/A 12/31/2019    TRANSRECTAL ULTRASOUND BIOPSY OF PROSTATE performed by Berlin Waldron MD at [address] [facility]      Not sure who did it and done a long time ago         CURRENT MEDICATIONS       Current Discharge Medication List      CONTINUE these medications which have NOT CHANGED    Details   amLODIPine (NORVASC) 5 MG tablet Take 5 mg by mouth daily      chlorthalidone (HYGROTEN) 50 MG tablet Take 50 mg by mouth daily      cyclobenzaprine (FLEXERIL) 10 MG tablet Take 10 mg by mouth as needed      furosemide (LASIX) 20 MG tablet Take 20 mg by mouth daily      mexiletine (MEXITIL) 150 MG capsule Take 150 mg by mouth daily      oxyCODONE-acetaminophen (PERCOCET) 5-325 MG per tablet Take 5-325 tablets by mouth as needed.       predniSONE (DELTASONE) 20 MG tablet Take 20 mg by mouth daily      VIAGRA 100 MG tablet Take 100 mg by mouth as needed      topiramate (TOPAMAX) 25 MG tablet Take 25 mg by mouth daily      amiodarone (PACERONE) 100 MG tablet Take 100 mg by mouth daily      apixaban (ELIQUIS) 5 MG TABS tablet Take 5 mg by mouth 2 times daily       ergocalciferol (ERGOCALCIFEROL) 1.25 MG (91841 UT) capsule Take 1,250 mcg by mouth daily      finasteride (PROSCAR) 5 MG tablet Take 1 tablet by mouth daily  Qty: 30 tablet, Refills: 11    Associated Diagnoses: Benign Physical Activity: Unknown    Days of Exercise per Week: 7 days    Minutes of Exercise per Session: Not on file   Stress:     Feeling of Stress : Not on file   Social Connections:     Frequency of Communication with Friends and Family: Not on file    Frequency of Social Gatherings with Friends and Family: Not on file    Attends Anabaptism Services: Not on file    Active Member of [address] [place] [address] or Organizations: Not on file    Attends Club or Organization Meetings: Not on file    Marital Status: Not on file   Intimate Partner Violence:     Fear of Current or Ex-Partner: Not on file    Emotionally Abused: Not on file    Physically Abused: Not on file    Sexually Abused: Not on file   Housing Stability:     Unable to Pay for Housing in the Last Year: Not on file    Number of [place] in the Last Year: Not on file    Unstable Housing in the Last Year: Not on file       SCREENINGS    Pooja Coma Scale  Eye Opening: Spontaneous  Best Verbal Response: Oriented  Best Motor Response: Obeys commands  Pooja Coma Scale Score: 15 @FLOW([id number])@      PHYSICAL EXAM    (up to 7 for level 4, 8 or more for level 5)     ED Triage Vitals [06/05/22 1719]   BP Temp Temp src Heart Rate Resp SpO2 Height Weight   (!) 166/83 98.7 °F (37.1 °C) -- 78 18 90 % -- 300 lb (136.1 kg)       Physical Exam  Vitals and nursing note reviewed. Constitutional:       Appearance: Normal appearance. He is well-developed. He is obese. HENT:      Head: Normocephalic and atraumatic. Eyes:      General: No scleral icterus. Conjunctiva/sclera: Conjunctivae normal.   Cardiovascular:      Rate and Rhythm: Normal rate and regular rhythm. Pulses: Normal pulses. Heart sounds: Normal heart sounds. Pulmonary:      Effort: Pulmonary effort is normal. No respiratory distress. Breath sounds: Normal breath sounds. No wheezing. Abdominal:      General: There is no distension. Palpations: Abdomen is soft.       Tenderness: There is no abdominal tenderness. Musculoskeletal:         General: No tenderness. Cervical back: Normal range of motion and neck supple. Lymphadenopathy:      Cervical: No cervical adenopathy. Skin:     General: Skin is warm and dry. Neurological:      General: No focal deficit present. Mental Status: He is alert and oriented to person, place, and time. Psychiatric:         Behavior: Behavior normal.         Thought Content: Thought content is paranoid. Comments: Asked if he was being video taped. DIAGNOSTIC RESULTS     EKG: All EKG's are interpreted by the Emergency Department Physician who either signs or Co-signsthis chart in the absence of a cardiologist.  66 sinus rhythm left bundle branch block compared to March 2022 with some changes in lateral leads read at 1722 by Dr. Soliz Rom  59 sinus rhythm left bundle branch block similar to previous read at 1920 by Dr. Garcia Courser:   Chyrl Borer such as CT, Ultrasound and MRI are read by the radiologist. Claudetta Goodie radiographic images are visualized and preliminarily interpreted by the emergency physician with the below findings:      Interpretation per the Radiologist below, if available at the time of this note:    XR CHEST PORTABLE   Final Result   Borderline heart size and vascularity. Left pacemaker. Recommendation: Follow up as clinically indicated.     Electronically Signed by Gabbi Curran MD at 05-Jun-2022 07:20:38 PM               VL Extremity Venous Bilateral    (Results Pending)         ED BEDSIDEULTRASOUND:   Performed by ED Physician -none    LABS:  Labs Reviewed   CBC WITH AUTO DIFFERENTIAL - Abnormal; Notable for the following components:       Result Value    MCHC 31.6 (*)     RDW 15.9 (*)     All other components within normal limits   TROPONIN - Abnormal; Notable for the following components:    Troponin 0.04 (*)     All other components within normal limits   TROPONIN - Abnormal; Notable for the MEDICATIONS:  Current Discharge Medication List             (Please note that portions of this note were completed with a voice recognitionprogram.  Efforts were made to edit the dictations but occasionally words are mis-transcribed.)    BALBIR Garcia (electronically signed)          BALBIR Garcia  06/05/22 4278

## 2022-06-06 VITALS
SYSTOLIC BLOOD PRESSURE: 174 MMHG | RESPIRATION RATE: 23 BRPM | WEIGHT: 313.7 LBS | TEMPERATURE: 97.8 F | HEART RATE: 51 BPM | DIASTOLIC BLOOD PRESSURE: 84 MMHG | BODY MASS INDEX: 39 KG/M2 | HEIGHT: 75 IN | OXYGEN SATURATION: 93 %

## 2022-06-06 PROBLEM — R42 DIZZINESS: Status: ACTIVE | Noted: 2022-06-06

## 2022-06-06 LAB
ALBUMIN SERPL-MCNC: 3.8 G/DL (ref 3.5–5.2)
ALP BLD-CCNC: 62 U/L (ref 40–130)
ALT SERPL-CCNC: 12 U/L (ref 5–41)
ANION GAP SERPL CALCULATED.3IONS-SCNC: 9 MMOL/L (ref 7–19)
AST SERPL-CCNC: 18 U/L (ref 5–40)
BILIRUB SERPL-MCNC: 0.4 MG/DL (ref 0.2–1.2)
BUN BLDV-MCNC: 14 MG/DL (ref 8–23)
CALCIUM SERPL-MCNC: 9.3 MG/DL (ref 8.8–10.2)
CHLORIDE BLD-SCNC: 104 MMOL/L (ref 98–111)
CHOLESTEROL, TOTAL: 199 MG/DL (ref 160–199)
CO2: 26 MMOL/L (ref 22–29)
CREAT SERPL-MCNC: 1.1 MG/DL (ref 0.5–1.2)
GFR AFRICAN AMERICAN: >59
GFR NON-AFRICAN AMERICAN: >60
GLUCOSE BLD-MCNC: 90 MG/DL (ref 74–109)
HCT VFR BLD CALC: 47.3 % (ref 42–52)
HDLC SERPL-MCNC: 31 MG/DL (ref 55–121)
HEMOGLOBIN: 14.6 G/DL (ref 14–18)
LDL CHOLESTEROL CALCULATED: 128 MG/DL
MCH RBC QN AUTO: 28.6 PG (ref 27–31)
MCHC RBC AUTO-ENTMCNC: 30.9 G/DL (ref 33–37)
MCV RBC AUTO: 92.6 FL (ref 80–94)
PDW BLD-RTO: 15.9 % (ref 11.5–14.5)
PLATELET # BLD: 170 K/UL (ref 130–400)
PMV BLD AUTO: 11.7 FL (ref 9.4–12.4)
POTASSIUM REFLEX MAGNESIUM: 4.5 MMOL/L (ref 3.5–5)
POTASSIUM SERPL-SCNC: 4.4 MMOL/L (ref 3.5–5)
RBC # BLD: 5.11 M/UL (ref 4.7–6.1)
SODIUM BLD-SCNC: 139 MMOL/L (ref 136–145)
TOTAL CK: 253 U/L (ref 39–308)
TOTAL PROTEIN: 6.2 G/DL (ref 6.6–8.7)
TRIGL SERPL-MCNC: 200 MG/DL (ref 0–149)
TROPONIN: 0.03 NG/ML (ref 0–0.03)
WBC # BLD: 5.8 K/UL (ref 4.8–10.8)

## 2022-06-06 PROCEDURE — 80061 LIPID PANEL: CPT

## 2022-06-06 PROCEDURE — 85027 COMPLETE CBC AUTOMATED: CPT

## 2022-06-06 PROCEDURE — 6370000000 HC RX 637 (ALT 250 FOR IP): Performed by: HOSPITALIST

## 2022-06-06 PROCEDURE — C9113 INJ PANTOPRAZOLE SODIUM, VIA: HCPCS | Performed by: HOSPITALIST

## 2022-06-06 PROCEDURE — 99214 OFFICE O/P EST MOD 30 MIN: CPT | Performed by: INTERNAL MEDICINE

## 2022-06-06 PROCEDURE — 6360000002 HC RX W HCPCS: Performed by: HOSPITALIST

## 2022-06-06 PROCEDURE — 84484 ASSAY OF TROPONIN QUANT: CPT

## 2022-06-06 PROCEDURE — 82550 ASSAY OF CK (CPK): CPT

## 2022-06-06 PROCEDURE — 80053 COMPREHEN METABOLIC PANEL: CPT

## 2022-06-06 PROCEDURE — 2580000003 HC RX 258: Performed by: HOSPITALIST

## 2022-06-06 PROCEDURE — 36415 COLL VENOUS BLD VENIPUNCTURE: CPT

## 2022-06-06 PROCEDURE — 96376 TX/PRO/DX INJ SAME DRUG ADON: CPT

## 2022-06-06 PROCEDURE — 81003 URINALYSIS AUTO W/O SCOPE: CPT

## 2022-06-06 PROCEDURE — G0378 HOSPITAL OBSERVATION PER HR: HCPCS

## 2022-06-06 RX ORDER — LORAZEPAM 1 MG/1
0.5 TABLET ORAL ONCE
Status: DISCONTINUED | OUTPATIENT
Start: 2022-06-06 | End: 2022-06-06

## 2022-06-06 RX ORDER — MECOBALAMIN 5000 MCG
5 TABLET,DISINTEGRATING ORAL NIGHTLY
Status: DISCONTINUED | OUTPATIENT
Start: 2022-06-06 | End: 2022-06-06 | Stop reason: HOSPADM

## 2022-06-06 RX ADMIN — FLUTICASONE PROPIONATE 2 SPRAY: 50 SPRAY, METERED NASAL at 09:09

## 2022-06-06 RX ADMIN — MEXILETINE HYDROCHLORIDE 150 MG: 150 CAPSULE ORAL at 09:10

## 2022-06-06 RX ADMIN — PREDNISONE 20 MG: 20 TABLET ORAL at 09:10

## 2022-06-06 RX ADMIN — FINASTERIDE 5 MG: 5 TABLET, FILM COATED ORAL at 09:09

## 2022-06-06 RX ADMIN — Medication 5 MG: at 00:17

## 2022-06-06 RX ADMIN — LISINOPRIL 20 MG: 10 TABLET ORAL at 09:09

## 2022-06-06 RX ADMIN — CARVEDILOL 12.5 MG: 12.5 TABLET, FILM COATED ORAL at 09:10

## 2022-06-06 RX ADMIN — AMLODIPINE BESYLATE 5 MG: 5 TABLET ORAL at 09:09

## 2022-06-06 RX ADMIN — CHLORTHALIDONE 50 MG: 25 TABLET ORAL at 09:09

## 2022-06-06 RX ADMIN — SODIUM CHLORIDE, PRESERVATIVE FREE 40 MG: 5 INJECTION INTRAVENOUS at 09:10

## 2022-06-06 RX ADMIN — ASPIRIN 81 MG 81 MG: 81 TABLET ORAL at 09:09

## 2022-06-06 RX ADMIN — SODIUM CHLORIDE, PRESERVATIVE FREE 10 ML: 5 INJECTION INTRAVENOUS at 09:19

## 2022-06-06 RX ADMIN — COLCHICINE 0.6 MG: 0.6 TABLET, FILM COATED ORAL at 09:10

## 2022-06-06 RX ADMIN — AMIODARONE HYDROCHLORIDE 100 MG: 200 TABLET ORAL at 09:09

## 2022-06-06 RX ADMIN — TOPIRAMATE 25 MG: 25 TABLET, FILM COATED ORAL at 09:10

## 2022-06-06 RX ADMIN — APIXABAN 5 MG: 5 TABLET, FILM COATED ORAL at 09:10

## 2022-06-06 RX ADMIN — FUROSEMIDE 20 MG: 40 TABLET ORAL at 09:10

## 2022-06-06 ASSESSMENT — ENCOUNTER SYMPTOMS
SHORTNESS OF BREATH: 0
NAUSEA: 0
ABDOMINAL PAIN: 0
ABDOMINAL DISTENTION: 0
VOMITING: 0
CHEST TIGHTNESS: 0
CONSTIPATION: 0
COLOR CHANGE: 0
COUGH: 0
WHEEZING: 0

## 2022-06-06 NOTE — PROGRESS NOTES
4 Eyes Skin Assessment    Yashira Burns is being assessed upon: Admission    I agree that Dina Kruse RN, along with Elba Valle RN (either 2 RN's or 1 LPN and 1 RN) have performed a thorough Head to Toe Skin Assessment on the patient. ALL assessment sites listed below have been assessed. Areas assessed by both nurses:     [x]   Head, Face, and Ears   [x]   Shoulders, Back, and Chest  [x]   Arms, Elbows, and Hands   [x]   Coccyx, Sacrum, and Ischium  [x]   Legs, Feet, and Heels    Does the Patient have Skin Breakdown?  No    Mal Prevention initiated: No  Wound Care Orders initiated: No    WOC nurse consulted for Pressure Injury (Stage 3,4, Unstageable, DTI, NWPT, and Complex wounds) and New or Established Ostomies: No        Primary Nurse eSignature: Anusha Bess RN on 6/5/2022 at 11:14 PM      Co-Signer eSignature: Electronically signed by Elba Valle RN on 6/6/22 at 2:49 AM CDT

## 2022-06-06 NOTE — PROGRESS NOTES
Tami Pfeiffer arrived to room # 706. Presented with: palpitations  Mental Status: Patient is alert and coherent. Vitals:    06/05/22 2115   BP: (!) 152/78   Pulse: 50   Resp: 22   Temp: 97.3 °F (36.3 °C)   SpO2: 96%     Patient safety contract and falls prevention contract reviewed with patient Yes. Oriented Patient to room. Call light within reach. Yes.   Needs, issues or concerns expressed at this time: no.      Electronically signed by Sam Alexander RN on 6/5/2022 at 11:14 PM2

## 2022-06-06 NOTE — CONSULTS
[facility]) Cardiology   Consult Note       Requesting MD:  Elizabeth Cast, *   Admit Status:         Patient:    Jeanette Crowder  [id number]  1960         Chief Complaint: Palpitation during sexual intercourse  HPI: Patient is a 60 y.o. male this afternoon having sex intercourse and developed on and off palpitation for 2 hours with mild degree of dizziness and anxiety. There was no chest pain. Patient is known to have hypertrophic cardiomyopathy. He had underwent 3 ventricular tachycardia ablation. The last one was 6 years ago. Patient also had defibrillator burden. Last battery change of was a year ago. There was some discharge with the first defibrillator but not with the second 1. Patient had 2 cardiac catheterization done. The last 1 was in March 1 1 was 6 years ago. It showed insignificant coronary artery disease. Echocardiogram showed mild apical hypokinesis with ejection fraction of 45%. There was severe concentric hypertrophy with grade 2 diastolic dysfunction he is a non-smoker and nondrinker. He does not use recreational drugs.     Review of Systems:  HENNT: normal  PULMONARY: normal   CVS:see history of present illness  ABD: denies any abdominal pain  PERIPHERL: normal  MSK: no swelling of the lower extremities  CNS: Denies any dizziness, syncopal episode or history of stroke  Renal: Denies any history of dysuria or frequency    Cardiac Specific Data:  Specialty Problems        Cardiology Problems    Hypertrophic cardiomyopathy (HCC)        CHF (congestive heart failure), NYHA class III, acute on chronic, combined (HCC)        Diastolic heart failure (HCC)        Hypertrophy of inter-atrial septum        Systolic heart failure (HCC)        Ventricular tachycardia (paroxysmal) (HCC)        Atypical chest pain        * (Principal) Chest pain              Past Medical History:  Past Medical History:   Diagnosis Date    [facility]     Cardiomyopathy [facility]     per pt    CHF (congestive heart failure) [facility]     Hyperlipidemia     Hypertension     Prolonged emergence from general anesthesia     patient states he does not come out of anesthesia well-he acts ugly    V tach ([facility]         Past Surgical History:  Past Surgical History:   Procedure Laterality Date    CARDIAC DEFIBRILLATOR PLACEMENT      CARDIAC DEFIBRILLATOR PLACEMENT Left     COLONOSCOPY  07/16/2021    Dr Gio Bird atypical appearing lesion was noted, appeared to be arising from the squamous tissue of the anus and no rectal in origin, surg referral, 10 yr recall    COLONOSCOPY N/A 7/16/2021    COLORECTAL CANCER SCREENING, NOT HIGH RISK performed by Allison Poe MD at [facility]    [facility]    ULTRASOUND PROSTATE/TRANSRECTAL N/A 12/31/2019    TRANSRECTAL ULTRASOUND BIOPSY OF PROSTATE performed by Adrián Cason MD at [facility] [address]      Not sure who did it and done a long time ago       Past Family History:  Family History   Problem Relation Age of Onset    Colon Cancer Neg Hx     Colon Polyps Neg Hx     Esophageal Cancer Neg Hx     Liver Cancer Neg Hx     Liver Disease Neg Hx     Rectal Cancer Neg Hx     Stomach Cancer Neg Hx        Past Social History:  Social History     Socioeconomic History    Marital status: Single     Spouse name: Not on file    Number of children: Not on file    Years of education: Not on file    Highest education level: Not on file   Occupational History    Not on file   Tobacco Use    Smoking status: Never Smoker    Smokeless tobacco: Never Used   Vaping Use    Vaping Use: Never used   Substance and Sexual Activity    Alcohol use: Never    Drug use: Never    Sexual activity: Not on file   Other Topics Concern    Not on file   Social History Narrative    Not on file     Social Determinants of Health     Financial Resource Strain:     Difficulty of Paying Living Expenses: Not on file   Food Insecurity:     Worried About Running Out of Food in the Last Year: Not on file    Lorenzo of Food in the Last Year: Not on file   Transportation Needs:     Lack of Transportation (Medical): Not on file    Lack of Transportation (Non-Medical): Not on file   Physical Activity: Unknown    Days of Exercise per Week: 7 days    Minutes of Exercise per Session: Not on file   Stress:     Feeling of Stress : Not on file   Social Connections:     Frequency of Communication with Friends and Family: Not on file    Frequency of Social Gatherings with Friends and Family: Not on file    Attends [employer] Services: Not on file    Active Member of [employer] or Organizations: Not on file    Attends Club or Organization Meetings: Not on file    Marital Status: Not on file   Intimate Partner Violence:     Fear of Current or Ex-Partner: Not on file    Emotionally Abused: Not on file    Physically Abused: Not on file    Sexually Abused: Not on file   Housing Stability:     Unable to Pay for Housing in the Last Year: Not on file    Number of [place] in the Last Year: Not on file    Unstable Housing in the Last Year: Not on file       Allergies:  No Known Allergies    Prior to Admission medications    Medication Sig Start Date End Date Taking? Authorizing Provider   amLODIPine (NORVASC) 5 MG tablet Take 5 mg by mouth daily 1/31/22   Historical Provider, MD   chlorthalidone (HYGROTEN) 50 MG tablet Take 50 mg by mouth daily    Historical Provider, MD   cyclobenzaprine (FLEXERIL) 10 MG tablet Take 10 mg by mouth as needed 3/11/22   Historical Provider, MD   furosemide (LASIX) 20 MG tablet Take 20 mg by mouth daily    Historical Provider, MD   mexiletine (MEXITIL) 150 MG capsule Take 150 mg by mouth daily 2/16/22   Historical Provider, MD   oxyCODONE-acetaminophen (PERCOCET) 5-325 MG per tablet Take 5-325 tablets by mouth as needed.     Historical Provider, MD   predniSONE (DELTASONE) 20 MG tablet Take 20 mg by mouth daily 3/11/22   Historical Provider, MD   VIAGRA 100 MG tablet Take 100 mg by mouth as needed 1/26/22   Historical Provider, MD   topiramate (TOPAMAX) 25 MG tablet Take 25 mg by mouth daily    Historical Provider, MD   amiodarone (PACERONE) 100 MG tablet Take 100 mg by mouth daily 1/31/22   Historical Provider, MD   apixaban (ELIQUIS) 5 MG TABS tablet Take 5 mg by mouth 2 times daily  3/29/21   Historical Provider, MD   ergocalciferol (ERGOCALCIFEROL) 1.25 MG (13922 UT) capsule Take 1,250 mcg by mouth daily    Historical Provider, MD   finasteride (PROSCAR) 5 MG tablet Take 1 tablet by mouth daily 5/17/21   Terrell Hernandez MD   indomethacin (INDOCIN) 50 MG capsule Take 1 capsule by mouth daily as needed for Pain 2/26/20   BALBIR Luis   carvedilol (COREG) 12.5 MG tablet Take 12.5 mg by mouth daily  8/19/19   Historical Provider, MD   colchicine (COLCRYS) 0.6 MG tablet Take 0.6 mg by mouth daily as needed     Historical Provider, MD   fluticasone (FLONASE) 50 MCG/ACT nasal spray 2 sprays by Nasal route daily as needed     Historical Provider, MD   nitroGLYCERIN (NITROSTAT) 0.4 MG SL tablet Place 0.4 mg under the tongue every 5 minutes as needed     Historical Provider, MD   lisinopril (PRINIVIL;ZESTRIL) 20 MG tablet lisinopril 20 mg tablet 7/4/19   Historical Provider, MD   aspirin 81 MG tablet Take 81 mg by mouth daily     Historical Provider, MD   simvastatin (ZOCOR) 20 MG tablet Take 20 mg by mouth nightly    Historical Provider, MD        Current Facility-Administered Medications   Medication Dose Route Frequency Provider Last Rate Last Admin    sodium chloride flush 0.9 % injection 5-40 mL  5-40 mL IntraVENous 2 times per day Kwame Vivar MD   10 mL at 06/05/22 1864    sodium chloride flush 0.9 % injection 10 mL  10 mL IntraVENous PRN Kwame Vivar MD        0.9 % sodium chloride infusion   IntraVENous PRN Kwame Vivar MD        ondansetron (ZOFRAN-ODT) disintegrating MD Angus Hood ON 6/6/2022] lisinopril (PRINIVIL;ZESTRIL) tablet 20 mg  20 mg Oral Daily Courtney Hamilton MD        oxyCODONE-acetaminophen (PERCOCET) 5-325 MG per tablet 1 tablet  1 tablet Oral Q8H PRN Courtney Hamilton MD        [START ON 6/6/2022] predniSONE (DELTASONE) tablet 20 mg  20 mg Oral Daily Courtney Hamilton MD        [START ON 6/6/2022] topiramate (TOPAMAX) tablet 25 mg  25 mg Oral Daily MD Blu Min ON 6/6/2022] mexiletine (MEXITIL) capsule 150 mg  150 mg Oral Daily Courtney Hamilton MD        pantoprazole (PROTONIX) 40 mg in sodium chloride (PF) 10 mL injection  40 mg IntraVENous 2 times per day Courtney Hamilton MD   40 mg at 06/05/22 2234    vitamin D (ERGOCALCIFEROL) capsule 50,000 Units  50,000 Units Oral Weekly Courtney Hamilton MD   50,000 Units at 06/05/22 2233        Current Infused Meds:   sodium chloride         Physical Exam:  Vitals:    06/05/22 2115   BP: (!) 152/78   Pulse: 50   Resp: 22   Temp: 97.3 °F (36.3 °C)   SpO2: 96%       Intake/Output Summary (Last 24 hours) at 6/5/2022 2236  Last data filed at 6/5/2022 2115  Gross per 24 hour   Intake 200 ml   Output --   Net 200 ml     Estimated body mass index is 39.21 kg/m² as calculated from the following:    Height as of this encounter: 6' 3\" (1.905 m). Weight as of this encounter: 313 lb 11.2 oz (142.3 kg). PHYSICAL EXAM:  HENNT: normal  PULMONARY: normal to inspection, palpation, percussion and ascultation  CVS:Normal neck vein, S1 and S2 normal, no murmur  ABD: liver and spleen not palpable, nontender, bowel sound normal  PERIPHERL: all pulses are normal with no bruit  MSK: no swelling of the lower extremities  CNS: Normal CN 2,3,4,6,5,7,9,10,11,and 12.   Oriented to time, place and person    Labs:  Recent Labs     06/05/22  1730   WBC 5.7   HGB 16.2          Recent Labs     06/05/22  1730      K 4.7      CO2 26   BUN 13 CREATININE 1.1   LABGLOM >60   MG 2.3   CALCIUM 9.7       CK, CKMB, Troponin:   Recent Labs     06/05/22 1730 06/05/22  1920   CKTOTAL 325*  --    TROPONINI 0.04* 0.04*       Last 3 BNP:  Recent Labs     06/05/22 1730   PROBNP 328             XR CHEST PORTABLE    Result Date: 6/5/2022  Borderline heart size and vascularity. Left pacemaker. Recommendation: Follow up as clinically indicated. Electronically Signed by Joshua Zelaya MD at 05-Jun-2022 07:20:38 PM                Assessment and Plan:  1. On and off with palpitation for 2 hours after sex intercourse with anxiety and dizziness. There was no chest pain  2. History of ventricular tachycardia ablation in the past  3. Implantation of defibrillator with battery change 1 year ago. No recent discharge  4. Hypertrophic cardiomyopathy  5. Grade 2 diastolic dysfunction  6. Normal cardiac catheterization x2    Plan: We will ask [employer] to interrogate the pacemaker in the morning. If there is no significant arrhythmia patient can be discharged at the moment continue cardiac monitoring    I have spent 60 minutes reviewing the chart, taking history, examining the patient, discussion with the patient and the family concerning the finding, diagnoses and treatment plan. This dictation was performed using [address]. Mistake and misspelling may have been created without  realizing them. Please notify me for clarification.   Thank you    Nikko Rodríguez MD   6/5/2022, 10:36 PM

## 2022-06-06 NOTE — PROGRESS NOTES
Cardiology Progress Note   Aziza Bauer MD      Patient:    Annette Swartz  [id number]  1960    Patient Active Problem List    Diagnosis Date Noted    Dizziness 06/06/2022     Priority: Medium    AICD (automatic cardioverter/defibrillator) present 06/05/2022     Priority: Medium    Atypical chest pain 03/17/2022     Priority: Low    Palpitations 01/26/2020     Priority: Low    Hypertrophy of inter-atrial septum 01/26/2020     Priority: Low     Overview Note:     2011  ICD place  7/1/2014  DSE negative for myocardial ischemia  2/5/2015  ICD generator change out  2/2/2016  Cardiac CT The left ventricle is normal in size with moderately reduced function. There is dyskinesis of the apical inferior and lateral segments and hypokinesis of the apical anterior and apical septum. LVEF = 40% by visual estimate. No LV thrombus. 2.   Asymmetric hypertrophy with a sigmoid shaped septum and maximum wall thickness of 2.4 cm in the 4-chamber view.  No obvious LVOT obstruction and no GIOVANI. 3.   Dilated left atrium. 4.   Dilated pulmonary artery which suggests pulmonary hypertension. ([place])  10/8/2016  VT ablation  10/21/2016  Echo  Severe cLVH  1/24/2019  VT ablation   7/2/2019:  Echo  LVEF 43%, Grade II diastolic dysfunction, Asymmetric septal hypertrophy 2.6cm, Posterior wall thickness 1.5cm  7/3/2019  lexiscan 1.  Left ventricular myocardial perfusion demonstrates a large infarct involving the inferior wall with extension into contiguous lateral wall particularly toward the apex.  There is no significant ischemia identified. 2.  The left ventricular ejection fraction (LVEF) is 35%. 3.  The left ventricular wall motion demonstrates decreased motion centered at the apex with extension into immediately contiguous portions of the other wall segments.    9/27/2019  Cath  Mild CAD ([place])      Systolic heart failure ([address] 01/26/2020     Priority: Low    Diastolic heart failure ([address] 01/26/2020     Priority: Low    0.9 % injection 10 mL  10 mL IntraVENous PRN Brayan Harvey MD        0.9 % sodium chloride infusion   IntraVENous PRN Brayan Harvey MD        ondansetron (ZOFRAN-ODT) disintegrating tablet 4 mg  4 mg Oral Q8H PRN Brayan Harvey MD        Or    [facility]dansetron [facility]) injection 4 mg  4 mg IntraVENous Q6H PRN Brayan Harvey MD        acetaminophen (TYLENOL) tablet 650 mg  650 mg Oral Q6H PRN Brayan Harvey MD        Or    acetaminophen (TYLENOL) suppository 650 mg  650 mg Rectal Q6H PRN Brayan Harvey MD        polyethylene glycol [facility]) packet 17 g  17 g Oral Daily PRN Brayan Harvey MD        aspirin chewable tablet 81 mg  81 mg Oral Daily Brayan Harvey MD   81 mg at 06/06/22 1412    atorvastatin (LIPITOR) tablet 40 mg  40 mg Oral Nightly Brayan Harvey MD   40 mg at 06/05/22 2234    nitroGLYCERIN (NITROSTAT) SL tablet 0.4 mg  0.4 mg SubLINGual Q5 Min PRN Brayan Harvey MD        amiodarone (CORDARONE) tablet 100 mg  100 mg Oral Daily Brayan Harvey MD   100 mg at 06/06/22 0909    amLODIPine (NORVASC) tablet 5 mg  5 mg Oral Daily Brayan Harvey MD   5 mg at 06/06/22 5174    apixaban (ELIQUIS) tablet 5 mg  5 mg Oral BID Brayan Harvey MD   5 mg at 06/06/22 0910    carvedilol (COREG) tablet 12.5 mg  12.5 mg Oral Daily Brayan Harvey MD   12.5 mg at 06/06/22 0910    chlorthalidone (HYGROTON) tablet 50 mg  50 mg Oral Daily Brayan Harvey MD   50 mg at 06/06/22 1417    colchicine (COLCRYS) tablet 0.6 mg  0.6 mg Oral Daily Brayan Harvey MD   0.6 mg at 06/06/22 0910    cyclobenzaprine (FLEXERIL) tablet 5 mg  5 mg Oral TID PRN Brayan Harvey MD        finasteride (PROSCAR) tablet 5 mg  5 mg Oral Daily Brayan Harvey MD   5 mg at 06/06/22 0909    fluticasone (FLONASE) 50 MCG/ACT nasal spray 2 spray  2 spray Nasal Daily Manolo Jaquez, MD   2 spray at 06/06/22 6488    furosemide (LASIX) tablet 20 mg  20 mg Oral Daily Carlin Bailon MD   20 mg at 06/06/22 0910    lisinopril (PRINIVIL;ZESTRIL) tablet 20 mg  20 mg Oral Daily Carlin Bailon MD   20 mg at 06/06/22 5229    oxyCODONE-acetaminophen (PERCOCET) 5-325 MG per tablet 1 tablet  1 tablet Oral Q8H PRN Carlin Bailon MD        predniSONE (DELTASONE) tablet 20 mg  20 mg Oral Daily Carlin Bailon MD   20 mg at 06/06/22 0910    topiramate (TOPAMAX) tablet 25 mg  25 mg Oral Daily Carlin Bailon MD   25 mg at 06/06/22 8197    mexiletine (MEXITIL) capsule 150 mg  150 mg Oral Daily Carlin Bailon MD   150 mg at 06/06/22 0910    pantoprazole (PROTONIX) 40 mg in sodium chloride (PF) 10 mL injection  40 mg IntraVENous 2 times per day Carlin Bailon MD   40 mg at 06/06/22 0910    vitamin D (ERGOCALCIFEROL) capsule 50,000 Units  50,000 Units Oral Weekly Carlin Bailon MD   50,000 Units at 06/05/22 2233         melatonin  5 mg Oral Nightly    sodium chloride flush  5-40 mL IntraVENous 2 times per day    aspirin  81 mg Oral Daily    atorvastatin  40 mg Oral Nightly    amiodarone  100 mg Oral Daily    amLODIPine  5 mg Oral Daily    apixaban  5 mg Oral BID    carvedilol  12.5 mg Oral Daily    chlorthalidone  50 mg Oral Daily    colchicine  0.6 mg Oral Daily    finasteride  5 mg Oral Daily    fluticasone  2 spray Nasal Daily    furosemide  20 mg Oral Daily    lisinopril  20 mg Oral Daily    predniSONE  20 mg Oral Daily    topiramate  25 mg Oral Daily    mexiletine  150 mg Oral Daily    pantoprazole (PROTONIX) 40 mg injection  40 mg IntraVENous 2 times per day    vitamin D  50,000 Units Oral Weekly         Physical Exam:  General: NAD, alert  HEENT: normal  CHEST: clear to ascultation  CVS: S1 and S2 normal, no murmur, no JVD  ABD; nontender, bowel sounds normal, liver and spleen not palpable  MSK: normal  CNS: oriented X3, normal affect, normal CN  PVD:  all peripheral pulses normal, no swelling of the ankles      RECENT LABS:    Lab Data:  CBC:   Recent Labs     06/05/22 1730 06/06/22  0135   WBC 5.7 5.8   HGB 16.2 14.6   HCT 51.2 47.3   MCV 90.9 92.6    170     BMP:   Recent Labs     06/05/22  1730 06/06/22  0135    139   K 4.7 4.4  4.5    104   CO2 26 26   BUN 13 14   CREATININE 1.1 1.1     LIVER PROFILE:   Recent Labs     06/05/22 1730 06/06/22  0135   AST 22 18   ALT 14 12   LIPASE 37  --    BILITOT 0.7 0.4   ALKPHOS 68 62     PT/INR: No results for input(s): PROTIME, INR in the last 72 hours. APTT: No results for input(s): APTT in the last 72 hours. CK, CKMB, Troponin:   Recent Labs     06/05/22 1730 06/05/22  1730 06/05/22  1920 06/05/22  2149 06/06/22  0113 06/06/22  0135   CKTOTAL 325*  --   --   --   --  253   TROPONINI 0.04*   < > 0.04* 0.04* 0.03  --     < > = values in this interval not displayed. Last 3 BNP:  Recent Labs     06/05/22  1730   PROBNP 328       IMAGING:  XR CHEST PORTABLE    Result Date: 6/5/2022  Borderline heart size and vascularity. Left pacemaker. Recommendation: Follow up as clinically indicated. Electronically Signed by Roxann Norton MD at 05-Jun-2022 07:20:38 PM                  Assessment and Plan:    1. On and off with palpitation for 2 hours after sex intercourse with anxiety and dizziness. There was no chest pain. Defibrillator interrogation today showed no significant arrhythmia yesterday  2. History of ventricular tachycardia ablation in the past  3. Implantation of defibrillator with battery change 1 year ago. No recent discharge  4. Hypertrophic cardiomyopathy  5. Grade 2 diastolic dysfunction  6.  Normal cardiac catheterization x2    Plan: From cardiac standpoint patient can be discharged      I have spent 30 minutes reviewing the chart, taking history, examining the patient, discussion with the patient and the family concerning the finding, diagnoses and treatment plan. This dictation was performed using [address]. Mistake and misspelling may have been created without  realizing them. Please notify me for clarification.   Thank you    Demarcus Valentin MD  6/6/2022 9:23 AM

## 2022-06-06 NOTE — DISCHARGE SUMMARY
Discharge Summary    NAME: Jean Steward  :  1960  MRN:  [id number]    Admit date:  2022  Discharge date:  2022    Admitting Physician:  Marcos Jung MD    Advance Directive: Full Code    Consults: cardiology    Primary Care Physician:  Jono Woodall DO    Discharge Diagnoses:  Principal Problem:    Palpitations  Active Problems:    AICD (automatic cardioverter/defibrillator) present    Dizziness    Hypertrophic cardiomyopathy ([address]          Significant Diagnostic Studies:   XR CHEST PORTABLE    Result Date: 2022  NO PRIOR REPORT AVAILABLE Exam: X-RAY OF [facility] Clinical data:Palpitations. Technique:Two portable views of the chest. Prior studies: Chest x-rays dated 2022, 2020, 2019 (images only). Findings:Borderline heart size and vascularity. No focal consolidation or effusion. Left pacemaker. No acute osseous abnormality is detected. Borderline heart size and vascularity. Left pacemaker. Recommendation: Follow up as clinically indicated. Electronically Signed by Andres Lee MD at 2022 07:20:38 PM               Pertinent Labs:   CBC:   Recent Labs     22  1730 22  0135   WBC 5.7 5.8   HGB 16.2 14.6    170     BMP:    Recent Labs     22  1730 22  0135    139   K 4.7 4.4  4.5    104   CO2 26 26   BUN 13 14   CREATININE 1.1 1.1   GLUCOSE 104 90     INR: No results for input(s): INR in the last 72 hours. Lipids:   Recent Labs     22  0135   CHOL 199   HDL 31*             Hospital Course:   61 yo M with hypertrophic cardiomyopathy, h/o multiple ablations for recurrent VT with defibrillator in place, presented with episode of palpitations, dizziness during intercourse. Last cardiac catheterization with insignificant CAD. Echo with mild hypokinesis with EF 45%. No recreational drug use. Device interrogated w/o any significant events noted. Cleared by Cardiology.   Stable for discharge home with outpatient follow up with his PCP and Cardiologist.            Physical Exam:  Vital Signs: BP (!) 165/83   Pulse 50   Temp 97.8 °F (36.6 °C) (Temporal)   Resp 19   Ht 6' 3\" (1.905 m)   Wt (!) 313 lb 11.2 oz (142.3 kg)   SpO2 93%   BMI 39.21 kg/m²   General appearance:. Alert and Cooperative   HEENT: Normocephalic. Chest: clear to auscultation bilaterally without wheezes or rhonchi. Cardiac: Normal heart tones with normal rate  Abdomen:soft, non-tender; normal bowel sounds  Extremities: No clubbing or cyanosis. No peripheral edema. Peripheral pulses palpable. Neurologic: Grossly intact.     Discharge Medications:         Medication List      CONTINUE taking these medications    amiodarone 100 MG tablet  Commonly known as: PACERONE     amLODIPine 5 MG tablet  Commonly known as: NORVASC     aspirin 81 MG tablet     carvedilol 12.5 MG tablet  Commonly known as: COREG     chlorthalidone 50 MG tablet  Commonly known as: HYGROTEN     Colcrys 0.6 MG tablet  Generic drug: colchicine     cyclobenzaprine 10 mg tablet  Commonly known as: FLEXERIL     Eliquis 5 MG Tabs tablet  Generic drug: apixaban     ergocalciferol 1.25 MG (44646 UT) capsule  Commonly known as: ERGOCALCIFEROL     finasteride 5 MG tablet  Commonly known as: Proscar  Take 1 tablet by mouth daily     fluticasone 50 MCG/ACT nasal spray  Commonly known as: FLONASE     furosemide 20 MG tablet  Commonly known as: LASIX     indomethacin 50 mg capsule  Commonly known as: INDOCIN  Take 1 capsule by mouth daily as needed for Pain     lisinopril 20 MG tablet  Commonly known as: PRINIVIL;ZESTRIL     mexiletine 150 MG capsule  Commonly known as: MEXITIL     nitroGLYCERIN 0.4 MG SL tablet  Commonly known as: NITROSTAT     oxyCODONE-acetaminophen 5-325 MG per tablet  Commonly known as: PERCOCET     predniSONE 20 MG tablet  Commonly known as: DELTASONE     simvastatin 20 MG tablet  Commonly known as: ZOCOR     topiramate 25 MG tablet  Commonly known as: TOPAMAX     Viagra 100 MG tablet  Generic drug: sildenafil            Discharge Instructions: Follow up with Viktoria Mcgraw DO within 1 week. Take medications as directed. Resume activity as tolerated. Disposition: Patient is medically stable and will be discharged home. Time spent on discharge 32 minutes.      Signed:  Maribell Aranda MD  6/6/2022 7:24 AM

## 2022-06-06 NOTE — H&P
[facility] - History & Physical      PCP: Rubén Graves DO    Date of Admission: 6/5/2022    Date of Service: 6/5/2022    Chief Complaint: Heart palpitations    History Of Present Illness: The patient is a 60 y.o. male who presented to [facility] ER with PMH AF, CHF, hyperlipidemia, HTN, AICD, complaining of heart palpitations. Patient states that he was having sexual intercourse when he began to experience \" irregular heartbeats\" and lightheadedness. Patient states due to his heart history he was concerned and wanted to get evaluated. Patient has had history of recurrent V. tach with history of ventricular storm status post ablation of V. tach in January 2016. Has had recurrence of V. tach and has underwent numerous ablations with most recent January 2019. Follows with Stephen Dye in [facility]. Does endorse recent constipation yesterday and took a laxative. Denies recent illness, fever, chills, nausea, vomiting, shortness of breath, and chest pain. Denies recent fall, headache, or visual changes. Currently he is resting comfortably in bed in no acute distress. Most recent heart catheterization 3/16/2022 revealed no significant disease. Work-up in ER CXR no acute cardiopulmonary disease, CMP WNL, troponin 0.04 with repeat 0.04. Received aspirin 325 mg while in ER. Patient is to be admitted to the hospitalist service with consultation to cardiology due to chest pain.   Past Medical History:        Diagnosis Date    A[facility]     Cardiomyopathy ([facility]     per pt    CHF (congestive heart failure) (HCC)     Hyperlipidemia     Hypertension     Prolonged emergence from general anesthesia     patient states he does not come out of anesthesia well-he acts ugly    V tach [facility])        Past Surgical History:        Procedure Laterality Date    CARDIAC DEFIBRILLATOR PLACEMENT      CARDIAC DEFIBRILLATOR PLACEMENT Left     COLONOSCOPY  07/16/2021    Dr Juliocesar Barger atypical appearing lesion was noted, appeared to be arising from the squamous tissue of the anus and no rectal in origin, surg referral, 10 yr recall    COLONOSCOPY N/A 7/16/2021    COLORECTAL CANCER SCREENING, NOT HIGH RISK performed by Melisa Cifuentes MD at [address] [facility]    [facility]    ULTRASOUND PROSTATE/TRANSRECTAL N/A 12/31/2019    TRANSRECTAL ULTRASOUND BIOPSY OF PROSTATE performed by Elvie Sepulveda MD at [address]      Not sure who did it and done a long time ago       Home Medications:  Prior to Admission medications    Medication Sig Start Date End Date Taking? Authorizing Provider   amLODIPine (NORVASC) 5 MG tablet Take 5 mg by mouth daily 1/31/22   Historical Provider, MD   chlorthalidone (HYGROTEN) 50 MG tablet Take 50 mg by mouth daily    Historical Provider, MD   cyclobenzaprine (FLEXERIL) 10 MG tablet Take 10 mg by mouth as needed 3/11/22   Historical Provider, MD   furosemide (LASIX) 20 MG tablet Take 20 mg by mouth daily    Historical Provider, MD   mexiletine (MEXITIL) 150 MG capsule Take 150 mg by mouth daily 2/16/22   Historical Provider, MD   oxyCODONE-acetaminophen (PERCOCET) 5-325 MG per tablet Take 5-325 tablets by mouth as needed.     Historical Provider, MD   predniSONE (DELTASONE) 20 MG tablet Take 20 mg by mouth daily 3/11/22   Historical Provider, MD   VIAGRA 100 MG tablet Take 100 mg by mouth as needed 1/26/22   Historical Provider, MD   topiramate (TOPAMAX) 25 MG tablet Take 25 mg by mouth daily    Historical Provider, MD   amiodarone (PACERONE) 100 MG tablet Take 100 mg by mouth daily 1/31/22   Historical Provider, MD   apixaban (ELIQUIS) 5 MG TABS tablet Take 5 mg by mouth 2 times daily  3/29/21   Historical Provider, MD   ergocalciferol (ERGOCALCIFEROL) 1.25 MG (19242 UT) capsule Take 1,250 mcg by mouth daily    Historical Provider, MD   finasteride (PROSCAR) 5 MG tablet Take 1 tablet by mouth daily 5/17/21   Steffi Lombardo MD   indomethacin (INDOCIN) 50 MG capsule Take 1 capsule by mouth daily as needed for Pain 2/26/20   BALBIR Harrison   carvedilol (COREG) 12.5 MG tablet Take 12.5 mg by mouth daily  8/19/19   Historical Provider, MD   colchicine (COLCRYS) 0.6 MG tablet Take 0.6 mg by mouth daily as needed     Historical Provider, MD   fluticasone (FLONASE) 50 MCG/ACT nasal spray 2 sprays by Nasal route daily as needed     Historical Provider, MD   nitroGLYCERIN (NITROSTAT) 0.4 MG SL tablet Place 0.4 mg under the tongue every 5 minutes as needed     Historical Provider, MD   lisinopril (PRINIVIL;ZESTRIL) 20 MG tablet lisinopril 20 mg tablet 7/4/19   Historical Provider, MD   aspirin 81 MG tablet Take 81 mg by mouth daily     Historical Provider, MD   simvastatin (ZOCOR) 20 MG tablet Take 20 mg by mouth nightly    Historical Provider, MD       Allergies:    Patient has no known allergies. Social History:    The patient currently lives home  Tobacco:   reports that he has never smoked. He has never used smokeless tobacco.  Alcohol:   reports no history of alcohol use. Illicit Drugs: denies    Family History:      Problem Relation Age of Onset    Colon Cancer Neg Hx     Colon Polyps Neg Hx     Esophageal Cancer Neg Hx     Liver Cancer Neg Hx     Liver Disease Neg Hx     Rectal Cancer Neg Hx     Stomach Cancer Neg Hx        Review of Systems:   Review of Systems   Constitutional: Negative for chills, diaphoresis, fatigue and fever. Respiratory: Negative for cough, chest tightness, shortness of breath and wheezing. Cardiovascular: Positive for palpitations. Negative for chest pain. Gastrointestinal: Negative for abdominal distention, abdominal pain, constipation, nausea and vomiting. Skin: Negative for color change, pallor and rash. Neurological: Positive for dizziness and light-headedness. Negative for tremors, syncope, weakness, numbness and headaches.      BMP:  Recent Labs     06/05/22  1730      K 4.7      CO2 26   BUN 13   CREATININE 1.1   CALCIUM 9.7     Recent Labs     06/05/22  1730   AST 22   ALT 14   BILITOT 0.7   ALKPHOS 68     Cardiac Enzymes:   Recent Labs     06/05/22  1730 06/05/22  1920 06/05/22  2149   CKTOTAL 325*  --   --    TROPONINI 0.04* 0.04* 0.04*     ABGs:No results found for: PHART, PO2ART, OZU2BPA  Urinalysis:  Lab Results   Component Value Date    NITRU Negative 06/05/2022    BACTERIA TRACE 12/09/2012    BLOODU Negative 06/05/2022    SPECGRAV 1.017 06/05/2022    GLUCOSEU Negative 06/05/2022       XR CHEST PORTABLE    Result Date: 6/5/2022  NO PRIOR REPORT AVAILABLE Exam: X-RAY OF [facility] Clinical data:Palpitations. Technique:Two portable views of the chest. Prior studies: Chest x-rays dated 03/16/2022, 01/26/2020, 08/09/2019 (images only). Findings:Borderline heart size and vascularity. No focal consolidation or effusion. Left pacemaker. No acute osseous abnormality is detected. Borderline heart size and vascularity. Left pacemaker. Recommendation: Follow up as clinically indicated.  Electronically Signed by Leonides Hernadez MD at 05-Jun-2022 07:20:38 PM             Assessment/Plan:    Chest pain  - Cardiology consultation and recommendations appreciated  - Aspirin and Lipitor  - SL Nitro PRN  - ECHO 3/17/2022: Decreased systolic function due to mild apical hypokinesis EF 45%, severe LVH with moderate grade 2 diastolic dysfunction  - Trend troponin  - FLP in a.m./ HgbA1c  - Serial and PRN EKGs  - Monitor on telemetry  - NPO after midnight    AICD (automatic cardioverter/defibrillator) present   -Interrogate ICD    DVT prophylaxis Eliquis    Signed:  Ashley Henry, 6/5/2022 11:59 PM       Attestation Statement     I have independently seen and examined this patient and agree with the assesment and plan by mid level provider        Objective:   Vitals: BP (!) 152/78   Pulse 50   Temp 97.3 °F (36.3 °C) (Temporal)   Resp 22   Ht 6' 3\" (1.905 m)   Wt (!) 313 lb 11.2 oz (142.3 kg)   SpO2 96%   BMI 39.21 kg/m²   General appearance: alert, appears stated age and cooperative  Skin: Skin color, texture, turgor normal.   HEENT: Head: Normocephalic, no lesions, without obvious abnormality.   Neck: no adenopathy, no carotid bruit, no JVD and supple, symmetrical, trachea midline  Lungs: clear to auscultation bilaterally  Heart:haley regular rate and rhythm, S1, S2 normal, no murmur, click, rub or gallop  Abdomen: soft, non-tender; bowel sounds normal; no masses,  no organomegaly  Extremities: extremities normal, atraumatic, no cyanosis or edema  Lymphatic: No significant lymph node enlargement papable  Neurologic: Mental status: Alert, oriented, thought content appropriate      Assessment & Plan:    · Palpitations with history of ventricular tachycardia with ablation- cardiology evaluated tonight   · Implantation of defibrillator with battery change 1 year ago- interrogate am   · Hypertrophic cardiomyopathy  · Grade 2 diastolic dysfunction  · Normal cardiac catheterization x2  · Vit D def- replace                 Stormy Cabello MD

## 2022-06-07 ENCOUNTER — CARE COORDINATION (OUTPATIENT)
Dept: CASE MANAGEMENT | Age: 62
End: 2022-06-07

## 2022-06-07 LAB
EKG P AXIS: 58 DEGREES
EKG P AXIS: 79 DEGREES
EKG P-R INTERVAL: 160 MS
EKG P-R INTERVAL: 190 MS
EKG Q-T INTERVAL: 430 MS
EKG Q-T INTERVAL: 464 MS
EKG QRS DURATION: 148 MS
EKG QRS DURATION: 160 MS
EKG QTC CALCULATION (BAZETT): 440 MS
EKG QTC CALCULATION (BAZETT): 461 MS
EKG T AXIS: -138 DEGREES
EKG T AXIS: 176 DEGREES

## 2022-06-07 PROCEDURE — 93010 ELECTROCARDIOGRAM REPORT: CPT | Performed by: INTERNAL MEDICINE

## 2022-06-07 NOTE — CARE COORDINATION
[facility] Transitions Initial Follow Up Call    Call within 2 business days of discharge: Yes    Patient: Edwyna Gosselin Patient : 1960   MRN: [id number]  Reason for Admission: Palpitations  Discharge Date: 22 RARS: Readmission Risk Score: 13.9 ( )      Last Discharge [facility]       Complaint Diagnosis Description Type Department Provider    22 Irregular Heart Beat Palpitations . .. ED to Hosp-Admission (Discharged) (ADMITTED) L CCU Maribell Robison MD; Oliver Yee ... Spoke with: N/A    Facility: [place] [id number]    Non-face-to-face services provided:  Reviewed encounter information for continuity of care prior to follow up phone call - chart notes, consults, progress notes, test results, med list, appointments, AVS, other information. Care Transitions 24 Hour Call    Care Transitions Interventions         Follow Up : Attempted to make contact with Marsha Lo for an initial follow up call post discharge from the hospital without success. Unable to leave a message regarding intent of call and call back information. Will try again my next business day. No future appointments.     Caro Madison RN

## 2022-06-08 ENCOUNTER — CARE COORDINATION (OUTPATIENT)
Dept: CASE MANAGEMENT | Age: 62
End: 2022-06-08

## 2022-06-08 NOTE — CARE COORDINATION
[facility] Transitions Initial Follow Up Call    Call within 2 business days of discharge: Yes    Patient: Alethea Salmon Patient : 1960   MRN: [id number]  Reason for Admission:   Discharge Date: 22 RARS: Readmission Risk Score: 13.9 ( )      Last Discharge [facility]       Complaint Diagnosis Description Type Department Provider    22 Irregular Heart Beat Palpitations . .. ED to Hosp-Admission (Discharged) (ADMITTED) L CCU Jameson Sams MD; Kelin Garcia ... Spoke with: N/A    Facility: [place] [id number]    Non-face-to-face services provided:  Reviewed encounter information for continuity of care prior to follow up phone call - chart notes, consults, progress notes, test results, med list, appointments, AVS, other information. Care Transitions 24 Hour Call    Care Transitions Interventions         Follow Up : Attempt #2 to make contact with Myron Holt for an initial follow up call post discharge from the hospital without success. Unable to leave a message regarding intent of call and call back information. Will discharge from CTN services at this time due to inability to make contact.     Future Appointments   Date Time Provider Lizabeth Hall   2022  2:15 PM Aleyda Grey MD [id number] Brennan RN

## 2023-01-17 ENCOUNTER — HOSPITAL ENCOUNTER (EMERGENCY)
Age: 63
Discharge: HOME | End: 2023-01-17
Payer: MEDICARE

## 2023-01-17 VITALS
WEIGHT: 300 LBS | BODY MASS INDEX: 37.3 KG/M2 | HEIGHT: 75 IN | TEMPERATURE: 97.2 F | HEART RATE: 80 BPM | DIASTOLIC BLOOD PRESSURE: 98 MMHG | OXYGEN SATURATION: 95 % | SYSTOLIC BLOOD PRESSURE: 194 MMHG | RESPIRATION RATE: 18 BRPM

## 2023-01-17 DIAGNOSIS — K02.9 DENTAL CARIES: Primary | ICD-10-CM

## 2023-01-17 PROCEDURE — 99283 EMERGENCY DEPT VISIT LOW MDM: CPT

## 2023-01-17 RX ORDER — HYDROCODONE BITARTRATE AND ACETAMINOPHEN 5; 325 MG/1; MG/1
1 TABLET ORAL EVERY 6 HOURS PRN
Qty: 10 TABLET | Refills: 0 | Status: SHIPPED | OUTPATIENT
Start: 2023-01-17 | End: 2023-01-20

## 2023-01-17 RX ORDER — LIDOCAINE HYDROCHLORIDE 20 MG/ML
15 SOLUTION OROPHARYNGEAL ONCE
Status: DISCONTINUED | OUTPATIENT
Start: 2023-01-17 | End: 2023-01-17 | Stop reason: HOSPADM

## 2023-01-17 RX ORDER — AMOXICILLIN 500 MG/1
500 CAPSULE ORAL 3 TIMES DAILY
Qty: 30 CAPSULE | Refills: 0 | Status: SHIPPED | OUTPATIENT
Start: 2023-01-17 | End: 2023-01-27

## 2023-01-17 ASSESSMENT — ENCOUNTER SYMPTOMS
FACIAL SWELLING: 0
TRISMUS: 0

## 2023-01-17 ASSESSMENT — PAIN - FUNCTIONAL ASSESSMENT: PAIN_FUNCTIONAL_ASSESSMENT: 0-10

## 2023-01-17 ASSESSMENT — PAIN SCALES - GENERAL: PAINLEVEL_OUTOF10: 9

## 2023-01-17 ASSESSMENT — PAIN DESCRIPTION - DESCRIPTORS: DESCRIPTORS: SHARP

## 2023-01-17 NOTE — PROGRESS NOTES
CLINICAL PHARMACY NOTE: MEDS TO BEDS    Total # of Prescriptions Filled: 2   The following medications were delivered to the patient:  Discharge Medication List as of 1/17/2023 10:44 AM        START taking these medications    Details   amoxicillin (AMOXIL) 500 MG capsule Take 1 capsule by mouth 3 times daily for 10 days, Disp-30 capsule, R-0Normal      HYDROcodone-acetaminophen (NORCO) 5-325 MG per tablet Take 1 tablet by mouth every 6 hours as needed for Pain for up to 3 days. Max Daily Amount: 4 tablets, Disp-10 tablet, R-0Normal               Additional Documentation:   Delivered Rx's to [facility]. Gave Rx's to patient. Patient paid with cash.

## 2023-01-17 NOTE — ED PROVIDER NOTES
[facility] EMERGENCY DEPT  eMERGENCY dEPARTMENT eNCOUnter      Pt Name: Edwyna Gosselin  MRN: [id number]  [place] 1960  Date of evaluation: 1/17/2023  Provider: BALBIR Miguel    CHIEF COMPLAINT       Chief Complaint   Patient presents with    Dental Pain         HISTORY OF PRESENT ILLNESS   (Location/Symptom, Timing/Onset,Context/Setting, Quality, Duration, Modifying Factors, Severity)  Note limiting factors. Edwyna Gosselin is a 58 y.o. male who presents to the emergency department with left lower dental pain x 3 days. No fever  poor dentition     The history is provided by the patient. Dental Pain  Location:  Lower  Lower teeth location:  21/LL 1st bicuspid  Quality:  Aching  Severity:  Moderate  Onset quality:  Sudden  Duration:  3 days  Timing:  Constant  Progression:  Unchanged  Chronicity:  New  Context: dental caries and poor dentition    Associated symptoms: no drooling, no facial swelling, no fever, no gum swelling, no neck swelling, no oral lesions and no trismus    Risk factors: sufficient dental care and no smoking    Risk factors comment:  Cardiac problems    NursingNotes were reviewed. REVIEW OF SYSTEMS    (2-9 systems for level 4, 10 or more for level 5)     Review of Systems   Constitutional:  Negative for fever. HENT:  Negative for drooling, facial swelling and mouth sores. Except as noted above the remainder of the review of systems was reviewed and negative.        PAST MEDICAL HISTORY     Past Medical History:   Diagnosis Date    A-fib ([address]     Cardiomyopathy ([address]     per pt    CHF (congestive heart failure) ([address]     Hyperlipidemia     Hypertension     Prolonged emergence from general anesthesia     patient states he does not come out of anesthesia well-he acts ugly    V tach          SURGICALHISTORY       Past Surgical History:   Procedure Laterality Date    CARDIAC DEFIBRILLATOR PLACEMENT      CARDIAC DEFIBRILLATOR PLACEMENT Left     COLONOSCOPY  07/16/2021    Dr Jeanne Ortiz atypical appearing lesion was noted, appeared to be arising from the squamous tissue of the anus and no rectal in origin, surg referral, 10 yr recall    COLONOSCOPY N/A 7/16/2021    COLORECTAL CANCER SCREENING, NOT HIGH RISK performed by Dread Clay MD at [facility] PROSTATE/TRANSRECTAL N/A 12/31/2019    TRANSRECTAL ULTRASOUND BIOPSY OF PROSTATE performed by Umer Hernandez MD at [facility]      Not sure who did it and done a long time ago         CURRENT MEDICATIONS       Discharge Medication List as of 1/17/2023 10:44 AM        CONTINUE these medications which have NOT CHANGED    Details   amLODIPine (NORVASC) 5 MG tablet Take 5 mg by mouth dailyHistorical Med      chlorthalidone (HYGROTEN) 50 MG tablet Take 50 mg by mouth dailyHistorical Med      cyclobenzaprine (FLEXERIL) 10 MG tablet Take 10 mg by mouth as neededHistorical Med      furosemide (LASIX) 20 MG tablet Take 20 mg by mouth dailyHistorical Med      mexiletine (MEXITIL) 150 MG capsule Take 150 mg by mouth dailyHistorical Med      oxyCODONE-acetaminophen (PERCOCET) 5-325 MG per tablet Take 5-325 tablets by mouth as needed. Historical Med      predniSONE (DELTASONE) 20 MG tablet Take 20 mg by mouth dailyHistorical Med      VIAGRA 100 MG tablet Take 100 mg by mouth as needed, DAWHistorical Med      topiramate (TOPAMAX) 25 MG tablet Take 25 mg by mouth dailyHistorical Med      amiodarone (PACERONE) 100 MG tablet Take 100 mg by mouth dailyHistorical Med      apixaban (ELIQUIS) 5 MG TABS tablet Take 5 mg by mouth 2 times daily Historical Med      ergocalciferol (ERGOCALCIFEROL) 1.25 MG (08028 UT) capsule Take 1,250 mcg by mouth dailyHistorical Med      finasteride (PROSCAR) 5 MG tablet Take 1 tablet by mouth daily, Disp-30 tablet, R-11Normal      indomethacin (INDOCIN) 50 MG capsule Take 1 capsule by mouth daily as needed for Pain, Disp-30 capsule, R-0Print      carvedilol (COREG) 12.5 MG tablet Take 12.5 mg by mouth daily , R-6Historical Med      colchicine (COLCRYS) 0.6 MG tablet Take 0.6 mg by mouth daily as needed Historical Med      fluticasone (FLONASE) 50 MCG/ACT nasal spray 2 sprays by Nasal route daily as needed Historical Med      nitroGLYCERIN (NITROSTAT) 0.4 MG SL tablet Place 0.4 mg under the tongue every 5 minutes as needed Historical Med      lisinopril (PRINIVIL;ZESTRIL) 20 MG tablet lisinopril 20 mg tabletHistorical Med      aspirin 81 MG tablet Take 81 mg by mouth daily Historical Med      simvastatin (ZOCOR) 20 MG tablet Take 20 mg by mouth nightlyHistorical Med             ALLERGIES     Patient has no known allergies.     FAMILY HISTORY       Family History   Problem Relation Age of Onset    Colon Cancer Neg Hx     Colon Polyps Neg Hx     Esophageal Cancer Neg Hx     Liver Cancer Neg Hx     Liver Disease Neg Hx     Rectal Cancer Neg Hx     Stomach Cancer Neg Hx           SOCIAL HISTORY       Social History     Socioeconomic History    Marital status: Single     Spouse name: None    Number of children: None    Years of education: None    Highest education level: None   Tobacco Use    Smoking status: Never    Smokeless tobacco: Never   Vaping Use    Vaping Use: Never used   Substance and Sexual Activity    Alcohol use: Never    Drug use: Never     Social Determinants of Health     Physical Activity: Unknown    Days of Exercise per Week: 7 days       SCREENINGS    [place] Coma Scale  Eye Opening: Spontaneous  Best Verbal Response: Oriented  Best Motor Response: Obeys commands  Pooja Coma Scale Score: 15 @FLOW([id number])@      PHYSICAL EXAM    (up to 7 for level 4, 8 or more for level 5)     ED Triage Vitals   BP Temp Temp src Heart Rate Resp SpO2 Height Weight   01/17/23 1004 01/17/23 0953 -- 01/17/23 0953 01/17/23 0953 01/17/23 0953 01/17/23 0953 01/17/23 0953   (!) 194/98 97.2 °F (36.2 °C)  80 18 95 % 6' 3\" (1.905 m) 300 lb (136.1 kg)       Physical Exam  Vitals and nursing note reviewed. Constitutional:       Appearance: Normal appearance. He is well-developed. HENT:      Head: Normocephalic and atraumatic. Jaw: There is normal jaw occlusion. Mouth/Throat:      Mouth: Mucous membranes are moist.      Pharynx: Uvula midline. Comments: Tooth broken in half  No erythema to gums or obvious abscess  Eyes:      General: No scleral icterus. Right eye: No discharge. Left eye: No discharge. Cardiovascular:      Rate and Rhythm: Normal rate. Pulmonary:      Effort: No respiratory distress. Musculoskeletal:      Cervical back: Normal range of motion and neck supple. Neurological:      Mental Status: He is alert and oriented to person, place, and time. Psychiatric:         Behavior: Behavior normal.       DIAGNOSTIC RESULTS     EKG: All EKG's are interpreted by the Emergency Department Physician who either signs or Co-signsthis chart in the absence of a cardiologist.        RADIOLOGY:   Non-plain filmimages such as CT, Ultrasound and MRI are read by the radiologist. Plain radiographic images are visualized and preliminarily interpreted by the emergency physician with the below findings:      Interpretation per the Radiologist below, if available at the time of this note:    No orders to display         ED BEDSIDEULTRASOUND:   Performed by ED Physician -none    LABS:  Labs Reviewed - No data to display    All other labs were within normal range or not returned as of this dictation.     EMERGENCY DEPARTMENT COURSE and DIFFERENTIALDIAGNOSIS/MDM:   Vitals:    Vitals:    01/17/23 0953 01/17/23 1004   BP:  (!) 194/98   Pulse: 80    Resp: 18    Temp: 97.2 °F (36.2 °C)    SpO2: 95%    Weight: 300 lb (136.1 kg)    Height: 6' 3\" (1.905 m)            MDM  Number of Diagnoses or Management Options  Dental caries: new and does not require workup  Diagnosis management comments: Stressed to pt the importance of dental follow-up          CONSULTS:  None    PROCEDURES:  Unless otherwise noted below, none     Procedures    FINAL IMPRESSION      1. Dental caries        DISPOSITION/PLAN   DISPOSITION Decision To Discharge 01/17/2023 10:34:12 AM      PATIENT REFERRED TO:  No follow-up provider specified. DISCHARGE MEDICATIONS:  Discharge Medication List as of 1/17/2023 10:44 AM        START taking these medications    Details   amoxicillin (AMOXIL) 500 MG capsule Take 1 capsule by mouth 3 times daily for 10 days, Disp-30 capsule, R-0Normal      HYDROcodone-acetaminophen (NORCO) 5-325 MG per tablet Take 1 tablet by mouth every 6 hours as needed for Pain for up to 3 days.  Max Daily Amount: 4 tablets, Disp-10 tablet, R-0Normal                (Please note that portions of this note were completed with a voice recognitionprogram.  Efforts were made to edit the dictations but occasionally words are mis-transcribed.)    BALBIR Navas (electronically signed)          BALBIR Navas  01/17/23 6570

## 2023-01-17 NOTE — ED NOTES
Patient presents to ED today with complaints of dental pain in the left lower back teeth that is worsening over the last 3 days. He describes the pain as a sharp constant pain that he rates at a 9/10.       Mayela Conteh RN  01/17/23 0071

## 2023-04-28 DIAGNOSIS — R97.20 ELEVATED PSA: Primary | ICD-10-CM

## 2023-05-03 DIAGNOSIS — R97.20 ELEVATED PSA: ICD-10-CM

## 2023-05-03 LAB — PSA SERPL-MCNC: 19.52 NG/ML (ref 0–4)

## 2023-05-10 ENCOUNTER — APPOINTMENT (OUTPATIENT)
Dept: GENERAL RADIOLOGY | Age: 63
End: 2023-05-10
Payer: MEDICARE

## 2023-05-10 ENCOUNTER — HOSPITAL ENCOUNTER (EMERGENCY)
Age: 63
Discharge: HOME | End: 2023-05-10
Attending: PEDIATRICS
Payer: MEDICARE

## 2023-05-10 ENCOUNTER — APPOINTMENT (OUTPATIENT)
Dept: CT IMAGING | Age: 63
End: 2023-05-10
Payer: MEDICARE

## 2023-05-10 VITALS
WEIGHT: 300 LBS | DIASTOLIC BLOOD PRESSURE: 97 MMHG | OXYGEN SATURATION: 99 % | TEMPERATURE: 98.5 F | RESPIRATION RATE: 22 BRPM | HEIGHT: 75 IN | SYSTOLIC BLOOD PRESSURE: 153 MMHG | HEART RATE: 52 BPM | BODY MASS INDEX: 37.3 KG/M2

## 2023-05-10 DIAGNOSIS — R06.02 SHORTNESS OF BREATH: Primary | ICD-10-CM

## 2023-05-10 LAB
ALBUMIN SERPL-MCNC: 3.9 G/DL (ref 3.5–5.2)
ALP SERPL-CCNC: 66 U/L (ref 40–130)
ALT SERPL-CCNC: 38 U/L (ref 5–41)
ANION GAP SERPL CALCULATED.3IONS-SCNC: 9 MMOL/L (ref 7–19)
AST SERPL-CCNC: 55 U/L (ref 5–40)
BASOPHILS # BLD: 0 K/UL (ref 0–0.2)
BASOPHILS NFR BLD: 0.5 % (ref 0–1)
BILIRUB SERPL-MCNC: 0.5 MG/DL (ref 0.2–1.2)
BNP BLD-MCNC: 913 PG/ML (ref 0–124)
BUN SERPL-MCNC: 19 MG/DL (ref 8–23)
CALCIUM SERPL-MCNC: 9.2 MG/DL (ref 8.8–10.2)
CHLORIDE SERPL-SCNC: 105 MMOL/L (ref 98–111)
CO2 SERPL-SCNC: 24 MMOL/L (ref 22–29)
CREAT SERPL-MCNC: 1.2 MG/DL (ref 0.5–1.2)
D DIMER PPP FEU-MCNC: 0.96 UG/ML FEU (ref 0–0.48)
EKG P AXIS: 67 DEGREES
EKG P-R INTERVAL: 124 MS
EKG Q-T INTERVAL: 466 MS
EKG QRS DURATION: 176 MS
EKG QTC CALCULATION (BAZETT): 467 MS
EKG T AXIS: 90 DEGREES
EOSINOPHIL # BLD: 0.2 K/UL (ref 0–0.6)
EOSINOPHIL NFR BLD: 2.5 % (ref 0–5)
ERYTHROCYTE [DISTWIDTH] IN BLOOD BY AUTOMATED COUNT: 17.5 % (ref 11.5–14.5)
GLUCOSE SERPL-MCNC: 106 MG/DL (ref 74–109)
HCT VFR BLD AUTO: 47.7 % (ref 42–52)
HGB BLD-MCNC: 15.6 G/DL (ref 14–18)
IMM GRANULOCYTES # BLD: 0 K/UL
LYMPHOCYTES # BLD: 1.9 K/UL (ref 1.1–4.5)
LYMPHOCYTES NFR BLD: 31.1 % (ref 20–40)
MAGNESIUM SERPL-MCNC: 2.2 MG/DL (ref 1.6–2.4)
MCH RBC QN AUTO: 28.7 PG (ref 27–31)
MCHC RBC AUTO-ENTMCNC: 32.7 G/DL (ref 33–37)
MCV RBC AUTO: 87.8 FL (ref 80–94)
MONOCYTES # BLD: 0.5 K/UL (ref 0–0.9)
MONOCYTES NFR BLD: 7.6 % (ref 0–10)
NEUTROPHILS # BLD: 3.5 K/UL (ref 1.5–7.5)
NEUTS SEG NFR BLD: 58 % (ref 50–65)
PLATELET # BLD AUTO: 150 K/UL (ref 130–400)
PMV BLD AUTO: 12.3 FL (ref 9.4–12.4)
POTASSIUM SERPL-SCNC: 4.3 MMOL/L (ref 3.5–5)
PROT SERPL-MCNC: 6.7 G/DL (ref 6.6–8.7)
RBC # BLD AUTO: 5.43 M/UL (ref 4.7–6.1)
SARS-COV-2 RDRP RESP QL NAA+PROBE: NOT DETECTED
SODIUM SERPL-SCNC: 138 MMOL/L (ref 136–145)
TROPONIN T SERPL-MCNC: 0.02 NG/ML (ref 0–0.03)
TSH SERPL DL<=0.005 MIU/L-ACNC: 2.13 UIU/ML (ref 0.35–5.5)
WBC # BLD AUTO: 6 K/UL (ref 4.8–10.8)

## 2023-05-10 PROCEDURE — 6360000004 HC RX CONTRAST MEDICATION: Performed by: PEDIATRICS

## 2023-05-10 PROCEDURE — 84484 ASSAY OF TROPONIN QUANT: CPT

## 2023-05-10 PROCEDURE — 85025 COMPLETE CBC W/AUTO DIFF WBC: CPT

## 2023-05-10 PROCEDURE — 80053 COMPREHEN METABOLIC PANEL: CPT

## 2023-05-10 PROCEDURE — 6360000002 HC RX W HCPCS: Performed by: PEDIATRICS

## 2023-05-10 PROCEDURE — 87635 SARS-COV-2 COVID-19 AMP PRB: CPT

## 2023-05-10 PROCEDURE — 96374 THER/PROPH/DIAG INJ IV PUSH: CPT

## 2023-05-10 PROCEDURE — 99285 EMERGENCY DEPT VISIT HI MDM: CPT

## 2023-05-10 PROCEDURE — 85379 FIBRIN DEGRADATION QUANT: CPT

## 2023-05-10 PROCEDURE — 83880 ASSAY OF NATRIURETIC PEPTIDE: CPT

## 2023-05-10 PROCEDURE — 93010 ELECTROCARDIOGRAM REPORT: CPT | Performed by: INTERNAL MEDICINE

## 2023-05-10 PROCEDURE — 93005 ELECTROCARDIOGRAM TRACING: CPT | Performed by: PEDIATRICS

## 2023-05-10 PROCEDURE — 71045 X-RAY EXAM CHEST 1 VIEW: CPT

## 2023-05-10 PROCEDURE — 83735 ASSAY OF MAGNESIUM: CPT

## 2023-05-10 PROCEDURE — 71275 CT ANGIOGRAPHY CHEST: CPT

## 2023-05-10 PROCEDURE — 84443 ASSAY THYROID STIM HORMONE: CPT

## 2023-05-10 PROCEDURE — 36415 COLL VENOUS BLD VENIPUNCTURE: CPT

## 2023-05-10 RX ORDER — FUROSEMIDE 20 MG/1
20 TABLET ORAL DAILY
Qty: 10 TABLET | Refills: 0 | Status: SHIPPED | OUTPATIENT
Start: 2023-05-10

## 2023-05-10 RX ORDER — FUROSEMIDE 10 MG/ML
20 INJECTION INTRAMUSCULAR; INTRAVENOUS ONCE
Status: COMPLETED | OUTPATIENT
Start: 2023-05-10 | End: 2023-05-10

## 2023-05-10 RX ADMIN — FUROSEMIDE 20 MG: 10 INJECTION, SOLUTION INTRAMUSCULAR; INTRAVENOUS at 09:55

## 2023-05-10 RX ADMIN — IOPAMIDOL 70 ML: 755 INJECTION, SOLUTION INTRAVENOUS at 09:37

## 2023-05-10 ASSESSMENT — ENCOUNTER SYMPTOMS
VOMITING: 0
SHORTNESS OF BREATH: 1
BLOOD IN STOOL: 0
EYE DISCHARGE: 0
COLOR CHANGE: 0
ABDOMINAL PAIN: 0
NAUSEA: 0
COUGH: 0
DIARRHEA: 0
RHINORRHEA: 0
BACK PAIN: 0

## 2023-05-10 ASSESSMENT — PAIN - FUNCTIONAL ASSESSMENT: PAIN_FUNCTIONAL_ASSESSMENT: 0-10

## 2023-05-10 ASSESSMENT — PAIN SCALES - GENERAL: PAINLEVEL_OUTOF10: 1

## 2023-05-10 NOTE — ED PROVIDER NOTES
Dianna Thompson. Awaiting on CTA to be performed. Patient came in with complaint of shortness of breath D-dimer slightly elevated and no other pathologic findings to explain his shortness of breath. CONSULTS:  None    PROCEDURES:  Unlessotherwise noted below, none      Procedures    FINAL IMPRESSION      1.  Shortness of breath          DISPOSITION/PLAN   DISPOSITION Decision To Discharge    PATIENT REFERRED TO:  Ruddy Recinos DO  [facility]  [phone]            DISCHARGE MEDICATIONS:  New Prescriptions    FUROSEMIDE (LASIX) 20 MG TABLET    Take 1 tablet by mouth daily          (Please note that portions of this note were completed with a voice recognition program.  Efforts were made to edit the dictations but occasionally words are mis-transcribed.)    Efraín Cordova MD (electronically signed)  Attending Emergency Physician        Rubin Bell MD  05/10/23 8971
17.5 (*)     All other components within normal limits   D-DIMER, QUANTITATIVE - Abnormal; Notable for the following components:    D-Dimer, Quant 0.96 (*)     All other components within normal limits   BRAIN NATRIURETIC PEPTIDE - Abnormal; Notable for the following components:    Pro- (*)     All other components within normal limits   COVID-19, RAPID   MAGNESIUM   TROPONIN   TSH WITH REFLEX TO FT4       All other labs were within normal range or not returned as of this dictation. EMERGENCY DEPARTMENT COURSE and DIFFERENTIAL DIAGNOSIS/MDM:   Vitals:    Vitals:    05/10/23 0602 05/10/23 0700 05/10/23 0720 05/10/23 0836   BP: (!) 167/92 (!) 160/87 (!) 163/88 (!) 153/97   Pulse: 60 52 54 52   Resp: 18 14 13 22   Temp:       SpO2: 92% 96% 99%    Weight:       Height:           MDM  76-year-old male presents to the emergency department with shortness of breath with exertion. Patient has had work-up March, 2022 including cardiac catheterization and echocardiogram.  On physical examination, patient is obese without evidence of acute increased work of breathing. Patient is at rest at this time. Lab, EKG, and radiology results reviewed. EKG shows paced rhythm. Patient has an increased proBNP at 913. Chest x-ray with moderate cardiomegaly and chronic changes. D-dimer 0.96 is elevated. CTA pulmonary ordered to rule out PE. Nursing will check patient's ambulatory saturations. Signed out to Dr. Jennifer Gutierrez due to end of shift        CONSULTS:  None    PROCEDURES:  Unless otherwise noted below, none     Procedures    FINAL IMPRESSION    No diagnosis found. DISPOSITION/PLAN   DISPOSITION        PATIENT REFERRED TO:  No follow-up provider specified.     DISCHARGE MEDICATIONS:  New Prescriptions    No medications on file          (Please note that portions of this note were completed with a voice recognition program.  Efforts were made to edit thedictations but occasionally words are

## 2023-05-10 NOTE — ED NOTES
Patient ambulated in [facility]. Walked one lap around ER. O2 sat remained above 90.       Marlys Farias  05/10/23 8401

## 2023-05-10 NOTE — ED NOTES
Per [employer], pt has had no arrhythmias since April 30th. All wires are in place and pacer is functioning properly. Pacemaker does detect possible intrathoracic fluid accumulation.       Orion Hwang RN  05/10/23 3130

## 2023-05-15 ENCOUNTER — OFFICE VISIT (OUTPATIENT)
Dept: UROLOGY | Age: 63
End: 2023-05-15
Payer: MEDICARE

## 2023-05-15 VITALS — HEIGHT: 75 IN | WEIGHT: 315 LBS | BODY MASS INDEX: 39.17 KG/M2 | TEMPERATURE: 97 F

## 2023-05-15 DIAGNOSIS — N52.01 ERECTILE DYSFUNCTION DUE TO ARTERIAL INSUFFICIENCY: ICD-10-CM

## 2023-05-15 DIAGNOSIS — R35.0 BENIGN PROSTATIC HYPERPLASIA WITH URINARY FREQUENCY: ICD-10-CM

## 2023-05-15 DIAGNOSIS — R97.20 ELEVATED PSA: Primary | ICD-10-CM

## 2023-05-15 DIAGNOSIS — N40.1 BENIGN PROSTATIC HYPERPLASIA WITH URINARY FREQUENCY: ICD-10-CM

## 2023-05-15 LAB
APPEARANCE FLUID: CLEAR
BILIRUBIN, POC: NORMAL
BLOOD URINE, POC: NORMAL
CLARITY, POC: CLEAR
COLOR, POC: YELLOW
GLUCOSE URINE, POC: NORMAL
KETONES, POC: NORMAL
LEUKOCYTE EST, POC: NORMAL
NITRITE, POC: NORMAL
PH, POC: 5.5
PROTEIN, POC: NORMAL
SPECIFIC GRAVITY, POC: 1.01
UROBILINOGEN, POC: 0.2

## 2023-05-15 PROCEDURE — G8417 CALC BMI ABV UP PARAM F/U: HCPCS | Performed by: UROLOGY

## 2023-05-15 PROCEDURE — G8427 DOCREV CUR MEDS BY ELIG CLIN: HCPCS | Performed by: UROLOGY

## 2023-05-15 PROCEDURE — 81002 URINALYSIS NONAUTO W/O SCOPE: CPT | Performed by: UROLOGY

## 2023-05-15 PROCEDURE — 3017F COLORECTAL CA SCREEN DOC REV: CPT | Performed by: UROLOGY

## 2023-05-15 PROCEDURE — 99214 OFFICE O/P EST MOD 30 MIN: CPT | Performed by: UROLOGY

## 2023-05-15 PROCEDURE — 1036F TOBACCO NON-USER: CPT | Performed by: UROLOGY

## 2023-05-15 RX ORDER — FINASTERIDE 5 MG/1
5 TABLET, FILM COATED ORAL DAILY
Qty: 30 TABLET | Refills: 11 | Status: SHIPPED | OUTPATIENT
Start: 2023-05-15

## 2023-05-15 ASSESSMENT — ENCOUNTER SYMPTOMS
ABDOMINAL DISTENTION: 0
TROUBLE SWALLOWING: 0
DIARRHEA: 0
VOMITING: 0
EYE REDNESS: 0
COUGH: 0
CONSTIPATION: 0
SHORTNESS OF BREATH: 0
NAUSEA: 0
BACK PAIN: 0
EYE DISCHARGE: 0
ABDOMINAL PAIN: 0

## 2023-05-15 NOTE — PROGRESS NOTES
Britney Flor is a 58 y.o. male who presents today   Chief Complaint   Patient presents with    Follow-up     I am here today for my 6 month follow up, my PSA is done. Elevated PSA:  Patient is here today for history of an elevated PSA. Patient was last seen 5/17/2021 he was started on finasteride at that time he failed to follow-up with me since. His last several PSA values are as follows:  Lab Results   Component Value Date    PSA 19.52 (H) 05/03/2023    PSA 11.2 (H) 05/11/2021    PSA 10.57 (H) 04/29/2020    PSA 9.37 (H) 12/16/2019     Overall the PSA level is: increased  Recent history of urinary tract infection/prostatitis? no  Previous prostate biopsy? yes -biopsy in 2007 and a biopsy done on 12/31/2019 for PSA of 9.0 prostate volume was 97.8 at that time. Lower urinary tract symptoms: urgency, frequency, and nocturia, 5 times per night    Patient has been noncompliant with follow-up not sure he is really been taking the finasteride but he says he has been         BPH / LUTS:  Patient is here today for lower urinary tract symptoms which started several year(s) ago. Recently the urinary symptoms are: are worsening  Current medical Rx for BPH/LUTS: Finasteride  Previous treatments tried for LUTS: Medical therapy with finasteride  Previous surgical intervention: none  Urinary retention: No  Any associated gross hematuria? no  History of recurrent UTIs: no  His AUA Symptom Score is, 16/35   Patient states symptoms are of moderate severity. Patient also has erectile dysfunction and request a new prescription for Viagra.   Since I have last seen him he has cardiomyopathy with A-fib CHF he had defibrillator placement and he has a prescription for sublingual nitro so Viagra would be contraindicated      Past Medical History:   Diagnosis Date    A-fib ([address]     Cardiomyopathy ([address]     per pt    CHF (congestive heart failure) [address]     Hyperlipidemia     Hypertension     Prolonged emergence from general

## 2023-05-17 ENCOUNTER — TELEPHONE (OUTPATIENT)
Dept: UROLOGY | Age: 63
End: 2023-05-17

## 2023-05-17 NOTE — TELEPHONE ENCOUNTER
Patient contacted office inquiring about Sildenafil and said he called his pharmacy [facility] and they have not received it. Per Dr Austyn Sanchez note from visit on 5/15/2023, Viagra is requested by him today however this would be contraindicated since he has nitroglycerin. He did prescribe Finasteride (Proscar) 5 MG tablet. Office contacted patient and left VM with this information. Advised that this can be discussed further at his next apt on 6/19/2023.

## 2023-06-19 ENCOUNTER — OFFICE VISIT (OUTPATIENT)
Dept: UROLOGY | Age: 63
End: 2023-06-19
Payer: MEDICARE

## 2023-06-19 VITALS — BODY MASS INDEX: 39.17 KG/M2 | HEIGHT: 75 IN | WEIGHT: 315 LBS | TEMPERATURE: 98.2 F

## 2023-06-19 DIAGNOSIS — R97.20 ELEVATED PSA: ICD-10-CM

## 2023-06-19 DIAGNOSIS — R97.20 ELEVATED PSA: Primary | ICD-10-CM

## 2023-06-19 DIAGNOSIS — N40.1 BENIGN PROSTATIC HYPERPLASIA WITH URINARY FREQUENCY: ICD-10-CM

## 2023-06-19 DIAGNOSIS — R35.0 BENIGN PROSTATIC HYPERPLASIA WITH URINARY FREQUENCY: ICD-10-CM

## 2023-06-19 LAB
APPEARANCE FLUID: CLEAR
BILIRUBIN, POC: NORMAL
BLOOD URINE, POC: NORMAL
CLARITY, POC: CLEAR
COLOR, POC: YELLOW
GLUCOSE URINE, POC: NORMAL
KETONES, POC: NORMAL
LEUKOCYTE EST, POC: NORMAL
NITRITE, POC: NORMAL
PH, POC: 7
POST VOID RESIDUAL (PVR): 54 ML
PROTEIN, POC: NORMAL
PSA SERPL-MCNC: 15.53 NG/ML (ref 0–4)
SPECIFIC GRAVITY, POC: 1.01
UROBILINOGEN, POC: 2

## 2023-06-19 PROCEDURE — 99214 OFFICE O/P EST MOD 30 MIN: CPT | Performed by: UROLOGY

## 2023-06-19 PROCEDURE — 51798 US URINE CAPACITY MEASURE: CPT | Performed by: UROLOGY

## 2023-06-19 PROCEDURE — 81002 URINALYSIS NONAUTO W/O SCOPE: CPT | Performed by: UROLOGY

## 2023-06-19 ASSESSMENT — ENCOUNTER SYMPTOMS
BACK PAIN: 0
NAUSEA: 0
DIARRHEA: 0
ABDOMINAL PAIN: 0
SHORTNESS OF BREATH: 0
TROUBLE SWALLOWING: 0
COUGH: 0
VOMITING: 0
CONSTIPATION: 0
EYE DISCHARGE: 0
EYE REDNESS: 0
ABDOMINAL DISTENTION: 0

## 2023-06-19 NOTE — PROGRESS NOTES
Rancho Bal is a 58 y.o. male who presents today   Chief Complaint   Patient presents with    Follow-up     I am here today for my 1 month follow up, my PSA is done. Elevated PSA:  Patient is here today for history of an elevated PSA. Patient was seen about a month ago PSA was up to 19.5. He reports being compliant with his finasteride he comes in now after repeat PSA  His last several PSA values are as follows:  Lab Results   Component Value Date    PSA 15.53 (H) 06/19/2023    PSA 19.52 (H) 05/03/2023    PSA 11.2 (H) 05/11/2021    PSA 10.57 (H) 04/29/2020    PSA 9.37 (H) 12/16/2019     Overall the PSA level is: increased  Recent history of urinary tract infection/prostatitis? no  Previous prostate biopsy? Yes biopsy in 2007 and a biopsy on 12/13/2019 for PSA of 9.0. Volume was 97.8 at that time. He reports he did not tolerate biopsy well he says if he is going to have another biopsy he would have to be put to sleep  Lower urinary tract symptoms: nocturia, 5 times per night and feeling of incomplete emptying. Patient has hypertrophic cardiomyopathy and is on Eliquis. Patient had a cardiac cath on 3/18/2022 that showed no significant obstructive coronary artery disease. He had severe concentric hypertrophy of the left ventricle, EF was 45% on echo done 3/17/2022. Patient has a defibrillator    BPH / LUTS:  Patient is here today for lower urinary tract symptoms which started several year(s) ago. Recently the urinary symptoms are: are worsening  Current medical Rx for BPH/LUTS: Finasteride  Previous treatments tried for LUTS: Medical therapy with finasteride  Previous surgical intervention: none  Urinary retention: No, PVR today was 54  Any associated gross hematuria? no  History of recurrent UTIs: no  His AUA Symptom Score is, 20/35   Patient states symptoms are of severe severity. Quality life score is 5 unhappy.   He also complains of incomplete emptying frequency urgency and nocturia x5 his

## 2023-06-30 ENCOUNTER — HOSPITAL ENCOUNTER (EMERGENCY)
Age: 63
Discharge: HOME | End: 2023-06-30
Payer: MEDICARE

## 2023-06-30 ENCOUNTER — APPOINTMENT (OUTPATIENT)
Dept: GENERAL RADIOLOGY | Age: 63
End: 2023-06-30
Payer: MEDICARE

## 2023-06-30 VITALS
WEIGHT: 315 LBS | HEART RATE: 50 BPM | BODY MASS INDEX: 39.17 KG/M2 | HEIGHT: 75 IN | TEMPERATURE: 98.2 F | SYSTOLIC BLOOD PRESSURE: 166 MMHG | DIASTOLIC BLOOD PRESSURE: 84 MMHG | RESPIRATION RATE: 16 BRPM | OXYGEN SATURATION: 93 %

## 2023-06-30 DIAGNOSIS — S61.215A LACERATION OF LEFT RING FINGER W/O FOREIGN BODY W/O DAMAGE TO NAIL, INITIAL ENCOUNTER: Primary | ICD-10-CM

## 2023-06-30 PROCEDURE — 96372 THER/PROPH/DIAG INJ SC/IM: CPT

## 2023-06-30 PROCEDURE — 12001 RPR S/N/AX/GEN/TRNK 2.5CM/<: CPT

## 2023-06-30 PROCEDURE — 2500000003 HC RX 250 WO HCPCS

## 2023-06-30 PROCEDURE — 73140 X-RAY EXAM OF FINGER(S): CPT

## 2023-06-30 PROCEDURE — 6360000002 HC RX W HCPCS: Performed by: PHYSICIAN ASSISTANT

## 2023-06-30 PROCEDURE — 6370000000 HC RX 637 (ALT 250 FOR IP): Performed by: PHYSICIAN ASSISTANT

## 2023-06-30 PROCEDURE — 99284 EMERGENCY DEPT VISIT MOD MDM: CPT

## 2023-06-30 RX ORDER — HYDROMORPHONE HYDROCHLORIDE 1 MG/ML
0.5 INJECTION, SOLUTION INTRAMUSCULAR; INTRAVENOUS; SUBCUTANEOUS ONCE
Status: COMPLETED | OUTPATIENT
Start: 2023-06-30 | End: 2023-06-30

## 2023-06-30 RX ORDER — MELOXICAM 15 MG/1
15 TABLET ORAL DAILY
Qty: 30 TABLET | Refills: 0 | Status: SHIPPED | OUTPATIENT
Start: 2023-06-30

## 2023-06-30 RX ORDER — LIDOCAINE HYDROCHLORIDE 10 MG/ML
INJECTION, SOLUTION EPIDURAL; INFILTRATION; INTRACAUDAL; PERINEURAL
Status: COMPLETED
Start: 2023-06-30 | End: 2023-06-30

## 2023-06-30 RX ORDER — CEPHALEXIN 500 MG/1
500 CAPSULE ORAL 2 TIMES DAILY
Qty: 14 CAPSULE | Refills: 0 | Status: SHIPPED | OUTPATIENT
Start: 2023-06-30 | End: 2023-07-05 | Stop reason: ALTCHOICE

## 2023-06-30 RX ORDER — CLONIDINE HYDROCHLORIDE 0.1 MG/1
0.1 TABLET ORAL ONCE
Status: COMPLETED | OUTPATIENT
Start: 2023-06-30 | End: 2023-06-30

## 2023-06-30 RX ORDER — CEPHALEXIN 500 MG/1
500 CAPSULE ORAL 2 TIMES DAILY
Qty: 10 CAPSULE | Refills: 0 | Status: SHIPPED | OUTPATIENT
Start: 2023-06-30 | End: 2023-06-30

## 2023-06-30 RX ADMIN — LIDOCAINE HYDROCHLORIDE 5 ML: 10 INJECTION, SOLUTION EPIDURAL; INFILTRATION; INTRACAUDAL; PERINEURAL at 16:56

## 2023-06-30 RX ADMIN — Medication 3 ML: at 15:51

## 2023-06-30 RX ADMIN — CLONIDINE HYDROCHLORIDE 0.1 MG: 0.1 TABLET ORAL at 15:51

## 2023-06-30 RX ADMIN — Medication 5 ML: at 16:56

## 2023-06-30 RX ADMIN — HYDROMORPHONE HYDROCHLORIDE 0.5 MG: 1 INJECTION, SOLUTION INTRAMUSCULAR; INTRAVENOUS; SUBCUTANEOUS at 16:07

## 2023-06-30 ASSESSMENT — PAIN SCALES - GENERAL: PAINLEVEL_OUTOF10: 10

## 2023-06-30 ASSESSMENT — ENCOUNTER SYMPTOMS
BACK PAIN: 0
COUGH: 0
PHOTOPHOBIA: 0
SHORTNESS OF BREATH: 0
EYE DISCHARGE: 0
COLOR CHANGE: 0
EYE ITCHING: 0
APNEA: 0

## 2023-06-30 ASSESSMENT — PAIN DESCRIPTION - ORIENTATION: ORIENTATION: LEFT

## 2023-06-30 ASSESSMENT — PAIN DESCRIPTION - DESCRIPTORS: DESCRIPTORS: ACHING

## 2023-06-30 ASSESSMENT — PAIN DESCRIPTION - LOCATION: LOCATION: FINGER (COMMENT WHICH ONE)

## 2023-07-05 ENCOUNTER — HOSPITAL ENCOUNTER (OUTPATIENT)
Dept: PREADMISSION TESTING | Age: 63
End: 2023-07-05
Payer: MEDICARE

## 2023-07-05 VITALS — WEIGHT: 309 LBS | BODY MASS INDEX: 38.62 KG/M2

## 2023-07-05 LAB
ANION GAP SERPL CALCULATED.3IONS-SCNC: 11 MMOL/L (ref 7–19)
APTT PPP: 29.9 SEC (ref 26–36.2)
BASOPHILS # BLD: 0 K/UL (ref 0–0.2)
BASOPHILS NFR BLD: 0.7 % (ref 0–1)
BUN SERPL-MCNC: 14 MG/DL (ref 8–23)
CALCIUM SERPL-MCNC: 9.3 MG/DL (ref 8.8–10.2)
CHLORIDE SERPL-SCNC: 107 MMOL/L (ref 98–111)
CO2 SERPL-SCNC: 21 MMOL/L (ref 22–29)
CREAT SERPL-MCNC: 0.9 MG/DL (ref 0.5–1.2)
EOSINOPHIL # BLD: 0.1 K/UL (ref 0–0.6)
EOSINOPHIL NFR BLD: 2.9 % (ref 0–5)
ERYTHROCYTE [DISTWIDTH] IN BLOOD BY AUTOMATED COUNT: 15.8 % (ref 11.5–14.5)
GLUCOSE SERPL-MCNC: 98 MG/DL (ref 74–109)
HCT VFR BLD AUTO: 48 % (ref 42–52)
HGB BLD-MCNC: 15.7 G/DL (ref 14–18)
IMM GRANULOCYTES # BLD: 0 K/UL
INR PPP: 1.15 (ref 0.88–1.18)
LYMPHOCYTES # BLD: 1.5 K/UL (ref 1.1–4.5)
LYMPHOCYTES NFR BLD: 32.5 % (ref 20–40)
MCH RBC QN AUTO: 28.4 PG (ref 27–31)
MCHC RBC AUTO-ENTMCNC: 32.7 G/DL (ref 33–37)
MCV RBC AUTO: 87 FL (ref 80–94)
MONOCYTES # BLD: 0.4 K/UL (ref 0–0.9)
MONOCYTES NFR BLD: 9.7 % (ref 0–10)
NEUTROPHILS # BLD: 2.5 K/UL (ref 1.5–7.5)
NEUTS SEG NFR BLD: 54 % (ref 50–65)
PLATELET # BLD AUTO: 153 K/UL (ref 130–400)
PMV BLD AUTO: 12.2 FL (ref 9.4–12.4)
POTASSIUM SERPL-SCNC: 4.5 MMOL/L (ref 3.5–5)
PROTHROMBIN TIME: 14.4 SEC (ref 12–14.6)
RBC # BLD AUTO: 5.52 M/UL (ref 4.7–6.1)
SODIUM SERPL-SCNC: 139 MMOL/L (ref 136–145)
WBC # BLD AUTO: 4.6 K/UL (ref 4.8–10.8)

## 2023-07-05 PROCEDURE — 93005 ELECTROCARDIOGRAM TRACING: CPT | Performed by: UROLOGY

## 2023-07-05 PROCEDURE — 85610 PROTHROMBIN TIME: CPT

## 2023-07-05 PROCEDURE — 85025 COMPLETE CBC W/AUTO DIFF WBC: CPT

## 2023-07-05 PROCEDURE — 80048 BASIC METABOLIC PNL TOTAL CA: CPT

## 2023-07-05 PROCEDURE — 85730 THROMBOPLASTIN TIME PARTIAL: CPT

## 2023-07-05 RX ORDER — LEVOFLOXACIN 5 MG/ML
500 INJECTION, SOLUTION INTRAVENOUS ONCE
Status: CANCELLED | OUTPATIENT
Start: 2023-07-06

## 2023-07-05 NOTE — DISCHARGE INSTRUCTIONS
PREOPERATIVE GUIDELINES WHEN RECEIVING ANESTHESIA    Do not eat or drink anything after midnight, the night before your surgery. No gum or candy the morning of surgery. This is extremely important for your safety. Take a bath (or shower) the night before your surgery and you may brush your teeth the morning of your surgery. You will be scheduled to arrive at the hospital 2 hours before your surgery, or follow your surgeon's instructions. Dress comfortably. Wear loose clothing that will be easy to remove and comfortable for your trip home. You may wear eyeglasses or contacts but bring your cases with you as they must be remove before your surgery. Hearing aids and dentures will need to be removed before your surgery. Do not wear any jewelry, including body jewelry. All jewelry will need to be removed prior to your surgery. Do not wear fingernail polish or make-up. It is best not to bring any valuables with you. If you are to stay in the hospital overnight, bring your robe, slippers and personal toiletries that you may need. POSTOPERATIVE GUIDELINES AFTER RECEIVING ANESTHESIA    If you are to go home after your surgery, you will need a responsible adult to drive you home. You will not be able to take public transportation after your discharge from the Operative Care Unit unless you are accompanied by a        responsible adult. On returning home, be sure to follow your physician's orders regarding diet, activity and medications. Remember, surgery with general anesthesia or sedation may leave you sleepy, very tired and with a decreased appetite for 12 to 24 hours. If you develop any post-surgical complications or problems, call your surgeon or [address] [place] [address],[address] Emergency Department ([phone]). The day before surgery you will receive a phone call from the surgery nurse to let you know what time to arrive on the day of surgery.  This call will usually be between 2-4 PM. If

## 2023-07-06 ENCOUNTER — ANESTHESIA (OUTPATIENT)
Dept: OPERATING ROOM | Age: 63
End: 2023-07-06
Payer: MEDICARE

## 2023-07-06 ENCOUNTER — HOSPITAL ENCOUNTER (OUTPATIENT)
Age: 63
Setting detail: OUTPATIENT SURGERY
Discharge: HOME | End: 2023-07-06
Attending: UROLOGY | Admitting: UROLOGY
Payer: MEDICARE

## 2023-07-06 ENCOUNTER — ANESTHESIA EVENT (OUTPATIENT)
Dept: OPERATING ROOM | Age: 63
End: 2023-07-06
Payer: MEDICARE

## 2023-07-06 VITALS
RESPIRATION RATE: 18 BRPM | HEART RATE: 53 BPM | WEIGHT: 309 LBS | BODY MASS INDEX: 38.42 KG/M2 | SYSTOLIC BLOOD PRESSURE: 160 MMHG | HEIGHT: 75 IN | DIASTOLIC BLOOD PRESSURE: 93 MMHG | OXYGEN SATURATION: 100 % | TEMPERATURE: 97.6 F

## 2023-07-06 DIAGNOSIS — N40.0 BPH WITH ELEVATED PSA: ICD-10-CM

## 2023-07-06 DIAGNOSIS — R97.20 BPH WITH ELEVATED PSA: ICD-10-CM

## 2023-07-06 PROBLEM — N40.1 BENIGN PROSTATIC HYPERPLASIA WITH URINARY OBSTRUCTION: Status: ACTIVE | Noted: 2023-07-06

## 2023-07-06 PROBLEM — N13.8 BENIGN PROSTATIC HYPERPLASIA WITH URINARY OBSTRUCTION: Status: ACTIVE | Noted: 2023-07-06

## 2023-07-06 LAB
EKG P AXIS: NORMAL DEGREES
EKG P-R INTERVAL: NORMAL MS
EKG Q-T INTERVAL: 486 MS
EKG QRS DURATION: 152 MS
EKG QTC CALCULATION (BAZETT): 469 MS
EKG T AXIS: 108 DEGREES

## 2023-07-06 PROCEDURE — 7100000000 HC PACU RECOVERY - FIRST 15 MIN: Performed by: UROLOGY

## 2023-07-06 PROCEDURE — 7100000001 HC PACU RECOVERY - ADDTL 15 MIN: Performed by: UROLOGY

## 2023-07-06 PROCEDURE — 3600000004 HC SURGERY LEVEL 4 BASE: Performed by: UROLOGY

## 2023-07-06 PROCEDURE — 7100000010 HC PHASE II RECOVERY - FIRST 15 MIN: Performed by: UROLOGY

## 2023-07-06 PROCEDURE — 93010 ELECTROCARDIOGRAM REPORT: CPT | Performed by: INTERNAL MEDICINE

## 2023-07-06 PROCEDURE — 2709999900 HC NON-CHARGEABLE SUPPLY: Performed by: UROLOGY

## 2023-07-06 PROCEDURE — 2500000003 HC RX 250 WO HCPCS: Performed by: NURSE ANESTHETIST, CERTIFIED REGISTERED

## 2023-07-06 PROCEDURE — 3700000001 HC ADD 15 MINUTES (ANESTHESIA): Performed by: UROLOGY

## 2023-07-06 PROCEDURE — 2580000003 HC RX 258: Performed by: UROLOGY

## 2023-07-06 PROCEDURE — 3700000000 HC ANESTHESIA ATTENDED CARE: Performed by: UROLOGY

## 2023-07-06 PROCEDURE — 6360000002 HC RX W HCPCS: Performed by: NURSE ANESTHETIST, CERTIFIED REGISTERED

## 2023-07-06 PROCEDURE — 88305 TISSUE EXAM BY PATHOLOGIST: CPT

## 2023-07-06 PROCEDURE — 2580000003 HC RX 258: Performed by: ANESTHESIOLOGY

## 2023-07-06 PROCEDURE — 7100000011 HC PHASE II RECOVERY - ADDTL 15 MIN: Performed by: UROLOGY

## 2023-07-06 PROCEDURE — 3600000014 HC SURGERY LEVEL 4 ADDTL 15MIN: Performed by: UROLOGY

## 2023-07-06 PROCEDURE — 6360000002 HC RX W HCPCS: Performed by: UROLOGY

## 2023-07-06 RX ORDER — ONDANSETRON 2 MG/ML
4 INJECTION INTRAMUSCULAR; INTRAVENOUS EVERY 4 HOURS PRN
Status: DISCONTINUED | OUTPATIENT
Start: 2023-07-06 | End: 2023-07-06 | Stop reason: HOSPADM

## 2023-07-06 RX ORDER — HYDROMORPHONE HYDROCHLORIDE 1 MG/ML
0.5 INJECTION, SOLUTION INTRAMUSCULAR; INTRAVENOUS; SUBCUTANEOUS EVERY 5 MIN PRN
Status: CANCELLED | OUTPATIENT
Start: 2023-07-06

## 2023-07-06 RX ORDER — LIDOCAINE HYDROCHLORIDE 10 MG/ML
INJECTION, SOLUTION EPIDURAL; INFILTRATION; INTRACAUDAL; PERINEURAL PRN
Status: DISCONTINUED | OUTPATIENT
Start: 2023-07-06 | End: 2023-07-06 | Stop reason: SDUPTHER

## 2023-07-06 RX ORDER — HYDROMORPHONE HYDROCHLORIDE 1 MG/ML
0.25 INJECTION, SOLUTION INTRAMUSCULAR; INTRAVENOUS; SUBCUTANEOUS EVERY 5 MIN PRN
Status: CANCELLED | OUTPATIENT
Start: 2023-07-06

## 2023-07-06 RX ORDER — MIDAZOLAM HYDROCHLORIDE 1 MG/ML
INJECTION INTRAMUSCULAR; INTRAVENOUS PRN
Status: DISCONTINUED | OUTPATIENT
Start: 2023-07-06 | End: 2023-07-06 | Stop reason: SDUPTHER

## 2023-07-06 RX ORDER — SODIUM CHLORIDE 9 MG/ML
INJECTION, SOLUTION INTRAVENOUS PRN
Status: CANCELLED | OUTPATIENT
Start: 2023-07-06

## 2023-07-06 RX ORDER — FINASTERIDE 5 MG/1
5 TABLET, FILM COATED ORAL DAILY
Qty: 30 TABLET | Refills: 11 | Status: SHIPPED | OUTPATIENT
Start: 2023-07-06

## 2023-07-06 RX ORDER — SODIUM CHLORIDE 0.9 % (FLUSH) 0.9 %
5-40 SYRINGE (ML) INJECTION EVERY 12 HOURS SCHEDULED
Status: CANCELLED | OUTPATIENT
Start: 2023-07-06

## 2023-07-06 RX ORDER — OXYCODONE HYDROCHLORIDE AND ACETAMINOPHEN 5; 325 MG/1; MG/1
2 TABLET ORAL EVERY 4 HOURS PRN
Status: DISCONTINUED | OUTPATIENT
Start: 2023-07-06 | End: 2023-07-06 | Stop reason: HOSPADM

## 2023-07-06 RX ORDER — LEVOFLOXACIN 5 MG/ML
500 INJECTION, SOLUTION INTRAVENOUS ONCE
Status: COMPLETED | OUTPATIENT
Start: 2023-07-06 | End: 2023-07-06

## 2023-07-06 RX ORDER — DEXMEDETOMIDINE HYDROCHLORIDE 4 UG/ML
INJECTION, SOLUTION INTRAVENOUS CONTINUOUS PRN
Status: DISCONTINUED | OUTPATIENT
Start: 2023-07-06 | End: 2023-07-06 | Stop reason: SDUPTHER

## 2023-07-06 RX ORDER — SODIUM CHLORIDE, SODIUM LACTATE, POTASSIUM CHLORIDE, CALCIUM CHLORIDE 600; 310; 30; 20 MG/100ML; MG/100ML; MG/100ML; MG/100ML
INJECTION, SOLUTION INTRAVENOUS CONTINUOUS
Status: DISCONTINUED | OUTPATIENT
Start: 2023-07-06 | End: 2023-07-06 | Stop reason: HOSPADM

## 2023-07-06 RX ORDER — PROPOFOL 10 MG/ML
INJECTION, EMULSION INTRAVENOUS PRN
Status: DISCONTINUED | OUTPATIENT
Start: 2023-07-06 | End: 2023-07-06 | Stop reason: SDUPTHER

## 2023-07-06 RX ORDER — SODIUM CHLORIDE 0.9 % (FLUSH) 0.9 %
5-40 SYRINGE (ML) INJECTION PRN
Status: CANCELLED | OUTPATIENT
Start: 2023-07-06

## 2023-07-06 RX ORDER — SODIUM CHLORIDE 9 MG/ML
INJECTION, SOLUTION INTRAVENOUS CONTINUOUS
Status: DISCONTINUED | OUTPATIENT
Start: 2023-07-06 | End: 2023-07-06 | Stop reason: HOSPADM

## 2023-07-06 RX ORDER — LEVOFLOXACIN 500 MG/1
500 TABLET, FILM COATED ORAL DAILY
Qty: 3 TABLET | Refills: 0 | Status: SHIPPED | OUTPATIENT
Start: 2023-07-07 | End: 2023-07-10

## 2023-07-06 RX ORDER — ONDANSETRON 2 MG/ML
4 INJECTION INTRAMUSCULAR; INTRAVENOUS
Status: CANCELLED | OUTPATIENT
Start: 2023-07-06 | End: 2023-07-07

## 2023-07-06 RX ORDER — FENTANYL CITRATE 50 UG/ML
INJECTION, SOLUTION INTRAMUSCULAR; INTRAVENOUS PRN
Status: DISCONTINUED | OUTPATIENT
Start: 2023-07-06 | End: 2023-07-06 | Stop reason: SDUPTHER

## 2023-07-06 RX ADMIN — PROPOFOL 30 MG: 10 INJECTION, EMULSION INTRAVENOUS at 15:18

## 2023-07-06 RX ADMIN — PROPOFOL 20 MG: 10 INJECTION, EMULSION INTRAVENOUS at 15:32

## 2023-07-06 RX ADMIN — FENTANYL CITRATE 25 MCG: 0.05 INJECTION, SOLUTION INTRAMUSCULAR; INTRAVENOUS at 14:59

## 2023-07-06 RX ADMIN — SODIUM CHLORIDE, POTASSIUM CHLORIDE, SODIUM LACTATE AND CALCIUM CHLORIDE: 600; 310; 30; 20 INJECTION, SOLUTION INTRAVENOUS at 13:06

## 2023-07-06 RX ADMIN — MIDAZOLAM 2 MG: 1 INJECTION INTRAMUSCULAR; INTRAVENOUS at 14:56

## 2023-07-06 RX ADMIN — LEVOFLOXACIN 500 MG: 500 INJECTION, SOLUTION INTRAVENOUS at 14:53

## 2023-07-06 RX ADMIN — FENTANYL CITRATE 25 MCG: 0.05 INJECTION, SOLUTION INTRAMUSCULAR; INTRAVENOUS at 15:11

## 2023-07-06 RX ADMIN — GENTAMICIN SULFATE 160 MG: 40 INJECTION, SOLUTION INTRAMUSCULAR; INTRAVENOUS at 15:02

## 2023-07-06 RX ADMIN — PROPOFOL 20 MG: 10 INJECTION, EMULSION INTRAVENOUS at 15:28

## 2023-07-06 RX ADMIN — FENTANYL CITRATE 25 MCG: 0.05 INJECTION, SOLUTION INTRAMUSCULAR; INTRAVENOUS at 15:14

## 2023-07-06 RX ADMIN — DEXMEDETOMIDINE HYDROCHLORIDE 0.3 MCG/KG/HR: 4 INJECTION, SOLUTION INTRAVENOUS at 14:54

## 2023-07-06 RX ADMIN — PROPOFOL 20 MG: 10 INJECTION, EMULSION INTRAVENOUS at 15:25

## 2023-07-06 RX ADMIN — LIDOCAINE HYDROCHLORIDE 50 MG: 10 INJECTION, SOLUTION EPIDURAL; INFILTRATION; INTRACAUDAL; PERINEURAL at 15:18

## 2023-07-06 RX ADMIN — FENTANYL CITRATE 25 MCG: 0.05 INJECTION, SOLUTION INTRAMUSCULAR; INTRAVENOUS at 15:21

## 2023-07-06 RX ADMIN — PROPOFOL 20 MG: 10 INJECTION, EMULSION INTRAVENOUS at 15:22

## 2023-07-06 ASSESSMENT — PAIN - FUNCTIONAL ASSESSMENT: PAIN_FUNCTIONAL_ASSESSMENT: NONE - DENIES PAIN

## 2023-07-06 ASSESSMENT — LIFESTYLE VARIABLES: SMOKING_STATUS: 0

## 2023-07-06 NOTE — DISCHARGE INSTRUCTIONS
Expect some blood in the bowel movement and when wiping for a few days. When this is subsided okay to restart any blood thinners. Take all antibiotics as prescribed beginning tomorrow 7/7/2023 until finished. Expect some blood in the urine may last several days to few weeks. Expect some blood in the ejaculation that can last several weeks to months. Call for fever or inability urinate         Sedation: Care Instructions  Overview     Sedation is the use of medicine to help you feel relaxed and comfortable during a procedure. The medicine is usually given in a vein (by IV). It may be used with numbing medicines. There are different levels of sedation. They range from being awake but relaxed to being completely unconscious. Which level you have will depend on the procedure and your needs. You will be watched closely by a doctor or nurse during sedation. Common side effects from sedation include:  Feeling sleepy or tired. (Your doctors and nurses will make sure you aren't too sleepy to go home.)  Feeling dizzy or unsteady. Follow-up care is a key part of your treatment and safety. Be sure to make and go to all appointments, and call your doctor if you are having problems. It's also a good idea to know your test results and keep a list of the medicines you take. How can you care for yourself at home? Activity    Don't do anything that requires attention to detail until you recover. This includes going to work or school, making important decisions, and signing any legal documents. It takes time for the medicine effects to completely wear off. For at least 24 hours, do not drive or operate any machinery. When you get home, make sure to rest until the anesthesia has worn off. Some people will feel drowsy or dizzy for up to a few hours after leaving the hospital.     Take your time and walk slowly. Sudden changes in position may cause nausea. Rest when you feel tired.  Getting enough sleep will help you

## 2023-07-06 NOTE — ADDENDUM NOTE
Addendum  created 07/06/23 1556 by BALBIR Santamaria - CRNA    Order list changed, Pharmacy for encounter modified

## 2023-07-06 NOTE — ANESTHESIA PRE PROCEDURE
Department of Anesthesiology  Preprocedure Note       Name:  Mike Means   Age:  58 y.o.  :  1960                                          MRN:  [id number]         Date:  2023      Surgeon: Sandie Shah):  Colette Ames MD    Procedure: TRANSRECTAL ULTRASOUND OF PROSTATE WITH TRANSRECTAL NEEDLE BIOPSY OF PROSTATE    Medications prior to admission:   Prior to Admission medications    Medication Sig Start Date End Date Taking?  Authorizing Provider   meloxicam (MOBIC) 15 MG tablet Take 1 tablet by mouth daily  Patient not taking: Reported on 2023   HARPAL Enriquez   amLODIPine (NORVASC) 5 MG tablet Take 1 tablet by mouth daily 22   Historical Provider, MD   mexiletine (MEXITIL) 150 MG capsule Take 1 capsule by mouth daily 22   Historical Provider, MD   VIAGRA 100 MG tablet Take 1 tablet by mouth as needed 22   Historical Provider, MD   topiramate (TOPAMAX) 25 MG tablet Take 1 tablet by mouth daily    Historical Provider, MD   ergocalciferol (ERGOCALCIFEROL) 1.25 MG (06232 UT) capsule Take 1,250 mcg by mouth daily    Historical Provider, MD   indomethacin (INDOCIN) 50 MG capsule Take 1 capsule by mouth daily as needed for Pain 20   Orvin Kayser, APRN   carvedilol (COREG) 12.5 MG tablet Take 1 tablet by mouth daily 19   Historical Provider, MD   colchicine (COLCRYS) 0.6 MG tablet Take 1 tablet by mouth daily as needed    Historical Provider, MD   fluticasone (FLONASE) 50 MCG/ACT nasal spray 2 sprays by Nasal route daily as needed    Historical Provider, MD   nitroGLYCERIN (NITROSTAT) 0.4 MG SL tablet Place 1 tablet under the tongue every 5 minutes as needed    Historical Provider, MD   lisinopril (PRINIVIL;ZESTRIL) 20 MG tablet Take 1 tablet by mouth nightly 19   Historical Provider, MD   aspirin 81 MG tablet Take 1 tablet by mouth daily  Patient not taking: Reported on 2023    Historical Provider, MD   simvastatin (ZOCOR) 20 MG tablet Take 1 tablet by mouth nightly

## 2023-07-06 NOTE — INTERVAL H&P NOTE
Update History & Physical    The patient's History and Physical of June 19, 2023 was reviewed with the patient and I examined the patient. There was no change. The surgical site was confirmed by the patient and me. Plan: The risks, benefits, expected outcome, and alternative to the recommended procedure have been discussed with the patient. Patient understands and wants to proceed with the procedure.      Electronically signed by Brien Marie MD on 7/6/2023 at 1:38 PM

## 2023-07-06 NOTE — PROGRESS NOTES
Patient was ready to leave and said that his friend was his  and was in the parking lot and would drive up when we come out. Discharge paperwork taken to review with responsible party. When arrived to doorway patient stated \"I am going to sit here in this chair and wait on him. \" Then quickly arose out of wheelchair and sat in stationary chair. I said I have to make sure you leave with your ride and that they sign your paperwork. He said he will be here shortly. Patient then made a phone call to friend and had a conversation with friend who said they would be there within the hour. Explained to patient that he had to return to the same day surgery and wait if his ride was not here in the parking lot. Patient then stated \"I am not returning to the same day surgery! I am sitting right here and waiting on my friend to come! \" I explained the reason and patient refused to return. Discharge instructions were reviewed with patient and nurse asked when friend arrived to please call nurse, patient stated that he would.   Electronically signed by Maddie Morgan RN on 7/6/2023 at 5:17 PM'

## 2023-07-06 NOTE — ANESTHESIA POSTPROCEDURE EVALUATION
Department of Anesthesiology  Postprocedure Note    Patient: Holly Martínez  MRN: [id number]  YOB: 1960  Date of evaluation: 7/6/2023      Procedure Summary     Date: 07/06/23 Room / Location: [facility] OR [facility]    Anesthesia Start: 1992 Anesthesia Stop:     Procedure: TRANSRECTAL ULTRASOUND OF PROSTATE WITH TRANSRECTAL NEEDLE BIOPSY OF PROSTATE Diagnosis:       BPH with elevated PSA      (BPH with elevated PSA [N40.0, R97.20])    Surgeons: Kallie Interiano MD Responsible Provider: BALBIR Samaniego CRNA    Anesthesia Type: general ASA Status: 3          Anesthesia Type: No value filed.     Debbie Phase I: Debbie Score: 10    Debbie Phase II:        Anesthesia Post Evaluation    Patient location during evaluation: PACU  Patient participation: complete - patient participated  Level of consciousness: awake and alert  Airway patency: patent  Nausea & Vomiting: no nausea and no vomiting  Complications: no  Cardiovascular status: blood pressure returned to baseline and hemodynamically stable  Respiratory status: acceptable, nonlabored ventilation and room air  Hydration status: stable  Multimodal analgesia pain management approach

## 2023-07-07 NOTE — OP NOTE
[facility], [address] [place] [address]                                OPERATIVE REPORT    PATIENT NAME: Jorge Gold                        :        1960  MED REC NO:   [id number]                              ROOM:  ACCOUNT NO:   [de-identified]                           ADMIT DATE: 2023  PROVIDER:     Trisha Zamudio MD    DATE OF PROCEDURE:  2023    TITLE OF OPERATION:  1. Transrectal ultrasound of the prostate. 2.  Transrectal needle biopsy of the prostate with ultrasound guidance. PREOPERATIVE DIAGNOSES:  1. Elevated PSA. 2.  BPH. POSTOPERATIVE DIAGNOSES:  1. Elevated PSA. 2.  BPH. ANESTHETIC:  MAC sedation. ATTENDING SURGEON:  Trisha Zamudio MD    ESTIMATED BLOOD LOSS:  0 mL. HISTORY:  The patient is a 59-year-old gentleman who has obstructive  voiding symptoms secondary to BPH. He was followed in our office. He  is on maximum combination of medical therapy with finasteride. He had  been on tamsulosin in the past without any benefits. He has a history  of prior biopsy in , and one on 2019, for PSA of 9.0. At that  time, prostatic volume was 97.8. He reported that he did not tolerate  the biopsy well and was not going to have another biopsy unless he was  put to sleep. His PSA has progressively increased. Despite being on  finasteride, his PSA on 2023, was up to 15.53, corrected value is  31.06. I have recommended that he proceed with transrectal ultrasound  and biopsy of the prostate. In addition, we will check prostatic volume  because he basically has failed treatment with medical therapy with  finasteride. Risks and complications of the procedure were discussed  with him including the risk of bleeding, infection, postprocedure  prostatic swelling with retention and difficulty urinating. He agrees  to proceed.   He was started on preoperative antibiotics and his  anticoagulation has

## 2023-07-14 ENCOUNTER — TELEPHONE (OUTPATIENT)
Dept: UROLOGY | Age: 63
End: 2023-07-14

## 2023-07-14 DIAGNOSIS — C61 PROSTATE CANCER (HCC): Primary | ICD-10-CM

## 2023-07-14 NOTE — TELEPHONE ENCOUNTER
I have called the patient and verified his date of birth. He is positive for Mic 4+3 in 1 core and Mic 3+3 in 1 core. He verbalized understanding. We will have him get a PSMA and CMP and follow-up with Dr. Christa Beyer for cancer consult.

## 2023-07-14 NOTE — RESULT ENCOUNTER NOTE
Office tried to schedule imaging for patient and was unable to reach central scheduling. Office will contact them again on Monday and call patient with appts.

## 2023-07-31 ENCOUNTER — HOSPITAL ENCOUNTER (OUTPATIENT)
Dept: NUCLEAR MEDICINE | Age: 63
Discharge: HOME | End: 2023-08-02
Payer: MEDICAID

## 2023-07-31 DIAGNOSIS — C61 PROSTATE CANCER (HCC): ICD-10-CM

## 2023-07-31 PROCEDURE — 3430000000 HC RX DIAGNOSTIC RADIOPHARMACEUTICAL: Performed by: NURSE PRACTITIONER

## 2023-07-31 PROCEDURE — A9596 HC RX DIAGNOSTIC RADIOPHARMACEUTICAL: HCPCS | Performed by: NURSE PRACTITIONER

## 2023-07-31 PROCEDURE — 78815 PET IMAGE W/CT SKULL-THIGH: CPT

## 2023-07-31 RX ADMIN — KIT FOR THE PREPARATION OF GALLIUM GA 68 GOZETOTIDE INJECTION 5 MILLICURIE: KIT INTRAVENOUS at 14:45

## 2023-08-30 ENCOUNTER — INITIAL CONSULT (OUTPATIENT)
Dept: UROLOGY | Age: 63
End: 2023-08-30
Payer: MEDICARE

## 2023-08-30 VITALS — TEMPERATURE: 98.2 F | BODY MASS INDEX: 38.42 KG/M2 | WEIGHT: 309 LBS | HEIGHT: 75 IN

## 2023-08-30 DIAGNOSIS — R35.0 BENIGN PROSTATIC HYPERPLASIA WITH URINARY FREQUENCY: ICD-10-CM

## 2023-08-30 DIAGNOSIS — C61 PROSTATE CANCER (HCC): ICD-10-CM

## 2023-08-30 DIAGNOSIS — C61 PROSTATE CANCER (HCC): Primary | ICD-10-CM

## 2023-08-30 DIAGNOSIS — N40.1 BENIGN PROSTATIC HYPERPLASIA WITH URINARY FREQUENCY: ICD-10-CM

## 2023-08-30 LAB
ALBUMIN SERPL-MCNC: 4.3 G/DL (ref 3.5–5.2)
ALP SERPL-CCNC: 79 U/L (ref 40–130)
ALT SERPL-CCNC: 9 U/L (ref 5–41)
ANION GAP SERPL CALCULATED.3IONS-SCNC: 11 MMOL/L (ref 7–19)
AST SERPL-CCNC: 17 U/L (ref 5–40)
BILIRUB SERPL-MCNC: 0.3 MG/DL (ref 0.2–1.2)
BUN SERPL-MCNC: 13 MG/DL (ref 8–23)
CALCIUM SERPL-MCNC: 9.4 MG/DL (ref 8.8–10.2)
CHLORIDE SERPL-SCNC: 105 MMOL/L (ref 98–111)
CO2 SERPL-SCNC: 25 MMOL/L (ref 22–29)
CREAT SERPL-MCNC: 1.1 MG/DL (ref 0.5–1.2)
GLUCOSE SERPL-MCNC: 94 MG/DL (ref 74–109)
POTASSIUM SERPL-SCNC: 4.5 MMOL/L (ref 3.5–5)
PROT SERPL-MCNC: 7.6 G/DL (ref 6.6–8.7)
SODIUM SERPL-SCNC: 141 MMOL/L (ref 136–145)

## 2023-08-30 PROCEDURE — 99215 OFFICE O/P EST HI 40 MIN: CPT | Performed by: UROLOGY

## 2023-08-30 ASSESSMENT — ENCOUNTER SYMPTOMS
BACK PAIN: 0
DIARRHEA: 0
VOMITING: 0
EYE REDNESS: 0
COUGH: 0
CONSTIPATION: 0
EYE DISCHARGE: 0
ABDOMINAL DISTENTION: 0
ABDOMINAL PAIN: 0
TROUBLE SWALLOWING: 0
NAUSEA: 0
SHORTNESS OF BREATH: 0

## 2023-08-31 ENCOUNTER — TELEPHONE (OUTPATIENT)
Dept: UROLOGY | Age: 63
End: 2023-08-31

## 2023-08-31 NOTE — TELEPHONE ENCOUNTER
Patient called office stating he needed to talk to Dr. Mima Lr or his nurse about his prostate cancer. Patient would not give any details pertaining to his questions but stated it was very important that someone calls him back. Please contact patient.

## 2023-09-08 NOTE — PROGRESS NOTES
Subjective    Mr. Grossman is 63 y.o. male    Chief Complaint: Discuss RALP     History of Present Illness  Patient here to discuss robotic assisted lap prostatectomy.  He is referred by Dr. Vj Patel.  Last PSA in 15.53.  Patient biopsy was positive in 2 out of 12 cores for [place] 4+3 equal 7 with perineural invasion identified.  PSMA PET scan shows no metastatic disease.  Prostate volume is 120 cc. AUA 15/35 with frequency, intermittency, urgency, straining, nocturia X 5.   Lab Results   Component Value Date    PSA 15.53 (H) 06/19/2023    PSA 19.52 (H) 05/03/2023    PSA 10.57 (H) 04/29/2020       The following portions of the patient's history were reviewed and updated as appropriate: allergies, current medications, past family history, past medical history, past social history, past surgical history and problem list.    Review of Systems      Current Outpatient Medications:     allopurinol (ZYLOPRIM) 300 MG tablet, Take 1 tablet by mouth Daily., Disp: , Rfl:     amiodarone (PACERONE) 200 MG tablet, Take 1 tablet by mouth Every 12 (Twelve) Hours., Disp: , Rfl:     cloNIDine (CATAPRES) 0.1 MG tablet, Take 1 tablet by mouth 2 (Two) Times a Day., Disp: , Rfl:     colchicine 0.6 MG tablet, Take 1 tablet by mouth Daily., Disp: , Rfl:     furosemide (LASIX) 20 MG tablet, Take 1 tablet by mouth Daily., Disp: 3 tablet, Rfl: 0    guaiFENesin (MUCINEX) 600 MG 12 hr tablet, Take 1 tablet by mouth Every 12 (Twelve) Hours., Disp: , Rfl:     ibuprofen (ADVIL,MOTRIN) 600 MG tablet, Take 1 tablet by mouth Every 6 (Six) Hours As Needed for Mild Pain ., Disp: 20 tablet, Rfl: 0    indomethacin (INDOCIN) 50 MG capsule, Take 1 capsule by mouth 3 (Three) Times a Day As Needed for Mild Pain., Disp: , Rfl:     lidocaine in D5W 4-5 MG/ML-% infusion, Infuse 2 mg/min into a venous catheter Continuous., Disp: , Rfl:     lisinopril (PRINIVIL,ZESTRIL) 20 MG tablet, Take 1 tablet by mouth Daily., Disp: , Rfl:     metoprolol tartrate  (LOPRESSOR) 5 MG/5ML injection, Infuse 5 mL into a venous catheter Every 6 (Six) Hours As Needed (HR>110)., Disp: 15 mL, Rfl:     pravastatin (PRAVACHOL) 20 MG tablet, TK 1 T PO QD, Disp: , Rfl: 3    sildenafil (VIAGRA) 50 MG tablet, Take 1 tablet by mouth Daily As Needed for Erectile Dysfunction., Disp: , Rfl:     valproic acid (DEPAKENE) 250 MG capsule, Take  by mouth Daily., Disp: , Rfl:     amLODIPine (NORVASC) 2.5 MG tablet, Take 1 tablet by mouth Daily., Disp: , Rfl:     amoxicillin-clavulanate (AUGMENTIN) 875-125 MG per tablet, Take 1 tablet by mouth 2 (Two) Times a Day. (Patient not taking: Reported on 9/13/2023), Disp: , Rfl:     apixaban (Eliquis) 5 MG tablet tablet, , Disp: , Rfl:     aspirin 81 MG EC tablet, Take 1 tablet by mouth Daily., Disp: , Rfl:     carvedilol (COREG) 12.5 MG tablet, Take 1 tablet by mouth 2 (Two) Times a Day., Disp: , Rfl:     chlorthalidone (HYGROTON) 25 MG tablet, , Disp: , Rfl:     cyclobenzaprine (FLEXERIL) 10 MG tablet, Take 10 mg by mouth 3 (Three) Times a Day As Needed for Muscle Spasms. (Patient not taking: Reported on 9/13/2023), Disp: , Rfl:     fluticasone (FLONASE) 50 MCG/ACT nasal spray, 2 sprays into the nostril(s) as directed by provider., Disp: , Rfl:     furosemide (LASIX) 40 MG tablet, Take 40 mg by mouth 2 (Two) Times a Day. (Patient not taking: Reported on 9/13/2023), Disp: , Rfl:     HYDROcodone-acetaminophen (NORCO) 5-325 MG per tablet, , Disp: , Rfl:     mexiletine (MEXITIL) 150 MG capsule, , Disp: , Rfl:     neomycin-polymyxin-hydrocortisone (CORTISPORIN) 3.5-70405-3 otic suspension, neomycin-polymyxin-hydrocort 3.5 mg-10,000 unit/mL-1 % ear drops,susp, Disp: , Rfl:     nitroglycerin (NITROSTAT) 0.4 MG SL tablet, Place 1 tablet under the tongue., Disp: , Rfl:     penicillin v potassium (VEETID) 500 MG tablet, Every 12 (Twelve) Hours., Disp: , Rfl:     pravastatin (PRAVACHOL) 40 MG tablet, Take 40 mg by mouth Daily. (Patient not taking: Reported on  "9/13/2023), Disp: , Rfl:     simvastatin (ZOCOR) 20 MG tablet, , Disp: , Rfl:     traMADol (ULTRAM) 50 MG tablet, Take 50 mg by mouth Every 6 (Six) Hours As Needed for Moderate Pain . (Patient not taking: Reported on 9/13/2023), Disp: , Rfl:     vardenafil (Levitra) 20 MG tablet, TAKE 1 TABLET BY ORAL ROUTE EVERY DAY AS NEEDED SEXUAL ACTIVITY MUST LAST 90 DAYS, Disp: , Rfl:     verapamil (CALAN) 80 MG tablet, Take 1 tablet by mouth Every 8 (Eight) Hours. (Patient not taking: Reported on 9/13/2023), Disp: , Rfl:     Past Medical History:   Diagnosis Date    Hyperlipidemia     Hypertension     Hypertrophic cardiomyopathy     s/p AICD    Migraine     Tachycardia     Ventricular tachycardia     Ventricular tachycardia, sustained 10/14/2016       Past Surgical History:   Procedure Laterality Date    CARDIAC ABLATION  01/28/2016 2/2016, 10/18/2016 (Dr. Dsos in [place], [place])    CARDIAC ABLATION      2018    CARDIAC CATHETERIZATION      CARDIAC DEFIBRILLATOR PLACEMENT      CARDIAC DEFIBRILLATOR PLACEMENT         Social History     Socioeconomic History    Marital status: Single   Tobacco Use    Smoking status: Never   Substance and Sexual Activity    Alcohol use: No    Drug use: No    Sexual activity: Defer       Family History   Family history unknown: Yes       Objective    Temp 97.8 °F (36.6 °C)   Ht 190.5 cm (75\")   Wt 136 kg (300 lb)   BMI 37.50 kg/m²     Physical Exam        Results for orders placed or performed during the hospital encounter of 07/09/19   Comprehensive Metabolic Panel    Specimen: Blood   Result Value Ref Range    Glucose 122 (H) 70 - 100 mg/dL    BUN 11 5 - 21 mg/dL    Creatinine 1.21 0.50 - 1.40 mg/dL    Sodium 143 135 - 145 mmol/L    Potassium 4.0 3.5 - 5.3 mmol/L    Chloride 108 98 - 110 mmol/L    CO2 22.0 (L) 24.0 - 31.0 mmol/L    Calcium 9.2 8.4 - 10.4 mg/dL    Total Protein 7.1 6.3 - 8.7 g/dL    Albumin 4.10 3.50 - 5.00 g/dL    ALT (SGPT) 23 0 - 54 U/L    AST (SGOT) 28 7 - 45 U/L    " Alkaline Phosphatase 74 24 - 120 U/L    Total Bilirubin 1.0 0.1 - 1.0 mg/dL    eGFR  African Amer 74 >60 mL/min/1.73    Globulin 3.0 gm/dL    A/G Ratio 1.4 1.1 - 2.5 g/dL    BUN/Creatinine Ratio 9.1 7.0 - 25.0    Anion Gap 13.0 4.0 - 13.0 mmol/L   Troponin    Specimen: Blood   Result Value Ref Range    Troponin I 0.035 (H) 0.000 - 0.034 ng/mL   Troponin    Specimen: Blood   Result Value Ref Range    Troponin I 0.031 0.000 - 0.034 ng/mL   CBC Auto Differential    Specimen: Blood   Result Value Ref Range    WBC 5.66 4.80 - 10.80 10*3/mm3    RBC 5.48 4.80 - 5.90 10*6/mm3    Hemoglobin 15.8 14.0 - 18.0 g/dL    Hematocrit 48.3 40.0 - 52.0 %    MCV 88.1 82.0 - 95.0 fL    MCH 28.8 28.0 - 32.0 pg    MCHC 32.7 (L) 33.0 - 36.0 g/dL    RDW 16.3 (H) 12.0 - 15.0 %    RDW-SD 52.2 40.0 - 54.0 fl    MPV 10.9 6.0 - 12.0 fL    Platelets 186 130 - 400 10*3/mm3    Neutrophil % 51.5 39.0 - 78.0 %    Lymphocyte % 36.2 15.0 - 45.0 %    Monocyte % 9.9 4.0 - 12.0 %    Eosinophil % 1.4 0.0 - 4.0 %    Basophil % 0.5 0.0 - 2.0 %    Immature Grans % 0.5 0.0 - 5.0 %    Neutrophils, Absolute 2.91 1.87 - 8.40 10*3/mm3    Lymphocytes, Absolute 2.05 0.72 - 4.86 10*3/mm3    Monocytes, Absolute 0.56 0.19 - 1.30 10*3/mm3    Eosinophils, Absolute 0.08 0.00 - 0.70 10*3/mm3    Basophils, Absolute 0.03 0.00 - 0.20 10*3/mm3    Immature Grans, Absolute 0.03 0.00 - 0.05 10*3/mm3    nRBC 0.0 0.0 - 0.2 /100 WBC   BNP    Specimen: Blood   Result Value Ref Range    proBNP 456.0 0.0 - 900.0 pg/mL   Phosphorus    Specimen: Blood   Result Value Ref Range    Phosphorus 3.1 2.5 - 4.5 mg/dL   Magnesium    Specimen: Blood   Result Value Ref Range    Magnesium 2.3 (H) 1.4 - 2.2 mg/dL   Light Blue Top   Result Value Ref Range    Extra Tube hold for add-on    Green Top (Gel)   Result Value Ref Range    Extra Tube Hold for add-ons.    Lavender Top   Result Value Ref Range    Extra Tube hold for add-on    Red Top   Result Value Ref Range    Extra Tube Hold for add-ons.     IPSS Questionnaire (AUA-7):  Incomplete emptying  Over the past month, how often have you had a sensation of not emptying your bladder completely after you finish?: Not at all (09/13/23 1530)  Frequency  Over the past month, how often have you had to urinate again less than two hours after you finishing urinating ?: More than half the time (09/13/23 1530)  Intermittency  Over the past month, how often have you found you stopped and started again several time when you urinated ?: Less thank 1 time in 5 (09/13/23 1530)  Urgency  Over the last month, how difficult  have you found it to postpone urination ?: more than half the time (09/13/23 1530)  Weak Stream  Over the past month, how often have you had a weak urinary stream ?: Not at all (09/13/23 1530)  Straining  Over the past month, how often have you had to push or strain to begin urination ?: Less than 1 time 5 (09/13/23 1530)  Nocturia  Over the past month, how many times did you most typically get up to urinate from the time you went to bed until the time you got up in the morning ?: at least 5 times (09/13/23 1530)  Quality of life due to urinary symptoms  If you were to spend the rest of your life with your urinary condition the way it is now, how would feel about that?: Unhappy (09/13/23 1530)    Scores  Total IPSS Score: 15 (09/13/23 1530)  Total Score = Symtomatic Level: moderately symptomatic: 8-19 (09/13/23 1530)       Assessment and Plan    Diagnoses and all orders for this visit:    1. Prostate cancer (Primary)  -     [facility]        Patient was initially interested in surgical management of his prostate cancer.  He does report now that he would rather undergo radiation.  The patient has researched proton therapy and is planning on deciding between that and IMRT.  I discussed IMRT with him today.  We discussed fiducial marker placement as well as SpaceOAR placement.  We discussed the risk of rectal injury with  SpaceOAR.   Referral made to Dr. Mcintyre.

## 2023-09-13 ENCOUNTER — PATIENT ROUNDING (BHMG ONLY) (OUTPATIENT)
Dept: UROLOGY | Facility: CLINIC | Age: 63
End: 2023-09-13
Payer: MEDICARE

## 2023-09-13 ENCOUNTER — OFFICE VISIT (OUTPATIENT)
Dept: UROLOGY | Facility: CLINIC | Age: 63
End: 2023-09-13
Payer: MEDICARE

## 2023-09-13 VITALS — WEIGHT: 300 LBS | TEMPERATURE: 97.8 F | BODY MASS INDEX: 37.3 KG/M2 | HEIGHT: 75 IN

## 2023-09-13 DIAGNOSIS — C61 PROSTATE CANCER: Primary | ICD-10-CM

## 2023-09-13 PROCEDURE — 99204 OFFICE O/P NEW MOD 45 MIN: CPT | Performed by: UROLOGY

## 2023-09-13 PROCEDURE — 1160F RVW MEDS BY RX/DR IN RCRD: CPT | Performed by: UROLOGY

## 2023-09-13 PROCEDURE — 1159F MED LIST DOCD IN RCRD: CPT | Performed by: UROLOGY

## 2023-09-13 RX ORDER — FLUTICASONE PROPIONATE 50 MCG
2 SPRAY, SUSPENSION (ML) NASAL
COMMUNITY

## 2023-09-13 RX ORDER — CARVEDILOL 12.5 MG/1
1 TABLET ORAL 2 TIMES DAILY
COMMUNITY

## 2023-09-13 RX ORDER — NITROGLYCERIN 0.4 MG/1
0.4 TABLET SUBLINGUAL
COMMUNITY

## 2023-09-13 RX ORDER — CHLORTHALIDONE 25 MG/1
TABLET ORAL
COMMUNITY

## 2023-09-13 RX ORDER — NEOMYCIN SULFATE, POLYMYXIN B SULFATE AND HYDROCORTISONE 10; 3.5; 1 MG/ML; MG/ML; [USP'U]/ML
SUSPENSION/ DROPS AURICULAR (OTIC)
COMMUNITY

## 2023-09-13 RX ORDER — VARDENAFIL HYDROCHLORIDE 20 MG/1
TABLET ORAL
COMMUNITY

## 2023-09-13 RX ORDER — ASPIRIN 81 MG/1
81 TABLET ORAL DAILY
COMMUNITY

## 2023-09-13 RX ORDER — SIMVASTATIN 20 MG
TABLET ORAL
COMMUNITY

## 2023-09-13 RX ORDER — MEXILETINE HYDROCHLORIDE 150 MG/1
CAPSULE ORAL
COMMUNITY

## 2023-09-13 RX ORDER — AMLODIPINE BESYLATE 2.5 MG/1
1 TABLET ORAL DAILY
COMMUNITY

## 2023-09-13 RX ORDER — PENICILLIN V POTASSIUM 500 MG/1
TABLET ORAL EVERY 12 HOURS SCHEDULED
COMMUNITY

## 2023-09-13 RX ORDER — HYDROCODONE BITARTRATE AND ACETAMINOPHEN 5; 325 MG/1; MG/1
TABLET ORAL
COMMUNITY

## 2023-09-13 NOTE — PROGRESS NOTES
September 13, 2023    Hello, may I speak with Jeanie Grossman?    My name is Heidi      I am  with [facility]  [address], [address]  [place] [address] [phone].    Before we get started may I verify your date of birth? 1960    I am calling to officially welcome you to our practice and ask about your recent visit. Is this a good time to talk? yes    Tell me about your visit with us. What things went well?  Yes, the visit went well.  The entire has staff has been friendly, professional, and efficient.         We're always looking for ways to make our patients' experiences even better. Do you have recommendations on ways we may improve?  no    Overall were you satisfied with your first visit to our practice? yes       I appreciate you taking the time to speak with me today. Is there anything else I can do for you? no      Thank you, and have a great day.

## 2023-09-15 ENCOUNTER — TELEPHONE (OUTPATIENT)
Dept: RADIATION ONCOLOGY | Facility: HOSPITAL | Age: 63
End: 2023-09-15
Payer: MEDICARE

## 2023-09-20 ENCOUNTER — TELEPHONE (OUTPATIENT)
Dept: RADIATION ONCOLOGY | Facility: HOSPITAL | Age: 63
End: 2023-09-20
Payer: MEDICARE

## 2023-09-20 NOTE — TELEPHONE ENCOUNTER
Attempted to call pt and left a VM for him to return my call to schedule a consult with Dr. Mcintyre.

## 2023-10-02 ENCOUNTER — HOSPITAL ENCOUNTER (OUTPATIENT)
Dept: RADIATION ONCOLOGY | Facility: HOSPITAL | Age: 63
Setting detail: RADIATION/ONCOLOGY SERIES
End: 2023-10-02
Payer: MEDICARE

## 2023-10-03 PROBLEM — Z78.9 NON-SMOKER: Status: ACTIVE | Noted: 2023-10-03

## 2023-10-03 NOTE — PROGRESS NOTES
[facility]CCristina Mcintyre MD      Kaleb Cade, APRN  ___________________________________________________________  [facility]  Department of Radiation Oncology  [address] [place] [id number]  Office:  [phone]  Fax: [phone]                DATE:  10/04/2023  PATIENT: Jeanie Grossman 1960                         MEDICAL RECORD #:  [id number]          Chief Complaint   Patient presents with    Prostate Cancer                                              Jeanie Grossman is a 63 y.o. male that has been referred to our office to discuss radiotherapy considerations for Adenocarcinoma of the Prostate.     History of Present Illness:  12/16/2019 - PSA: 9.37    12/31/2019 - Prostate biopsy per :  Prostate, left lateral base, needle biopsy:  Benign prostatic glands and stroma with mild chronic inflammation     Prostate, left base, needle biopsy:  Benign prostatic glands and stroma   Prostate, right base, needle biopsy: Benign prostatic glands and stroma with atrophy and mild chronic inflammation   Prostate, right lateral base, needle biopsy: Benign prostatic glands and stroma with atrophy and mild chronic inflammation   Prostate, left lateral mid, needle biopsy: Benign prostatic glands and stroma with atrophy and mild chronic inflammation   Prostate, left mid, needle biopsy: Benign prostatic glands and stroma with atrophy and mild chronic inflammation   Prostate, right mid, needle biopsy: Benign prostatic glands and stroma with atrophy and mild chronic inflammation   Prostate, right lateral mid, needle biopsy: Benign prostatic glands and stroma with atrophy and mild chronic inflammation   Prostate, left lateral apex, needle biopsy: Benign prostatic glands and stroma with atrophy and mild chronic inflammation   Prostate, left apex, needle biopsy: Benign prostatic glands and stroma with atrophy and mild chronic inflammation   Prostate, right apex, needle biopsy: Benign  prostatic glands and stroma with atrophy and mild chronic active inflammation   Prostate, right lateral apex, needle biopsy: Benign prostatic glands and stroma with atrophy and mild chronic inflammation     04/29/2020 - PSA: 10.57    05/11/2021 - PSA: 11.2    05/03/2023 - PSA: 19.52    06/19/2023 - PSA: 15.53    06/19/2023 - Appointment with :  His PSA remains elevated and has not been as expected on finasteride.   Therefore I recommend a transrectal ultrasound biopsy he says he cannot tolerate in the office was to be put to sleep.   He has a significant high risk for any anesthetic because of his cardiomyopathy.   He also has to be off his anticoagulation Eliquis     07/06/2023 - Prostate biopsy per :   Prostate, left lateral base needle biopsy: Benign prostatic parenchyma.   Prostate, left lateral mid needle biopsy: Benign prostatic parenchyma.   Prostate, left lateral apex needle biopsy: Benign prostatic parenchyma.   Prostate, left base needle biopsy: Benign prostatic parenchyma.   Prostate, left mid needle biopsy: Benign prostatic parenchyma.   Prostate, left apex needle biopsy: Benign prostatic parenchyma.   Prostate, right base needle biopsy:   Prostatic adenocarcinoma ([place]'s grade 4+3 equal 7), tumor measures 0.9 cm in greatest linear dimension and occupies approximately 75% of the evaluated core, grade group 3.   Perineural invasion is identified.   Prostate, right mid needle biopsy: Benign prostatic parenchyma.   Prostate, right apex needle biopsy:   Focal atypical small acinar proliferation.   Prostate, right lateral base needle biopsy:   Prostatic adenocarcinoma ([place]'s grade 3+3 equal 6), tumor measures 0.8 cm in greatest linear dimension and occupies approximately 95% of the evaluated core, grade group 1.   Perineural invasion is identified.   Prostate, right lateral mid needle biopsy: Benign prostatic parenchyma.   Prostate, right lateral apex needle biopsy: Benign prostatic  parenchyma.     07/31/2023 - PET Scan:  The PET CT examination demonstrates a generally physiologic distribution of activity in the thorax, as described in the report.   The examination demonstrates a generally physiologic distribution of activity in the abdomen and pelvis, as described in the report.  In addition, the prostate gland demonstrates a heterogeneous distribution of activity with multiple small foci of relatively increased activity which may represent malignancy.  Correlation with prostate MRI for more complete characterization of individual lesions is suggested if this is a clinical concern. There is also an unusual difference in the level of activity in the right lobe of the liver compared to the left lobe which is unexplained.  No discrete focal lesions are identified.  The patient may benefit from additional MRI evaluation for more complete characterization of the liver.   The examination demonstrates a generally physiologic distribution of activity in the included portions of the head and neck, as described in the report.   The examination demonstrates a generally physiologic distribution of activity in the included portions of the skeleton and extremeties, as described in the report.     08/30/2023 - Appointment with Dr. Patel:  Patient has intermediate unfavorable risk disease he was noted to have perineural invasion. And he has PSA greater than 10. He has intermediate risk disease.   We discussed treatment options he is not a candidate for active surveillance.  He is most interested in surgery and we will make referral to [facility] urology for robot prostatectomy he will need cardiology clearance we will get him into see his cardiologist in [place] if not to get his clearance through [facility] cardiology.   Should he not be a surgical candidate then I recommend he consider radiation therapy.   Regarding the PET scan he has asymmetrical uptake in the liver without any discrete  metastatic foci uncertain of clinical significance.   Otherwise there is no evidence of metastatic disease.    09/01/2023 -Appointment with Radiation Oncology - [facility]:      09/13/2023 - Appointment with Dr. Cruz:  Patient was initially interested in surgical management of his prostate cancer.    He does report now that he would rather undergo radiation.    The patient has researched proton therapy and is planning on deciding between that and IMRT.  I discussed IMRT with him today.    We discussed fiducial marker placement as well as SpaceOAR placement.    We discussed the risk of rectal injury with SpaceOAR.     Referral made to Dr. Mcintyre.         History obtained from  PATIENT and CHART    PAST MEDICAL HISTORY  Past Medical History:   Diagnosis Date    Hyperlipidemia     Hypertension     Hypertrophic cardiomyopathy     s/p AICD    Migraine     Prostate cancer     Tachycardia     Ventricular tachycardia     Ventricular tachycardia, sustained 10/14/2016      PAST SURGICAL HISTORY  Past Surgical History:   Procedure Laterality Date    CARDIAC ABLATION  01/28/2016 2/2016, 10/18/2016 (Dr. Doss in [place], [place])    CARDIAC ABLATION      2018    CARDIAC CATHETERIZATION      CARDIAC DEFIBRILLATOR PLACEMENT      CARDIAC DEFIBRILLATOR PLACEMENT      PROSTATE BIOPSY  2019    PROSTATE BIOPSY  2023      FAMILY HISTORY  Family history is unknown by patient.    SOCIAL HISTORY  Social History     Tobacco Use    Smoking status: Never   Substance Use Topics    Alcohol use: No    Drug use: No     ALLERGIES  Patient has no known allergies.     MEDICATIONS    Current Outpatient Medications:     allopurinol (ZYLOPRIM) 300 MG tablet, Take 1 tablet by mouth Daily., Disp: , Rfl:     amiodarone (PACERONE) 200 MG tablet, Take 1 tablet by mouth Every 12 (Twelve) Hours., Disp: , Rfl:     amLODIPine (NORVASC) 2.5 MG tablet, Take 1 tablet by mouth Daily., Disp: , Rfl:     amoxicillin-clavulanate (AUGMENTIN)  875-125 MG per tablet, Take 1 tablet by mouth 2 (Two) Times a Day. (Patient not taking: Reported on 9/13/2023), Disp: , Rfl:     apixaban (Eliquis) 5 MG tablet tablet, , Disp: , Rfl:     aspirin 81 MG EC tablet, Take 1 tablet by mouth Daily., Disp: , Rfl:     carvedilol (COREG) 12.5 MG tablet, Take 1 tablet by mouth 2 (Two) Times a Day., Disp: , Rfl:     chlorthalidone (HYGROTON) 25 MG tablet, , Disp: , Rfl:     cloNIDine (CATAPRES) 0.1 MG tablet, Take 1 tablet by mouth 2 (Two) Times a Day., Disp: , Rfl:     colchicine 0.6 MG tablet, Take 1 tablet by mouth Daily., Disp: , Rfl:     cyclobenzaprine (FLEXERIL) 10 MG tablet, Take 10 mg by mouth 3 (Three) Times a Day As Needed for Muscle Spasms. (Patient not taking: Reported on 9/13/2023), Disp: , Rfl:     fluticasone (FLONASE) 50 MCG/ACT nasal spray, 2 sprays into the nostril(s) as directed by provider., Disp: , Rfl:     furosemide (LASIX) 20 MG tablet, Take 1 tablet by mouth Daily., Disp: 3 tablet, Rfl: 0    furosemide (LASIX) 40 MG tablet, Take 40 mg by mouth 2 (Two) Times a Day. (Patient not taking: Reported on 9/13/2023), Disp: , Rfl:     guaiFENesin (MUCINEX) 600 MG 12 hr tablet, Take 1 tablet by mouth Every 12 (Twelve) Hours., Disp: , Rfl:     HYDROcodone-acetaminophen (NORCO) 5-325 MG per tablet, , Disp: , Rfl:     ibuprofen (ADVIL,MOTRIN) 600 MG tablet, Take 1 tablet by mouth Every 6 (Six) Hours As Needed for Mild Pain ., Disp: 20 tablet, Rfl: 0    indomethacin (INDOCIN) 50 MG capsule, Take 1 capsule by mouth 3 (Three) Times a Day As Needed for Mild Pain., Disp: , Rfl:     lidocaine in D5W 4-5 MG/ML-% infusion, Infuse 2 mg/min into a venous catheter Continuous., Disp: , Rfl:     lisinopril (PRINIVIL,ZESTRIL) 20 MG tablet, Take 1 tablet by mouth Daily., Disp: , Rfl:     metoprolol tartrate (LOPRESSOR) 5 MG/5ML injection, Infuse 5 mL into a venous catheter Every 6 (Six) Hours As Needed (HR>110)., Disp: 15 mL, Rfl:     mexiletine (MEXITIL) 150 MG capsule, , Disp: ,  "Rfl:     neomycin-polymyxin-hydrocortisone (CORTISPORIN) 3.5-70298-5 otic suspension, neomycin-polymyxin-hydrocort 3.5 mg-10,000 unit/mL-1 % ear drops,susp, Disp: , Rfl:     nitroglycerin (NITROSTAT) 0.4 MG SL tablet, Place 1 tablet under the tongue., Disp: , Rfl:     penicillin v potassium (VEETID) 500 MG tablet, Every 12 (Twelve) Hours., Disp: , Rfl:     pravastatin (PRAVACHOL) 20 MG tablet, TK 1 T PO QD, Disp: , Rfl: 3    pravastatin (PRAVACHOL) 40 MG tablet, Take 40 mg by mouth Daily. (Patient not taking: Reported on 9/13/2023), Disp: , Rfl:     sildenafil (VIAGRA) 50 MG tablet, Take 1 tablet by mouth Daily As Needed for Erectile Dysfunction., Disp: , Rfl:     simvastatin (ZOCOR) 20 MG tablet, , Disp: , Rfl:     traMADol (ULTRAM) 50 MG tablet, Take 50 mg by mouth Every 6 (Six) Hours As Needed for Moderate Pain . (Patient not taking: Reported on 9/13/2023), Disp: , Rfl:     valproic acid (DEPAKENE) 250 MG capsule, Take  by mouth Daily., Disp: , Rfl:     vardenafil (Levitra) 20 MG tablet, TAKE 1 TABLET BY ORAL ROUTE EVERY DAY AS NEEDED SEXUAL ACTIVITY MUST LAST 90 DAYS, Disp: , Rfl:     verapamil (CALAN) 80 MG tablet, Take 1 tablet by mouth Every 8 (Eight) Hours. (Patient not taking: Reported on 9/13/2023), Disp: , Rfl:     The following portions of the patient's history were reviewed and updated as appropriate: allergies, current medications, past family history, past medical history, past social history, past surgical history and problem list.    REVIEW OF SYSTEMS  Review of Systems  I have reviewed and confirmed the accuracy of the ROS as documented by the MA/RACHEAL/RASHEL Mcintyre III, MD    PHYSICAL EXAM  VITAL SIGNS:   Vitals:    10/04/23 1420 10/04/23 1427   BP: (!) 210/98  Comment: left arm; patient very anxious 172/82  Comment: right arm   Pulse: 76    SpO2: 95%  Comment: room air    Weight: (!) 144 kg (318 lb)    Height: 190.5 cm (75\")    PainSc: 0-No pain      Physical Exam      Performance Status: " ECOG (0) Fully active, able to carry on all predisease performance without restriction    Clinical Quality Measures  -Pain Documented by Standardized Tool, FPS Jeanie Grossman reports a pain score of 0.  Given his pain assessment as noted, treatment options were discussed and the following options were decided upon as a follow-up plan to address the patient's pain:  no pain,no plan given .  Pain Medications               aspirin 81 MG EC tablet Take 1 tablet by mouth Daily.    cyclobenzaprine (FLEXERIL) 10 MG tablet Take 10 mg by mouth 3 (Three) Times a Day As Needed for Muscle Spasms.    HYDROcodone-acetaminophen (NORCO) 5-325 MG per tablet     ibuprofen (ADVIL,MOTRIN) 600 MG tablet Take 1 tablet by mouth Every 6 (Six) Hours As Needed for Mild Pain .    indomethacin (INDOCIN) 50 MG capsule Take 1 capsule by mouth 3 (Three) Times a Day As Needed for Mild Pain.    traMADol (ULTRAM) 50 MG tablet Take 50 mg by mouth Every 6 (Six) Hours As Needed for Moderate Pain .    valproic acid (DEPAKENE) 250 MG capsule Take  by mouth Daily.          -Advanced Care Planning Advance Care Planning  ACP discussion was held with the patient during this visit. Patient does not have an advance directive, information provided.    -Body Mass Index Screening and Follow-Up Plan Class 2 Severe Obesity (BMI >=35 and <=39.9). Obesity-related health conditions include the following: none.     -Tobacco Use: Screening and Cessation Intervention Social History    Tobacco Use      Smoking status: Never      Smokeless tobacco: Not on file     PROSTATE PQRS #102   Bone Scan Use: PET scan completed   Risk of Recurrence: Intermediate risk PSA > 10-20/GL 7/T2b  Measure #104  Adjuvant Hormonal [place]: Hormonal Therapy Not Prescribed/Administered. Patient Reasons  Recurrence Risk: Intermediate risk PSA 10-20/GL 7/V6f-X4v    ASSESSMENT AND PLAN  1. Prostate cancer    2. Non-smoker      No orders of the defined types were placed in this encounter.    RECOMMENDATIONS:   Jeanie Grossman was diagnosed with prostatic adenocarcinoma, pre-PSA 15.53, [place] (4+3) 7.     Indications and rationale as well as risks, benefits and alternatives of therapy for carcinoma of the prostate were discussed today. Risks of radiation therapy includes but is not limited to radiation induced proctitis, cystitis, diarrhea, dysuria, frequency and bleeding from the bladder or rectum as well as a significant risk of permanent erectile dysfunction and progression of disease in spite of therapy with either local or systemic failure.  The option of definitive surgery has also been reviewed by the urologist as well as surveillance. We discussed the goals and plans of care with him and his family, answered all questions and he verbalizes understanding. I have seen, examined and reviewed this patient's medication list, appropriate labs and imaging studies as well as other physician notes.    A definitive course of fractionated radiation therapy is recommended to the prostate with ADT.  I anticipate a course of 7000 cGy over 28 treatment fractions. The patient verbalizes understanding, voices no further questions and wishes to proceed with recommended therapy.     He does desire to receive his treatment at [facility].  However, his current insurance coverage does not provide coverage at [facility].    We will facilitate initiation of ADT and he will investigate options for modification of his insurance coverage in order to see if he can receive his definitive radiotherapy at [facility].  We will want him on ADT for at least 2 months prior to initiation of radiotherapy, and also for placement of seeds and gel.    Will refer back to urologist for initiation of ADT, seed placement and Space OAR gel placement.     Continue ongoing management per primary care physician and other specialists.    Thank you for allowing me to assist in his care.    Time Spent: I spent 58 minutes caring for  Jeanie on this date of service. This time includes time spent by me in the following activities: preparing for the visit, reviewing tests, obtaining and/or reviewing a separately obtained history, performing a medically appropriate examination and/or evaluation, counseling and educating the patient/family/caregiver, ordering medications, tests, or procedures, referring and communicating with other health care professionals, documenting information in the medical record, independently interpreting results and communicating that information with the patient/family/caregiver, and care coordination.   Dejan Mcintyre III, MD  10/04/2023

## 2023-10-04 ENCOUNTER — CONSULT (OUTPATIENT)
Dept: RADIATION ONCOLOGY | Facility: HOSPITAL | Age: 63
End: 2023-10-04
Payer: MEDICARE

## 2023-10-04 VITALS
SYSTOLIC BLOOD PRESSURE: 172 MMHG | DIASTOLIC BLOOD PRESSURE: 82 MMHG | WEIGHT: 315 LBS | BODY MASS INDEX: 39.17 KG/M2 | HEART RATE: 76 BPM | HEIGHT: 75 IN | OXYGEN SATURATION: 95 %

## 2023-10-04 DIAGNOSIS — Z78.9 NON-SMOKER: ICD-10-CM

## 2023-10-04 DIAGNOSIS — C61 PROSTATE CANCER: Primary | ICD-10-CM

## 2023-10-04 PROCEDURE — G0463 HOSPITAL OUTPT CLINIC VISIT: HCPCS | Performed by: RADIOLOGY

## 2023-10-06 ENCOUNTER — TELEPHONE (OUTPATIENT)
Dept: RADIATION ONCOLOGY | Facility: HOSPITAL | Age: 63
End: 2023-10-06
Payer: MEDICARE

## 2023-10-06 NOTE — TELEPHONE ENCOUNTER
Mr. Grossman returned call and said he was fine with going to [place] for his Lupron injections. SW did inform the nurse.

## 2023-10-06 NOTE — TELEPHONE ENCOUNTER
Called Mr. Grossman to inform him [facility] is out of network with his insurance. SW called to see if he would like his Lupron injections at [facility]. Cristina Ngoc did not answer and this writer left a voicemail.

## 2023-10-12 ENCOUNTER — TELEPHONE (OUTPATIENT)
Dept: RADIATION ONCOLOGY | Facility: HOSPITAL | Age: 63
End: 2023-10-12
Payer: MEDICARE

## 2023-10-12 NOTE — TELEPHONE ENCOUNTER
Call placed to [employer] [phone] regarding request for prior auth on Lupron. Spoke with Tori. Pending review. Ref#: [id number]

## 2023-10-13 ENCOUNTER — TELEPHONE (OUTPATIENT)
Dept: RADIATION ONCOLOGY | Facility: HOSPITAL | Age: 63
End: 2023-10-13
Payer: MEDICARE

## 2023-10-13 NOTE — TELEPHONE ENCOUNTER
Call to [employer] to check status of prior auth for lupron. Spoke with Radha and prior auth still pending.     REF#: [id number]

## 2023-10-16 ENCOUNTER — TELEPHONE (OUTPATIENT)
Dept: RADIATION ONCOLOGY | Facility: HOSPITAL | Age: 63
End: 2023-10-16
Payer: MEDICARE

## 2023-10-16 DIAGNOSIS — C61 PROSTATE CANCER: Primary | ICD-10-CM

## 2023-10-16 NOTE — TELEPHONE ENCOUNTER
LINDA spoke to Mr. Grossman via phone. Mr. Grossman states he, wants to receive radiation treatment at a hospital that takes the insurance he currently has. At this time, he does not want to change his insurance. Radiation nurse did contact Dr. Yoo's office and verified they do take his insurance. Mr. Grossman is aware Dr. Yoo's office will contact him to set up an appointment.

## 2023-10-16 NOTE — TELEPHONE ENCOUNTER
Called and spoke with Hemalatha at [employer] [place]. She stated that the lupron injection is still pending auth. She stated that it has a turn around time of 14 days and should have a response no later than 10/20.     Ref#: [id number]

## 2023-10-20 ENCOUNTER — TELEPHONE (OUTPATIENT)
Dept: RADIATION ONCOLOGY | Facility: HOSPITAL | Age: 63
End: 2023-10-20
Payer: MEDICARE

## 2023-10-20 NOTE — TELEPHONE ENCOUNTER
"Patient called and left message stating that he had some \"prostate issues\" and needed to come in and see Dr. Francisco Javier ren.     Call back to patient. Patient advised that he needed to speak with Dr. Mcintyre. Nursing staff advised patient that due to his insurance he may have to be self-pay. Nursing staff proceeded to ask patient what \"issues\" he was having and patient either hung up or the phone disconnected.     Patient is scheduled to see Dr. Martin Yoo at [facility] next week.   "

## 2023-10-27 ENCOUNTER — APPOINTMENT (OUTPATIENT)
Dept: GENERAL RADIOLOGY | Facility: HOSPITAL | Age: 63
DRG: 309 | End: 2023-10-27
Payer: MEDICARE

## 2023-10-27 ENCOUNTER — HOSPITAL ENCOUNTER (INPATIENT)
Facility: HOSPITAL | Age: 63
LOS: 1 days | Discharge: HOME | DRG: 309 | End: 2023-10-28
Attending: EMERGENCY MEDICINE | Admitting: FAMILY MEDICINE
Payer: MEDICARE

## 2023-10-27 DIAGNOSIS — E66.01 MORBID OBESITY: ICD-10-CM

## 2023-10-27 DIAGNOSIS — I10 PRIMARY HYPERTENSION: ICD-10-CM

## 2023-10-27 DIAGNOSIS — I47.20 VENTRICULAR TACHYCARDIA: Primary | ICD-10-CM

## 2023-10-27 DIAGNOSIS — I50.22 CHRONIC HFREF (HEART FAILURE WITH REDUCED EJECTION FRACTION): ICD-10-CM

## 2023-10-27 LAB
AMPHET+METHAMPHET UR QL: NEGATIVE
AMPHETAMINES UR QL: NEGATIVE
ANION GAP SERPL CALCULATED.3IONS-SCNC: 9 MMOL/L (ref 5–15)
BARBITURATES UR QL SCN: NEGATIVE
BASOPHILS # BLD AUTO: 0.02 10*3/MM3 (ref 0–0.2)
BASOPHILS NFR BLD AUTO: 0.4 % (ref 0–1.5)
BENZODIAZ UR QL SCN: NEGATIVE
BUN SERPL-MCNC: 17 MG/DL (ref 8–23)
BUN/CREAT SERPL: 14.9 (ref 7–25)
BUPRENORPHINE SERPL-MCNC: NEGATIVE NG/ML
CALCIUM SPEC-SCNC: 8.6 MG/DL (ref 8.6–10.5)
CANNABINOIDS SERPL QL: NEGATIVE
CHLORIDE SERPL-SCNC: 108 MMOL/L (ref 98–107)
CO2 SERPL-SCNC: 24 MMOL/L (ref 22–29)
COCAINE UR QL: NEGATIVE
CREAT SERPL-MCNC: 1.14 MG/DL (ref 0.76–1.27)
DEPRECATED RDW RBC AUTO: 54.5 FL (ref 37–54)
EGFRCR SERPLBLD CKD-EPI 2021: 71.8 ML/MIN/1.73
EOSINOPHIL # BLD AUTO: 0.12 10*3/MM3 (ref 0–0.4)
EOSINOPHIL NFR BLD AUTO: 2.3 % (ref 0.3–6.2)
ERYTHROCYTE [DISTWIDTH] IN BLOOD BY AUTOMATED COUNT: 17.1 % (ref 12.3–15.4)
ETHANOL UR QL: <0.01 %
FENTANYL UR-MCNC: NEGATIVE NG/ML
GLUCOSE SERPL-MCNC: 116 MG/DL (ref 65–99)
HCT VFR BLD AUTO: 47.6 % (ref 37.5–51)
HGB BLD-MCNC: 15 G/DL (ref 13–17.7)
IMM GRANULOCYTES # BLD AUTO: 0.01 10*3/MM3 (ref 0–0.05)
IMM GRANULOCYTES NFR BLD AUTO: 0.2 % (ref 0–0.5)
INR PPP: 1.06 (ref 0.91–1.09)
LYMPHOCYTES # BLD AUTO: 1.4 10*3/MM3 (ref 0.7–3.1)
LYMPHOCYTES NFR BLD AUTO: 26.6 % (ref 19.6–45.3)
MAGNESIUM SERPL-MCNC: 2.1 MG/DL (ref 1.6–2.4)
MCH RBC QN AUTO: 27.9 PG (ref 26.6–33)
MCHC RBC AUTO-ENTMCNC: 31.5 G/DL (ref 31.5–35.7)
MCV RBC AUTO: 88.6 FL (ref 79–97)
METHADONE UR QL SCN: NEGATIVE
MONOCYTES # BLD AUTO: 0.41 10*3/MM3 (ref 0.1–0.9)
MONOCYTES NFR BLD AUTO: 7.8 % (ref 5–12)
NEUTROPHILS NFR BLD AUTO: 3.3 10*3/MM3 (ref 1.7–7)
NEUTROPHILS NFR BLD AUTO: 62.7 % (ref 42.7–76)
NRBC BLD AUTO-RTO: 0 /100 WBC (ref 0–0.2)
NT-PROBNP SERPL-MCNC: 1111 PG/ML (ref 0–900)
OPIATES UR QL: NEGATIVE
OXYCODONE UR QL SCN: NEGATIVE
PCP UR QL SCN: NEGATIVE
PLATELET # BLD AUTO: 158 10*3/MM3 (ref 140–450)
PMV BLD AUTO: 11.8 FL (ref 6–12)
POTASSIUM SERPL-SCNC: 4.3 MMOL/L (ref 3.5–5.2)
PROPOXYPH UR QL: NEGATIVE
PROTHROMBIN TIME: 13.9 SECONDS (ref 11.8–14.8)
RBC # BLD AUTO: 5.37 10*6/MM3 (ref 4.14–5.8)
SODIUM SERPL-SCNC: 141 MMOL/L (ref 136–145)
TRICYCLICS UR QL SCN: NEGATIVE
TROPONIN T SERPL HS-MCNC: 42 NG/L
TSH SERPL DL<=0.05 MIU/L-ACNC: 0.84 UIU/ML (ref 0.27–4.2)
WBC NRBC COR # BLD: 5.26 10*3/MM3 (ref 3.4–10.8)

## 2023-10-27 PROCEDURE — 25010000002 AMIODARONE IN DEXTROSE 5% 360-4.14 MG/200ML-% SOLUTION: Performed by: EMERGENCY MEDICINE

## 2023-10-27 PROCEDURE — 25010000002 LABETALOL 5 MG/ML SOLUTION: Performed by: EMERGENCY MEDICINE

## 2023-10-27 PROCEDURE — 84484 ASSAY OF TROPONIN QUANT: CPT | Performed by: EMERGENCY MEDICINE

## 2023-10-27 PROCEDURE — 84443 ASSAY THYROID STIM HORMONE: CPT | Performed by: EMERGENCY MEDICINE

## 2023-10-27 PROCEDURE — 82077 ASSAY SPEC XCP UR&BREATH IA: CPT | Performed by: EMERGENCY MEDICINE

## 2023-10-27 PROCEDURE — 83735 ASSAY OF MAGNESIUM: CPT | Performed by: EMERGENCY MEDICINE

## 2023-10-27 PROCEDURE — 80307 DRUG TEST PRSMV CHEM ANLYZR: CPT | Performed by: EMERGENCY MEDICINE

## 2023-10-27 PROCEDURE — 71045 X-RAY EXAM CHEST 1 VIEW: CPT

## 2023-10-27 PROCEDURE — 85610 PROTHROMBIN TIME: CPT | Performed by: EMERGENCY MEDICINE

## 2023-10-27 PROCEDURE — 36415 COLL VENOUS BLD VENIPUNCTURE: CPT

## 2023-10-27 PROCEDURE — 80048 BASIC METABOLIC PNL TOTAL CA: CPT | Performed by: EMERGENCY MEDICINE

## 2023-10-27 PROCEDURE — 99285 EMERGENCY DEPT VISIT HI MDM: CPT

## 2023-10-27 PROCEDURE — 85025 COMPLETE CBC W/AUTO DIFF WBC: CPT | Performed by: EMERGENCY MEDICINE

## 2023-10-27 PROCEDURE — 93010 ELECTROCARDIOGRAM REPORT: CPT | Performed by: EMERGENCY MEDICINE

## 2023-10-27 PROCEDURE — 83880 ASSAY OF NATRIURETIC PEPTIDE: CPT | Performed by: EMERGENCY MEDICINE

## 2023-10-27 PROCEDURE — 25010000002 AMIODARONE IN DEXTROSE 5% 150-4.21 MG/100ML-% SOLUTION: Performed by: EMERGENCY MEDICINE

## 2023-10-27 PROCEDURE — 93005 ELECTROCARDIOGRAM TRACING: CPT | Performed by: EMERGENCY MEDICINE

## 2023-10-27 PROCEDURE — 93005 ELECTROCARDIOGRAM TRACING: CPT | Performed by: INTERNAL MEDICINE

## 2023-10-27 RX ORDER — HYDROCODONE BITARTRATE AND ACETAMINOPHEN 5; 325 MG/1; MG/1
1 TABLET ORAL EVERY 8 HOURS PRN
Status: DISCONTINUED | OUTPATIENT
Start: 2023-10-27 | End: 2023-10-28 | Stop reason: HOSPADM

## 2023-10-27 RX ORDER — GUAIFENESIN 600 MG/1
600 TABLET, EXTENDED RELEASE ORAL EVERY 12 HOURS SCHEDULED
Status: DISCONTINUED | OUTPATIENT
Start: 2023-10-27 | End: 2023-10-28 | Stop reason: HOSPADM

## 2023-10-27 RX ORDER — CARVEDILOL 6.25 MG/1
12.5 TABLET ORAL 2 TIMES DAILY
Status: DISCONTINUED | OUTPATIENT
Start: 2023-10-27 | End: 2023-10-28 | Stop reason: HOSPADM

## 2023-10-27 RX ORDER — CHLORHEXIDINE GLUCONATE 500 MG/1
1 CLOTH TOPICAL EVERY 24 HOURS
Status: DISCONTINUED | OUTPATIENT
Start: 2023-10-28 | End: 2023-10-28 | Stop reason: HOSPADM

## 2023-10-27 RX ORDER — VALPROIC ACID 250 MG/1
250 CAPSULE, LIQUID FILLED ORAL DAILY
Status: DISCONTINUED | OUTPATIENT
Start: 2023-10-28 | End: 2023-10-28 | Stop reason: HOSPADM

## 2023-10-27 RX ORDER — CLONIDINE HYDROCHLORIDE 0.1 MG/1
0.1 TABLET ORAL EVERY 12 HOURS SCHEDULED
Status: DISCONTINUED | OUTPATIENT
Start: 2023-10-27 | End: 2023-10-28 | Stop reason: HOSPADM

## 2023-10-27 RX ORDER — CHLORHEXIDINE GLUCONATE 500 MG/1
1 CLOTH TOPICAL ONCE
Status: COMPLETED | OUTPATIENT
Start: 2023-10-27 | End: 2023-10-27

## 2023-10-27 RX ORDER — FUROSEMIDE 40 MG/1
40 TABLET ORAL
Status: DISCONTINUED | OUTPATIENT
Start: 2023-10-27 | End: 2023-10-28 | Stop reason: HOSPADM

## 2023-10-27 RX ORDER — ACETAMINOPHEN 650 MG/1
650 SUPPOSITORY RECTAL EVERY 4 HOURS PRN
Status: DISCONTINUED | OUTPATIENT
Start: 2023-10-27 | End: 2023-10-28 | Stop reason: HOSPADM

## 2023-10-27 RX ORDER — NITROGLYCERIN 0.4 MG/1
0.4 TABLET SUBLINGUAL
Status: DISCONTINUED | OUTPATIENT
Start: 2023-10-27 | End: 2023-10-28 | Stop reason: HOSPADM

## 2023-10-27 RX ORDER — ASPIRIN 81 MG/1
81 TABLET ORAL DAILY
Status: DISCONTINUED | OUTPATIENT
Start: 2023-10-28 | End: 2023-10-28 | Stop reason: HOSPADM

## 2023-10-27 RX ORDER — ATORVASTATIN CALCIUM 10 MG/1
10 TABLET, FILM COATED ORAL DAILY
Status: DISCONTINUED | OUTPATIENT
Start: 2023-10-28 | End: 2023-10-28 | Stop reason: HOSPADM

## 2023-10-27 RX ORDER — OXYMETAZOLINE HYDROCHLORIDE 0.05 G/100ML
2 SPRAY NASAL 2 TIMES DAILY
Status: DISCONTINUED | OUTPATIENT
Start: 2023-10-27 | End: 2023-10-28 | Stop reason: HOSPADM

## 2023-10-27 RX ORDER — SODIUM CHLORIDE 9 MG/ML
40 INJECTION, SOLUTION INTRAVENOUS AS NEEDED
Status: DISCONTINUED | OUTPATIENT
Start: 2023-10-27 | End: 2023-10-28 | Stop reason: HOSPADM

## 2023-10-27 RX ORDER — MEXILETINE HYDROCHLORIDE 150 MG/1
150 CAPSULE ORAL ONCE
Status: COMPLETED | OUTPATIENT
Start: 2023-10-27 | End: 2023-10-27

## 2023-10-27 RX ORDER — SODIUM CHLORIDE 0.9 % (FLUSH) 0.9 %
10 SYRINGE (ML) INJECTION EVERY 12 HOURS SCHEDULED
Status: DISCONTINUED | OUTPATIENT
Start: 2023-10-27 | End: 2023-10-28 | Stop reason: HOSPADM

## 2023-10-27 RX ORDER — ALLOPURINOL 300 MG/1
300 TABLET ORAL DAILY
Status: DISCONTINUED | OUTPATIENT
Start: 2023-10-28 | End: 2023-10-28 | Stop reason: HOSPADM

## 2023-10-27 RX ORDER — LORAZEPAM 0.5 MG/1
0.5 TABLET ORAL NIGHTLY PRN
Status: DISCONTINUED | OUTPATIENT
Start: 2023-10-27 | End: 2023-10-28 | Stop reason: HOSPADM

## 2023-10-27 RX ORDER — ACETAMINOPHEN 325 MG/1
650 TABLET ORAL EVERY 4 HOURS PRN
Status: DISCONTINUED | OUTPATIENT
Start: 2023-10-27 | End: 2023-10-28 | Stop reason: HOSPADM

## 2023-10-27 RX ORDER — LABETALOL HYDROCHLORIDE 5 MG/ML
20 INJECTION, SOLUTION INTRAVENOUS ONCE
Status: COMPLETED | OUTPATIENT
Start: 2023-10-27 | End: 2023-10-27

## 2023-10-27 RX ORDER — SODIUM CHLORIDE 0.9 % (FLUSH) 0.9 %
10 SYRINGE (ML) INJECTION AS NEEDED
Status: DISCONTINUED | OUTPATIENT
Start: 2023-10-27 | End: 2023-10-28 | Stop reason: HOSPADM

## 2023-10-27 RX ORDER — ONDANSETRON 2 MG/ML
4 INJECTION INTRAMUSCULAR; INTRAVENOUS EVERY 6 HOURS PRN
Status: DISCONTINUED | OUTPATIENT
Start: 2023-10-27 | End: 2023-10-28 | Stop reason: HOSPADM

## 2023-10-27 RX ORDER — MEXILETINE HYDROCHLORIDE 150 MG/1
150 CAPSULE ORAL EVERY 12 HOURS SCHEDULED
Status: DISCONTINUED | OUTPATIENT
Start: 2023-10-27 | End: 2023-10-28 | Stop reason: HOSPADM

## 2023-10-27 RX ORDER — LISINOPRIL 20 MG/1
20 TABLET ORAL DAILY
Status: DISCONTINUED | OUTPATIENT
Start: 2023-10-28 | End: 2023-10-28 | Stop reason: HOSPADM

## 2023-10-27 RX ADMIN — AMIODARONE HYDROCHLORIDE 1 MG/MIN: 1.8 INJECTION, SOLUTION INTRAVENOUS at 17:14

## 2023-10-27 RX ADMIN — AMIODARONE HYDROCHLORIDE 150 MG: 1.5 INJECTION, SOLUTION INTRAVENOUS at 17:14

## 2023-10-27 RX ADMIN — MEXILETINE HYDROCHLORIDE 150 MG: 150 CAPSULE ORAL at 21:21

## 2023-10-27 RX ADMIN — Medication 1 APPLICATION: at 21:21

## 2023-10-27 RX ADMIN — GUAIFENESIN 600 MG: 600 TABLET, EXTENDED RELEASE ORAL at 21:21

## 2023-10-27 RX ADMIN — Medication 2 SPRAY: at 21:59

## 2023-10-27 RX ADMIN — CLONIDINE HYDROCHLORIDE 0.1 MG: 0.1 TABLET ORAL at 21:20

## 2023-10-27 RX ADMIN — APIXABAN 5 MG: 5 TABLET, FILM COATED ORAL at 21:20

## 2023-10-27 RX ADMIN — MEXILETINE HYDROCHLORIDE 150 MG: 150 CAPSULE ORAL at 19:36

## 2023-10-27 RX ADMIN — LABETALOL HYDROCHLORIDE 20 MG: 5 INJECTION, SOLUTION INTRAVENOUS at 17:18

## 2023-10-27 RX ADMIN — CHLORHEXIDINE GLUCONATE 1 APPLICATION: 500 CLOTH TOPICAL at 21:21

## 2023-10-27 RX ADMIN — Medication 10 ML: at 21:22

## 2023-10-27 NOTE — ED PROVIDER NOTES
Subjective   History of Present Illness  Patient has got palpitation.  He is a 63-year-old is got a history of A-fib in the past came into the ED with palpitation.  There is no chest pain at this time.  There is  no  nausea vomiting his blood pressure slightly elevated.  No other complaints.    Palpitations  Palpitations quality:  Irregular  Onset quality:  Gradual  Progression:  Resolved  Chronicity:  Recurrent  Context: not anxiety, not appetite suppressants, not blood loss, not bronchodilators, not caffeine, not dehydration, not exercise, not illicit drugs and not nicotine    Relieved by:  Nothing  Worsened by:  Nothing  Ineffective treatments:  None tried  Associated symptoms: weakness    Associated symptoms: no back pain, no chest pressure, no cough, no diaphoresis, no dizziness, no malaise/fatigue, no nausea, no near-syncope, no numbness, no PND, no shortness of breath, no syncope and no vomiting    Risk factors: diabetes mellitus, heart disease and hx of atrial fibrillation    Risk factors: no hx of PE, no hx of thyroid disease and no OTC sinus medications        Review of Systems   Constitutional:  Negative for diaphoresis and malaise/fatigue.   HENT: Negative.     Respiratory:  Negative for cough and shortness of breath.    Cardiovascular:  Positive for palpitations. Negative for syncope, PND and near-syncope.   Gastrointestinal: Negative.  Negative for abdominal distention, abdominal pain, nausea and vomiting.   Endocrine: Negative.    Genitourinary: Negative.    Musculoskeletal: Negative.  Negative for back pain and neck pain.   Skin:  Negative for color change and pallor.   Neurological:  Positive for weakness. Negative for dizziness, syncope, light-headedness, numbness and headaches.   Hematological: Negative.  Does not bruise/bleed easily.   All other systems reviewed and are negative.      Past Medical History:   Diagnosis Date    Hyperlipidemia     Hypertension     Hypertrophic cardiomyopathy     s/p  AICD    Migraine     Prostate cancer     Tachycardia     Ventricular tachycardia     Ventricular tachycardia, sustained 10/14/2016       No Known Allergies    Past Surgical History:   Procedure Laterality Date    CARDIAC ABLATION  01/28/2016 2/2016, 10/18/2016 (Dr. Doss in [place], [place])    CARDIAC ABLATION      2018    CARDIAC CATHETERIZATION      CARDIAC DEFIBRILLATOR PLACEMENT      CARDIAC DEFIBRILLATOR PLACEMENT      PROSTATE BIOPSY  2019    PROSTATE BIOPSY  2023       Family History   Family history unknown: Yes       Social History     Socioeconomic History    Marital status: Single   Tobacco Use    Smoking status: Never   Substance and Sexual Activity    Alcohol use: No    Drug use: No    Sexual activity: Defer           Objective   Physical Exam  Vitals and nursing note reviewed. Exam conducted with a chaperone present.   Constitutional:       General: He is not in acute distress.     Appearance: Normal appearance. He is well-developed. He is ill-appearing. He is not toxic-appearing.   HENT:      Head: Normocephalic and atraumatic.      Comments: No head trauma no hemotympanum     Nose: Nose normal.      Mouth/Throat:      Mouth: Mucous membranes are moist.      Pharynx: Uvula midline.   Eyes:      General: Lids are normal. Lids are everted, no foreign bodies appreciated.      Conjunctiva/sclera: Conjunctivae normal.      Pupils: Pupils are equal, round, and reactive to light.   Neck:      Vascular: Normal carotid pulses. No carotid bruit or JVD.      Trachea: Trachea and phonation normal. No tracheal deviation.      Comments: No nuchal rigidity  Cardiovascular:      Rate and Rhythm: Normal rate and regular rhythm.      Chest Wall: PMI is not displaced.      Pulses: Normal pulses.      Heart sounds: Normal heart sounds.      No systolic murmur is present.      No gallop. S3 sounds present.   Pulmonary:      Effort: Pulmonary effort is normal. No tachypnea or respiratory distress.      Breath sounds: No  stridor. Examination of the right-lower field reveals rales. Examination of the left-lower field reveals rales. Rales present. No decreased breath sounds, wheezing or rhonchi.   Abdominal:      General: Abdomen is protuberant. Bowel sounds are normal. There is no distension.      Palpations: Abdomen is soft.      Tenderness: There is no abdominal tenderness.   Musculoskeletal:         General: No swelling. Normal range of motion.      Cervical back: Full passive range of motion without pain, normal range of motion and neck supple. No rigidity.      Right lower le+ Edema present.      Left lower le+ Edema present.      Comments: Lower extremity exam bilaterally is unremarkable.  There is no right or left calf tenderness .  There is no palpable venous cord.  No obvious difference in the size of the legs.  No pitting edema.  The dorsalis pedis and posterior tibial femoral and popliteal pulses are palpable and +2 bilaterally.  Homans sign is negative   Skin:     General: Skin is warm and dry.      Capillary Refill: Capillary refill takes 2 to 3 seconds. Capillary refill takes less than 2 seconds.      Coloration: Skin is not jaundiced or pale.      Nails: There is no clubbing.   Neurological:      General: No focal deficit present.      Mental Status: He is alert and oriented to person, place, and time.      GCS: GCS eye subscore is 4. GCS verbal subscore is 5. GCS motor subscore is 6.      Cranial Nerves: Cranial nerves 2-12 are intact. No cranial nerve deficit.      Motor: Motor function is intact.      Gait: Gait normal.      Deep Tendon Reflexes: Reflexes are normal and symmetric. Reflexes normal.   Psychiatric:         Speech: Speech normal.         Behavior: Behavior normal.         Procedures           ED Course  ED Course as of 10/27/23 1936   Fri Oct 27, 2023   1659 Paced rhythm with intraventricular conduction delay. [TS]   1805 .  Left ventricular myocardial perfusion demonstrates a large infarct  involving the inferior wall with extension into contiguous lateral wall particularly toward the apex.  There is no significant ischemia identified.   2.  The left ventricular ejection fraction (LVEF) is 35%.   3.  The left ventricular wall motion demonstrates decreased motion centered at the apex with extension into immediately contiguous portions of the other wall segments.   This was the patient was last stress test [TS]   1919 Pacemaker was interrogated which shows runs of VT which were treated with pacing out.  No shocks delivered patient is asymptomatic at this time was placed on amiodarone by me.  I have discussed this case with Dr. Ramachandran and  will give a dose of mexiletine I am not sure whether he is on mexiletine at this time we will admit to the medicine service [TS]   1922 There is a concern about noncompliance of medication. [TS]      ED Course User Index  [TS] Karlos Bailey MD                                           Medical Decision Making  Patient with palpitation denies any chest pain or shortness of breath he is got a history of poor EF.  Pacemaker will be interrogated.  Patient's cardiologist is in [place] but he has not seen them for a while the AICD was placed 3 years ago in [place].  He wants to move his cardiology evaluation to [place].    Problems Addressed:  Chronic HFrEF (heart failure with reduced ejection fraction): chronic illness or injury     Details: Patient has got a poor EF in the last echo  Morbid obesity: chronic illness or injury  Primary hypertension: chronic illness or injury  Ventricular tachycardia: acute illness or injury that poses a threat to life or bodily functions    Amount and/or Complexity of Data Reviewed  Labs: ordered.     Details: Labs reviewed  Radiology: ordered.  ECG/medicine tests: ordered.     Details: Intraventricular conduction delay  Discussion of management or test interpretation with external provider(s): Discussed with Dr. Ramachandran and  discussed with Dr. Rodas  Will be discussing this with Dr. Ba    Risk  Prescription drug management.  Decision regarding hospitalization.  Risk Details: Case discussed with the patient patient is on amiodarone drip at this time is can be admitted to medicine service.        Final diagnoses:   Ventricular tachycardia   Chronic HFrEF (heart failure with reduced ejection fraction)   Morbid obesity   Primary hypertension       ED Disposition  ED Disposition       ED Disposition   Decision to Admit    Condition   --    Comment   Level of Care: Critical Care [6]   Diagnosis: VT (ventricular tachycardia) [309603]   Admitting Physician: ANDREA RUBI [[id number]]   Attending Physician: ANDREA RUBI [[id number]]   Certification: I Certify That Inpatient Hospital Services Are Medically Necessary For Greater Than 2 Midnights                 No follow-up provider specified.       Medication List      No changes were made to your prescriptions during this visit.            Karlos Bailey MD  10/27/23 1922       Karlos Bailey MD  10/27/23 1936

## 2023-10-28 ENCOUNTER — DOCUMENTATION (OUTPATIENT)
Dept: CARDIOLOGY | Facility: CLINIC | Age: 63
End: 2023-10-28
Payer: MEDICARE

## 2023-10-28 VITALS
DIASTOLIC BLOOD PRESSURE: 74 MMHG | OXYGEN SATURATION: 94 % | TEMPERATURE: 98 F | RESPIRATION RATE: 18 BRPM | SYSTOLIC BLOOD PRESSURE: 140 MMHG | HEART RATE: 50 BPM

## 2023-10-28 LAB
ALBUMIN SERPL-MCNC: 3.6 G/DL (ref 3.5–5.2)
ALBUMIN/GLOB SERPL: 1.3 G/DL
ALP SERPL-CCNC: 58 U/L (ref 39–117)
ALT SERPL W P-5'-P-CCNC: 14 U/L (ref 1–41)
ANION GAP SERPL CALCULATED.3IONS-SCNC: 9 MMOL/L (ref 5–15)
AST SERPL-CCNC: 19 U/L (ref 1–40)
BASOPHILS # BLD AUTO: 0.03 10*3/MM3 (ref 0–0.2)
BASOPHILS NFR BLD AUTO: 0.6 % (ref 0–1.5)
BILIRUB SERPL-MCNC: 0.4 MG/DL (ref 0–1.2)
BUN SERPL-MCNC: 17 MG/DL (ref 8–23)
BUN/CREAT SERPL: 15.3 (ref 7–25)
CALCIUM SPEC-SCNC: 8.7 MG/DL (ref 8.6–10.5)
CHLORIDE SERPL-SCNC: 107 MMOL/L (ref 98–107)
CHOLEST SERPL-MCNC: 165 MG/DL (ref 0–200)
CO2 SERPL-SCNC: 23 MMOL/L (ref 22–29)
CREAT SERPL-MCNC: 1.11 MG/DL (ref 0.76–1.27)
DEPRECATED RDW RBC AUTO: 54.5 FL (ref 37–54)
EGFRCR SERPLBLD CKD-EPI 2021: 74.2 ML/MIN/1.73
EOSINOPHIL # BLD AUTO: 0.12 10*3/MM3 (ref 0–0.4)
EOSINOPHIL NFR BLD AUTO: 2.6 % (ref 0.3–6.2)
ERYTHROCYTE [DISTWIDTH] IN BLOOD BY AUTOMATED COUNT: 16.7 % (ref 12.3–15.4)
GEN 5 2HR TROPONIN T REFLEX: 39 NG/L
GLOBULIN UR ELPH-MCNC: 2.7 GM/DL
GLUCOSE SERPL-MCNC: 100 MG/DL (ref 65–99)
HBA1C MFR BLD: 6 % (ref 4.8–5.6)
HCT VFR BLD AUTO: 46.6 % (ref 37.5–51)
HDLC SERPL-MCNC: 33 MG/DL (ref 40–60)
HGB BLD-MCNC: 14.5 G/DL (ref 13–17.7)
IMM GRANULOCYTES # BLD AUTO: 0.01 10*3/MM3 (ref 0–0.05)
IMM GRANULOCYTES NFR BLD AUTO: 0.2 % (ref 0–0.5)
LDLC SERPL CALC-MCNC: 118 MG/DL (ref 0–100)
LDLC/HDLC SERPL: 3.56 {RATIO}
LYMPHOCYTES # BLD AUTO: 2.02 10*3/MM3 (ref 0.7–3.1)
LYMPHOCYTES NFR BLD AUTO: 43.1 % (ref 19.6–45.3)
MCH RBC QN AUTO: 27.8 PG (ref 26.6–33)
MCHC RBC AUTO-ENTMCNC: 31.1 G/DL (ref 31.5–35.7)
MCV RBC AUTO: 89.3 FL (ref 79–97)
MONOCYTES # BLD AUTO: 0.35 10*3/MM3 (ref 0.1–0.9)
MONOCYTES NFR BLD AUTO: 7.5 % (ref 5–12)
NEUTROPHILS NFR BLD AUTO: 2.16 10*3/MM3 (ref 1.7–7)
NEUTROPHILS NFR BLD AUTO: 46 % (ref 42.7–76)
NRBC BLD AUTO-RTO: 0 /100 WBC (ref 0–0.2)
PHOSPHATE SERPL-MCNC: 3.4 MG/DL (ref 2.5–4.5)
PLATELET # BLD AUTO: 132 10*3/MM3 (ref 140–450)
PMV BLD AUTO: 12.1 FL (ref 6–12)
POTASSIUM SERPL-SCNC: 4.1 MMOL/L (ref 3.5–5.2)
PROT SERPL-MCNC: 6.3 G/DL (ref 6–8.5)
RBC # BLD AUTO: 5.22 10*6/MM3 (ref 4.14–5.8)
SODIUM SERPL-SCNC: 139 MMOL/L (ref 136–145)
TRIGL SERPL-MCNC: 73 MG/DL (ref 0–150)
TROPONIN T DELTA: -10 NG/L
TROPONIN T SERPL HS-MCNC: 49 NG/L
VLDLC SERPL-MCNC: 14 MG/DL (ref 5–40)
WBC NRBC COR # BLD: 4.69 10*3/MM3 (ref 3.4–10.8)

## 2023-10-28 PROCEDURE — 80061 LIPID PANEL: CPT | Performed by: INTERNAL MEDICINE

## 2023-10-28 PROCEDURE — 84100 ASSAY OF PHOSPHORUS: CPT | Performed by: INTERNAL MEDICINE

## 2023-10-28 PROCEDURE — 93005 ELECTROCARDIOGRAM TRACING: CPT | Performed by: EMERGENCY MEDICINE

## 2023-10-28 PROCEDURE — 93010 ELECTROCARDIOGRAM REPORT: CPT | Performed by: EMERGENCY MEDICINE

## 2023-10-28 PROCEDURE — 25010000002 AMIODARONE IN DEXTROSE 5% 360-4.14 MG/200ML-% SOLUTION: Performed by: EMERGENCY MEDICINE

## 2023-10-28 PROCEDURE — 84484 ASSAY OF TROPONIN QUANT: CPT | Performed by: INTERNAL MEDICINE

## 2023-10-28 PROCEDURE — 80053 COMPREHEN METABOLIC PANEL: CPT | Performed by: INTERNAL MEDICINE

## 2023-10-28 PROCEDURE — 99222 1ST HOSP IP/OBS MODERATE 55: CPT | Performed by: INTERNAL MEDICINE

## 2023-10-28 PROCEDURE — 83036 HEMOGLOBIN GLYCOSYLATED A1C: CPT | Performed by: INTERNAL MEDICINE

## 2023-10-28 PROCEDURE — 85025 COMPLETE CBC W/AUTO DIFF WBC: CPT | Performed by: INTERNAL MEDICINE

## 2023-10-28 RX ORDER — MEXILETINE HYDROCHLORIDE 150 MG/1
150 CAPSULE ORAL 2 TIMES DAILY
Qty: 60 CAPSULE | Refills: 2 | Status: SHIPPED | OUTPATIENT
Start: 2023-10-28

## 2023-10-28 RX ORDER — AMIODARONE HYDROCHLORIDE 200 MG/1
200 TABLET ORAL DAILY
Qty: 60 TABLET | Refills: 2 | Status: SHIPPED | OUTPATIENT
Start: 2023-10-28 | End: 2023-10-28 | Stop reason: SDUPTHER

## 2023-10-28 RX ORDER — AMIODARONE HYDROCHLORIDE 200 MG/1
400 TABLET ORAL EVERY 8 HOURS
Status: DISCONTINUED | OUTPATIENT
Start: 2023-10-28 | End: 2023-10-28 | Stop reason: HOSPADM

## 2023-10-28 RX ORDER — AMIODARONE HYDROCHLORIDE 200 MG/1
200 TABLET ORAL DAILY
Qty: 60 TABLET | Refills: 2 | Status: SHIPPED | OUTPATIENT
Start: 2023-10-28 | End: 2023-10-28 | Stop reason: HOSPADM

## 2023-10-28 RX ORDER — AMIODARONE HYDROCHLORIDE 200 MG/1
200 TABLET ORAL EVERY 12 HOURS SCHEDULED
Qty: 60 TABLET | Status: ON HOLD
Start: 2023-10-28 | End: 2023-11-03

## 2023-10-28 RX ORDER — AMIODARONE HYDROCHLORIDE 200 MG/1
200 TABLET ORAL EVERY 12 HOURS SCHEDULED
Qty: 60 TABLET | Refills: 2
Start: 2023-10-28 | End: 2023-10-28 | Stop reason: SDUPTHER

## 2023-10-28 RX ORDER — MEXILETINE HYDROCHLORIDE 150 MG/1
150 CAPSULE ORAL 2 TIMES DAILY
Qty: 60 CAPSULE | Refills: 2 | Status: SHIPPED | OUTPATIENT
Start: 2023-10-28 | End: 2023-10-28 | Stop reason: SDUPTHER

## 2023-10-28 RX ADMIN — Medication 10 ML: at 08:09

## 2023-10-28 RX ADMIN — FUROSEMIDE 40 MG: 40 TABLET ORAL at 08:11

## 2023-10-28 RX ADMIN — LISINOPRIL 20 MG: 20 TABLET ORAL at 08:10

## 2023-10-28 RX ADMIN — ASPIRIN 81 MG: 81 TABLET, COATED ORAL at 08:12

## 2023-10-28 RX ADMIN — CARVEDILOL 12.5 MG: 6.25 TABLET, FILM COATED ORAL at 08:11

## 2023-10-28 RX ADMIN — GUAIFENESIN 600 MG: 600 TABLET, EXTENDED RELEASE ORAL at 08:10

## 2023-10-28 RX ADMIN — Medication 1 APPLICATION: at 08:09

## 2023-10-28 RX ADMIN — Medication 2 SPRAY: at 08:09

## 2023-10-28 RX ADMIN — ALLOPURINOL 300 MG: 300 TABLET ORAL at 08:11

## 2023-10-28 RX ADMIN — CLONIDINE HYDROCHLORIDE 0.1 MG: 0.1 TABLET ORAL at 08:10

## 2023-10-28 RX ADMIN — MEXILETINE HYDROCHLORIDE 150 MG: 150 CAPSULE ORAL at 08:09

## 2023-10-28 RX ADMIN — CHLORHEXIDINE GLUCONATE 1 APPLICATION: 500 CLOTH TOPICAL at 04:47

## 2023-10-28 RX ADMIN — AMIODARONE HYDROCHLORIDE 0.5 MG/MIN: 1.8 INJECTION, SOLUTION INTRAVENOUS at 00:01

## 2023-10-28 RX ADMIN — AMIODARONE HYDROCHLORIDE 400 MG: 200 TABLET ORAL at 08:10

## 2023-10-28 RX ADMIN — ATORVASTATIN CALCIUM 10 MG: 10 TABLET, FILM COATED ORAL at 08:11

## 2023-10-28 RX ADMIN — APIXABAN 5 MG: 5 TABLET, FILM COATED ORAL at 08:09

## 2023-10-28 NOTE — H&P
"    [facility] Medicine Services  HISTORY AND PHYSICAL    Date of Admission: 10/27/2023  Primary Care Physician: Conor Denny DO Subjective   Primary Historian: Patient    Chief Complaint: Palpitations    Palpitations   Pertinent negatives include no chest pain, coughing, fever, nausea or shortness of breath.     63-year-old male who presents emergency department with episode of palpitations.  He notes that he has been having trouble sleeping due to nasal congestion.  He went to the [employer] to get nasal spray, and started having irregular heartbeat at the store.  He states \"My heart was going crazy.\"  He has chronic lower extremity edema.  He did not have chest pain.  He notes that his irregular heartbeat has happened many times.  He has no acute bowel or kidney changes.        Review of Systems   Constitutional:  Negative for chills and fever.   Respiratory:  Negative for cough and shortness of breath.    Cardiovascular:  Positive for palpitations and leg swelling. Negative for chest pain.   Gastrointestinal:  Negative for diarrhea and nausea.   Psychiatric/Behavioral:  Positive for sleep disturbance.       Otherwise complete ROS reviewed and negative except as mentioned in the HPI.    Past Medical History:   Past Medical History:   Diagnosis Date    Hyperlipidemia     Hypertension     Hypertrophic cardiomyopathy     s/p AICD    Migraine     Prostate cancer     Tachycardia     Ventricular tachycardia     Ventricular tachycardia, sustained 10/14/2016     Past Surgical History:  Past Surgical History:   Procedure Laterality Date    CARDIAC ABLATION  01/28/2016 2/2016, 10/18/2016 (Dr. Doss in [place], [place])    CARDIAC ABLATION      2018    CARDIAC CATHETERIZATION      CARDIAC DEFIBRILLATOR PLACEMENT      CARDIAC DEFIBRILLATOR PLACEMENT      PROSTATE BIOPSY  2019    PROSTATE BIOPSY  2023     Social History:  reports that he has never smoked. He does not have any " smokeless tobacco history on file. He reports that he does not drink alcohol and does not use drugs.    Family History: Family history is unknown by patient.       Allergies:  No Known Allergies    Medications:  Prior to Admission medications    Medication Sig Start Date End Date Taking? Authorizing Provider   allopurinol (ZYLOPRIM) 300 MG tablet Take 1 tablet by mouth Daily.    Fabiola Casanova MD   amiodarone (PACERONE) 200 MG tablet Take 1 tablet by mouth Every 12 (Twelve) Hours. 10/2/17   Nia Garcia APRN   amLODIPine (NORVASC) 2.5 MG tablet Take 1 tablet by mouth Daily.    Fabiola Casanova MD   amoxicillin-clavulanate (AUGMENTIN) 875-125 MG per tablet Take 1 tablet by mouth 2 (Two) Times a Day.  Patient not taking: Reported on 9/13/2023    Fabiola Casanova MD   apixaban (Eliquis) 5 MG tablet tablet     Fabiola Casanova MD   aspirin 81 MG EC tablet Take 1 tablet by mouth Daily.    Fabiola Casanova MD   carvedilol (COREG) 12.5 MG tablet Take 1 tablet by mouth 2 (Two) Times a Day.    Fabiola Casanova MD   chlorthalidone (HYGROTON) 25 MG tablet     Fabiola Casanova MD   cloNIDine (CATAPRES) 0.1 MG tablet Take 1 tablet by mouth 2 (Two) Times a Day.    Fabiola Casanova MD   colchicine 0.6 MG tablet Take 1 tablet by mouth Daily.    Fabiola Casanova MD   cyclobenzaprine (FLEXERIL) 10 MG tablet Take 10 mg by mouth 3 (Three) Times a Day As Needed for Muscle Spasms.  Patient not taking: Reported on 9/13/2023    Fabiola Casanova MD   fluticasone (FLONASE) 50 MCG/ACT nasal spray 2 sprays into the nostril(s) as directed by provider.    Fabiola Casanova MD   furosemide (LASIX) 20 MG tablet Take 1 tablet by mouth Daily. 7/1/19   Luis Alberto Salgado PA-C   furosemide (LASIX) 40 MG tablet Take 40 mg by mouth 2 (Two) Times a Day.  Patient not taking: Reported on 9/13/2023    Fabiola Casanova MD   guaiFENesin (MUCINEX) 600 MG 12 hr tablet Take 1 tablet by mouth Every  12 (Twelve) Hours. 10/2/17   Nia Garcia APRN   HYDROcodone-acetaminophen (NORCO) 5-325 MG per tablet     Fabiola Casanova MD   ibuprofen (ADVIL,MOTRIN) 600 MG tablet Take 1 tablet by mouth Every 6 (Six) Hours As Needed for Mild Pain . 7/12/19   Brett Mendez MD   indomethacin (INDOCIN) 50 MG capsule Take 1 capsule by mouth 3 (Three) Times a Day As Needed for Mild Pain.    Fabiola Casanova MD   lidocaine in D5W 4-5 MG/ML-% infusion Infuse 2 mg/min into a venous catheter Continuous. 10/2/17   Nia Garcia APRN   lisinopril (PRINIVIL,ZESTRIL) 20 MG tablet Take 1 tablet by mouth Daily.    Fabiola Casanova MD   metoprolol tartrate (LOPRESSOR) 5 MG/5ML injection Infuse 5 mL into a venous catheter Every 6 (Six) Hours As Needed (HR>110). 10/2/17   Nia Garcia APRN   mexiletine (MEXITIL) 150 MG capsule     Fabiola Casanova MD   neomycin-polymyxin-hydrocortisone (CORTISPORIN) 3.5-06150-9 otic suspension neomycin-polymyxin-hydrocort 3.5 mg-10,000 unit/mL-1 % ear drops,susp    Fabiola Casanova MD   nitroglycerin (NITROSTAT) 0.4 MG SL tablet Place 1 tablet under the tongue.    Fabiola Casanova MD   penicillin v potassium (VEETID) 500 MG tablet Every 12 (Twelve) Hours.    Fabiola Casanova MD   pravastatin (PRAVACHOL) 20 MG tablet TK 1 T PO QD 10/28/16   Fabiola Casanova MD   pravastatin (PRAVACHOL) 40 MG tablet Take 40 mg by mouth Daily.  Patient not taking: Reported on 9/13/2023    Fabiola Casanova MD   sildenafil (VIAGRA) 50 MG tablet Take 1 tablet by mouth Daily As Needed for Erectile Dysfunction.    Fabiola Casanova MD   simvastatin (ZOCOR) 20 MG tablet     Fabiola Casanova MD   traMADol (ULTRAM) 50 MG tablet Take 50 mg by mouth Every 6 (Six) Hours As Needed for Moderate Pain .  Patient not taking: Reported on 9/13/2023    Fabiola Casanova MD   valproic acid (DEPAKENE) 250 MG capsule Take  by mouth Daily.    Fabiola Casanova MD   vardenafil  (Levitra) 20 MG tablet TAKE 1 TABLET BY ORAL ROUTE EVERY DAY AS NEEDED SEXUAL ACTIVITY MUST LAST 90 DAYS    Provider, MD Fabiola   verapamil (CALAN) 80 MG tablet Take 1 tablet by mouth Every 8 (Eight) Hours.  Patient not taking: Reported on 9/13/2023 10/2/17   Nia Garcia APRN I have utilized all available immediate resources to obtain, update, or review the patient's current medications (including all prescriptions, over-the-counter products, herbals, cannabis/cannabidiol products, and vitamin/mineral/dietary (nutritional) supplements).    Objective     Vital Signs: /92   Pulse 52   Temp 98.1 °F (36.7 °C)   Resp 18   SpO2 100%   Physical Exam  Vitals reviewed.   Constitutional:       Appearance: He is obese.   HENT:      Head: Normocephalic and atraumatic.      Right Ear: External ear normal.      Left Ear: External ear normal.      Nose: Nose normal.      Mouth/Throat:      Mouth: Mucous membranes are moist.      Pharynx: No oropharyngeal exudate.   Eyes:      General: No scleral icterus.     Conjunctiva/sclera: Conjunctivae normal.   Cardiovascular:      Rate and Rhythm: Normal rate and regular rhythm.      Heart sounds: Normal heart sounds.   Pulmonary:      Effort: Pulmonary effort is normal.      Breath sounds: Normal breath sounds.   Abdominal:      General: There is no distension.      Palpations: Abdomen is soft.      Tenderness: There is no abdominal tenderness.   Musculoskeletal:         General: Swelling present. No tenderness.      Cervical back: Normal range of motion and neck supple.      Right lower leg: Edema present.      Left lower leg: Edema present.   Skin:     General: Skin is warm and dry.   Neurological:      General: No focal deficit present.      Mental Status: He is alert.      Cranial Nerves: No cranial nerve deficit.   Psychiatric:         Mood and Affect: Mood normal.         Behavior: Behavior normal.        Results Reviewed:  Lab Results (last 24 hours)        Procedure Component Value Units Date/Time    Fentanyl, Urine - Urine, Clean Catch [[id number]]  (Normal) Collected: 10/27/23 1730    Specimen: Urine, Clean Catch Updated: 10/27/23 1803     Fentanyl, Urine Negative    Narrative:      Negative Threshold:      Fentanyl 5 ng/mL     The normal value for the drug tested is negative. This report includes final unconfirmed screening results to be used for medical treatment purposes only. Unconfirmed results must not be used for non-medical purposes such as employment or legal testing. Clinical consideration should be applied to any drug of abuse test, particularly when unconfirmed results are used.           Urine Drug Screen - Urine, Clean Catch [[id number]]  (Normal) Collected: 10/27/23 1730    Specimen: Urine, Clean Catch Updated: 10/27/23 1802     THC, Screen, Urine Negative     Phencyclidine (PCP), Urine Negative     Cocaine Screen, Urine Negative     Methamphetamine, Ur Negative     Opiate Screen Negative     Amphetamine Screen, Urine Negative     Benzodiazepine Screen, Urine Negative     Tricyclic Antidepressants Screen Negative     Methadone Screen, Urine Negative     Barbiturates Screen, Urine Negative     Oxycodone Screen, Urine Negative     Propoxyphene Screen Negative     Buprenorphine, Screen, Urine Negative    Narrative:      Cutoff For Drugs Screened:    Amphetamines               500 ng/ml  Barbiturates               200 ng/ml  Benzodiazepines            150 ng/ml  Cocaine                    150 ng/ml  Methadone                  200 ng/ml  Opiates                    100 ng/ml  Phencyclidine               25 ng/ml  THC                            50 ng/ml  Methamphetamine            500 ng/ml  Tricyclic Antidepressants  300 ng/ml  Oxycodone                  100 ng/ml  Propoxyphene               300 ng/ml  Buprenorphine               10 ng/ml    The normal value for all drugs tested is negative. This report includes unconfirmed screening results, with the cutoff  values listed, to be used for medical treatment purposes only.  Unconfirmed results must not be used for non-medical purposes such as employment or legal testing.  Clinical consideration should be applied to any drug of abuse test, particularly when unconfirmed results are used.      TSH [[id number]]  (Normal) Collected: 10/27/23 1642    Specimen: Blood Updated: 10/27/23 1724     TSH 0.843 uIU/mL     Basic Metabolic Panel [[id number]]  (Abnormal) Collected: 10/27/23 1642    Specimen: Blood Updated: 10/27/23 1717     Glucose 116 mg/dL      BUN 17 mg/dL      Creatinine 1.14 mg/dL      Sodium 141 mmol/L      Potassium 4.3 mmol/L      Comment: Slight hemolysis detected by analyzer. Results may be affected.        Chloride 108 mmol/L      CO2 24.0 mmol/L      Calcium 8.6 mg/dL      BUN/Creatinine Ratio 14.9     Anion Gap 9.0 mmol/L      eGFR 71.8 mL/min/1.73     Narrative:      GFR Normal >60  Chronic Kidney Disease <60  Kidney Failure <15      Magnesium [[id number]]  (Normal) Collected: 10/27/23 1642    Specimen: Blood Updated: 10/27/23 1717     Magnesium 2.1 mg/dL     BNP [[id number]]  (Abnormal) Collected: 10/27/23 1642    Specimen: Blood Updated: 10/27/23 1714     proBNP 1,111.0 pg/mL     Narrative:      This assay is used as an aid in the diagnosis of individuals suspected of having heart failure. It can be used as an aid in the diagnosis of acute decompensated heart failure (ADHF) in patients presenting with signs and symptoms of ADHF to the emergency department (ED). In addition, NT-proBNP of <300 pg/mL indicates ADHF is not likely.    Age Range Result Interpretation  NT-proBNP Concentration (pg/mL:      <50             Positive            >450                   Gray                 300-450                    Negative             <300    50-75           Positive            >900                  Gray                300-900                  Negative            <300      >75             Positive            >1800                   Mahoney                300-1800                  Negative            <300    Single High Sensitivity Troponin T [[id number]]  (Abnormal) Collected: 10/27/23 1642    Specimen: Blood Updated: 10/27/23 1714     HS Troponin T 42 ng/L     Narrative:      High Sensitive Troponin T Reference Range:  <10.0 ng/L- Negative Female for AMI  <15.0 ng/L- Negative Male for AMI  >=10 - Abnormal Female indicating possible myocardial injury.  >=15 - Abnormal Male indicating possible myocardial injury.   Clinicians would have to utilize clinical acumen, EKG, Troponin, and serial changes to determine if it is an Acute Myocardial Infarction or myocardial injury due to an underlying chronic condition.         Ethanol [[id number]] Collected: 10/27/23 1642    Specimen: Blood Updated: 10/27/23 1712     Ethanol % <0.010 %     Narrative:      Not for legal purposes. Chain of Custody not followed.     Protime-INR [[id number]]  (Normal) Collected: 10/27/23 1642    Specimen: Blood Updated: 10/27/23 1705     Protime 13.9 Seconds      INR 1.06    CBC & Differential [[id number]]  (Abnormal) Collected: 10/27/23 1642    Specimen: Blood Updated: 10/27/23 1656    Narrative:      The following orders were created for panel order CBC & Differential.  Procedure                               Abnormality         Status                     ---------                               -----------         ------                     CBC Auto Differential[[id number]]        Abnormal            Final result                 Please view results for these tests on the individual orders.    CBC Auto Differential [[id number]]  (Abnormal) Collected: 10/27/23 1642    Specimen: Blood Updated: 10/27/23 1656     WBC 5.26 10*3/mm3      RBC 5.37 10*6/mm3      Hemoglobin 15.0 g/dL      Hematocrit 47.6 %      MCV 88.6 fL      MCH 27.9 pg      MCHC 31.5 g/dL      RDW 17.1 %      RDW-SD 54.5 fl      MPV 11.8 fL      Platelets 158 10*3/mm3      Neutrophil % 62.7 %      Lymphocyte %  26.6 %      Monocyte % 7.8 %      Eosinophil % 2.3 %      Basophil % 0.4 %      Immature Grans % 0.2 %      Neutrophils, Absolute 3.30 10*3/mm3      Lymphocytes, Absolute 1.40 10*3/mm3      Monocytes, Absolute 0.41 10*3/mm3      Eosinophils, Absolute 0.12 10*3/mm3      Basophils, Absolute 0.02 10*3/mm3      Immature Grans, Absolute 0.01 10*3/mm3      nRBC 0.0 /100 WBC           Imaging Results (Last 24 Hours)       Procedure Component Value Units Date/Time    XR Chest 1 View [[id number]] Collected: 10/27/23 1702     Updated: 10/27/23 1706    Narrative:      EXAM: XR CHEST 1 VW- 10/27/2023 4:50 PM CDT     HISTORY: Palpitation       COMPARISON: 7/9/2019.     TECHNIQUE: Single frontal radiograph of the chest was obtained.     FINDINGS:      Support Devices: Cardiac pacemaker device with leads overlying the right  atrium and right ventricle.     Cardiac and Mediastinal Silhouettes: Heart size is enlarged, likely  unchanged.     Lungs/Pleura: No focal consolidation. No sizable pleural effusion. No  visible pneumothorax.     Osseous structures: No acute osseous finding.     Other: None.       Impression:         No acute cardiopulmonary abnormality.     Cardiomegaly.           This report was signed and finalized on 10/27/2023 5:02 PM CDT by Eris Mckeon.             I have personally reviewed and interpreted the radiology studies and ECG obtained at time of admission.     Assessment / Plan   Assessment:   Active Hospital Problems    Diagnosis     **VT (ventricular tachycardia)      Impression:  VT  Hypertrophic cardiomyopathy with AICD  Hypertension  Hyperlipidemia     Treatment Plan  Admit to the CCU  Continue current home medications  FU labs in the am with EKG  Consult with EP and cardiology.   5. Amiodarone drip started in the ED.     The patient will be admitted to my service here at [facility].  Primary team to take over the morning    Medical Decision Making  Number and Complexity of problems: 4,  moderate  Differential Diagnosis: Arrhythmia    Conditions and Status        Condition is unchanged.     [facility] Data  External documents reviewed: None  Cardiac tracing (EKG, telemetry) interpretation: None  Radiology interpretation: None  Labs reviewed: Reviewed  Any tests that were considered but not ordered: None     Decision rules/scores evaluated (example [id number], Wells, etc): None     Discussed with: Patient     Care Planning  Shared decision making: Patient and ED staff  Code status and discussions: Full    Disposition  Social Determinants of Health that impact treatment or disposition: Medication noncompliance  Estimated length of stay is 2 to 3 days.     I confirmed that the patient's advanced care plan is present, code status is documented, and a surrogate decision maker is listed in the patient's medical record.     The patient's surrogate decision maker is family.     The patient was seen and examined by me on 10/27/2023 at 730.    Electronically signed by Ruba Franz DO, 10/27/23, 20:14 CDT.

## 2023-10-28 NOTE — DISCHARGE SUMMARY
"    [facility] Medicine Services  DISCHARGE SUMMARY       Date of Admission: 10/27/2023  Date of Discharge:  10/28/2023  Primary Care Physician: Conor Denny,     Discharge Diagnoses:  Active Hospital Problems    Diagnosis     **VT (ventricular tachycardia)     Type 2 diabetes mellitus without complication     AICD (automatic cardioverter/defibrillator) present     Hypertrophic cardiomyopathy     Essential hypertension          Presenting Problem/History of Present Illness:  VT (ventricular tachycardia) [I47.20]     Chief Complaint on Day of Discharge:   No complaints    History of Present Illness on Day of Discharge:   The patient feels well today.  He has no complaints and is anxious for discharge home.  I discussed the case with Marixa Ramachandran and [place].  Dr. Ramachandran has nothing else to offer with regard to arrhythmia management.  I spoke at length with Dr. Rodas about the patient having been off mexiletine because he ran out and his former electrophysiologist at [place] is no longer in practice there and he could not have the prescription refilled.  He has been out of mexiletine for about 2 weeks now.  He has been instructed to restart mexiletine and amiodarone.  Dr. Rodas feels it is appropriate to discharge the patient today with follow-up in his office next week after restarting mexiletine.    Hospital Course  63-year-old male who presents emergency department with episode of palpitations.  He notes that he has been having trouble sleeping due to nasal congestion.  He went to the [employer] to get nasal spray, and started having irregular heartbeat at the store.  He states \"My heart was going crazy.\"  He has chronic lower extremity edema.  He did not have chest pain.  He notes that his irregular heartbeat has happened many times.  He has no acute bowel or kidney changes.   Treatment Plan  Admit to the CCU  Continue current home medications  FU labs in the am with " EKG  Consult with EP and cardiology.   5. Amiodarone drip started in the ED.         Consults:   Cardiology:  ASSESSMENT/PLAN:     1.  Ventricular tachycardia  2.  Hypertrophic cardiomyopathy  3.  Elevated troponin, considered myocardial injury, not an acute coronary syndrome, in the setting of problem #1  3.  Primary hypertension  4.  Mixed hyperlipidemia     -Both cardiology and electrophysiology were asked to consult on this patient.  Unfortunately, the patient has a significant arrhythmic history with VT, prior ablation, an ICD and is currently taking mexiletine as an outpatient.  Therefore, I will have to defer to electrophysiology regarding any further medication recommendations.  The patient is known to have no significant coronary artery disease therefore is not considered to need any invasive work-up in terms of coronary artery disease, etc.  -At this time, interventional cardiology will sign off of this patient but there is an EP consultation pending at this time, placed by the admitting physician overnight, for further recommendations.    Result Review    Result Review:  I have personally reviewed the results from the time of this admission to 10/28/2023 11:19 CDT and agree with these findings:  []  Laboratory  []  Microbiology  []  Radiology  []  EKG/Telemetry   []  Cardiology/Vascular   []  Pathology  []  Old records  []  Other:    Condition on Discharge:    Stable and at baseline with no further arrhythmias    Physical Exam on Discharge:  /74   Pulse 50   Temp 98 °F (36.7 °C) (Oral)   Resp 18   SpO2 94%   Physical Exam     Constitutional:       Appearance: He is obese.   HENT:      Head: Normocephalic and atraumatic.      Right Ear: External ear normal.      Left Ear: External ear normal.      Nose: Nose normal.      Mouth/Throat:      Mouth: Mucous membranes are moist.      Pharynx: No oropharyngeal exudate.   Eyes:      General: No scleral icterus.     Conjunctiva/sclera: Conjunctivae  normal.   Cardiovascular:      Rate and Rhythm: Normal rate and regular rhythm.      Heart sounds: Normal heart sounds.   Pulmonary:      Effort: Pulmonary effort is normal.      Breath sounds: Normal breath sounds.   Abdominal:      General: There is no distension.      Palpations: Abdomen is soft.      Tenderness: There is no abdominal tenderness.   Musculoskeletal:         General: Swelling present. No tenderness.      Cervical back: Normal range of motion and neck supple.      Right lower leg: Edema present.      Left lower leg: Edema present.   Skin:     General: Skin is warm and dry.   Neurological:      General: No focal deficit present.      Mental Status: He is alert.      Cranial Nerves: No cranial nerve deficit.   Psychiatric:         Mood and Affect: Mood normal.         Behavior: Behavior normal.       Discharge Disposition:  Home or Self Care    Discharge Medications:     Discharge Medications        Changes to Medications        Instructions Start Date   mexiletine 150 MG capsule  Commonly known as: MEXITIL  What changed: See the new instructions.   150 mg, Oral, 2 Times Daily             Continue These Medications        Instructions Start Date   allopurinol 300 MG tablet  Commonly known as: ZYLOPRIM   300 mg, Oral, Daily      amiodarone 200 MG tablet  Commonly known as: PACERONE   200 mg, Oral, Every 12 Hours Scheduled      amLODIPine 2.5 MG tablet  Commonly known as: NORVASC   1 tablet, Oral, Daily      aspirin 81 MG EC tablet   81 mg, Oral, Daily      chlorthalidone 25 MG tablet  Commonly known as: HYGROTON       cloNIDine 0.1 MG tablet  Commonly known as: CATAPRES   0.1 mg, Oral, 2 Times Daily      colchicine 0.6 MG tablet   0.6 mg, Oral, Daily      Eliquis 5 MG tablet tablet  Generic drug: apixaban       fluticasone 50 MCG/ACT nasal spray  Commonly known as: FLONASE   2 sprays, Nasal      guaiFENesin 600 MG 12 hr tablet  Commonly known as: MUCINEX   600 mg, Oral, Every 12 Hours Scheduled       HYDROcodone-acetaminophen 5-325 MG per tablet  Commonly known as: NORCO       lisinopril 20 MG tablet  Commonly known as: PRINIVIL,ZESTRIL   20 mg, Oral, Daily      nitroglycerin 0.4 MG SL tablet  Commonly known as: NITROSTAT   0.4 mg, Sublingual      sildenafil 50 MG tablet  Commonly known as: VIAGRA   50 mg, Oral, Daily PRN      simvastatin 20 MG tablet  Commonly known as: ZOCOR       valproic acid 250 MG capsule  Commonly known as: DEPAKENE   Oral, Daily             Stop These Medications      amoxicillin-clavulanate 875-125 MG per tablet  Commonly known as: AUGMENTIN     carvedilol 12.5 MG tablet  Commonly known as: COREG     cyclobenzaprine 10 MG tablet  Commonly known as: FLEXERIL     furosemide 20 MG tablet  Commonly known as: LASIX     ibuprofen 600 MG tablet  Commonly known as: ADVIL,MOTRIN     indomethacin 50 MG capsule  Commonly known as: INDOCIN     Levitra 20 MG tablet  Generic drug: vardenafil     lidocaine in D5W 4-5 MG/ML-% infusion     metoprolol tartrate 5 MG/5ML injection  Commonly known as: LOPRESSOR     neomycin-polymyxin-hydrocortisone 3.5-94510-3 otic suspension  Commonly known as: CORTISPORIN     penicillin v potassium 500 MG tablet  Commonly known as: VEETID     pravastatin 20 MG tablet  Commonly known as: PRAVACHOL     pravastatin 40 MG tablet  Commonly known as: PRAVACHOL     traMADol 50 MG tablet  Commonly known as: ULTRAM     verapamil 80 MG tablet  Commonly known as: CALAN            ASK your doctor about these medications        Instructions Start Date   furosemide 40 MG tablet  Commonly known as: LASIX   40 mg, 2 Times Daily               Discharge Diet:   Diet Instructions       Diet: Cardiac Diets; No Salt Packet; Thin (IDDSI 0)      Discharge Diet: Cardiac Diets    Cardiac Diet: No Salt Packet    Fluid Consistency: Thin (IDDSI 0)            Discharge Care Plan / Instructions:   Discharge home  Hold carvedilol until seen by Dr. Rodas (bradycardia into the 30s)    Activity at  Discharge:   Activity Instructions       Activity as Tolerated              Follow-up Appointments:  Follow-up with Dr. Rodas next week    Electronically signed by Aaron Frazier DO, 10/28/23, 11:19 CDT.    Time: Discharge less than 30 min    Part of this note may be an electronic transcription/translation of spoken language to printed text using the Dragon Dictation system.

## 2023-10-28 NOTE — PLAN OF CARE
Goal Outcome Evaluation:      - alert and oriented x 4 the entire shift. Arrived at 2005. Has been on the amiodarone drip the entire shift -- had several instances where his HR dropped into the mid-30s. HR: 50s-60s despite HR drops. Dr. Rodas was notified and wanted amiodarone stopped and to get a 400 mg tablet q8hr starting at 0800. BP has remained below 160. No complaints of dizziness, weakness, palpitations, chest pain, etc. Stated that he feels like his normal self. UOP: 700. Trop: 49. Ever since AMIO was stopped, his HR has not dropped. He has a pacemaker with a defibrillator.       Problem: Adult Inpatient Plan of Care  Goal: Absence of Hospital-Acquired Illness or Injury  Intervention: Identify and Manage Fall Risk  Recent Flowsheet Documentation  Taken 10/28/2023 0500 by Saeed Delgado RN  Safety Promotion/Fall Prevention:   safety round/check completed   room organization consistent   clutter free environment maintained  Taken 10/28/2023 0400 by Saeed Delgado RN  Safety Promotion/Fall Prevention:   safety round/check completed   room organization consistent   clutter free environment maintained  Taken 10/28/2023 0300 by Saeed Delgado RN  Safety Promotion/Fall Prevention:   safety round/check completed   room organization consistent   clutter free environment maintained  Taken 10/28/2023 0200 by Saeed Delgado RN  Safety Promotion/Fall Prevention:   safety round/check completed   room organization consistent   clutter free environment maintained  Taken 10/28/2023 0100 by Saeed Delgado RN  Safety Promotion/Fall Prevention:   safety round/check completed   room organization consistent   clutter free environment maintained  Taken 10/28/2023 0000 by Saeed Delgado RN  Safety Promotion/Fall Prevention:   safety round/check completed   room organization consistent   clutter free environment maintained  Taken 10/27/2023 2300 by Saeed Delgado RN  Safety Promotion/Fall Prevention:   safety round/check completed    room organization consistent   clutter free environment maintained  Taken 10/27/2023 2200 by Saeed Delgado RN  Safety Promotion/Fall Prevention:   safety round/check completed   room organization consistent   clutter free environment maintained  Taken 10/27/2023 2100 by Saeed Delgado RN  Safety Promotion/Fall Prevention:   safety round/check completed   room organization consistent   clutter free environment maintained  Taken 10/27/2023 2005 by Saeed Delgado RN  Safety Promotion/Fall Prevention:   safety round/check completed   room organization consistent   clutter free environment maintained  Intervention: Prevent Skin Injury  Recent Flowsheet Documentation  Taken 10/28/2023 0400 by Saeed Delgado RN  Body Position:   position changed independently   supine   weight shifting  Taken 10/28/2023 0200 by Saeed Delgado RN  Body Position:   position changed independently   supine   weight shifting  Taken 10/28/2023 0000 by Saeed Delgado RN  Body Position:   position changed independently   weight shifting   supine  Taken 10/27/2023 2200 by Saeed Delgado RN  Body Position:   position changed independently   weight shifting   supine  Taken 10/27/2023 2005 by Saeed Delgado RN  Body Position:   position changed independently   weight shifting   supine  Intervention: Prevent and Manage VTE (Venous Thromboembolism) Risk  Recent Flowsheet Documentation  Taken 10/28/2023 0400 by Saeed Delgado RN  Activity Management: bedrest  Taken 10/28/2023 0000 by Saeed Delgado RN  Activity Management: bedrest  VTE Prevention/Management: (SEE MAR) other (see comments)  Taken 10/27/2023 2200 by Saeed Delgado RN  Activity Management: bedrest  Taken 10/27/2023 2005 by Saeed Delgado RN  Activity Management: bedrest  VTE Prevention/Management: (SEE MAR) other (see comments)  Intervention: Prevent Infection  Recent Flowsheet Documentation  Taken 10/28/2023 0500 by Saeed Delgado RN  Infection Prevention:   single patient room provided    rest/sleep promoted   hand hygiene promoted  Taken 10/28/2023 0400 by Saeed Delgado RN  Infection Prevention:   single patient room provided   rest/sleep promoted   hand hygiene promoted  Taken 10/28/2023 0300 by Saeed Delgado RN  Infection Prevention:   single patient room provided   rest/sleep promoted   hand hygiene promoted  Taken 10/28/2023 0200 by Saeed Delgado RN  Infection Prevention:   single patient room provided   rest/sleep promoted   hand hygiene promoted  Goal: Optimal Comfort and Wellbeing  Intervention: Provide Person-Centered Care  Recent Flowsheet Documentation  Taken 10/28/2023 0400 by Saeed Delgado RN  Trust Relationship/Rapport:   care explained   choices provided   questions encouraged   reassurance provided   thoughts/feelings acknowledged  Taken 10/28/2023 0000 by Saeed Delgado RN  Trust Relationship/Rapport:   care explained   choices provided   reassurance provided   thoughts/feelings acknowledged  Taken 10/27/2023 2005 by Saeed Delgado RN  Trust Relationship/Rapport:   care explained   choices provided   reassurance provided   thoughts/feelings acknowledged  Goal: Readiness for Transition of Care  Intervention: Mutually Develop Transition Plan  Recent Flowsheet Documentation  Taken 10/28/2023 0214 by Saeed Delgado, RN  Equipment Currently Used at Home: none  Taken 10/28/2023 0209 by Saeed Delgado, RN  Transportation Anticipated: public transportation  Transportation Concerns: no car  Patient/Family Anticipated Services at Transition:   Patient/Family Anticipates Transition to: home with family

## 2023-10-28 NOTE — PROGRESS NOTES
EP Problems:   Ventricular tachycardia  - 1/2016:  VT ablation, [place]  - 10/2017:  VT ablation, [facility]  - 1/2019:  VT ablation, [facility]  2.  Presence of an ICD  - 2008:  DOI  -2/2022:  Gen change, [employer]    Cardiology problems:   HTN  Chronic systolic heart failure   HOCM

## 2023-10-28 NOTE — CONSULTS
Inpatient Cardiology Consult  Consult performed by: Martin Ramachandran MD  Consult ordered by: Ruba Franz DO  Reason for consult: VT        Chief Complaint   Patient presents with    Palpitations     HPI: This is a 63-year-old -American male with reported hypertrophic cardiomyopathy, an ICD in place, previous ICD discharges for ventricular tachycardia, also prior ablation who presents with complaints of palpitations.  The patient says that he was at a local store yesterday when he began to feel some palpitations and irregularity to his pulse.  He says that he has become very aware of ventricular arrhythmias and thought that he was likely in some type of ventricular arrhythmia once again.  He called EMS and was brought here for further evaluation.  Reportedly, the patient's ICD was interrogated and did show ventricular tachycardia but did not require any ICD discharges.  The patient says that he has had multiple ICD discharges in the past but has been on mexiletine for quite some time after previously being on amiodarone.  His rhythm has been stable since that time.  He also says that since his most recent generator change, he has not received any shocks from his defibrillator.  He denies having any preceding symptoms other than the fact that he has been having some trouble sleeping lately due to some congestion.  He denies having any chest pain, shortness of breath.  He notes some chronic lower extremity edema that is unchanged.  He denies having orthopnea, PND.  No lightheadedness, dizziness or syncope.    Past Medical History:   Diagnosis Date    Hyperlipidemia     Hypertension     Hypertrophic cardiomyopathy     s/p AICD    Migraine     Prostate cancer     Tachycardia     Ventricular tachycardia     Ventricular tachycardia, sustained 10/14/2016     Past Surgical History:   Procedure Laterality Date    CARDIAC ABLATION  01/28/2016 2/2016, 10/18/2016 (Dr. Doss in [place], [place])    CARDIAC  ABLATION      2018    CARDIAC CATHETERIZATION      CARDIAC DEFIBRILLATOR PLACEMENT      CARDIAC DEFIBRILLATOR PLACEMENT      PROSTATE BIOPSY  2019    PROSTATE BIOPSY  2023     Prior to Admission medications    Medication Sig Start Date End Date Taking? Authorizing Provider   allopurinol (ZYLOPRIM) 300 MG tablet Take 1 tablet by mouth Daily.    Fabiola Casanova MD   amiodarone (PACERONE) 200 MG tablet Take 1 tablet by mouth Every 12 (Twelve) Hours. 10/2/17   Nia Garcia APRN   amLODIPine (NORVASC) 2.5 MG tablet Take 1 tablet by mouth Daily.    Fabiola Casanova MD   amoxicillin-clavulanate (AUGMENTIN) 875-125 MG per tablet Take 1 tablet by mouth 2 (Two) Times a Day.  Patient not taking: Reported on 9/13/2023    Fabiola Casanova MD   apixaban (Eliquis) 5 MG tablet tablet     Fabiola Casanova MD   aspirin 81 MG EC tablet Take 1 tablet by mouth Daily.    Fabiola Casanova MD   carvedilol (COREG) 12.5 MG tablet Take 1 tablet by mouth 2 (Two) Times a Day.    Fabiola Casanova MD   chlorthalidone (HYGROTON) 25 MG tablet     Fabiola Casanova MD   cloNIDine (CATAPRES) 0.1 MG tablet Take 1 tablet by mouth 2 (Two) Times a Day.    Fabiola Casanova MD   colchicine 0.6 MG tablet Take 1 tablet by mouth Daily.    Fabiola Casanova MD   cyclobenzaprine (FLEXERIL) 10 MG tablet Take 10 mg by mouth 3 (Three) Times a Day As Needed for Muscle Spasms.  Patient not taking: Reported on 9/13/2023    Fabiola Casanova MD   fluticasone (FLONASE) 50 MCG/ACT nasal spray 2 sprays into the nostril(s) as directed by provider.    Fabiola Casanova MD   furosemide (LASIX) 20 MG tablet Take 1 tablet by mouth Daily. 7/1/19   Luis Alberto Salgado PA-C   furosemide (LASIX) 40 MG tablet Take 40 mg by mouth 2 (Two) Times a Day.  Patient not taking: Reported on 9/13/2023    Fabiola Casanova MD   guaiFENesin (MUCINEX) 600 MG 12 hr tablet Take 1 tablet by mouth Every 12 (Twelve) Hours. 10/2/17   Jose  NORMA Corbin   HYDROcodone-acetaminophen (NORCO) 5-325 MG per tablet     Fabiola Casanova MD   ibuprofen (ADVIL,MOTRIN) 600 MG tablet Take 1 tablet by mouth Every 6 (Six) Hours As Needed for Mild Pain . 7/12/19   Brett Mendez MD   indomethacin (INDOCIN) 50 MG capsule Take 1 capsule by mouth 3 (Three) Times a Day As Needed for Mild Pain.    Fabiola Casanova MD   lidocaine in D5W 4-5 MG/ML-% infusion Infuse 2 mg/min into a venous catheter Continuous. 10/2/17   Nia Garcia APRN   lisinopril (PRINIVIL,ZESTRIL) 20 MG tablet Take 1 tablet by mouth Daily.    Fabiola Casanova MD   metoprolol tartrate (LOPRESSOR) 5 MG/5ML injection Infuse 5 mL into a venous catheter Every 6 (Six) Hours As Needed (HR>110). 10/2/17   Nia Garcia APRN   mexiletine (MEXITIL) 150 MG capsule     Fabiola Casanova MD   neomycin-polymyxin-hydrocortisone (CORTISPORIN) 3.5-97817-4 otic suspension neomycin-polymyxin-hydrocort 3.5 mg-10,000 unit/mL-1 % ear drops,susp    Fabiola Casanova MD   nitroglycerin (NITROSTAT) 0.4 MG SL tablet Place 1 tablet under the tongue.    Fabiola Casanova MD   penicillin v potassium (VEETID) 500 MG tablet Every 12 (Twelve) Hours.    Fabiola Casanova MD   pravastatin (PRAVACHOL) 20 MG tablet TK 1 T PO QD 10/28/16   Fabiola Casanova MD   pravastatin (PRAVACHOL) 40 MG tablet Take 40 mg by mouth Daily.  Patient not taking: Reported on 9/13/2023    Fabiola Casanova MD   sildenafil (VIAGRA) 50 MG tablet Take 1 tablet by mouth Daily As Needed for Erectile Dysfunction.    Fabiola Casanova MD   simvastatin (ZOCOR) 20 MG tablet     Fabiola Casanova MD   traMADol (ULTRAM) 50 MG tablet Take 50 mg by mouth Every 6 (Six) Hours As Needed for Moderate Pain .  Patient not taking: Reported on 9/13/2023    Fabiola Casanova MD   valproic acid (DEPAKENE) 250 MG capsule Take  by mouth Daily.    Fabiola Casanova MD   vardenafil (Levitra) 20 MG tablet TAKE 1 TABLET BY  ORAL ROUTE EVERY DAY AS NEEDED SEXUAL ACTIVITY MUST LAST 90 DAYS    Provider, MD Fabiola   verapamil (CALAN) 80 MG tablet Take 1 tablet by mouth Every 8 (Eight) Hours.  Patient not taking: Reported on 9/13/2023 10/2/17   Nia Garcia APRN     No Known Allergies    Social History     Tobacco Use    Smoking status: Never    Smokeless tobacco: Not on file   Substance Use Topics    Alcohol use: No     Family History   Family history unknown: Yes     Review of Systems   Constitutional: Negative for chills, fever and weight loss.   Eyes:  Negative for blurred vision and pain.   Cardiovascular:  Positive for palpitations. Negative for chest pain, dyspnea on exertion, leg swelling, orthopnea, paroxysmal nocturnal dyspnea and syncope.   Respiratory:  Negative for cough, shortness of breath and wheezing.    Endocrine: Negative for cold intolerance and heat intolerance.   Hematologic/Lymphatic: Negative for adenopathy and bleeding problem.   Skin:  Negative for poor wound healing and rash.   Musculoskeletal:  Negative for myalgias and neck pain.   Gastrointestinal:  Negative for abdominal pain, nausea and vomiting.   Neurological:  Negative for dizziness, headaches, light-headedness, loss of balance and numbness.   Psychiatric/Behavioral:  Negative for altered mental status.    Allergic/Immunologic: Negative for hives and persistent infections.     Physical Exam:    /81   Pulse 52   Temp 98 °F (36.7 °C) (Oral)   Resp 18   SpO2 99%   Temp:  [98 °F (36.7 °C)-98.1 °F (36.7 °C)] 98 °F (36.7 °C)  Heart Rate:  [49-78] 52  Resp:  [18] 18  BP: (147-199)/() 149/81    Vitals reviewed.   Constitutional:       General: Not in acute distress.     Appearance: Normal appearance. Well-developed and not in distress. Not toxic-appearing or diaphoretic.   Eyes:      General: Lids are normal.      Extraocular Movements: Extraocular movements intact.      Pupils: Pupils are equal, round, and reactive to light.   HENT:       Head: Normocephalic and atraumatic.      Right Ear: External ear normal.      Left Ear: External ear normal.      Nose: Nose normal.    Mouth/Throat:      Mouth: Mucous membranes are not pale, not dry and not cyanotic.   Neck:      Vascular: No carotid bruit or JVD.   Pulmonary:      Effort: Pulmonary effort is normal. No accessory muscle usage or respiratory distress.      Breath sounds: Normal breath sounds. No wheezing. No rhonchi. No rales.   Chest:      Chest wall: Not tender to palpatation.   Cardiovascular:      Normal rate. Regular rhythm.      Murmurs: There is no murmur.      No gallop.    Pulses:     Intact distal pulses.   Edema:     Peripheral edema present.     Pretibial: bilateral trace edema of the pretibial area.     Ankle: bilateral trace edema of the ankle.  Abdominal:      General: Abdomen is protuberant. Bowel sounds are normal. There is no distension or abdominal bruit.      Palpations: Abdomen is soft.      Tenderness: There is no abdominal tenderness.   Musculoskeletal: Normal range of motion.         General: No tenderness or deformity.      Extremities: No clubbing present.Skin:     General: Skin is warm and dry. There is no cyanosis.      Findings: No erythema or rash.   Neurological:      General: No focal deficit present.      Mental Status: Oriented to person, place, and time and oriented to person, place and time.      Cranial Nerves: No cranial nerve deficit.   Psychiatric:         Attention and Perception: Attention normal.         Mood and Affect: Mood normal.         Speech: Speech normal.       Diagnostic Data:    Lab Results   Component Value Date    WBC 4.69 10/28/2023    HGB 14.5 10/28/2023    HCT 46.6 10/28/2023    MCV 89.3 10/28/2023     (L) 10/28/2023     Lab Results   Component Value Date    GLUCOSE 100 (H) 10/28/2023    CALCIUM 8.7 10/28/2023     10/28/2023    K 4.1 10/28/2023    CO2 23.0 10/28/2023     10/28/2023    BUN 17 10/28/2023    CREATININE 1.11  10/28/2023    EGFR 74.2 10/28/2023    BCR 15.3 10/28/2023    ANIONGAP 9.0 10/28/2023     High-sensitivity troponin: 42, 49, 39  proBNP: 1111  TSH: 0.843  Urine drug screen negative    Chest x-ray: Cardiomegaly but no acute cardiopulmonary findings.    Cardiac catheterization at [facility] on 3/18/2022: Described as no significant coronary artery disease and a false positive nuclear stress test.    Echocardiogram at [facility] on 3/17/2022:   Technically difficult study with poor images due to body habitus    Normal left ventricular size with decreased systolic function due to mild    apical hypokinesis -estimated ejection fraction 45%    Severe concentric left ventricular hypertrophy with moderate [grade 2]    diastolic dysfunction    Mild left atrial enlargement    Poor visualization of the aortic valve without evidence of stenosis and    with mild insufficiency    Mild mitral annular calcification with poor visualization of the mitral    valve and no demonstrated regurgitation    No evidence of pulmonic stenosis or insufficiency    Normal right atrial size with mild right ventricular enlargement    Trace tricuspid regurgitation    IVC appears mildly dilated with decreased inspiratory motion consistent    with elevated right atrial filling pressures    Aortic root dimensions fall within normal limits with dilatation of the    ascending aorta measuring 3.7 cm    No significant pericardial effusion    Poor visualization of the aortic arch    AICD leads seen in right-sided chambers    Definity contrast utilized to better define endocardial borders     ECG on 10/27/2023 at 4:45 PM: Atrial paced rhythm, ventricular rate 52, prolonged AV conduction noted, left bundle branch block pattern  ECG today at 6:27 AM: Ventricular rate 50, atrial paced with prolonged AV conduction, left axis deviation with near left bundle branch block pattern    ASSESSMENT/PLAN:    1.  Ventricular tachycardia  2.  Hypertrophic  cardiomyopathy  3.  Elevated troponin, considered myocardial injury, not an acute coronary syndrome, in the setting of problem #1  3.  Primary hypertension  4.  Mixed hyperlipidemia    -Both cardiology and electrophysiology were asked to consult on this patient.  Unfortunately, the patient has a significant arrhythmic history with VT, prior ablation, an ICD and is currently taking mexiletine as an outpatient.  Therefore, I will have to defer to electrophysiology regarding any further medication recommendations.  The patient is known to have no significant coronary artery disease therefore is not considered to need any invasive work-up in terms of coronary artery disease, etc.  -At this time, interventional cardiology will sign off of this patient but there is an EP consultation pending at this time, placed by the admitting physician overnight, for further recommendations.

## 2023-10-29 ENCOUNTER — READMISSION MANAGEMENT (OUTPATIENT)
Dept: CALL CENTER | Facility: HOSPITAL | Age: 63
End: 2023-10-29
Payer: MEDICARE

## 2023-10-29 NOTE — OUTREACH NOTE
Prep Survey      Flowsheet Row Responses   [facility] patient discharged from? [facility]   Is LACE score < 7 ? No   Eligibility Readm Mgmt   Discharge diagnosis VT (ventricular tachycardia)   Does the patient have one of the following disease processes/diagnoses(primary or secondary)? Other   Does the patient have Home health ordered? No   Is there a DME ordered? No   Prep survey completed? Yes            Xiomara ARTEAGA - Registered Nurse

## 2023-10-30 LAB
QT INTERVAL: 472 MS
QT INTERVAL: 496 MS
QT INTERVAL: 528 MS
QTC INTERVAL: 438 MS
QTC INTERVAL: 465 MS
QTC INTERVAL: 481 MS

## 2023-10-30 NOTE — PAYOR COMM NOTE
"ADMIT 10/27/2023 INPATIENT    [facility]  [place],    [phone]  OR  FAX    [phone]    Jeanie Grossman (63 y.o. Male)       Date of Birth   1960    Social Security Number       Address   [address]  [facility] [address]    Home Phone   [phone]    MRN   [id number]       Orthodoxy   Other    Marital Status   Single                            Admission Date   10/27/23    Admission Type   Emergency    Admitting Provider   Aaron Frazier DO    Attending Provider       Department, Room/Bed   [facility] CARDIAC CARE, C010/1       Discharge Date   10/28/2023    Discharge Disposition   Home or Self Care    Discharge Destination                                 Attending Provider: (none)   Allergies: No Known Allergies    Isolation: None   Infection: None   Code Status: Prior    Ht: 190.5 cm (75\")   Wt: 144 kg (318 lb)    Admission Cmt: None   Principal Problem: VT (ventricular tachycardia) [I47.20]                   Active Insurance as of 10/27/2023       Primary Coverage       Payor Plan Insurance Group Employer/Plan Group    AETNA MEDICARE REPLACEMENT AETNA MEDICARE REPLACEMENT [id number]       Payor Plan Address Payor Plan Phone Number Payor Plan Fax Number Effective Dates    [address] [phone]  4/1/2023 - None Entered    [facility] [id number]         Subscriber Name Subscriber Birth Date Member ID       JEANIE GROSSMAN 1960 [id number]               Secondary Coverage       Payor Plan Insurance Group Employer/Plan Group    [place]        Payor Plan Address Payor Plan Phone Number Payor Plan Fax Number Effective Dates    [address] [address] [phone]  9/14/2016 - None Entered    [facility] [address]         Subscriber Name Subscriber Birth Date Member ID       JEANIE GROSSMAN 1960 [id number]                     Emergency Contacts        (Rel.) Home Phone Work Phone Mobile Phone    Uri Grossman (Brother) [phone] -- --      "     Patient Care Timeline (10/27/2023 15:54 to 10/27/2023 20:06)      10/27/2023  10/27/2023 Event Details User   15:54 HPI HPI (Adult)  Stated Reason for Visit: pt states that he felt heart race at [employer]. pt has not had his routine meds. pt was picking them up when he had this feeling come on.  History Obtained From: patient Lisandra Waldrop RN   15:54:11 Patient arrival  Lisandra Waldrop RN   15:54:11 Arrival Complaint Heart Rythmn      15:54:47 Chief Complaints Updated Palpitations Lisandra Waldrop RN     16:05 Vital Signs  Vital Signs  Temp: 98.1 °F (36.7 °C)  Heart Rate: 70  Resp: 18  BP: 199/102 Abnormal   Oxygen Therapy  SpO2: 99 %  Vitals Timer  Restart Vitals Timer: Yes Lisandra Waldrop R     16:59 Free Text Paced rhythm with intraventricular conduction delay. Karlos Bailey MD     17:14 Medication New Bag amiodarone 150 mg in 100 mL D5W (loading dose) - Dose: 150 mg ; Route: Intravenous ; Line: Peripheral IV Anterior;Left Forearm ; Scheduled Time: 1714 Lisandra Waldrop RN   17:14 Medication New Bag amiodarone 360 mg in 200 mL D5W infusion - Dose: 1 mg/min ; Rate: 33.3 mL/hr ; Route: Intravenous ; Line: Peripheral IV Anterior;Left Forearm ; Scheduled Time: 1714 Lisandra Waldrop RN     17:18 Medication Given labetalol (NORMODYNE,TRANDATE) injection 20 mg - Dose: 20 mg ; Route: Intravenous ; Line: Peripheral IV Anterior;Left Forearm ; Scheduled Time: 1626 Lisandra Waldrop RN     19:19 Free Text Pacemaker was interrogated which shows runs of VT which were treated with pacing out.  No shocks delivered patient is asymptomatic at this time was placed on amiodarone by me.  I have discussed this case with Dr. Ramachandran and  will give a dose of mexiletine I am not sure whether he is on mexiletine at this time we will admit to the medicine service Karlos Bailey MD Spivey, Tyler, RN   Registered Nurse     Plan of Care      Signed     Date of Service: 10/28/23 0556  Creation Time: 10/28/23 0556     Signed      "    Goal Outcome Evaluation:   - alert and oriented x 4 the entire shift. Arrived at 2005. Has been on the amiodarone drip the entire shift -- had several instances where his HR dropped into the mid-30s. HR: 50s-60s despite HR drops. Dr. Rodas was notified and wanted amiodarone stopped and to get a 400 mg tablet q8hr starting at 0800. BP has remained below 160. No complaints of dizziness, weakness, palpitations, chest pain, etc. Stated that he feels like his normal self. UOP: 700. Trop: 49. Ever since AMIO was stopped, his HR has not dropped. He has a pacemaker with a defibrillator.                       History & Physical        Ruba Franz DO at 10/27/23 2014              [facility] Medicine Services  HISTORY AND PHYSICAL    Date of Admission: 10/27/2023  Primary Care Physician: Conor Denny DO    Subjective   Primary Historian: Patient    Chief Complaint: Palpitations    Palpitations   Pertinent negatives include no chest pain, coughing, fever, nausea or shortness of breath.     63-year-old male who presents emergency department with episode of palpitations.  He notes that he has been having trouble sleeping due to nasal congestion.  He went to the [employer] to get nasal spray, and started having irregular heartbeat at the store.  He states \"My heart was going crazy.\"  He has chronic lower extremity edema.  He did not have chest pain.  He notes that his irregular heartbeat has happened many times.  He has no acute bowel or kidney changes.        Review of Systems   Constitutional:  Negative for chills and fever.   Respiratory:  Negative for cough and shortness of breath.    Cardiovascular:  Positive for palpitations and leg swelling. Negative for chest pain.   Gastrointestinal:  Negative for diarrhea and nausea.   Psychiatric/Behavioral:  Positive for sleep disturbance.       Otherwise complete ROS reviewed and negative except as mentioned in the HPI.    Past " Medical History:   Past Medical History:   Diagnosis Date    Hyperlipidemia     Hypertension     Hypertrophic cardiomyopathy     s/p AICD    Migraine     Prostate cancer     Tachycardia     Ventricular tachycardia     Ventricular tachycardia, sustained 10/14/2016     Past Surgical History:  Past Surgical History:   Procedure Laterality Date    CARDIAC ABLATION  01/28/2016 2/2016, 10/18/2016 (Dr. Doss in [place], [place])    CARDIAC ABLATION      2018    CARDIAC CATHETERIZATION      CARDIAC DEFIBRILLATOR PLACEMENT      CARDIAC DEFIBRILLATOR PLACEMENT      PROSTATE BIOPSY  2019    PROSTATE BIOPSY  2023     Social History:  reports that he has never smoked. He does not have any smokeless tobacco history on file. He reports that he does not drink alcohol and does not use drugs.    Family History: Family history is unknown by patient.       Allergies:  No Known Allergies    Medications:  Prior to Admission medications    Medication Sig Start Date End Date Taking? Authorizing Provider   allopurinol (ZYLOPRIM) 300 MG tablet Take 1 tablet by mouth Daily.    ProviderFabiola MD   amiodarone (PACERONE) 200 MG tablet Take 1 tablet by mouth Every 12 (Twelve) Hours. 10/2/17   Nia Garcia APRN   amLODIPine (NORVASC) 2.5 MG tablet Take 1 tablet by mouth Daily.    ProviderFabiola MD   amoxicillin-clavulanate (AUGMENTIN) 875-125 MG per tablet Take 1 tablet by mouth 2 (Two) Times a Day.  Patient not taking: Reported on 9/13/2023    Fabiola Casanova MD   apixaban (Eliquis) 5 MG tablet tablet     Fabiola Casanova MD   aspirin 81 MG EC tablet Take 1 tablet by mouth Daily.    Fabiola Casanova MD   carvedilol (COREG) 12.5 MG tablet Take 1 tablet by mouth 2 (Two) Times a Day.    Fabiola Casanova MD   chlorthalidone (HYGROTON) 25 MG tablet     Fabiola Casanova MD   cloNIDine (CATAPRES) 0.1 MG tablet Take 1 tablet by mouth 2 (Two) Times a Day.    Fabiola Casanova MD   colchicine 0.6 MG tablet  Take 1 tablet by mouth Daily.    Fabiola Casanova MD   cyclobenzaprine (FLEXERIL) 10 MG tablet Take 10 mg by mouth 3 (Three) Times a Day As Needed for Muscle Spasms.  Patient not taking: Reported on 9/13/2023    Fabiola Casanova MD   fluticasone (FLONASE) 50 MCG/ACT nasal spray 2 sprays into the nostril(s) as directed by provider.    Fabiola Casanova MD   furosemide (LASIX) 20 MG tablet Take 1 tablet by mouth Daily. 7/1/19   Luis Alberto Salgado PA-C   furosemide (LASIX) 40 MG tablet Take 40 mg by mouth 2 (Two) Times a Day.  Patient not taking: Reported on 9/13/2023    Fabiola Casanova MD   guaiFENesin (MUCINEX) 600 MG 12 hr tablet Take 1 tablet by mouth Every 12 (Twelve) Hours. 10/2/17   Nia Garcia APRN   HYDROcodone-acetaminophen (NORCO) 5-325 MG per tablet     Fabiola Casanova MD   ibuprofen (ADVIL,MOTRIN) 600 MG tablet Take 1 tablet by mouth Every 6 (Six) Hours As Needed for Mild Pain . 7/12/19   Brett Mendez MD   indomethacin (INDOCIN) 50 MG capsule Take 1 capsule by mouth 3 (Three) Times a Day As Needed for Mild Pain.    Fabiola Casanova MD   lidocaine in D5W 4-5 MG/ML-% infusion Infuse 2 mg/min into a venous catheter Continuous. 10/2/17   Nia Garcia APRN   lisinopril (PRINIVIL,ZESTRIL) 20 MG tablet Take 1 tablet by mouth Daily.    aFbiola Casanova MD   metoprolol tartrate (LOPRESSOR) 5 MG/5ML injection Infuse 5 mL into a venous catheter Every 6 (Six) Hours As Needed (HR>110). 10/2/17   Nia Garcia APRN   mexiletine (MEXITIL) 150 MG capsule     Fabiola Casanova MD   neomycin-polymyxin-hydrocortisone (CORTISPORIN) 3.5-91546-4 otic suspension neomycin-polymyxin-hydrocort 3.5 mg-10,000 unit/mL-1 % ear drops,susp    Fabiola Casanova MD   nitroglycerin (NITROSTAT) 0.4 MG SL tablet Place 1 tablet under the tongue.    Fabiola Casanova MD   penicillin v potassium (VEETID) 500 MG tablet Every 12 (Twelve) Hours.    Provider, MD Fabiola    pravastatin (PRAVACHOL) 20 MG tablet TK 1 T PO QD 10/28/16   Fabiola Casanova MD   pravastatin (PRAVACHOL) 40 MG tablet Take 40 mg by mouth Daily.  Patient not taking: Reported on 9/13/2023    Fabiola Casanova MD   sildenafil (VIAGRA) 50 MG tablet Take 1 tablet by mouth Daily As Needed for Erectile Dysfunction.    Fabiola Casanova MD   simvastatin (ZOCOR) 20 MG tablet     Fabiola Casanova MD   traMADol (ULTRAM) 50 MG tablet Take 50 mg by mouth Every 6 (Six) Hours As Needed for Moderate Pain .  Patient not taking: Reported on 9/13/2023    Fabiola Casanova MD   valproic acid (DEPAKENE) 250 MG capsule Take  by mouth Daily.    Fabiola Casanova MD   vardenafil (Levitra) 20 MG tablet TAKE 1 TABLET BY ORAL ROUTE EVERY DAY AS NEEDED SEXUAL ACTIVITY MUST LAST 90 DAYS    Fabiola Casanova MD   verapamil (CALAN) 80 MG tablet Take 1 tablet by mouth Every 8 (Eight) Hours.  Patient not taking: Reported on 9/13/2023 10/2/17   Nia Garcia APRN I have utilized all available immediate resources to obtain, update, or review the patient's current medications (including all prescriptions, over-the-counter products, herbals, cannabis/cannabidiol products, and vitamin/mineral/dietary (nutritional) supplements).    Objective     Vital Signs: /92   Pulse 52   Temp 98.1 °F (36.7 °C)   Resp 18   SpO2 100%   Physical Exam  Vitals reviewed.   Constitutional:       Appearance: He is obese.   HENT:      Head: Normocephalic and atraumatic.      Right Ear: External ear normal.      Left Ear: External ear normal.      Nose: Nose normal.      Mouth/Throat:      Mouth: Mucous membranes are moist.      Pharynx: No oropharyngeal exudate.   Eyes:      General: No scleral icterus.     Conjunctiva/sclera: Conjunctivae normal.   Cardiovascular:      Rate and Rhythm: Normal rate and regular rhythm.      Heart sounds: Normal heart sounds.   Pulmonary:      Effort: Pulmonary effort is normal.      Breath  sounds: Normal breath sounds.   Abdominal:      General: There is no distension.      Palpations: Abdomen is soft.      Tenderness: There is no abdominal tenderness.   Musculoskeletal:         General: Swelling present. No tenderness.      Cervical back: Normal range of motion and neck supple.      Right lower leg: Edema present.      Left lower leg: Edema present.   Skin:     General: Skin is warm and dry.   Neurological:      General: No focal deficit present.      Mental Status: He is alert.      Cranial Nerves: No cranial nerve deficit.   Psychiatric:         Mood and Affect: Mood normal.         Behavior: Behavior normal.        Results Reviewed:  Lab Results (last 24 hours)       Procedure Component Value Units Date/Time    Fentanyl, Urine - Urine, Clean Catch [[id number]]  (Normal) Collected: 10/27/23 1730    Specimen: Urine, Clean Catch Updated: 10/27/23 1803     Fentanyl, Urine Negative    Narrative:      Negative Threshold:      Fentanyl 5 ng/mL     The normal value for the drug tested is negative. This report includes final unconfirmed screening results to be used for medical treatment purposes only. Unconfirmed results must not be used for non-medical purposes such as employment or legal testing. Clinical consideration should be applied to any drug of abuse test, particularly when unconfirmed results are used.           Urine Drug Screen - Urine, Clean Catch [[id number]]  (Normal) Collected: 10/27/23 1730    Specimen: Urine, Clean Catch Updated: 10/27/23 1802     THC, Screen, Urine Negative     Phencyclidine (PCP), Urine Negative     Cocaine Screen, Urine Negative     Methamphetamine, Ur Negative     Opiate Screen Negative     Amphetamine Screen, Urine Negative     Benzodiazepine Screen, Urine Negative     Tricyclic Antidepressants Screen Negative     Methadone Screen, Urine Negative     Barbiturates Screen, Urine Negative     Oxycodone Screen, Urine Negative     Propoxyphene Screen Negative      Buprenorphine, Screen, Urine Negative    Narrative:      Cutoff For Drugs Screened:    Amphetamines               500 ng/ml  Barbiturates               200 ng/ml  Benzodiazepines            150 ng/ml  Cocaine                    150 ng/ml  Methadone                  200 ng/ml  Opiates                    100 ng/ml  Phencyclidine               25 ng/ml  THC                            50 ng/ml  Methamphetamine            500 ng/ml  Tricyclic Antidepressants  300 ng/ml  Oxycodone                  100 ng/ml  Propoxyphene               300 ng/ml  Buprenorphine               10 ng/ml    The normal value for all drugs tested is negative. This report includes unconfirmed screening results, with the cutoff values listed, to be used for medical treatment purposes only.  Unconfirmed results must not be used for non-medical purposes such as employment or legal testing.  Clinical consideration should be applied to any drug of abuse test, particularly when unconfirmed results are used.      TSH [[id number]]  (Normal) Collected: 10/27/23 1642    Specimen: Blood Updated: 10/27/23 1724     TSH 0.843 uIU/mL     Basic Metabolic Panel [[id number]]  (Abnormal) Collected: 10/27/23 1642    Specimen: Blood Updated: 10/27/23 1717     Glucose 116 mg/dL      BUN 17 mg/dL      Creatinine 1.14 mg/dL      Sodium 141 mmol/L      Potassium 4.3 mmol/L      Comment: Slight hemolysis detected by analyzer. Results may be affected.        Chloride 108 mmol/L      CO2 24.0 mmol/L      Calcium 8.6 mg/dL      BUN/Creatinine Ratio 14.9     Anion Gap 9.0 mmol/L      eGFR 71.8 mL/min/1.73     Narrative:      GFR Normal >60  Chronic Kidney Disease <60  Kidney Failure <15      Magnesium [[id number]]  (Normal) Collected: 10/27/23 1642    Specimen: Blood Updated: 10/27/23 1717     Magnesium 2.1 mg/dL     BNP [[id number]]  (Abnormal) Collected: 10/27/23 1642    Specimen: Blood Updated: 10/27/23 1714     proBNP 1,111.0 pg/mL     Narrative:      This assay is used as an  aid in the diagnosis of individuals suspected of having heart failure. It can be used as an aid in the diagnosis of acute decompensated heart failure (ADHF) in patients presenting with signs and symptoms of ADHF to the emergency department (ED). In addition, NT-proBNP of <300 pg/mL indicates ADHF is not likely.    Age Range Result Interpretation  NT-proBNP Concentration (pg/mL:      <50             Positive            >450                   Gray                 300-450                    Negative             <300    50-75           Positive            >900                  Gray                300-900                  Negative            <300      >75             Positive            >1800                  Gray                300-1800                  Negative            <300    Single High Sensitivity Troponin T [[id number]]  (Abnormal) Collected: 10/27/23 1642    Specimen: Blood Updated: 10/27/23 1714     HS Troponin T 42 ng/L     Narrative:      High Sensitive Troponin T Reference Range:  <10.0 ng/L- Negative Female for AMI  <15.0 ng/L- Negative Male for AMI  >=10 - Abnormal Female indicating possible myocardial injury.  >=15 - Abnormal Male indicating possible myocardial injury.   Clinicians would have to utilize clinical acumen, EKG, Troponin, and serial changes to determine if it is an Acute Myocardial Infarction or myocardial injury due to an underlying chronic condition.         Ethanol [[id number]] Collected: 10/27/23 1642    Specimen: Blood Updated: 10/27/23 1712     Ethanol % <0.010 %     Narrative:      Not for legal purposes. Chain of Custody not followed.     Protime-INR [[id number]]  (Normal) Collected: 10/27/23 1642    Specimen: Blood Updated: 10/27/23 1705     Protime 13.9 Seconds      INR 1.06    CBC & Differential [[id number]]  (Abnormal) Collected: 10/27/23 1642    Specimen: Blood Updated: 10/27/23 1656    Narrative:      The following orders were created for panel order CBC & Differential.  Procedure                                Abnormality         Status                     ---------                               -----------         ------                     CBC Auto Differential[[id number]]        Abnormal            Final result                 Please view results for these tests on the individual orders.    CBC Auto Differential [[id number]]  (Abnormal) Collected: 10/27/23 1642    Specimen: Blood Updated: 10/27/23 1656     WBC 5.26 10*3/mm3      RBC 5.37 10*6/mm3      Hemoglobin 15.0 g/dL      Hematocrit 47.6 %      MCV 88.6 fL      MCH 27.9 pg      MCHC 31.5 g/dL      RDW 17.1 %      RDW-SD 54.5 fl      MPV 11.8 fL      Platelets 158 10*3/mm3      Neutrophil % 62.7 %      Lymphocyte % 26.6 %      Monocyte % 7.8 %      Eosinophil % 2.3 %      Basophil % 0.4 %      Immature Grans % 0.2 %      Neutrophils, Absolute 3.30 10*3/mm3      Lymphocytes, Absolute 1.40 10*3/mm3      Monocytes, Absolute 0.41 10*3/mm3      Eosinophils, Absolute 0.12 10*3/mm3      Basophils, Absolute 0.02 10*3/mm3      Immature Grans, Absolute 0.01 10*3/mm3      nRBC 0.0 /100 WBC           Imaging Results (Last 24 Hours)       Procedure Component Value Units Date/Time    XR Chest 1 View [[id number]] Collected: 10/27/23 1702     Updated: 10/27/23 1706    Narrative:      EXAM: XR CHEST 1 VW- 10/27/2023 4:50 PM CDT     HISTORY: Palpitation       COMPARISON: 7/9/2019.     TECHNIQUE: Single frontal radiograph of the chest was obtained.     FINDINGS:      Support Devices: Cardiac pacemaker device with leads overlying the right  atrium and right ventricle.     Cardiac and Mediastinal Silhouettes: Heart size is enlarged, likely  unchanged.     Lungs/Pleura: No focal consolidation. No sizable pleural effusion. No  visible pneumothorax.     Osseous structures: No acute osseous finding.     Other: None.       Impression:         No acute cardiopulmonary abnormality.     Cardiomegaly.           This report was signed and finalized on 10/27/2023 5:02 PM  CDT by Eris Mckeon.             I have personally reviewed and interpreted the radiology studies and ECG obtained at time of admission.     Assessment / Plan   Assessment:   Active Hospital Problems    Diagnosis     **VT (ventricular tachycardia)      Impression:  VT  Hypertrophic cardiomyopathy with AICD  Hypertension  Hyperlipidemia     Treatment Plan  Admit to the CCU  Continue current home medications  FU labs in the am with EKG  Consult with EP and cardiology.   5. Amiodarone drip started in the ED.     The patient will be admitted to my service here at [facility].  Primary team to take over the morning    Medical Decision Making  Number and Complexity of problems: 4, moderate  Differential Diagnosis: Arrhythmia    Conditions and Status        Condition is unchanged.     [facility] Data  External documents reviewed: None  Cardiac tracing (EKG, telemetry) interpretation: None  Radiology interpretation: None  Labs reviewed: Reviewed  Any tests that were considered but not ordered: None     Decision rules/scores evaluated (example [id number], Wells, etc): None     Discussed with: Patient     Care Planning  Shared decision making: Patient and ED staff  Code status and discussions: Full    Disposition  Social Determinants of Health that impact treatment or disposition: Medication noncompliance  Estimated length of stay is 2 to 3 days.     I confirmed that the patient's advanced care plan is present, code status is documented, and a surrogate decision maker is listed in the patient's medical record.     The patient's surrogate decision maker is family.     The patient was seen and examined by me on 10/27/2023 at 730.    Electronically signed by Ruba Franz DO, 10/27/23, 20:14 CDT.                Electronically signed by Ruba Franz DO at 10/27/23 6997          Discharge Summaryl        Martin Ramachandran MD at 10/28/23 0929        Consult Orders    1. Inpatient Cardiology Consult  [[id number]] ordered by Ruba Franz DO                 Inpatient Cardiology Consult  Consult performed by: Martin Ramachandran MD  Consult ordered by: Ruba Franz DO  Reason for consult: VT        Chief Complaint   Patient presents with    Palpitations     HPI: This is a 63-year-old -American male with reported hypertrophic cardiomyopathy, an ICD in place, previous ICD discharges for ventricular tachycardia, also prior ablation who presents with complaints of palpitations.  The patient says that he was at a local store yesterday when he began to feel some palpitations and irregularity to his pulse.  He says that he has become very aware of ventricular arrhythmias and thought that he was likely in some type of ventricular arrhythmia once again.  He called EMS and was brought here for further evaluation.  Reportedly, the patient's ICD was interrogated and did show ventricular tachycardia but did not require any ICD discharges.  The patient says that he has had multiple ICD discharges in the past but has been on mexiletine for quite some time after previously being on amiodarone.  His rhythm has been stable since that time.  He also says that since his most recent generator change, he has not received any shocks from his defibrillator.  He denies having any preceding symptoms other than the fact that he has been having some trouble sleeping lately due to some congestion.  He denies having any chest pain, shortness of breath.  He notes some chronic lower extremity edema that is unchanged.  He denies having orthopnea, PND.  No lightheadedness, dizziness or syncope.    Past Medical History:   Diagnosis Date    Hyperlipidemia     Hypertension     Hypertrophic cardiomyopathy     s/p AICD    Migraine     Prostate cancer     Tachycardia     Ventricular tachycardia     Ventricular tachycardia, sustained 10/14/2016     Past Surgical History:   Procedure Laterality Date    CARDIAC ABLATION  01/28/2016     2/2016, 10/18/2016 (Dr. Doss in [place], [place])    CARDIAC ABLATION      2018    CARDIAC CATHETERIZATION      CARDIAC DEFIBRILLATOR PLACEMENT      CARDIAC DEFIBRILLATOR PLACEMENT      PROSTATE BIOPSY  2019    PROSTATE BIOPSY  2023     Prior to Admission medications    Medication Sig Start Date End Date Taking? Authorizing Provider   allopurinol (ZYLOPRIM) 300 MG tablet Take 1 tablet by mouth Daily.    Fabiola Casanova MD   amiodarone (PACERONE) 200 MG tablet Take 1 tablet by mouth Every 12 (Twelve) Hours. 10/2/17   Nia Garcia APRN   amLODIPine (NORVASC) 2.5 MG tablet Take 1 tablet by mouth Daily.    Fabiola Casanova MD   amoxicillin-clavulanate (AUGMENTIN) 875-125 MG per tablet Take 1 tablet by mouth 2 (Two) Times a Day.  Patient not taking: Reported on 9/13/2023    Fabiola Casanova MD   apixaban (Eliquis) 5 MG tablet tablet     Fabiola Casanova MD   aspirin 81 MG EC tablet Take 1 tablet by mouth Daily.    Fabiola Casanova MD   carvedilol (COREG) 12.5 MG tablet Take 1 tablet by mouth 2 (Two) Times a Day.    Fabiola Casanova MD   chlorthalidone (HYGROTON) 25 MG tablet     Fabiola Casanova MD   cloNIDine (CATAPRES) 0.1 MG tablet Take 1 tablet by mouth 2 (Two) Times a Day.    Fabiola Casanova MD   colchicine 0.6 MG tablet Take 1 tablet by mouth Daily.    Fabiola Casanova MD   cyclobenzaprine (FLEXERIL) 10 MG tablet Take 10 mg by mouth 3 (Three) Times a Day As Needed for Muscle Spasms.  Patient not taking: Reported on 9/13/2023    Fabiola Casanova MD   fluticasone (FLONASE) 50 MCG/ACT nasal spray 2 sprays into the nostril(s) as directed by provider.    Fabiola Casanova MD   furosemide (LASIX) 20 MG tablet Take 1 tablet by mouth Daily. 7/1/19   Luis Alberto Salgado PA-C   furosemide (LASIX) 40 MG tablet Take 40 mg by mouth 2 (Two) Times a Day.  Patient not taking: Reported on 9/13/2023    Fabiola Casanova MD   guaiFENesin (MUCINEX) 600 MG 12 hr tablet  Take 1 tablet by mouth Every 12 (Twelve) Hours. 10/2/17   Nia Garcia APRN   HYDROcodone-acetaminophen (NORCO) 5-325 MG per tablet     Fabiola Casanova MD   ibuprofen (ADVIL,MOTRIN) 600 MG tablet Take 1 tablet by mouth Every 6 (Six) Hours As Needed for Mild Pain . 7/12/19   Brett Mendez MD   indomethacin (INDOCIN) 50 MG capsule Take 1 capsule by mouth 3 (Three) Times a Day As Needed for Mild Pain.    Fabiola Casanova MD   lidocaine in D5W 4-5 MG/ML-% infusion Infuse 2 mg/min into a venous catheter Continuous. 10/2/17   Nia Garcia APRN   lisinopril (PRINIVIL,ZESTRIL) 20 MG tablet Take 1 tablet by mouth Daily.    Fabiola Casanova MD   metoprolol tartrate (LOPRESSOR) 5 MG/5ML injection Infuse 5 mL into a venous catheter Every 6 (Six) Hours As Needed (HR>110). 10/2/17   Nia Garcia APRN   mexiletine (MEXITIL) 150 MG capsule     Fabiola Casanova MD   neomycin-polymyxin-hydrocortisone (CORTISPORIN) 3.5-58561-2 otic suspension neomycin-polymyxin-hydrocort 3.5 mg-10,000 unit/mL-1 % ear drops,susp    Fabiola Casanova MD   nitroglycerin (NITROSTAT) 0.4 MG SL tablet Place 1 tablet under the tongue.    Fabiola Casanova MD   penicillin v potassium (VEETID) 500 MG tablet Every 12 (Twelve) Hours.    Fabiola Casanova MD   pravastatin (PRAVACHOL) 20 MG tablet TK 1 T PO QD 10/28/16   Fabiola Casanova MD   pravastatin (PRAVACHOL) 40 MG tablet Take 40 mg by mouth Daily.  Patient not taking: Reported on 9/13/2023    Fabiola Casanova MD   sildenafil (VIAGRA) 50 MG tablet Take 1 tablet by mouth Daily As Needed for Erectile Dysfunction.    Fabiola Casanova MD   simvastatin (ZOCOR) 20 MG tablet     Fabiola Casanova MD   traMADol (ULTRAM) 50 MG tablet Take 50 mg by mouth Every 6 (Six) Hours As Needed for Moderate Pain .  Patient not taking: Reported on 9/13/2023    Fabiola Casanova MD   valproic acid (DEPAKENE) 250 MG capsule Take  by mouth Daily.    Jocelyne  MD Fabiola   vardenafil (Levitra) 20 MG tablet TAKE 1 TABLET BY ORAL ROUTE EVERY DAY AS NEEDED SEXUAL ACTIVITY MUST LAST 90 DAYS    Provider, MD Fabiola   verapamil (CALAN) 80 MG tablet Take 1 tablet by mouth Every 8 (Eight) Hours.  Patient not taking: Reported on 9/13/2023 10/2/17   Nia Garcia APRN     No Known Allergies    Social History     Tobacco Use    Smoking status: Never    Smokeless tobacco: Not on file   Substance Use Topics    Alcohol use: No     Family History   Family history unknown: Yes     Review of Systems   Constitutional: Negative for chills, fever and weight loss.   Eyes:  Negative for blurred vision and pain.   Cardiovascular:  Positive for palpitations. Negative for chest pain, dyspnea on exertion, leg swelling, orthopnea, paroxysmal nocturnal dyspnea and syncope.   Respiratory:  Negative for cough, shortness of breath and wheezing.    Endocrine: Negative for cold intolerance and heat intolerance.   Hematologic/Lymphatic: Negative for adenopathy and bleeding problem.   Skin:  Negative for poor wound healing and rash.   Musculoskeletal:  Negative for myalgias and neck pain.   Gastrointestinal:  Negative for abdominal pain, nausea and vomiting.   Neurological:  Negative for dizziness, headaches, light-headedness, loss of balance and numbness.   Psychiatric/Behavioral:  Negative for altered mental status.    Allergic/Immunologic: Negative for hives and persistent infections.     Physical Exam:    /81   Pulse 52   Temp 98 °F (36.7 °C) (Oral)   Resp 18   SpO2 99%   Temp:  [98 °F (36.7 °C)-98.1 °F (36.7 °C)] 98 °F (36.7 °C)  Heart Rate:  [49-78] 52  Resp:  [18] 18  BP: (147-199)/() 149/81    Vitals reviewed.   Constitutional:       General: Not in acute distress.     Appearance: Normal appearance. Well-developed and not in distress. Not toxic-appearing or diaphoretic.   Eyes:      General: Lids are normal.      Extraocular Movements: Extraocular movements intact.       Pupils: Pupils are equal, round, and reactive to light.   HENT:      Head: Normocephalic and atraumatic.      Right Ear: External ear normal.      Left Ear: External ear normal.      Nose: Nose normal.    Mouth/Throat:      Mouth: Mucous membranes are not pale, not dry and not cyanotic.   Neck:      Vascular: No carotid bruit or JVD.   Pulmonary:      Effort: Pulmonary effort is normal. No accessory muscle usage or respiratory distress.      Breath sounds: Normal breath sounds. No wheezing. No rhonchi. No rales.   Chest:      Chest wall: Not tender to palpatation.   Cardiovascular:      Normal rate. Regular rhythm.      Murmurs: There is no murmur.      No gallop.    Pulses:     Intact distal pulses.   Edema:     Peripheral edema present.     Pretibial: bilateral trace edema of the pretibial area.     Ankle: bilateral trace edema of the ankle.  Abdominal:      General: Abdomen is protuberant. Bowel sounds are normal. There is no distension or abdominal bruit.      Palpations: Abdomen is soft.      Tenderness: There is no abdominal tenderness.   Musculoskeletal: Normal range of motion.         General: No tenderness or deformity.      Extremities: No clubbing present.Skin:     General: Skin is warm and dry. There is no cyanosis.      Findings: No erythema or rash.   Neurological:      General: No focal deficit present.      Mental Status: Oriented to person, place, and time and oriented to person, place and time.      Cranial Nerves: No cranial nerve deficit.   Psychiatric:         Attention and Perception: Attention normal.         Mood and Affect: Mood normal.         Speech: Speech normal.       Diagnostic Data:    Lab Results   Component Value Date    WBC 4.69 10/28/2023    HGB 14.5 10/28/2023    HCT 46.6 10/28/2023    MCV 89.3 10/28/2023     (L) 10/28/2023     Lab Results   Component Value Date    GLUCOSE 100 (H) 10/28/2023    CALCIUM 8.7 10/28/2023     10/28/2023    K 4.1 10/28/2023    CO2 23.0  10/28/2023     10/28/2023    BUN 17 10/28/2023    CREATININE 1.11 10/28/2023    EGFR 74.2 10/28/2023    BCR 15.3 10/28/2023    ANIONGAP 9.0 10/28/2023     High-sensitivity troponin: 42, 49, 39  proBNP: 1111  TSH: 0.843  Urine drug screen negative    Chest x-ray: Cardiomegaly but no acute cardiopulmonary findings.    Cardiac catheterization at [facility] on 3/18/2022: Described as no significant coronary artery disease and a false positive nuclear stress test.    Echocardiogram at [facility] on 3/17/2022:   Technically difficult study with poor images due to body habitus    Normal left ventricular size with decreased systolic function due to mild    apical hypokinesis -estimated ejection fraction 45%    Severe concentric left ventricular hypertrophy with moderate [grade 2]    diastolic dysfunction    Mild left atrial enlargement    Poor visualization of the aortic valve without evidence of stenosis and    with mild insufficiency    Mild mitral annular calcification with poor visualization of the mitral    valve and no demonstrated regurgitation    No evidence of pulmonic stenosis or insufficiency    Normal right atrial size with mild right ventricular enlargement    Trace tricuspid regurgitation    IVC appears mildly dilated with decreased inspiratory motion consistent    with elevated right atrial filling pressures    Aortic root dimensions fall within normal limits with dilatation of the    ascending aorta measuring 3.7 cm    No significant pericardial effusion    Poor visualization of the aortic arch    AICD leads seen in right-sided chambers    Definity contrast utilized to better define endocardial borders     ECG on 10/27/2023 at 4:45 PM: Atrial paced rhythm, ventricular rate 52, prolonged AV conduction noted, left bundle branch block pattern  ECG today at 6:27 AM: Ventricular rate 50, atrial paced with prolonged AV conduction, left axis deviation with near left bundle branch block  pattern    ASSESSMENT/PLAN:    1.  Ventricular tachycardia  2.  Hypertrophic cardiomyopathy  3.  Elevated troponin, considered myocardial injury, not an acute coronary syndrome, in the setting of problem #1  3.  Primary hypertension  4.  Mixed hyperlipidemia    -Both cardiology and electrophysiology were asked to consult on this patient.  Unfortunately, the patient has a significant arrhythmic history with VT, prior ablation, an ICD and is currently taking mexiletine as an outpatient.  Therefore, I will have to defer to electrophysiology regarding any further medication recommendations.  The patient is known to have no significant coronary artery disease therefore is not considered to need any invasive work-up in terms of coronary artery disease, etc.  -At this time, interventional cardiology will sign off of this patient but there is an EP consultation pending at this time, placed by the admitting physician overnight, for further recommendations.      Electronically signed by Martin Ramachandran MD at 10/28/23 8135       Saeed Delgado, RN at 10/28/23 6623          Goal Outcome Evaluation:      - alert and oriented x 4 the entire shift. Arrived at 2005. Has been on the amiodarone drip the entire shift -- had several instances where his HR dropped into the mid-30s. HR: 50s-60s despite HR drops. Dr. Rodas was notified and wanted amiodarone stopped and to get a 400 mg tablet q8hr starting at 0800. BP has remained below 160. No complaints of dizziness, weakness, palpitations, chest pain, etc. Stated that he feels like his normal self. UOP: 700. Trop: 49. Ever since AMIO was stopped, his HR has not dropped. He has a pacemaker with a defibrillator.       Problem: Adult Inpatient Plan of Care  Goal: Absence of Hospital-Acquired Illness or Injury  Intervention: Identify and Manage Fall Risk  Recent Flowsheet Documentation  Taken 10/28/2023 0500 by Saeed Delgado, RN  Safety Promotion/Fall Prevention:   safety round/check  completed   room organization consistent   clutter free environment maintained  Taken 10/28/2023 0400 by Saeed Delgado RN  Safety Promotion/Fall Prevention:   safety round/check completed   room organization consistent   clutter free environment maintained  Taken 10/28/2023 0300 by Saeed Delgado RN  Safety Promotion/Fall Prevention:   safety round/check completed   room organization consistent   clutter free environment maintained  Taken 10/28/2023 0200 by Saeed Delgado RN  Safety Promotion/Fall Prevention:   safety round/check completed   room organization consistent   clutter free environment maintained  Taken 10/28/2023 0100 by Saeed Delgado RN  Safety Promotion/Fall Prevention:   safety round/check completed   room organization consistent   clutter free environment maintained  Taken 10/28/2023 0000 by Saeed Delgado RN  Safety Promotion/Fall Prevention:   safety round/check completed   room organization consistent   clutter free environment maintained  Taken 10/27/2023 2300 by Saeed Delgado RN  Safety Promotion/Fall Prevention:   safety round/check completed   room organization consistent   clutter free environment maintained  Taken 10/27/2023 2200 by Saeed Delgado RN  Safety Promotion/Fall Prevention:   safety round/check completed   room organization consistent   clutter free environment maintained  Taken 10/27/2023 2100 by Saeed Delgado RN  Safety Promotion/Fall Prevention:   safety round/check completed   room organization consistent   clutter free environment maintained  Taken 10/27/2023 2005 by Saeed Delgado RN  Safety Promotion/Fall Prevention:   safety round/check completed   room organization consistent   clutter free environment maintained  Intervention: Prevent Skin Injury  Recent Flowsheet Documentation  Taken 10/28/2023 0400 by Saeed Delgado RN  Body Position:   position changed independently   supine   weight shifting  Taken 10/28/2023 0200 by Saeed Delgado RN  Body Position:   position  changed independently   supine   weight shifting  Taken 10/28/2023 0000 by Saeed Delgado RN  Body Position:   position changed independently   weight shifting   supine  Taken 10/27/2023 2200 by Saeed Delgado RN  Body Position:   position changed independently   weight shifting   supine  Taken 10/27/2023 2005 by Saeed Delgado RN  Body Position:   position changed independently   weight shifting   supine  Intervention: Prevent and Manage VTE (Venous Thromboembolism) Risk  Recent Flowsheet Documentation  Taken 10/28/2023 0400 by Saeed Delgado RN  Activity Management: bedrest  Taken 10/28/2023 0000 by Saeed Delgado RN  Activity Management: bedrest  VTE Prevention/Management: (SEE MAR) other (see comments)  Taken 10/27/2023 2200 by Saeed Delgado RN  Activity Management: bedrest  Taken 10/27/2023 2005 by Saeed Delgado RN  Activity Management: bedrest  VTE Prevention/Management: (SEE MAR) other (see comments)  Intervention: Prevent Infection  Recent Flowsheet Documentation  Taken 10/28/2023 0500 by Saeed Delgado RN  Infection Prevention:   single patient room provided   rest/sleep promoted   hand hygiene promoted  Taken 10/28/2023 0400 by Saeed Delgado RN  Infection Prevention:   single patient room provided   rest/sleep promoted   hand hygiene promoted  Taken 10/28/2023 0300 by Saeed Delgado RN  Infection Prevention:   single patient room provided   rest/sleep promoted   hand hygiene promoted  Taken 10/28/2023 0200 by Saeed Delgado RN  Infection Prevention:   single patient room provided   rest/sleep promoted   hand hygiene promoted  Goal: Optimal Comfort and Wellbeing  Intervention: Provide Person-Centered Care  Recent Flowsheet Documentation  Taken 10/28/2023 0400 by Saeed Delgado RN  Trust Relationship/Rapport:   care explained   choices provided   questions encouraged   reassurance provided   thoughts/feelings acknowledged  Taken 10/28/2023 0000 by Saeed Delgado RN  Trust Relationship/Rapport:   care  "explained   choices provided   reassurance provided   thoughts/feelings acknowledged  Taken 10/27/2023 2005 by Saeed Delgado RN  Trust Relationship/Rapport:   care explained   choices provided   reassurance provided   thoughts/feelings acknowledged  Goal: Readiness for Transition of Care  Intervention: Mutually Develop Transition Plan  Recent Flowsheet Documentation  Taken 10/28/2023 0214 by Saeed Delgado RN  Equipment Currently Used at Home: none  Taken 10/28/2023 0209 by Saeed Delgado RN  Transportation Anticipated: public transportation  Transportation Concerns: no car  Patient/Family Anticipated Services at Transition:   Patient/Family Anticipates Transition to: home with family                       Electronically signed by Saeed Delgado RN at 10/28/23 0608       Ruba Franz DO at 10/27/23 2014              [facility] Medicine Services  HISTORY AND PHYSICAL    Date of Admission: 10/27/2023  Primary Care Physician: Conor Denny DO    Subjective   Primary Historian: Patient    Chief Complaint: Palpitations    Palpitations   Pertinent negatives include no chest pain, coughing, fever, nausea or shortness of breath.     63-year-old male who presents emergency department with episode of palpitations.  He notes that he has been having trouble sleeping due to nasal congestion.  He went to the [employer] to get nasal spray, and started having irregular heartbeat at the store.  He states \"My heart was going crazy.\"  He has chronic lower extremity edema.  He did not have chest pain.  He notes that his irregular heartbeat has happened many times.  He has no acute bowel or kidney changes.        Review of Systems   Constitutional:  Negative for chills and fever.   Respiratory:  Negative for cough and shortness of breath.    Cardiovascular:  Positive for palpitations and leg swelling. Negative for chest pain.   Gastrointestinal:  Negative for diarrhea and " nausea.   Psychiatric/Behavioral:  Positive for sleep disturbance.       Otherwise complete ROS reviewed and negative except as mentioned in the HPI.    Past Medical History:   Past Medical History:   Diagnosis Date    Hyperlipidemia     Hypertension     Hypertrophic cardiomyopathy     s/p AICD    Migraine     Prostate cancer     Tachycardia     Ventricular tachycardia     Ventricular tachycardia, sustained 10/14/2016     Past Surgical History:  Past Surgical History:   Procedure Laterality Date    CARDIAC ABLATION  01/28/2016 2/2016, 10/18/2016 (Dr. Doss in [place], [place])    CARDIAC ABLATION      2018    CARDIAC CATHETERIZATION      CARDIAC DEFIBRILLATOR PLACEMENT      CARDIAC DEFIBRILLATOR PLACEMENT      PROSTATE BIOPSY  2019    PROSTATE BIOPSY  2023     Social History:  reports that he has never smoked. He does not have any smokeless tobacco history on file. He reports that he does not drink alcohol and does not use drugs.    Family History: Family history is unknown by patient.       Allergies:  No Known Allergies    Medications:  Prior to Admission medications    Medication Sig Start Date End Date Taking? Authorizing Provider   allopurinol (ZYLOPRIM) 300 MG tablet Take 1 tablet by mouth Daily.    ProviderFabiola MD   amiodarone (PACERONE) 200 MG tablet Take 1 tablet by mouth Every 12 (Twelve) Hours. 10/2/17   Nia Garcia APRN   amLODIPine (NORVASC) 2.5 MG tablet Take 1 tablet by mouth Daily.    ProviderFabiola MD   amoxicillin-clavulanate (AUGMENTIN) 875-125 MG per tablet Take 1 tablet by mouth 2 (Two) Times a Day.  Patient not taking: Reported on 9/13/2023    Fabiola Casanova MD   apixaban (Eliquis) 5 MG tablet tablet     ProviderFabiola MD   aspirin 81 MG EC tablet Take 1 tablet by mouth Daily.    ProviderFabiola MD   carvedilol (COREG) 12.5 MG tablet Take 1 tablet by mouth 2 (Two) Times a Day.    ProviderFabiola MD   chlorthalidone (HYGROTON) 25 MG tablet      Fabiola Casanova MD   cloNIDine (CATAPRES) 0.1 MG tablet Take 1 tablet by mouth 2 (Two) Times a Day.    Fabiola Casanova MD   colchicine 0.6 MG tablet Take 1 tablet by mouth Daily.    Fabiola Casanova MD   cyclobenzaprine (FLEXERIL) 10 MG tablet Take 10 mg by mouth 3 (Three) Times a Day As Needed for Muscle Spasms.  Patient not taking: Reported on 9/13/2023    Fabiola Casanova MD   fluticasone (FLONASE) 50 MCG/ACT nasal spray 2 sprays into the nostril(s) as directed by provider.    Fabiloa Casanova MD   furosemide (LASIX) 20 MG tablet Take 1 tablet by mouth Daily. 7/1/19   Luis Alberto Salgado PA-C   furosemide (LASIX) 40 MG tablet Take 40 mg by mouth 2 (Two) Times a Day.  Patient not taking: Reported on 9/13/2023    Fabiola Casanova MD   guaiFENesin (MUCINEX) 600 MG 12 hr tablet Take 1 tablet by mouth Every 12 (Twelve) Hours. 10/2/17   Nia Garcia APRN   HYDROcodone-acetaminophen (NORCO) 5-325 MG per tablet     Fabiola Casanova MD   ibuprofen (ADVIL,MOTRIN) 600 MG tablet Take 1 tablet by mouth Every 6 (Six) Hours As Needed for Mild Pain . 7/12/19   Brett Mendez MD   indomethacin (INDOCIN) 50 MG capsule Take 1 capsule by mouth 3 (Three) Times a Day As Needed for Mild Pain.    Fabiola Casanova MD   lidocaine in D5W 4-5 MG/ML-% infusion Infuse 2 mg/min into a venous catheter Continuous. 10/2/17   Nia Garcia APRN   lisinopril (PRINIVIL,ZESTRIL) 20 MG tablet Take 1 tablet by mouth Daily.    Fabiola Casanova MD   metoprolol tartrate (LOPRESSOR) 5 MG/5ML injection Infuse 5 mL into a venous catheter Every 6 (Six) Hours As Needed (HR>110). 10/2/17   Nia Garcia APRN   mexiletine (MEXITIL) 150 MG capsule     Fabiola Casanova MD   neomycin-polymyxin-hydrocortisone (CORTISPORIN) 3.5-26301-0 otic suspension neomycin-polymyxin-hydrocort 3.5 mg-10,000 unit/mL-1 % ear drops,susp    Provider, MD Fabiola   nitroglycerin (NITROSTAT) 0.4 MG SL tablet Place 1  tablet under the tongue.    Fabiola Casanova MD   penicillin v potassium (VEETID) 500 MG tablet Every 12 (Twelve) Hours.    Fabiola Casanova MD   pravastatin (PRAVACHOL) 20 MG tablet TK 1 T PO QD 10/28/16   Fabiola Casanova MD   pravastatin (PRAVACHOL) 40 MG tablet Take 40 mg by mouth Daily.  Patient not taking: Reported on 9/13/2023    Fabiola Casanova MD   sildenafil (VIAGRA) 50 MG tablet Take 1 tablet by mouth Daily As Needed for Erectile Dysfunction.    Fabiola Casanova MD   simvastatin (ZOCOR) 20 MG tablet     Fabiola Casanova MD   traMADol (ULTRAM) 50 MG tablet Take 50 mg by mouth Every 6 (Six) Hours As Needed for Moderate Pain .  Patient not taking: Reported on 9/13/2023    Fabiola Casanova MD   valproic acid (DEPAKENE) 250 MG capsule Take  by mouth Daily.    Fabiola Casanova MD   vardenafil (Levitra) 20 MG tablet TAKE 1 TABLET BY ORAL ROUTE EVERY DAY AS NEEDED SEXUAL ACTIVITY MUST LAST 90 DAYS    Fabiola Casanova MD   verapamil (CALAN) 80 MG tablet Take 1 tablet by mouth Every 8 (Eight) Hours.  Patient not taking: Reported on 9/13/2023 10/2/17   Nia Garcia APRN I have utilized all available immediate resources to obtain, update, or review the patient's current medications (including all prescriptions, over-the-counter products, herbals, cannabis/cannabidiol products, and vitamin/mineral/dietary (nutritional) supplements).    Objective     Vital Signs: /92   Pulse 52   Temp 98.1 °F (36.7 °C)   Resp 18   SpO2 100%   Physical Exam  Vitals reviewed.   Constitutional:       Appearance: He is obese.   HENT:      Head: Normocephalic and atraumatic.      Right Ear: External ear normal.      Left Ear: External ear normal.      Nose: Nose normal.      Mouth/Throat:      Mouth: Mucous membranes are moist.      Pharynx: No oropharyngeal exudate.   Eyes:      General: No scleral icterus.     Conjunctiva/sclera: Conjunctivae normal.   Cardiovascular:      Rate  and Rhythm: Normal rate and regular rhythm.      Heart sounds: Normal heart sounds.   Pulmonary:      Effort: Pulmonary effort is normal.      Breath sounds: Normal breath sounds.   Abdominal:      General: There is no distension.      Palpations: Abdomen is soft.      Tenderness: There is no abdominal tenderness.   Musculoskeletal:         General: Swelling present. No tenderness.      Cervical back: Normal range of motion and neck supple.      Right lower leg: Edema present.      Left lower leg: Edema present.   Skin:     General: Skin is warm and dry.   Neurological:      General: No focal deficit present.      Mental Status: He is alert.      Cranial Nerves: No cranial nerve deficit.   Psychiatric:         Mood and Affect: Mood normal.         Behavior: Behavior normal.        Results Reviewed:  Lab Results (last 24 hours)       Procedure Component Value Units Date/Time    Fentanyl, Urine - Urine, Clean Catch [[id number]]  (Normal) Collected: 10/27/23 1730    Specimen: Urine, Clean Catch Updated: 10/27/23 1803     Fentanyl, Urine Negative    Narrative:      Negative Threshold:      Fentanyl 5 ng/mL     The normal value for the drug tested is negative. This report includes final unconfirmed screening results to be used for medical treatment purposes only. Unconfirmed results must not be used for non-medical purposes such as employment or legal testing. Clinical consideration should be applied to any drug of abuse test, particularly when unconfirmed results are used.           Urine Drug Screen - Urine, Clean Catch [[id number]]  (Normal) Collected: 10/27/23 1730    Specimen: Urine, Clean Catch Updated: 10/27/23 1802     THC, Screen, Urine Negative     Phencyclidine (PCP), Urine Negative     Cocaine Screen, Urine Negative     Methamphetamine, Ur Negative     Opiate Screen Negative     Amphetamine Screen, Urine Negative     Benzodiazepine Screen, Urine Negative     Tricyclic Antidepressants Screen Negative      Methadone Screen, Urine Negative     Barbiturates Screen, Urine Negative     Oxycodone Screen, Urine Negative     Propoxyphene Screen Negative     Buprenorphine, Screen, Urine Negative    Narrative:      Cutoff For Drugs Screened:    Amphetamines               500 ng/ml  Barbiturates               200 ng/ml  Benzodiazepines            150 ng/ml  Cocaine                    150 ng/ml  Methadone                  200 ng/ml  Opiates                    100 ng/ml  Phencyclidine               25 ng/ml  THC                            50 ng/ml  Methamphetamine            500 ng/ml  Tricyclic Antidepressants  300 ng/ml  Oxycodone                  100 ng/ml  Propoxyphene               300 ng/ml  Buprenorphine               10 ng/ml    The normal value for all drugs tested is negative. This report includes unconfirmed screening results, with the cutoff values listed, to be used for medical treatment purposes only.  Unconfirmed results must not be used for non-medical purposes such as employment or legal testing.  Clinical consideration should be applied to any drug of abuse test, particularly when unconfirmed results are used.      TSH [[id number]]  (Normal) Collected: 10/27/23 1642    Specimen: Blood Updated: 10/27/23 1724     TSH 0.843 uIU/mL     Basic Metabolic Panel [[id number]]  (Abnormal) Collected: 10/27/23 1642    Specimen: Blood Updated: 10/27/23 1717     Glucose 116 mg/dL      BUN 17 mg/dL      Creatinine 1.14 mg/dL      Sodium 141 mmol/L      Potassium 4.3 mmol/L      Comment: Slight hemolysis detected by analyzer. Results may be affected.        Chloride 108 mmol/L      CO2 24.0 mmol/L      Calcium 8.6 mg/dL      BUN/Creatinine Ratio 14.9     Anion Gap 9.0 mmol/L      eGFR 71.8 mL/min/1.73     Narrative:      GFR Normal >60  Chronic Kidney Disease <60  Kidney Failure <15      Magnesium [[id number]]  (Normal) Collected: 10/27/23 1642    Specimen: Blood Updated: 10/27/23 1717     Magnesium 2.1 mg/dL     BNP [[id number]]   (Abnormal) Collected: 10/27/23 1642    Specimen: Blood Updated: 10/27/23 1714     proBNP 1,111.0 pg/mL     Narrative:      This assay is used as an aid in the diagnosis of individuals suspected of having heart failure. It can be used as an aid in the diagnosis of acute decompensated heart failure (ADHF) in patients presenting with signs and symptoms of ADHF to the emergency department (ED). In addition, NT-proBNP of <300 pg/mL indicates ADHF is not likely.    Age Range Result Interpretation  NT-proBNP Concentration (pg/mL:      <50             Positive            >450                   Gray                 300-450                    Negative             <300    50-75           Positive            >900                  Gray                300-900                  Negative            <300      >75             Positive            >1800                  Gray                300-1800                  Negative            <300    Single High Sensitivity Troponin T [[id number]]  (Abnormal) Collected: 10/27/23 1642    Specimen: Blood Updated: 10/27/23 1714     HS Troponin T 42 ng/L     Narrative:      High Sensitive Troponin T Reference Range:  <10.0 ng/L- Negative Female for AMI  <15.0 ng/L- Negative Male for AMI  >=10 - Abnormal Female indicating possible myocardial injury.  >=15 - Abnormal Male indicating possible myocardial injury.   Clinicians would have to utilize clinical acumen, EKG, Troponin, and serial changes to determine if it is an Acute Myocardial Infarction or myocardial injury due to an underlying chronic condition.         Ethanol [[id number]] Collected: 10/27/23 1642    Specimen: Blood Updated: 10/27/23 1712     Ethanol % <0.010 %     Narrative:      Not for legal purposes. Chain of Custody not followed.     Protime-INR [[id number]]  (Normal) Collected: 10/27/23 1642    Specimen: Blood Updated: 10/27/23 1705     Protime 13.9 Seconds      INR 1.06    CBC & Differential [[id number]]  (Abnormal) Collected: 10/27/23  1642    Specimen: Blood Updated: 10/27/23 1656    Narrative:      The following orders were created for panel order CBC & Differential.  Procedure                               Abnormality         Status                     ---------                               -----------         ------                     CBC Auto Differential[[id number]]        Abnormal            Final result                 Please view results for these tests on the individual orders.    CBC Auto Differential [[id number]]  (Abnormal) Collected: 10/27/23 1642    Specimen: Blood Updated: 10/27/23 1656     WBC 5.26 10*3/mm3      RBC 5.37 10*6/mm3      Hemoglobin 15.0 g/dL      Hematocrit 47.6 %      MCV 88.6 fL      MCH 27.9 pg      MCHC 31.5 g/dL      RDW 17.1 %      RDW-SD 54.5 fl      MPV 11.8 fL      Platelets 158 10*3/mm3      Neutrophil % 62.7 %      Lymphocyte % 26.6 %      Monocyte % 7.8 %      Eosinophil % 2.3 %      Basophil % 0.4 %      Immature Grans % 0.2 %      Neutrophils, Absolute 3.30 10*3/mm3      Lymphocytes, Absolute 1.40 10*3/mm3      Monocytes, Absolute 0.41 10*3/mm3      Eosinophils, Absolute 0.12 10*3/mm3      Basophils, Absolute 0.02 10*3/mm3      Immature Grans, Absolute 0.01 10*3/mm3      nRBC 0.0 /100 WBC           Imaging Results (Last 24 Hours)       Procedure Component Value Units Date/Time    XR Chest 1 View [[id number]] Collected: 10/27/23 1702     Updated: 10/27/23 1706    Narrative:      EXAM: XR CHEST 1 VW- 10/27/2023 4:50 PM CDT     HISTORY: Palpitation       COMPARISON: 7/9/2019.     TECHNIQUE: Single frontal radiograph of the chest was obtained.     FINDINGS:      Support Devices: Cardiac pacemaker device with leads overlying the right  atrium and right ventricle.     Cardiac and Mediastinal Silhouettes: Heart size is enlarged, likely  unchanged.     Lungs/Pleura: No focal consolidation. No sizable pleural effusion. No  visible pneumothorax.     Osseous structures: No acute osseous finding.     Other: None.        Impression:         No acute cardiopulmonary abnormality.     Cardiomegaly.           This report was signed and finalized on 10/27/2023 5:02 PM CDT by Eris Mckeon.             I have personally reviewed and interpreted the radiology studies and ECG obtained at time of admission.     Assessment / Plan   Assessment:   Active Hospital Problems    Diagnosis     **VT (ventricular tachycardia)      Impression:  VT  Hypertrophic cardiomyopathy with AICD  Hypertension  Hyperlipidemia     Treatment Plan  Admit to the CCU  Continue current home medications  FU labs in the am with EKG  Consult with EP and cardiology.   5. Amiodarone drip started in the ED.     The patient will be admitted to my service here at [facility].  Primary team to take over the morning    Medical Decision Making  Number and Complexity of problems: 4, moderate  Differential Diagnosis: Arrhythmia    Conditions and Status        Condition is unchanged.     [facility] Data  External documents reviewed: None  Cardiac tracing (EKG, telemetry) interpretation: None  Radiology interpretation: None  Labs reviewed: Reviewed  Any tests that were considered but not ordered: None     Decision rules/scores evaluated (example [id number], [place], etc): None     Discussed with: Patient     Care Planning  Shared decision making: Patient and ED staff  Code status and discussions: Full    Disposition  Social Determinants of Health that impact treatment or disposition: Medication noncompliance  Estimated length of stay is 2 to 3 days.     I confirmed that the patient's advanced care plan is present, code status is documented, and a surrogate decision maker is listed in the patient's medical record.     The patient's surrogate decision maker is family.     The patient was seen and examined by me on 10/27/2023 at 730.    Electronically signed by Ruba Franz DO, 10/27/23, 20:14 CDT.                Electronically signed by Ruba Franz DO at 10/27/23  2108       Karlos Bailey MD at 10/27/23 0819          Subjective  History of Present Illness  Patient has got palpitation.  He is a 63-year-old is got a history of A-fib in the past came into the ED with palpitation.  There is no chest pain at this time.  There is  no  nausea vomiting his blood pressure slightly elevated.  No other complaints.    Palpitations  Palpitations quality:  Irregular  Onset quality:  Gradual  Progression:  Resolved  Chronicity:  Recurrent  Context: not anxiety, not appetite suppressants, not blood loss, not bronchodilators, not caffeine, not dehydration, not exercise, not illicit drugs and not nicotine    Relieved by:  Nothing  Worsened by:  Nothing  Ineffective treatments:  None tried  Associated symptoms: weakness    Associated symptoms: no back pain, no chest pressure, no cough, no diaphoresis, no dizziness, no malaise/fatigue, no nausea, no near-syncope, no numbness, no PND, no shortness of breath, no syncope and no vomiting    Risk factors: diabetes mellitus, heart disease and hx of atrial fibrillation    Risk factors: no hx of PE, no hx of thyroid disease and no OTC sinus medications        Review of Systems   Constitutional:  Negative for diaphoresis and malaise/fatigue.   HENT: Negative.     Respiratory:  Negative for cough and shortness of breath.    Cardiovascular:  Positive for palpitations. Negative for syncope, PND and near-syncope.   Gastrointestinal: Negative.  Negative for abdominal distention, abdominal pain, nausea and vomiting.   Endocrine: Negative.    Genitourinary: Negative.    Musculoskeletal: Negative.  Negative for back pain and neck pain.   Skin:  Negative for color change and pallor.   Neurological:  Positive for weakness. Negative for dizziness, syncope, light-headedness, numbness and headaches.   Hematological: Negative.  Does not bruise/bleed easily.   All other systems reviewed and are negative.      Past Medical History:   Diagnosis Date    Hyperlipidemia      Hypertension     Hypertrophic cardiomyopathy     s/p AICD    Migraine     Prostate cancer     Tachycardia     Ventricular tachycardia     Ventricular tachycardia, sustained 10/14/2016       No Known Allergies    Past Surgical History:   Procedure Laterality Date    CARDIAC ABLATION  01/28/2016 2/2016, 10/18/2016 (Dr. Doss in [place], [place])    CARDIAC ABLATION      2018    CARDIAC CATHETERIZATION      CARDIAC DEFIBRILLATOR PLACEMENT      CARDIAC DEFIBRILLATOR PLACEMENT      PROSTATE BIOPSY  2019    PROSTATE BIOPSY  2023       Family History   Family history unknown: Yes       Social History     Socioeconomic History    Marital status: Single   Tobacco Use    Smoking status: Never   Substance and Sexual Activity    Alcohol use: No    Drug use: No    Sexual activity: Defer           Objective  Physical Exam  Vitals and nursing note reviewed. Exam conducted with a chaperone present.   Constitutional:       General: He is not in acute distress.     Appearance: Normal appearance. He is well-developed. He is ill-appearing. He is not toxic-appearing.   HENT:      Head: Normocephalic and atraumatic.      Comments: No head trauma no hemotympanum     Nose: Nose normal.      Mouth/Throat:      Mouth: Mucous membranes are moist.      Pharynx: Uvula midline.   Eyes:      General: Lids are normal. Lids are everted, no foreign bodies appreciated.      Conjunctiva/sclera: Conjunctivae normal.      Pupils: Pupils are equal, round, and reactive to light.   Neck:      Vascular: Normal carotid pulses. No carotid bruit or JVD.      Trachea: Trachea and phonation normal. No tracheal deviation.      Comments: No nuchal rigidity  Cardiovascular:      Rate and Rhythm: Normal rate and regular rhythm.      Chest Wall: PMI is not displaced.      Pulses: Normal pulses.      Heart sounds: Normal heart sounds.      No systolic murmur is present.      No gallop. S3 sounds present.   Pulmonary:      Effort: Pulmonary effort is normal. No  tachypnea or respiratory distress.      Breath sounds: No stridor. Examination of the right-lower field reveals rales. Examination of the left-lower field reveals rales. Rales present. No decreased breath sounds, wheezing or rhonchi.   Abdominal:      General: Abdomen is protuberant. Bowel sounds are normal. There is no distension.      Palpations: Abdomen is soft.      Tenderness: There is no abdominal tenderness.   Musculoskeletal:         General: No swelling. Normal range of motion.      Cervical back: Full passive range of motion without pain, normal range of motion and neck supple. No rigidity.      Right lower le+ Edema present.      Left lower le+ Edema present.      Comments: Lower extremity exam bilaterally is unremarkable.  There is no right or left calf tenderness .  There is no palpable venous cord.  No obvious difference in the size of the legs.  No pitting edema.  The dorsalis pedis and posterior tibial femoral and popliteal pulses are palpable and +2 bilaterally.  Homans sign is negative   Skin:     General: Skin is warm and dry.      Capillary Refill: Capillary refill takes 2 to 3 seconds. Capillary refill takes less than 2 seconds.      Coloration: Skin is not jaundiced or pale.      Nails: There is no clubbing.   Neurological:      General: No focal deficit present.      Mental Status: He is alert and oriented to person, place, and time.      GCS: GCS eye subscore is 4. GCS verbal subscore is 5. GCS motor subscore is 6.      Cranial Nerves: Cranial nerves 2-12 are intact. No cranial nerve deficit.      Motor: Motor function is intact.      Gait: Gait normal.      Deep Tendon Reflexes: Reflexes are normal and symmetric. Reflexes normal.   Psychiatric:         Speech: Speech normal.         Behavior: Behavior normal.         Procedures           ED Course  ED Course as of 10/27/23 1936   Fri Oct 27, 2023   165 Paced rhythm with intraventricular conduction delay. [TS]   1805 .  Left  ventricular myocardial perfusion demonstrates a large infarct involving the inferior wall with extension into contiguous lateral wall particularly toward the apex.  There is no significant ischemia identified.   2.  The left ventricular ejection fraction (LVEF) is 35%.   3.  The left ventricular wall motion demonstrates decreased motion centered at the apex with extension into immediately contiguous portions of the other wall segments.   This was the patient was last stress test [TS]   1919 Pacemaker was interrogated which shows runs of VT which were treated with pacing out.  No shocks delivered patient is asymptomatic at this time was placed on amiodarone by me.  I have discussed this case with Dr. Ramachandran and  will give a dose of mexiletine I am not sure whether he is on mexiletine at this time we will admit to the medicine service [TS]   1922 There is a concern about noncompliance of medication. [TS]      ED Course User Index  [TS] Karlos Bailey MD                                           Medical Decision Making  Patient with palpitation denies any chest pain or shortness of breath he is got a history of poor EF.  Pacemaker will be interrogated.  Patient's cardiologist is in [place] but he has not seen them for a while the AICD was placed 3 years ago in [place].  He wants to move his cardiology evaluation to [place].    Problems Addressed:  Chronic HFrEF (heart failure with reduced ejection fraction): chronic illness or injury     Details: Patient has got a poor EF in the last echo  Morbid obesity: chronic illness or injury  Primary hypertension: chronic illness or injury  Ventricular tachycardia: acute illness or injury that poses a threat to life or bodily functions    Amount and/or Complexity of Data Reviewed  Labs: ordered.     Details: Labs reviewed  Radiology: ordered.  ECG/medicine tests: ordered.     Details: Intraventricular conduction delay  Discussion of management or test  interpretation with external provider(s): Discussed with Dr. Ramachandran and discussed with Dr. Rodas  Will be discussing this with Dr. Ba    Risk  Prescription drug management.  Decision regarding hospitalization.  Risk Details: Case discussed with the patient patient is on amiodarone drip at this time is can be admitted to medicine service.        Final diagnoses:   Ventricular tachycardia   Chronic HFrEF (heart failure with reduced ejection fraction)   Morbid obesity   Primary hypertension       ED Disposition  ED Disposition       ED Disposition   Decision to Admit    Condition   --    Comment   Level of Care: Critical Care [6]   Diagnosis: VT (ventricular tachycardia) [280032]   Admitting Physician: ANDREA RUBI [[id number]]   Attending Physician: ANDREA RUBI [[id number]]   Certification: I Certify That Inpatient Hospital Services Are Medically Necessary For Greater Than 2 Midnights                 No follow-up provider specified.       Medication List      No changes were made to your prescriptions during this visit.            Karlos Bailey MD  10/27/23 1922       Karlos Bailey MD  10/27/23 1936      Electronically signed by Karlos Bailey MD at 10/27/23 1936          Emergency Department Notes        Karlos Bailey MD at 10/27/23 1607          Subjective   History of Present Illness  Patient has got palpitation.  He is a 63-year-old is got a history of A-fib in the past came into the ED with palpitation.  There is no chest pain at this time.  There is  no  nausea vomiting his blood pressure slightly elevated.  No other complaints.    Palpitations  Palpitations quality:  Irregular  Onset quality:  Gradual  Progression:  Resolved  Chronicity:  Recurrent  Context: not anxiety, not appetite suppressants, not blood loss, not bronchodilators, not caffeine, not dehydration, not exercise, not illicit drugs and not nicotine    Relieved by:  Nothing  Worsened by:  Nothing  Ineffective treatments:  None  tried  Associated symptoms: weakness    Associated symptoms: no back pain, no chest pressure, no cough, no diaphoresis, no dizziness, no malaise/fatigue, no nausea, no near-syncope, no numbness, no PND, no shortness of breath, no syncope and no vomiting    Risk factors: diabetes mellitus, heart disease and hx of atrial fibrillation    Risk factors: no hx of PE, no hx of thyroid disease and no OTC sinus medications        Review of Systems   Constitutional:  Negative for diaphoresis and malaise/fatigue.   HENT: Negative.     Respiratory:  Negative for cough and shortness of breath.    Cardiovascular:  Positive for palpitations. Negative for syncope, PND and near-syncope.   Gastrointestinal: Negative.  Negative for abdominal distention, abdominal pain, nausea and vomiting.   Endocrine: Negative.    Genitourinary: Negative.    Musculoskeletal: Negative.  Negative for back pain and neck pain.   Skin:  Negative for color change and pallor.   Neurological:  Positive for weakness. Negative for dizziness, syncope, light-headedness, numbness and headaches.   Hematological: Negative.  Does not bruise/bleed easily.   All other systems reviewed and are negative.      Past Medical History:   Diagnosis Date    Hyperlipidemia     Hypertension     Hypertrophic cardiomyopathy     s/p AICD    Migraine     Prostate cancer     Tachycardia     Ventricular tachycardia     Ventricular tachycardia, sustained 10/14/2016       No Known Allergies    Past Surgical History:   Procedure Laterality Date    CARDIAC ABLATION  01/28/2016 2/2016, 10/18/2016 (Dr. Doss in [place], [place])    CARDIAC ABLATION      2018    CARDIAC CATHETERIZATION      CARDIAC DEFIBRILLATOR PLACEMENT      CARDIAC DEFIBRILLATOR PLACEMENT      PROSTATE BIOPSY  2019    PROSTATE BIOPSY  2023       Family History   Family history unknown: Yes       Social History     Socioeconomic History    Marital status: Single   Tobacco Use    Smoking status: Never   Substance and  Sexual Activity    Alcohol use: No    Drug use: No    Sexual activity: Defer           Objective   Physical Exam  Vitals and nursing note reviewed. Exam conducted with a chaperone present.   Constitutional:       General: He is not in acute distress.     Appearance: Normal appearance. He is well-developed. He is ill-appearing. He is not toxic-appearing.   HENT:      Head: Normocephalic and atraumatic.      Comments: No head trauma no hemotympanum     Nose: Nose normal.      Mouth/Throat:      Mouth: Mucous membranes are moist.      Pharynx: Uvula midline.   Eyes:      General: Lids are normal. Lids are everted, no foreign bodies appreciated.      Conjunctiva/sclera: Conjunctivae normal.      Pupils: Pupils are equal, round, and reactive to light.   Neck:      Vascular: Normal carotid pulses. No carotid bruit or JVD.      Trachea: Trachea and phonation normal. No tracheal deviation.      Comments: No nuchal rigidity  Cardiovascular:      Rate and Rhythm: Normal rate and regular rhythm.      Chest Wall: PMI is not displaced.      Pulses: Normal pulses.      Heart sounds: Normal heart sounds.      No systolic murmur is present.      No gallop. S3 sounds present.   Pulmonary:      Effort: Pulmonary effort is normal. No tachypnea or respiratory distress.      Breath sounds: No stridor. Examination of the right-lower field reveals rales. Examination of the left-lower field reveals rales. Rales present. No decreased breath sounds, wheezing or rhonchi.   Abdominal:      General: Abdomen is protuberant. Bowel sounds are normal. There is no distension.      Palpations: Abdomen is soft.      Tenderness: There is no abdominal tenderness.   Musculoskeletal:         General: No swelling. Normal range of motion.      Cervical back: Full passive range of motion without pain, normal range of motion and neck supple. No rigidity.      Right lower le+ Edema present.      Left lower le+ Edema present.      Comments: Lower  extremity exam bilaterally is unremarkable.  There is no right or left calf tenderness .  There is no palpable venous cord.  No obvious difference in the size of the legs.  No pitting edema.  The dorsalis pedis and posterior tibial femoral and popliteal pulses are palpable and +2 bilaterally.  Homans sign is negative   Skin:     General: Skin is warm and dry.      Capillary Refill: Capillary refill takes 2 to 3 seconds. Capillary refill takes less than 2 seconds.      Coloration: Skin is not jaundiced or pale.      Nails: There is no clubbing.   Neurological:      General: No focal deficit present.      Mental Status: He is alert and oriented to person, place, and time.      GCS: GCS eye subscore is 4. GCS verbal subscore is 5. GCS motor subscore is 6.      Cranial Nerves: Cranial nerves 2-12 are intact. No cranial nerve deficit.      Motor: Motor function is intact.      Gait: Gait normal.      Deep Tendon Reflexes: Reflexes are normal and symmetric. Reflexes normal.   Psychiatric:         Speech: Speech normal.         Behavior: Behavior normal.         Procedures          ED Course  ED Course as of 10/27/23 1936   Fri Oct 27, 2023   1659 Paced rhythm with intraventricular conduction delay. [TS]   1805 .  Left ventricular myocardial perfusion demonstrates a large infarct involving the inferior wall with extension into contiguous lateral wall particularly toward the apex.  There is no significant ischemia identified.   2.  The left ventricular ejection fraction (LVEF) is 35%.   3.  The left ventricular wall motion demonstrates decreased motion centered at the apex with extension into immediately contiguous portions of the other wall segments.   This was the patient was last stress test [TS]   1919 Pacemaker was interrogated which shows runs of VT which were treated with pacing out.  No shocks delivered patient is asymptomatic at this time was placed on amiodarone by me.  I have discussed this case with Dr. Ramachandran  and  will give a dose of mexiletine I am not sure whether he is on mexiletine at this time we will admit to the medicine service [TS]   1922 There is a concern about noncompliance of medication. [TS]      ED Course User Index  [TS] Karlos Bailey MD                                           Medical Decision Making  Patient with palpitation denies any chest pain or shortness of breath he is got a history of poor EF.  Pacemaker will be interrogated.  Patient's cardiologist is in [place] but he has not seen them for a while the AICD was placed 3 years ago in [place].  He wants to move his cardiology evaluation to [place].    Problems Addressed:  Chronic HFrEF (heart failure with reduced ejection fraction): chronic illness or injury     Details: Patient has got a poor EF in the last echo  Morbid obesity: chronic illness or injury  Primary hypertension: chronic illness or injury  Ventricular tachycardia: acute illness or injury that poses a threat to life or bodily functions    Amount and/or Complexity of Data Reviewed  Labs: ordered.     Details: Labs reviewed  Radiology: ordered.  ECG/medicine tests: ordered.     Details: Intraventricular conduction delay  Discussion of management or test interpretation with external provider(s): Discussed with Dr. Ramachandran and discussed with Dr. Rodas  Will be discussing this with Dr. Ba    Risk  Prescription drug management.  Decision regarding hospitalization.  Risk Details: Case discussed with the patient patient is on amiodarone drip at this time is can be admitted to medicine service.        Final diagnoses:   Ventricular tachycardia   Chronic HFrEF (heart failure with reduced ejection fraction)   Morbid obesity   Primary hypertension       ED Disposition  ED Disposition       ED Disposition   Decision to Admit    Condition   --    Comment   Level of Care: Critical Care [6]   Diagnosis: VT (ventricular tachycardia) [136769]   Admitting Physician: ANDREA RUBI  [[id number]]   Attending Physician: ANDREA RUBI [[id number]]   Certification: I Certify That Inpatient Hospital Services Are Medically Necessary For Greater Than 2 Midnights                 No follow-up provider specified.       Medication List      No changes were made to your prescriptions during this visit.            Karlos Bailey MD  10/27/23 1922       Karlos Bailey MD  10/27/23 1936      Electronically signed by Karlos Bailey MD at 10/27/23 1936       Vital Signs (last 3 days) before discharge       Date/Time Temp Temp src Pulse Resp BP SpO2    10/28/23 1000 -- -- 50 -- 140/74 94    10/28/23 0945 -- -- 54 -- 139/71 96    10/28/23 0930 -- -- 51 -- 129/78 96    10/28/23 0915 -- -- 50 -- 140/76 94    10/28/23 0900 -- -- 55 -- 162/84 98    10/28/23 0845 -- -- 50 -- 170/92 95    10/28/23 0830 -- -- 50 -- 156/93 95    10/28/23 0815 -- -- 54 -- 170/92 95    10/28/23 0800 -- -- 52 -- 149/81 99    10/28/23 0700 -- -- 49 -- 151/100 96    10/28/23 0645 -- -- 50 -- 155/92 97    10/28/23 0630 -- -- 50 -- 159/96 98    10/28/23 0615 -- -- 50 -- 154/79 95    10/28/23 0330 98 (36.7) Oral -- -- -- --    10/28/23 0115 -- -- 51 -- -- 97    10/28/23 0100 -- -- 50 -- 164/84 97    10/28/23 0045 -- -- 50 -- 167/84 95    10/28/23 0015 -- -- 50 -- 155/83 95    10/28/23 0000 -- -- 50 -- 173/87 95    10/27/23 2345 -- -- 50 -- 158/82 95    10/27/23 2230 -- -- 51 -- 147/94 94    10/27/23 2215 -- -- 55 -- -- 94    10/27/23 2200 -- -- 54 -- 169/95 98    10/27/23 2145 -- -- 52 -- 154/97 99    10/27/23 2130 -- -- 50 -- 156/72 98    10/27/23 2115 -- -- 53 -- 166/86 97    10/27/23 2100 -- -- 53 -- 166/85 98    10/27/23 2000 -- -- 55 -- -- 99    10/27/23 1945 -- -- 52 -- 156/92 100    10/27/23 1930 -- -- 50 -- 168/86 97    10/27/23 1915 -- -- 53 -- 160/91 95    10/27/23 1900 -- -- 56 -- 166/99 96    10/27/23 1845 -- -- 50 -- 157/83 96    10/27/23 1830 -- -- 57 -- 165/89 96    10/27/23 1815 -- -- 52 -- 166/86 99    10/27/23 1800 -- -- 78 -- 174/89  --    10/27/23 1605 98.1 (36.7) -- 70 18 199/102 99          Oxygen Therapy (last 3 days) before discharge       Date/Time SpO2 Device (Oxygen Therapy) Flow (L/min) Oxygen Concentration (%) ETCO2 (mmHg)    10/28/23 1000 94 -- -- -- --    10/28/23 0945 96 -- -- -- --    10/28/23 0930 96 -- -- -- --    10/28/23 0915 94 -- -- -- --    10/28/23 0900 98 -- -- -- --    10/28/23 0845 95 -- -- -- --    10/28/23 0830 95 -- -- -- --    10/28/23 0815 95 -- -- -- --    10/28/23 0800 99 room air -- -- --    10/28/23 0700 96 -- -- -- --    10/28/23 0645 97 -- -- -- --    10/28/23 0630 98 -- -- -- --    10/28/23 0615 95 -- -- -- --    10/28/23 0400 -- room air -- -- --    10/28/23 0330 -- room air -- -- --    10/28/23 0115 97 -- -- -- --    10/28/23 0100 97 -- -- -- --    10/28/23 0045 95 -- -- -- --    10/28/23 0015 95 -- -- -- --    10/28/23 0000 95 room air -- -- --    10/27/23 2345 95 -- -- -- --    10/27/23 2230 94 -- -- -- --    10/27/23 2215 94 -- -- -- --    10/27/23 2200 98 -- -- -- --    10/27/23 2145 99 -- -- -- --    10/27/23 2130 98 -- -- -- --    10/27/23 2115 97 -- -- -- --    10/27/23 2100 98 -- -- -- --    10/27/23 2005 -- room air -- -- --    10/27/23 2000 99 -- -- -- --    10/27/23 1945 100 -- -- -- --    10/27/23 1930 97 -- -- -- --    10/27/23 1915 95 -- -- -- --    10/27/23 1900 96 -- -- -- --    10/27/23 1845 96 -- -- -- --    10/27/23 1830 96 -- -- -- --    10/27/23 1815 99 -- -- -- --    10/27/23 1605 99 -- -- -- --          Intake & Output (last 3 days)         10/27 0701  10/28 0700 10/28 0701  10/29 0700 10/29 0701  10/30 0700 10/30 0701  10/31 0700    I.V. 282.3       Total Intake 282.3       Urine 700       Total Output 700       Net -417.7                      Orders (last 72 hrs)        Start     Ordered    10/28/23 1115  Discontinue IV  Once,   Status:  Canceled         10/28/23 1115    10/28/23 1111  Discharge patient  Once         10/28/23 1115    10/28/23 0900  allopurinol (ZYLOPRIM) tablet 300  mg  Daily,   Status:  Discontinued         10/27/23 2028    10/28/23 0900  aspirin EC tablet 81 mg  Daily,   Status:  Discontinued         10/27/23 2028    10/28/23 0900  atorvastatin (LIPITOR) tablet 10 mg  Daily,   Status:  Discontinued         10/27/23 2028    10/28/23 0900  lisinopril (PRINIVIL,ZESTRIL) tablet 20 mg  Daily,   Status:  Discontinued         10/27/23 2028    10/28/23 0900  valproic acid (DEPAKENE) capsule 250 mg  Daily,   Status:  Discontinued         10/27/23 2028    10/28/23 0800  amiodarone (PACERONE) tablet 400 mg  Every 8 Hours,   Status:  Discontinued         10/28/23 0508    10/28/23 0600  ECG 12 Lead Drug Monitoring; Amiodarone  Daily,   Status:  Canceled       10/27/23 1658    10/28/23 0600  CBC Auto Differential  Morning Draw         10/27/23 2028    10/28/23 0600  Comprehensive Metabolic Panel  Morning Draw         10/27/23 2028    10/28/23 0600  Phosphorus  Morning Draw         10/27/23 2028    10/28/23 0600  Lipid Panel  Morning Draw         10/27/23 2028    10/28/23 0600  Hemoglobin A1c  Morning Draw         10/27/23 2028    10/28/23 0600  High Sensitivity Troponin T  Morning Draw         10/27/23 2028    10/28/23 0519  High Sensitivity Troponin T 2Hr  PROCEDURE ONCE         10/28/23 0414    10/28/23 0500  ECG 12 Lead Rhythm Change  Tomorrow AM,   Status:  Canceled         10/27/23 2028    10/28/23 0400  Chlorhexidine Gluconate Cloth 2 % pads 1 application   Every 24 Hours,   Status:  Discontinued         10/27/23 2028    10/28/23 0000  amiodarone (PACERONE) 200 MG tablet  Every 12 Hours Scheduled,   Status:  Discontinued         10/28/23 1115    10/28/23 0000  mexiletine (MEXITIL) 150 MG capsule  2 Times Daily,   Status:  Discontinued         10/28/23 1115    10/28/23 0000  Discharge Follow-up with Specified Provider: Dr Rodas next week         10/28/23 1115    10/28/23 0000  Activity as Tolerated         10/28/23 1115    10/28/23 0000  Diet: Cardiac Diets; No Salt Packet; Thin (IDDSI  0)         10/28/23 1115    10/28/23 0000  amiodarone (PACERONE) 200 MG tablet  Every 12 Hours Scheduled         10/28/23 1138    10/28/23 0000  amiodarone (Pacerone) 200 MG tablet  Daily,   Status:  Discontinued         10/28/23 1139    10/28/23 0000  amiodarone (Pacerone) 200 MG tablet  Daily,   Status:  Discontinued         10/28/23 1157    10/28/23 0000  mexiletine (MEXITIL) 150 MG capsule  2 Times Daily         10/28/23 1157    10/27/23 2327  ECG 12 Lead Rhythm Change  STAT         10/27/23 2327    10/27/23 2314  amiodarone 360 mg in 200 mL D5W infusion  Continuous,   Status:  Discontinued        [facility] for [facility] Orders Report.    10/27/23 1658    10/27/23 2200  oxymetazoline (AFRIN) nasal spray 2 spray  2 Times Daily,   Status:  Discontinued         10/27/23 2106    10/27/23 2115  apixaban (ELIQUIS) tablet 5 mg  Every 12 Hours Scheduled,   Status:  Discontinued         10/27/23 2028    10/27/23 2115  carvedilol (COREG) tablet 12.5 mg  2 Times Daily,   Status:  Discontinued         10/27/23 2028    10/27/23 2115  cloNIDine (CATAPRES) tablet 0.1 mg  Every 12 Hours Scheduled,   Status:  Discontinued         10/27/23 2028    10/27/23 2115  mexiletine (MEXITIL) capsule 150 mg  Every 12 Hours Scheduled,   Status:  Discontinued         10/27/23 2028    10/27/23 2115  furosemide (LASIX) tablet 40 mg  2 Times Daily (Diuretics),   Status:  Discontinued         10/27/23 2028    10/27/23 2115  guaiFENesin (MUCINEX) 12 hr tablet 600 mg  Every 12 Hours Scheduled,   Status:  Discontinued         10/27/23 2028    10/27/23 2115  sodium chloride 0.9 % flush 10 mL  Every 12 Hours Scheduled,   Status:  Discontinued         10/27/23 2028    10/27/23 2115  mupirocin (BACTROBAN) 2 % nasal ointment 1 application   2 Times Daily,   Status:  Discontinued         10/27/23 2028    10/27/23 2115  Chlorhexidine Gluconate Cloth 2 % pads 1 application   Once         10/27/23 2028    10/27/23 2100  Vital Signs Every Hour and Per  Hospital Policy Based on Patient Condition  Every Hour,   Status:  Canceled       10/27/23 2028    10/27/23 2100  Intake & Output  Every Hour,   Status:  Canceled       10/27/23 2028    10/27/23 2029  Continuous Cardiac Monitoring  Continuous,   Status:  Canceled        Comments: Follow Standing Orders As Outlined in Process Instructions (Open Order Report to View Full Instructions)    10/27/23 2028    10/27/23 2029  Telemetry - Maintain IV Access  Continuous,   Status:  Canceled         10/27/23 2028    10/27/23 2029  Telemetry - Place Orders & Notify Provider of Results When Patient Experiences Acute Chest Pain, Dysrhythmia or Respiratory Distress  Until Discontinued,   Status:  Canceled         10/27/23 2028    10/27/23 2029  Continuous Pulse Oximetry  Continuous,   Status:  Canceled         10/27/23 2028    10/27/23 2029  Height & Weight  Once         10/27/23 2028    10/27/23 2029  Daily Weights  Daily,   Status:  Canceled       10/27/23 2028    10/27/23 2029  Oral Care - Patient Not on NPPV & Not Intubated  Every Shift,   Status:  Canceled       10/27/23 2028    10/27/23 2029  Target Arousal Level RASS 0 to -1  Continuous,   Status:  Canceled         10/27/23 2028    10/27/23 2029  Use Mobility Guidelines for Advancement of Activity  Continuous,   Status:  Canceled         10/27/23 2028    10/27/23 2029  Insert Peripheral IV  Once,   Status:  Canceled         10/27/23 2028    10/27/23 2029  Saline Lock & Maintain IV Access  Continuous,   Status:  Canceled         10/27/23 2028    10/27/23 2029  VTE Prophylaxis Not Indicated: Other: Patient Currently Anticoagulated / Receiving Prophylaxis  Once         10/27/23 2028    10/27/23 2029  Diet: Cardiac Diets; Healthy Heart (2-3 Na+); Texture: Regular Texture (IDDSI 7); Fluid Consistency: Thin (IDDSI 0)  Diet Effective Now,   Status:  Canceled         10/27/23 2028    10/27/23 2029  Inpatient Cardiac Electrophysiology Consult  Once,   Status:  Canceled         Specialty:  Cardiac Electrophysiology  Provider:  (Not yet assigned)    10/27/23 2028    10/27/23 2029  Inpatient Cardiology Consult  Once        Specialty:  Cardiology  Provider:  (Not yet assigned)    10/27/23 2028    10/27/23 2028  nitroglycerin (NITROSTAT) SL tablet 0.4 mg  Every 5 Minutes PRN,   Status:  Discontinued         10/27/23 2028    10/27/23 2028  HYDROcodone-acetaminophen (NORCO) 5-325 MG per tablet 1 tablet  Every 8 Hours PRN,   Status:  Discontinued         10/27/23 2028    10/27/23 2028  nitroglycerin (NITROSTAT) SL tablet 0.4 mg  Every 5 Minutes PRN,   Status:  Discontinued         10/27/23 2028    10/27/23 2028  sodium chloride 0.9 % flush 10 mL  As Needed,   Status:  Discontinued         10/27/23 2028    10/27/23 2028  sodium chloride 0.9 % infusion 40 mL  As Needed,   Status:  Discontinued         10/27/23 2028    10/27/23 2028  acetaminophen (TYLENOL) tablet 650 mg  Every 4 Hours PRN,   Status:  Discontinued        See [facility] for full Linked Orders Report.    10/27/23 2028    10/27/23 2028  acetaminophen (TYLENOL) suppository 650 mg  Every 4 Hours PRN,   Status:  Discontinued        See [facility] for full Linked Orders Report.    10/27/23 2028    10/27/23 2028  ondansetron (ZOFRAN) injection 4 mg  Every 6 Hours PRN,   Status:  Discontinued         10/27/23 2028    10/27/23 2028  LORazepam (ATIVAN) tablet 0.5 mg  Nightly PRN,   Status:  Discontinued         10/27/23 2028    10/27/23 2007  Code Status and Medical Interventions:  Continuous,   Status:  Canceled         10/27/23 2014    10/27/23 1938  mexiletine (MEXITIL) capsule 150 mg  Once         10/27/23 1922    10/27/23 1936  Inpatient Admission  Once         10/27/23 1936    10/27/23 1936  Cardiac Monitoring  Continuous,   Status:  Canceled        Comments: Follow Standing Orders As Outlined in Process Instructions (Open Order Report to View Full Instructions)    10/27/23 1936    10/27/23 1746  Fentanyl, Urine - Urine, Clean Catch   Once         10/27/23 1745    10/27/23 1714  amiodarone 150 mg in 100 mL D5W (loading dose)  Once        See [facility] for full Linked Orders Report.    10/27/23 1658    10/27/23 1714  amiodarone 360 mg in 200 mL D5W infusion  Continuous        See [facility] for full Linked Orders Report.    10/27/23 1658    10/27/23 1659  Initiate & Follow Amiodarone Protocol  Continuous,   Status:  Canceled         10/27/23 1658    10/27/23 1659  Monitor QTC  Every Shift,   Status:  Canceled       10/27/23 1658    10/27/23 1659  Notify physician if QTC is greater than or equal to 500 milliseconds  Until Discontinued         10/27/23 1658    10/27/23 1659  ECG 12 Lead Drug Monitoring; Amiodarone  STAT,   Status:  Canceled         10/27/23 1658    10/27/23 1626  labetalol (NORMODYNE,TRANDATE) injection 20 mg  Once         10/27/23 1610    10/27/23 1611  XR Chest 1 View  1 Time Imaging         10/27/23 1610    10/27/23 1611  Device Interrogation. Device type? Pacemaker; Company to contact? Unknown (Staff to obtain from patient)  Once         10/27/23 1610    10/27/23 1610  CBC & Differential  Once         10/27/23 1610    10/27/23 1610  Basic Metabolic Panel  Once         10/27/23 1610    10/27/23 1610  Protime-INR  Once         10/27/23 1610    10/27/23 1610  BNP  Once         10/27/23 1610    10/27/23 1610  Single High Sensitivity Troponin T  Once         10/27/23 1610    10/27/23 1610  Ethanol  Once         10/27/23 1610    10/27/23 1610  Urine Drug Screen - Urine, Clean Catch  Once         10/27/23 1610    10/27/23 1610  TSH  Once         10/27/23 1610    10/27/23 1610  Magnesium  Once         10/27/23 1610    10/27/23 1610  ECG 12 Lead Tachycardia  Once         10/27/23 1610    10/27/23 1610  CBC Auto Differential  PROCEDURE ONCE         10/27/23 1610    --  SCANNED EKG         10/27/23 0000    --  SCANNED EKG         10/27/23 0000                     Consult Notes (last 72 hours)        Martin Ramachandran MD at 10/28/23  0936        Consult Orders    1. Inpatient Cardiology Consult [[id number]] ordered by Ruba Franz DO                 Inpatient Cardiology Consult  Consult performed by: Martin Ramachandran MD  Consult ordered by: Ruba Franz DO  Reason for consult: VT        Chief Complaint   Patient presents with    Palpitations     HPI: This is a 63-year-old -American male with reported hypertrophic cardiomyopathy, an ICD in place, previous ICD discharges for ventricular tachycardia, also prior ablation who presents with complaints of palpitations.  The patient says that he was at a local store yesterday when he began to feel some palpitations and irregularity to his pulse.  He says that he has become very aware of ventricular arrhythmias and thought that he was likely in some type of ventricular arrhythmia once again.  He called EMS and was brought here for further evaluation.  Reportedly, the patient's ICD was interrogated and did show ventricular tachycardia but did not require any ICD discharges.  The patient says that he has had multiple ICD discharges in the past but has been on mexiletine for quite some time after previously being on amiodarone.  His rhythm has been stable since that time.  He also says that since his most recent generator change, he has not received any shocks from his defibrillator.  He denies having any preceding symptoms other than the fact that he has been having some trouble sleeping lately due to some congestion.  He denies having any chest pain, shortness of breath.  He notes some chronic lower extremity edema that is unchanged.  He denies having orthopnea, PND.  No lightheadedness, dizziness or syncope.    Past Medical History:   Diagnosis Date    Hyperlipidemia     Hypertension     Hypertrophic cardiomyopathy     s/p AICD    Migraine     Prostate cancer     Tachycardia     Ventricular tachycardia     Ventricular tachycardia, sustained 10/14/2016     Past Surgical History:    Procedure Laterality Date    CARDIAC ABLATION  01/28/2016 2/2016, 10/18/2016 (Dr. Doss in [place], [place])    CARDIAC ABLATION      2018    CARDIAC CATHETERIZATION      CARDIAC DEFIBRILLATOR PLACEMENT      CARDIAC DEFIBRILLATOR PLACEMENT      PROSTATE BIOPSY  2019    PROSTATE BIOPSY  2023     Prior to Admission medications    Medication Sig Start Date End Date Taking? Authorizing Provider   allopurinol (ZYLOPRIM) 300 MG tablet Take 1 tablet by mouth Daily.    Fabiola Casanova MD   amiodarone (PACERONE) 200 MG tablet Take 1 tablet by mouth Every 12 (Twelve) Hours. 10/2/17   Nia Garcia APRN   amLODIPine (NORVASC) 2.5 MG tablet Take 1 tablet by mouth Daily.    Fabiola Casanova MD   amoxicillin-clavulanate (AUGMENTIN) 875-125 MG per tablet Take 1 tablet by mouth 2 (Two) Times a Day.  Patient not taking: Reported on 9/13/2023    Fabiola Casanova MD   apixaban (Eliquis) 5 MG tablet tablet     Fabiola Casanova MD   aspirin 81 MG EC tablet Take 1 tablet by mouth Daily.    Fabiola Casanova MD   carvedilol (COREG) 12.5 MG tablet Take 1 tablet by mouth 2 (Two) Times a Day.    Fabiola Casanova MD   chlorthalidone (HYGROTON) 25 MG tablet     Fabiola Casanova MD   cloNIDine (CATAPRES) 0.1 MG tablet Take 1 tablet by mouth 2 (Two) Times a Day.    Fabiola Casanova MD   colchicine 0.6 MG tablet Take 1 tablet by mouth Daily.    Fabiola Casanova MD   cyclobenzaprine (FLEXERIL) 10 MG tablet Take 10 mg by mouth 3 (Three) Times a Day As Needed for Muscle Spasms.  Patient not taking: Reported on 9/13/2023    Fabiola Casanova MD   fluticasone (FLONASE) 50 MCG/ACT nasal spray 2 sprays into the nostril(s) as directed by provider.    Fabiola Casanova MD   furosemide (LASIX) 20 MG tablet Take 1 tablet by mouth Daily. 7/1/19   Luis Alberto Salgado PA-C   furosemide (LASIX) 40 MG tablet Take 40 mg by mouth 2 (Two) Times a Day.  Patient not taking: Reported on 9/13/2023    Jocelyne  MD Fabiola   guaiFENesin (MUCINEX) 600 MG 12 hr tablet Take 1 tablet by mouth Every 12 (Twelve) Hours. 10/2/17   Nia Garcia APRN   HYDROcodone-acetaminophen (NORCO) 5-325 MG per tablet     Fabiola Casanova MD   ibuprofen (ADVIL,MOTRIN) 600 MG tablet Take 1 tablet by mouth Every 6 (Six) Hours As Needed for Mild Pain . 7/12/19   Brett Mendez MD   indomethacin (INDOCIN) 50 MG capsule Take 1 capsule by mouth 3 (Three) Times a Day As Needed for Mild Pain.    Fabiola Casanova MD   lidocaine in D5W 4-5 MG/ML-% infusion Infuse 2 mg/min into a venous catheter Continuous. 10/2/17   Nia Garcia APRN   lisinopril (PRINIVIL,ZESTRIL) 20 MG tablet Take 1 tablet by mouth Daily.    Fabiola Casanova MD   metoprolol tartrate (LOPRESSOR) 5 MG/5ML injection Infuse 5 mL into a venous catheter Every 6 (Six) Hours As Needed (HR>110). 10/2/17   Nia Garcia APRN   mexiletine (MEXITIL) 150 MG capsule     Fabiola Casanova MD   neomycin-polymyxin-hydrocortisone (CORTISPORIN) 3.5-37254-9 otic suspension neomycin-polymyxin-hydrocort 3.5 mg-10,000 unit/mL-1 % ear drops,susp    Fabiola Casanova MD   nitroglycerin (NITROSTAT) 0.4 MG SL tablet Place 1 tablet under the tongue.    Fabiola Casanova MD   penicillin v potassium (VEETID) 500 MG tablet Every 12 (Twelve) Hours.    Fabiola Casanova MD   pravastatin (PRAVACHOL) 20 MG tablet TK 1 T PO QD 10/28/16   Fabiola Casanova MD   pravastatin (PRAVACHOL) 40 MG tablet Take 40 mg by mouth Daily.  Patient not taking: Reported on 9/13/2023    Fabiola Casanova MD   sildenafil (VIAGRA) 50 MG tablet Take 1 tablet by mouth Daily As Needed for Erectile Dysfunction.    Fabiola Casanova MD   simvastatin (ZOCOR) 20 MG tablet     Fabiola Casanova MD   traMADol (ULTRAM) 50 MG tablet Take 50 mg by mouth Every 6 (Six) Hours As Needed for Moderate Pain .  Patient not taking: Reported on 9/13/2023    Fabiola Casanova MD   valproic acid  (DEPAKENE) 250 MG capsule Take  by mouth Daily.    Provider, MD Fabiola   vardenafil (Levitra) 20 MG tablet TAKE 1 TABLET BY ORAL ROUTE EVERY DAY AS NEEDED SEXUAL ACTIVITY MUST LAST 90 DAYS    Provider, MD Fabiola   verapamil (CALAN) 80 MG tablet Take 1 tablet by mouth Every 8 (Eight) Hours.  Patient not taking: Reported on 9/13/2023 10/2/17   Nia Garcia APRN     No Known Allergies    Social History     Tobacco Use    Smoking status: Never    Smokeless tobacco: Not on file   Substance Use Topics    Alcohol use: No     Family History   Family history unknown: Yes     Review of Systems   Constitutional: Negative for chills, fever and weight loss.   Eyes:  Negative for blurred vision and pain.   Cardiovascular:  Positive for palpitations. Negative for chest pain, dyspnea on exertion, leg swelling, orthopnea, paroxysmal nocturnal dyspnea and syncope.   Respiratory:  Negative for cough, shortness of breath and wheezing.    Endocrine: Negative for cold intolerance and heat intolerance.   Hematologic/Lymphatic: Negative for adenopathy and bleeding problem.   Skin:  Negative for poor wound healing and rash.   Musculoskeletal:  Negative for myalgias and neck pain.   Gastrointestinal:  Negative for abdominal pain, nausea and vomiting.   Neurological:  Negative for dizziness, headaches, light-headedness, loss of balance and numbness.   Psychiatric/Behavioral:  Negative for altered mental status.    Allergic/Immunologic: Negative for hives and persistent infections.     Physical Exam:    /81   Pulse 52   Temp 98 °F (36.7 °C) (Oral)   Resp 18   SpO2 99%   Temp:  [98 °F (36.7 °C)-98.1 °F (36.7 °C)] 98 °F (36.7 °C)  Heart Rate:  [49-78] 52  Resp:  [18] 18  BP: (147-199)/() 149/81    Vitals reviewed.   Constitutional:       General: Not in acute distress.     Appearance: Normal appearance. Well-developed and not in distress. Not toxic-appearing or diaphoretic.   Eyes:      General: Lids are normal.       Extraocular Movements: Extraocular movements intact.      Pupils: Pupils are equal, round, and reactive to light.   HENT:      Head: Normocephalic and atraumatic.      Right Ear: External ear normal.      Left Ear: External ear normal.      Nose: Nose normal.    Mouth/Throat:      Mouth: Mucous membranes are not pale, not dry and not cyanotic.   Neck:      Vascular: No carotid bruit or JVD.   Pulmonary:      Effort: Pulmonary effort is normal. No accessory muscle usage or respiratory distress.      Breath sounds: Normal breath sounds. No wheezing. No rhonchi. No rales.   Chest:      Chest wall: Not tender to palpatation.   Cardiovascular:      Normal rate. Regular rhythm.      Murmurs: There is no murmur.      No gallop.    Pulses:     Intact distal pulses.   Edema:     Peripheral edema present.     Pretibial: bilateral trace edema of the pretibial area.     Ankle: bilateral trace edema of the ankle.  Abdominal:      General: Abdomen is protuberant. Bowel sounds are normal. There is no distension or abdominal bruit.      Palpations: Abdomen is soft.      Tenderness: There is no abdominal tenderness.   Musculoskeletal: Normal range of motion.         General: No tenderness or deformity.      Extremities: No clubbing present.Skin:     General: Skin is warm and dry. There is no cyanosis.      Findings: No erythema or rash.   Neurological:      General: No focal deficit present.      Mental Status: Oriented to person, place, and time and oriented to person, place and time.      Cranial Nerves: No cranial nerve deficit.   Psychiatric:         Attention and Perception: Attention normal.         Mood and Affect: Mood normal.         Speech: Speech normal.       Diagnostic Data:    Lab Results   Component Value Date    WBC 4.69 10/28/2023    HGB 14.5 10/28/2023    HCT 46.6 10/28/2023    MCV 89.3 10/28/2023     (L) 10/28/2023     Lab Results   Component Value Date    GLUCOSE 100 (H) 10/28/2023    CALCIUM 8.7  10/28/2023     10/28/2023    K 4.1 10/28/2023    CO2 23.0 10/28/2023     10/28/2023    BUN 17 10/28/2023    CREATININE 1.11 10/28/2023    EGFR 74.2 10/28/2023    BCR 15.3 10/28/2023    ANIONGAP 9.0 10/28/2023     High-sensitivity troponin: 42, 49, 39  proBNP: 1111  TSH: 0.843  Urine drug screen negative    Chest x-ray: Cardiomegaly but no acute cardiopulmonary findings.    Cardiac catheterization at [facility] on 3/18/2022: Described as no significant coronary artery disease and a false positive nuclear stress test.    Echocardiogram at [facility] on 3/17/2022:   Technically difficult study with poor images due to body habitus    Normal left ventricular size with decreased systolic function due to mild    apical hypokinesis -estimated ejection fraction 45%    Severe concentric left ventricular hypertrophy with moderate [grade 2]    diastolic dysfunction    Mild left atrial enlargement    Poor visualization of the aortic valve without evidence of stenosis and    with mild insufficiency    Mild mitral annular calcification with poor visualization of the mitral    valve and no demonstrated regurgitation    No evidence of pulmonic stenosis or insufficiency    Normal right atrial size with mild right ventricular enlargement    Trace tricuspid regurgitation    IVC appears mildly dilated with decreased inspiratory motion consistent    with elevated right atrial filling pressures    Aortic root dimensions fall within normal limits with dilatation of the    ascending aorta measuring 3.7 cm    No significant pericardial effusion    Poor visualization of the aortic arch    AICD leads seen in right-sided chambers    Definity contrast utilized to better define endocardial borders     ECG on 10/27/2023 at 4:45 PM: Atrial paced rhythm, ventricular rate 52, prolonged AV conduction noted, left bundle branch block pattern  ECG today at 6:27 AM: Ventricular rate 50, atrial paced with prolonged AV conduction,  left axis deviation with near left bundle branch block pattern    ASSESSMENT/PLAN:    1.  Ventricular tachycardia  2.  Hypertrophic cardiomyopathy  3.  Elevated troponin, considered myocardial injury, not an acute coronary syndrome, in the setting of problem #1  3.  Primary hypertension  4.  Mixed hyperlipidemia    -Both cardiology and electrophysiology were asked to consult on this patient.  Unfortunately, the patient has a significant arrhythmic history with VT, prior ablation, an ICD and is currently taking mexiletine as an outpatient.  Therefore, I will have to defer to electrophysiology regarding any further medication recommendations.  The patient is known to have no significant coronary artery disease therefore is not considered to need any invasive work-up in terms of coronary artery disease, etc.  -At this time, interventional cardiology will sign off of this patient but there is an EP consultation pending at this time, placed by the admitting physician overnight, for further recommendations.      Electronically signed by Maritn Ramachandran MD at 10/28/23 2338

## 2023-11-01 ENCOUNTER — READMISSION MANAGEMENT (OUTPATIENT)
Dept: CALL CENTER | Facility: HOSPITAL | Age: 63
End: 2023-11-01
Payer: MEDICARE

## 2023-11-01 NOTE — OUTREACH NOTE
Medical Week 1 Survey      Flowsheet Row Responses   [facility] facility patient discharged from? [facility]   Does the patient have one of the following disease processes/diagnoses(primary or secondary)? Other   Week 1 attempt successful? No   Unsuccessful attempts Attempt 1            Naman ANGEL - Registered Nurse

## 2023-11-02 ENCOUNTER — HOSPITAL ENCOUNTER (INPATIENT)
Facility: HOSPITAL | Age: 63
LOS: 1 days | Discharge: ANOTHER HOSPITAL | DRG: 309 | End: 2023-11-03
Attending: STUDENT IN AN ORGANIZED HEALTH CARE EDUCATION/TRAINING PROGRAM | Admitting: STUDENT IN AN ORGANIZED HEALTH CARE EDUCATION/TRAINING PROGRAM
Payer: MEDICARE

## 2023-11-02 ENCOUNTER — APPOINTMENT (OUTPATIENT)
Dept: GENERAL RADIOLOGY | Facility: HOSPITAL | Age: 63
DRG: 309 | End: 2023-11-02
Payer: MEDICARE

## 2023-11-02 DIAGNOSIS — I47.29 VENTRICULAR TACHYCARDIA, PAROXYSMAL: Primary | ICD-10-CM

## 2023-11-02 PROBLEM — I47.20 VENTRICULAR TACHYCARDIA (PAROXYSMAL): Status: ACTIVE | Noted: 2023-11-02

## 2023-11-02 LAB
ALBUMIN SERPL-MCNC: 4.2 G/DL (ref 3.5–5.2)
ALBUMIN/GLOB SERPL: 1.2 G/DL
ALP SERPL-CCNC: 75 U/L (ref 39–117)
ALT SERPL W P-5'-P-CCNC: 21 U/L (ref 1–41)
ANION GAP SERPL CALCULATED.3IONS-SCNC: 9 MMOL/L (ref 5–15)
AST SERPL-CCNC: 20 U/L (ref 1–40)
BASOPHILS # BLD AUTO: 0.04 10*3/MM3 (ref 0–0.2)
BASOPHILS NFR BLD AUTO: 0.6 % (ref 0–1.5)
BILIRUB SERPL-MCNC: 0.7 MG/DL (ref 0–1.2)
BUN SERPL-MCNC: 15 MG/DL (ref 8–23)
BUN/CREAT SERPL: 13.4 (ref 7–25)
CALCIUM SPEC-SCNC: 9.7 MG/DL (ref 8.6–10.5)
CHLORIDE SERPL-SCNC: 105 MMOL/L (ref 98–107)
CO2 SERPL-SCNC: 25 MMOL/L (ref 22–29)
CREAT SERPL-MCNC: 1.12 MG/DL (ref 0.76–1.27)
DEPRECATED RDW RBC AUTO: 55 FL (ref 37–54)
EGFRCR SERPLBLD CKD-EPI 2021: 73.4 ML/MIN/1.73
EOSINOPHIL # BLD AUTO: 0.09 10*3/MM3 (ref 0–0.4)
EOSINOPHIL NFR BLD AUTO: 1.4 % (ref 0.3–6.2)
ERYTHROCYTE [DISTWIDTH] IN BLOOD BY AUTOMATED COUNT: 18.1 % (ref 12.3–15.4)
GEN 5 2HR TROPONIN T REFLEX: 284 NG/L
GLOBULIN UR ELPH-MCNC: 3.5 GM/DL
GLUCOSE SERPL-MCNC: 133 MG/DL (ref 65–99)
HCT VFR BLD AUTO: 58.1 % (ref 37.5–51)
HGB BLD-MCNC: 18.8 G/DL (ref 13–17.7)
IMM GRANULOCYTES # BLD AUTO: 0.02 10*3/MM3 (ref 0–0.05)
IMM GRANULOCYTES NFR BLD AUTO: 0.3 % (ref 0–0.5)
LYMPHOCYTES # BLD AUTO: 1.48 10*3/MM3 (ref 0.7–3.1)
LYMPHOCYTES NFR BLD AUTO: 23.1 % (ref 19.6–45.3)
MAGNESIUM SERPL-MCNC: 2.3 MG/DL (ref 1.6–2.4)
MCH RBC QN AUTO: 28.8 PG (ref 26.6–33)
MCHC RBC AUTO-ENTMCNC: 32.4 G/DL (ref 31.5–35.7)
MCV RBC AUTO: 89 FL (ref 79–97)
MONOCYTES # BLD AUTO: 0.4 10*3/MM3 (ref 0.1–0.9)
MONOCYTES NFR BLD AUTO: 6.2 % (ref 5–12)
NEUTROPHILS NFR BLD AUTO: 4.39 10*3/MM3 (ref 1.7–7)
NEUTROPHILS NFR BLD AUTO: 68.4 % (ref 42.7–76)
NRBC BLD AUTO-RTO: 0 /100 WBC (ref 0–0.2)
PLATELET # BLD AUTO: 165 10*3/MM3 (ref 140–450)
PMV BLD AUTO: 12.1 FL (ref 6–12)
POTASSIUM SERPL-SCNC: 4.2 MMOL/L (ref 3.5–5.2)
PROT SERPL-MCNC: 7.7 G/DL (ref 6–8.5)
RBC # BLD AUTO: 6.53 10*6/MM3 (ref 4.14–5.8)
SODIUM SERPL-SCNC: 139 MMOL/L (ref 136–145)
T4 FREE SERPL-MCNC: 1.14 NG/DL (ref 0.93–1.7)
TROPONIN T DELTA: 215 NG/L
TROPONIN T SERPL HS-MCNC: 69 NG/L
TSH SERPL DL<=0.05 MIU/L-ACNC: 2.5 UIU/ML (ref 0.27–4.2)
WBC NRBC COR # BLD: 6.42 10*3/MM3 (ref 3.4–10.8)

## 2023-11-02 PROCEDURE — 93005 ELECTROCARDIOGRAM TRACING: CPT | Performed by: STUDENT IN AN ORGANIZED HEALTH CARE EDUCATION/TRAINING PROGRAM

## 2023-11-02 PROCEDURE — 84443 ASSAY THYROID STIM HORMONE: CPT | Performed by: STUDENT IN AN ORGANIZED HEALTH CARE EDUCATION/TRAINING PROGRAM

## 2023-11-02 PROCEDURE — 99223 1ST HOSP IP/OBS HIGH 75: CPT | Performed by: STUDENT IN AN ORGANIZED HEALTH CARE EDUCATION/TRAINING PROGRAM

## 2023-11-02 PROCEDURE — 83735 ASSAY OF MAGNESIUM: CPT | Performed by: STUDENT IN AN ORGANIZED HEALTH CARE EDUCATION/TRAINING PROGRAM

## 2023-11-02 PROCEDURE — 93005 ELECTROCARDIOGRAM TRACING: CPT

## 2023-11-02 PROCEDURE — 25010000002 AMIODARONE IN DEXTROSE 5% 360-4.14 MG/200ML-% SOLUTION: Performed by: STUDENT IN AN ORGANIZED HEALTH CARE EDUCATION/TRAINING PROGRAM

## 2023-11-02 PROCEDURE — 84484 ASSAY OF TROPONIN QUANT: CPT | Performed by: STUDENT IN AN ORGANIZED HEALTH CARE EDUCATION/TRAINING PROGRAM

## 2023-11-02 PROCEDURE — 71045 X-RAY EXAM CHEST 1 VIEW: CPT

## 2023-11-02 PROCEDURE — 25010000002 AMIODARONE IN DEXTROSE 5% 150-4.21 MG/100ML-% SOLUTION: Performed by: STUDENT IN AN ORGANIZED HEALTH CARE EDUCATION/TRAINING PROGRAM

## 2023-11-02 PROCEDURE — 80053 COMPREHEN METABOLIC PANEL: CPT | Performed by: STUDENT IN AN ORGANIZED HEALTH CARE EDUCATION/TRAINING PROGRAM

## 2023-11-02 PROCEDURE — 36415 COLL VENOUS BLD VENIPUNCTURE: CPT

## 2023-11-02 PROCEDURE — 85025 COMPLETE CBC W/AUTO DIFF WBC: CPT | Performed by: STUDENT IN AN ORGANIZED HEALTH CARE EDUCATION/TRAINING PROGRAM

## 2023-11-02 PROCEDURE — 84439 ASSAY OF FREE THYROXINE: CPT | Performed by: STUDENT IN AN ORGANIZED HEALTH CARE EDUCATION/TRAINING PROGRAM

## 2023-11-02 PROCEDURE — 99285 EMERGENCY DEPT VISIT HI MDM: CPT

## 2023-11-02 PROCEDURE — 93010 ELECTROCARDIOGRAM REPORT: CPT | Performed by: STUDENT IN AN ORGANIZED HEALTH CARE EDUCATION/TRAINING PROGRAM

## 2023-11-02 RX ORDER — AMLODIPINE BESYLATE 5 MG/1
2.5 TABLET ORAL DAILY
Status: DISCONTINUED | OUTPATIENT
Start: 2023-11-02 | End: 2023-11-04 | Stop reason: HOSPADM

## 2023-11-02 RX ORDER — VALPROIC ACID 250 MG/1
250 CAPSULE, LIQUID FILLED ORAL DAILY
Status: DISCONTINUED | OUTPATIENT
Start: 2023-11-02 | End: 2023-11-04 | Stop reason: HOSPADM

## 2023-11-02 RX ORDER — MEXILETINE HYDROCHLORIDE 200 MG/1
200 CAPSULE ORAL EVERY 8 HOURS SCHEDULED
Status: DISCONTINUED | OUTPATIENT
Start: 2023-11-02 | End: 2023-11-04 | Stop reason: HOSPADM

## 2023-11-02 RX ORDER — HYDROCODONE BITARTRATE AND ACETAMINOPHEN 5; 325 MG/1; MG/1
1 TABLET ORAL EVERY 4 HOURS PRN
Status: DISCONTINUED | OUTPATIENT
Start: 2023-11-02 | End: 2023-11-04 | Stop reason: HOSPADM

## 2023-11-02 RX ORDER — CHLORTHALIDONE 25 MG/1
25 TABLET ORAL DAILY
Status: DISCONTINUED | OUTPATIENT
Start: 2023-11-02 | End: 2023-11-04 | Stop reason: HOSPADM

## 2023-11-02 RX ORDER — ASPIRIN 81 MG/1
81 TABLET ORAL DAILY
Status: DISCONTINUED | OUTPATIENT
Start: 2023-11-03 | End: 2023-11-04 | Stop reason: HOSPADM

## 2023-11-02 RX ORDER — LISINOPRIL 20 MG/1
20 TABLET ORAL DAILY
Status: DISCONTINUED | OUTPATIENT
Start: 2023-11-03 | End: 2023-11-04 | Stop reason: HOSPADM

## 2023-11-02 RX ORDER — COLCHICINE 0.6 MG/1
0.6 TABLET ORAL DAILY
Status: DISCONTINUED | OUTPATIENT
Start: 2023-11-03 | End: 2023-11-04 | Stop reason: HOSPADM

## 2023-11-02 RX ADMIN — AMLODIPINE BESYLATE 2.5 MG: 5 TABLET ORAL at 17:42

## 2023-11-02 RX ADMIN — APIXABAN 5 MG: 5 TABLET, FILM COATED ORAL at 21:53

## 2023-11-02 RX ADMIN — CHLORTHALIDONE 25 MG: 25 TABLET ORAL at 18:21

## 2023-11-02 RX ADMIN — AMIODARONE HYDROCHLORIDE 150 MG: 1.5 INJECTION, SOLUTION INTRAVENOUS at 13:15

## 2023-11-02 RX ADMIN — MEXILETINE HYDROCHLORIDE 200 MG: 200 CAPSULE ORAL at 21:53

## 2023-11-02 RX ADMIN — AMIODARONE HYDROCHLORIDE 1 MG/MIN: 1.8 INJECTION, SOLUTION INTRAVENOUS at 13:28

## 2023-11-02 RX ADMIN — AMIODARONE HYDROCHLORIDE 0.5 MG/MIN: 1.8 INJECTION, SOLUTION INTRAVENOUS at 18:58

## 2023-11-02 NOTE — H&P
"Chief Complaint  VT storm    Subjective        History of Present Illness  EP Problems:   Ventricular tachycardia  - 1/2016:  VT ablation, [place]  - 10/2017:  VT ablation, [facility], [place]  - 1/2019:  VT ablation, [facility], [place]  2.  Presence of an ICD  - 2008:  DOI  -2/2022:  Gen change, [employer]     Cardiology problems:   HTN  Chronic systolic heart failure   GARY Grossman is a 63 y.o. male with past medical history as above who presents to the hospital for treatment of VT storm with multiple ICD shocks.  He has a complicated history with evidence of recurrent ventricular tachycardia and ICD shocks.  He has had a total of 3 previous ventricular tachycardia ablations.  The first ablation was in 2016 in [place] with 2 subsequent ablations in 2017 and 2019 in [facility].  He has done reasonably well over the years, however last week presented to the hospital with ventricular tachycardia requiring ATP.  He had not taken his mexiletine that morning and mexiletine was restarted.  He had also not been on amiodarone as this was previously discontinued by his electrophysiologist [facility] the [facility].  Given this, he was restarted on amiodarone discharged home.  He did well for several days, however today states that he was taking out the trash when he ended up having numerous ICD shocks.  He presented to the ER where his device was interrogated and he was found to have ventricular tachycardia on his device.    Family History:  Family history is unknown by patient.    Social History:  Social History     Tobacco Use    Smoking status: Never   Vaping Use    Vaping Use: Never used   Substance Use Topics    Alcohol use: No    Drug use: No       Allergies:  Patient has no known allergies.      Objective   Vital Signs:  /76   Pulse 58   Temp 97.9 °F (36.6 °C)   Resp 20   Ht 190.5 cm (75\")   Wt (!) 143 kg (315 lb)   SpO2 99%   BMI 39.37 kg/m²   Estimated body mass index is 39.37 kg/m² as calculated from the following:    " "Height as of this encounter: 190.5 cm (75\").    Weight as of this encounter: 143 kg (315 lb).      Physical Exam  Vitals reviewed.   Constitutional:       Appearance: Normal appearance.   HENT:      Head: Normocephalic and atraumatic.   Eyes:      Extraocular Movements: Extraocular movements intact.      Conjunctiva/sclera: Conjunctivae normal.   Cardiovascular:      Rate and Rhythm: Normal rate and regular rhythm.      Pulses: Normal pulses.      Heart sounds: Normal heart sounds.      Comments: Pulse generator site is clean, dry, intact  Pulmonary:      Effort: Pulmonary effort is normal.      Breath sounds: Normal breath sounds.   Musculoskeletal:         General: No swelling.   Neurological:      General: No focal deficit present.      Mental Status: He is alert and oriented to person, place, and time.   Psychiatric:         Mood and Affect: Mood normal.         Judgment: Judgment normal.        Result Review :  Labs were reviewed with relevant findings as follows:   Hemoglobin 18.8  Creatinine 1.12  Mag 2.3  TSH/T4 wnl    Presenting ECG directly visualized and independently reviewed: AP rhythm, IVCD    Device interrogation:  12 total shocks for monomorphic VT.  Numerous episodes appear to be terminating with ATP followed by near immediate re-initiation.  Shocks ineffective in termination of tachycardia                Assessment and Plan   Assessment/plan:   VT storm  IVCD    Jeanie Grossman is a 63 y.o. male who presents to the hospital for evaluation of VT storm.  He has drug refractory VT causing numerous shocks c/b failure to terminate VT with ICD shocks.  I think that his next best step would be consideration of redo VT ablation.  Given that he has had 3 endocardial ablaitons thus far, consideration of epicardial ablation would be appropriate.  We will plan to request a transfer to a tertiary center to consider this procedure.  For now, he does appear to have stabilized with IV amiodarone which we will continue " for the time being.    Plan:   - Admit to ICU  - Continue amiodarone IV  - Increase mexiletine to 200mg TID  - Transfer for consideration of epicardial VT ablation           Part of this note may be an electronic transcription/translation of spoken language to printed text using the Dragon Dictation System.

## 2023-11-02 NOTE — ED NOTES
Nursing report ED to floor  Jeanie Grossman  63 y.o.  male    HPI:   Chief Complaint   Patient presents with    Pacemaker Problem       Admitting doctor:   Chalo Rodas MD    Consulting provider(s):  Consults       Date and Time Order Name Status Description    10/27/2023  8:29 PM Inpatient Cardiology Consult Completed              Admitting diagnosis:   The encounter diagnosis was Ventricular tachycardia, paroxysmal.    Code status:   Current Code Status       Date Active Code Status Order ID Comments User Context       Prior            Allergies:   Patient has no known allergies.    Intake and Output  No intake or output data in the 24 hours ending 11/02/23 1624    Weight:       11/02/23  1223   Weight: (!) 143 kg (315 lb)       Most recent vitals:   Vitals:    11/02/23 1224 11/02/23 1228 11/02/23 1416 11/02/23 1546   BP: (!) 181/94  168/76 161/73   Pulse:   58 56   Resp:       Temp:  97.9 °F (36.6 °C)     SpO2:   99% 98%   Weight:       Height:         Oxygen Therapy: .    Active LDAs/IV Access:   Lines, Drains & Airways       Active LDAs       Name Placement date Placement time Site Days    Peripheral IV Anterior;Left Forearm --  --  Forearm  --    Peripheral IV 10/27/23 2235 Anterior;Right Forearm 10/27/23  2235  Forearm  5    Peripheral IV 11/02/23 1237 Posterior;Right Forearm 11/02/23  1237  Forearm  less than 1                    Labs (abnormal labs have a star):   Labs Reviewed   COMPREHENSIVE METABOLIC PANEL - Abnormal; Notable for the following components:       Result Value    Glucose 133 (*)     All other components within normal limits    Narrative:     GFR Normal >60  Chronic Kidney Disease <60  Kidney Failure <15     SINGLE HSTROPONIN T - Abnormal; Notable for the following components:    HS Troponin T 69 (*)     All other components within normal limits    Narrative:     High Sensitive Troponin T Reference Range:  <10.0 ng/L- Negative Female for AMI  <15.0 ng/L- Negative Male for AMI  >=10 - Abnormal  Female indicating possible myocardial injury.  >=15 - Abnormal Male indicating possible myocardial injury.   Clinicians would have to utilize clinical acumen, EKG, Troponin, and serial changes to determine if it is an Acute Myocardial Infarction or myocardial injury due to an underlying chronic condition.        CBC WITH AUTO DIFFERENTIAL - Abnormal; Notable for the following components:    RBC 6.53 (*)     Hemoglobin 18.8 (*)     Hematocrit 58.1 (*)     RDW 18.1 (*)     RDW-SD 55.0 (*)     MPV 12.1 (*)     All other components within normal limits   HIGH SENSITIVITIY TROPONIN T 2HR - Abnormal; Notable for the following components:    HS Troponin T 284 (*)     Troponin T Delta 215 (*)     All other components within normal limits    Narrative:     High Sensitive Troponin T Reference Range:  <10.0 ng/L- Negative Female for AMI  <15.0 ng/L- Negative Male for AMI  >=10 - Abnormal Female indicating possible myocardial injury.  >=15 - Abnormal Male indicating possible myocardial injury.   Clinicians would have to utilize clinical acumen, EKG, Troponin, and serial changes to determine if it is an Acute Myocardial Infarction or myocardial injury due to an underlying chronic condition.        MAGNESIUM - Normal   T4, FREE - Normal    Narrative:     Results may be falsely increased if patient taking Biotin.     TSH - Normal   CBC AND DIFFERENTIAL    Narrative:     The following orders were created for panel order CBC & Differential.  Procedure                               Abnormality         Status                     ---------                               -----------         ------                     CBC Auto Differential[[id number]]        Abnormal            Final result                 Please view results for these tests on the individual orders.       Meds given in ED:   Medications   amiodarone 150 mg in 100 mL D5W (loading dose) (0 mg Intravenous Stopped 11/2/23 7952)     Followed by   amiodarone 360 mg in 200 mL  D5W infusion (1 mg/min Intravenous New Bag 11/2/23 1328)     Followed by   amiodarone 360 mg in 200 mL D5W infusion (has no administration in time range)     amiodarone, 1 mg/min, Last Rate: 1 mg/min (11/02/23 1328)   Followed by  amiodarone, 0.5 mg/min         NIH Stroke Scale:       Isolation/Infection(s):  No active isolations   No active infections     COVID Testing  Collected .  Resulted .    Nursing report ED to floor:  Mental status: .  Ambulatory status: .  Precautions: .    ED nurse phone extentsion- ..

## 2023-11-03 ENCOUNTER — APPOINTMENT (OUTPATIENT)
Dept: CARDIOLOGY | Facility: HOSPITAL | Age: 63
DRG: 309 | End: 2023-11-03
Payer: MEDICARE

## 2023-11-03 ENCOUNTER — READMISSION MANAGEMENT (OUTPATIENT)
Dept: CALL CENTER | Facility: HOSPITAL | Age: 63
End: 2023-11-03
Payer: MEDICARE

## 2023-11-03 VITALS
RESPIRATION RATE: 16 BRPM | HEIGHT: 75 IN | TEMPERATURE: 97.9 F | OXYGEN SATURATION: 97 % | WEIGHT: 300.27 LBS | SYSTOLIC BLOOD PRESSURE: 135 MMHG | BODY MASS INDEX: 37.33 KG/M2 | DIASTOLIC BLOOD PRESSURE: 65 MMHG | HEART RATE: 50 BPM

## 2023-11-03 LAB
ALBUMIN SERPL-MCNC: 3.9 G/DL (ref 3.5–5.2)
ALBUMIN/GLOB SERPL: 1.3 G/DL
ALP SERPL-CCNC: 63 U/L (ref 39–117)
ALT SERPL W P-5'-P-CCNC: 17 U/L (ref 1–41)
ANION GAP SERPL CALCULATED.3IONS-SCNC: 11 MMOL/L (ref 5–15)
AST SERPL-CCNC: 27 U/L (ref 1–40)
BH CV ECHO MEAS - AO MAX PG: 8.9 MMHG
BH CV ECHO MEAS - AO MEAN PG: 5 MMHG
BH CV ECHO MEAS - AO V2 MAX: 149 CM/SEC
BH CV ECHO MEAS - AO V2 VTI: 31.1 CM
BH CV ECHO MEAS - AVA(I,D): 3.4 CM2
BH CV ECHO MEAS - EDV(CUBED): 145.5 ML
BH CV ECHO MEAS - EDV(MOD-SP4): 178 ML
BH CV ECHO MEAS - EF(MOD-BP): 39 %
BH CV ECHO MEAS - EF(MOD-SP4): 30.9 %
BH CV ECHO MEAS - ESV(CUBED): 76.8 ML
BH CV ECHO MEAS - ESV(MOD-SP4): 123 ML
BH CV ECHO MEAS - FS: 19.2 %
BH CV ECHO MEAS - IVS/LVPW: 0.96 CM
BH CV ECHO MEAS - IVSD: 2.11 CM
BH CV ECHO MEAS - LA DIMENSION: 5.5 CM
BH CV ECHO MEAS - LV DIASTOLIC VOL/BSA (35-75): 68.3 CM2
BH CV ECHO MEAS - LV MASS(C)D: 606.5 GRAMS
BH CV ECHO MEAS - LV MAX PG: 4.2 MMHG
BH CV ECHO MEAS - LV MEAN PG: 2 MMHG
BH CV ECHO MEAS - LV SYSTOLIC VOL/BSA (12-30): 47.2 CM2
BH CV ECHO MEAS - LV V1 MAX: 103 CM/SEC
BH CV ECHO MEAS - LV V1 VTI: 21.4 CM
BH CV ECHO MEAS - LVIDD: 5.3 CM
BH CV ECHO MEAS - LVIDS: 4.3 CM
BH CV ECHO MEAS - LVOT AREA: 4.9 CM2
BH CV ECHO MEAS - LVOT DIAM: 2.5 CM
BH CV ECHO MEAS - LVPWD: 2.19 CM
BH CV ECHO MEAS - MED PEAK E' VEL: 7.1 CM/SEC
BH CV ECHO MEAS - MR MAX PG: 67.9 MMHG
BH CV ECHO MEAS - MR MAX VEL: 412 CM/SEC
BH CV ECHO MEAS - MV A MAX VEL: 35.7 CM/SEC
BH CV ECHO MEAS - MV DEC SLOPE: 437 CM/SEC2
BH CV ECHO MEAS - MV E MAX VEL: 78.7 CM/SEC
BH CV ECHO MEAS - MV E/A: 2.2
BH CV ECHO MEAS - MV P1/2T: 62.9 MSEC
BH CV ECHO MEAS - MVA(P1/2T): 3.5 CM2
BH CV ECHO MEAS - PA V2 MAX: 77.6 CM/SEC
BH CV ECHO MEAS - RAP SYSTOLE: 5 MMHG
BH CV ECHO MEAS - RV MAX PG: 0.84 MMHG
BH CV ECHO MEAS - RV V1 MAX: 45.8 CM/SEC
BH CV ECHO MEAS - RVDD: 2.9 CM
BH CV ECHO MEAS - SI(MOD-SP4): 21.1 ML/M2
BH CV ECHO MEAS - SV(LVOT): 105 ML
BH CV ECHO MEAS - SV(MOD-SP4): 55 ML
BILIRUB SERPL-MCNC: 0.6 MG/DL (ref 0–1.2)
BUN SERPL-MCNC: 15 MG/DL (ref 8–23)
BUN/CREAT SERPL: 12.3 (ref 7–25)
CALCIUM SPEC-SCNC: 9.4 MG/DL (ref 8.6–10.5)
CHLORIDE SERPL-SCNC: 106 MMOL/L (ref 98–107)
CO2 SERPL-SCNC: 22 MMOL/L (ref 22–29)
CREAT SERPL-MCNC: 1.22 MG/DL (ref 0.76–1.27)
DEPRECATED RDW RBC AUTO: 53.1 FL (ref 37–54)
EGFRCR SERPLBLD CKD-EPI 2021: 66.2 ML/MIN/1.73
ERYTHROCYTE [DISTWIDTH] IN BLOOD BY AUTOMATED COUNT: 16.8 % (ref 12.3–15.4)
GLOBULIN UR ELPH-MCNC: 2.9 GM/DL
GLUCOSE SERPL-MCNC: 101 MG/DL (ref 65–99)
HCT VFR BLD AUTO: 50.2 % (ref 37.5–51)
HGB BLD-MCNC: 16 G/DL (ref 13–17.7)
LEFT ATRIUM VOLUME INDEX: 59.4 ML/M2
LEFT ATRIUM VOLUME: 155 ML
MAGNESIUM SERPL-MCNC: 2.2 MG/DL (ref 1.6–2.4)
MCH RBC QN AUTO: 28 PG (ref 26.6–33)
MCHC RBC AUTO-ENTMCNC: 31.9 G/DL (ref 31.5–35.7)
MCV RBC AUTO: 87.9 FL (ref 79–97)
PLATELET # BLD AUTO: 152 10*3/MM3 (ref 140–450)
PMV BLD AUTO: 11.9 FL (ref 6–12)
POTASSIUM SERPL-SCNC: 4.1 MMOL/L (ref 3.5–5.2)
PROT SERPL-MCNC: 6.8 G/DL (ref 6–8.5)
RBC # BLD AUTO: 5.71 10*6/MM3 (ref 4.14–5.8)
SODIUM SERPL-SCNC: 139 MMOL/L (ref 136–145)
WBC NRBC COR # BLD: 5.77 10*3/MM3 (ref 3.4–10.8)

## 2023-11-03 PROCEDURE — 25010000002 AMIODARONE IN DEXTROSE 5% 360-4.14 MG/200ML-% SOLUTION: Performed by: STUDENT IN AN ORGANIZED HEALTH CARE EDUCATION/TRAINING PROGRAM

## 2023-11-03 PROCEDURE — 80053 COMPREHEN METABOLIC PANEL: CPT | Performed by: STUDENT IN AN ORGANIZED HEALTH CARE EDUCATION/TRAINING PROGRAM

## 2023-11-03 PROCEDURE — 85027 COMPLETE CBC AUTOMATED: CPT | Performed by: STUDENT IN AN ORGANIZED HEALTH CARE EDUCATION/TRAINING PROGRAM

## 2023-11-03 PROCEDURE — 93010 ELECTROCARDIOGRAM REPORT: CPT | Performed by: STUDENT IN AN ORGANIZED HEALTH CARE EDUCATION/TRAINING PROGRAM

## 2023-11-03 PROCEDURE — 99239 HOSP IP/OBS DSCHRG MGMT >30: CPT | Performed by: STUDENT IN AN ORGANIZED HEALTH CARE EDUCATION/TRAINING PROGRAM

## 2023-11-03 PROCEDURE — 93306 TTE W/DOPPLER COMPLETE: CPT

## 2023-11-03 PROCEDURE — 93306 TTE W/DOPPLER COMPLETE: CPT | Performed by: INTERNAL MEDICINE

## 2023-11-03 PROCEDURE — 83735 ASSAY OF MAGNESIUM: CPT | Performed by: STUDENT IN AN ORGANIZED HEALTH CARE EDUCATION/TRAINING PROGRAM

## 2023-11-03 PROCEDURE — 25510000001 PERFLUTREN 6.52 MG/ML SUSPENSION: Performed by: STUDENT IN AN ORGANIZED HEALTH CARE EDUCATION/TRAINING PROGRAM

## 2023-11-03 RX ORDER — INDOMETHACIN 50 MG/1
50 CAPSULE ORAL 2 TIMES DAILY WITH MEALS
Status: DISCONTINUED | OUTPATIENT
Start: 2023-11-03 | End: 2023-11-03

## 2023-11-03 RX ORDER — AMIODARONE HYDROCHLORIDE 200 MG/1
200 TABLET ORAL DAILY
COMMUNITY

## 2023-11-03 RX ORDER — INDOMETHACIN 50 MG/1
50 CAPSULE ORAL 2 TIMES DAILY WITH MEALS
Status: DISCONTINUED | OUTPATIENT
Start: 2023-11-03 | End: 2023-11-04 | Stop reason: HOSPADM

## 2023-11-03 RX ORDER — METOPROLOL SUCCINATE 50 MG/1
50 TABLET, EXTENDED RELEASE ORAL
Status: DISCONTINUED | OUTPATIENT
Start: 2023-11-03 | End: 2023-11-03

## 2023-11-03 RX ORDER — ASPIRIN 81 MG/1
81 TABLET ORAL DAILY
COMMUNITY

## 2023-11-03 RX ORDER — PROPRANOLOL HYDROCHLORIDE 40 MG/1
20 TABLET ORAL EVERY 8 HOURS SCHEDULED
Status: DISCONTINUED | OUTPATIENT
Start: 2023-11-03 | End: 2023-11-04 | Stop reason: HOSPADM

## 2023-11-03 RX ORDER — INDOMETHACIN 50 MG/1
50 CAPSULE ORAL ONCE
Status: DISCONTINUED | OUTPATIENT
Start: 2023-11-03 | End: 2023-11-04 | Stop reason: HOSPADM

## 2023-11-03 RX ADMIN — ASPIRIN 81 MG: 81 TABLET, COATED ORAL at 08:02

## 2023-11-03 RX ADMIN — APIXABAN 5 MG: 5 TABLET, FILM COATED ORAL at 21:50

## 2023-11-03 RX ADMIN — PROPRANOLOL HYDROCHLORIDE 20 MG: 40 TABLET ORAL at 14:11

## 2023-11-03 RX ADMIN — MEXILETINE HYDROCHLORIDE 200 MG: 200 CAPSULE ORAL at 06:53

## 2023-11-03 RX ADMIN — LISINOPRIL 20 MG: 20 TABLET ORAL at 08:02

## 2023-11-03 RX ADMIN — CHLORTHALIDONE 25 MG: 25 TABLET ORAL at 08:04

## 2023-11-03 RX ADMIN — AMIODARONE HYDROCHLORIDE 0.5 MG/MIN: 1.8 INJECTION, SOLUTION INTRAVENOUS at 06:54

## 2023-11-03 RX ADMIN — MEXILETINE HYDROCHLORIDE 200 MG: 200 CAPSULE ORAL at 21:50

## 2023-11-03 RX ADMIN — COLCHICINE 0.6 MG: 0.6 TABLET ORAL at 08:03

## 2023-11-03 RX ADMIN — APIXABAN 5 MG: 5 TABLET, FILM COATED ORAL at 08:03

## 2023-11-03 RX ADMIN — MEXILETINE HYDROCHLORIDE 200 MG: 200 CAPSULE ORAL at 14:09

## 2023-11-03 RX ADMIN — INDOMETHACIN 50 MG: 50 CAPSULE ORAL at 19:38

## 2023-11-03 RX ADMIN — PERFLUTREN 13.04 MG: 6.52 INJECTION, SUSPENSION INTRAVENOUS at 11:43

## 2023-11-03 RX ADMIN — PROPRANOLOL HYDROCHLORIDE 20 MG: 40 TABLET ORAL at 21:50

## 2023-11-03 RX ADMIN — AMIODARONE HYDROCHLORIDE 0.5 MG/MIN: 1.8 INJECTION, SOLUTION INTRAVENOUS at 18:56

## 2023-11-03 RX ADMIN — PROPRANOLOL HYDROCHLORIDE 20 MG: 40 TABLET ORAL at 08:45

## 2023-11-03 RX ADMIN — AMLODIPINE BESYLATE 2.5 MG: 5 TABLET ORAL at 08:05

## 2023-11-03 NOTE — ED PROVIDER NOTES
Subjective   History of Present Illness  Patient states that he was just recently in the hospital and was admitted for ventricular tachycardia.  States that today his defibrillator went off at least 8 time.  States that he is currently not having any chest pain or shortness of breath.  States that he has been taking all his medications as prescribed and as recently changed.  Denies any abdominal pain or dysuria or headaches or vision changes or new numbness or tingling.  Has not had any recent fevers or chills and is not having any alcohol or drug use        Review of Systems   All other systems reviewed and are negative.      Past Medical History:   Diagnosis Date    Hyperlipidemia     Hypertension     Hypertrophic cardiomyopathy     s/p AICD    Migraine     Prostate cancer     Tachycardia     Ventricular tachycardia     Ventricular tachycardia, sustained 10/14/2016       No Known Allergies    Past Surgical History:   Procedure Laterality Date    CARDIAC ABLATION  01/28/2016 2/2016, 10/18/2016 (Dr. Doss in [place], [place])    CARDIAC ABLATION      2018    CARDIAC CATHETERIZATION      CARDIAC DEFIBRILLATOR PLACEMENT      CARDIAC DEFIBRILLATOR PLACEMENT      PROSTATE BIOPSY  2019    PROSTATE BIOPSY  2023       Family History   Family history unknown: Yes       Social History     Socioeconomic History    Marital status: Single   Tobacco Use    Smoking status: Never   Vaping Use    Vaping Use: Never used   Substance and Sexual Activity    Alcohol use: No    Drug use: No    Sexual activity: Defer           Objective   Physical Exam  Vitals and nursing note reviewed.   Constitutional:       General: He is in acute distress (due to recent difibrillator firing).      Appearance: Normal appearance. He is not toxic-appearing or diaphoretic.   HENT:      Head: Normocephalic and atraumatic.      Right Ear: External ear normal.      Left Ear: External ear normal.      Nose: Nose normal.      Mouth/Throat:      Mouth: Mucous  membranes are moist.   Eyes:      General:         Right eye: No discharge.         Left eye: No discharge.      Extraocular Movements: Extraocular movements intact.      Conjunctiva/sclera: Conjunctivae normal.   Cardiovascular:      Rate and Rhythm: Regular rhythm. Bradycardia present.      Pulses: Normal pulses.   Pulmonary:      Effort: Pulmonary effort is normal. No respiratory distress.      Breath sounds: No rhonchi.   Abdominal:      General: Abdomen is flat.      Tenderness: There is no abdominal tenderness. There is no guarding or rebound.   Musculoskeletal:         General: No deformity or signs of injury.   Skin:     General: Skin is warm.      Coloration: Skin is not jaundiced.   Neurological:      Mental Status: He is alert and oriented to person, place, and time. Mental status is at baseline.   Psychiatric:         Mood and Affect: Mood normal.         Behavior: Behavior normal.         Thought Content: Thought content normal.         Judgment: Judgment normal.         Procedures           ED Course                                           Medical Decision Making  Jeanie Grossman is a very pleasant 63 y.o. male who presents to the ED for pacemaker issues.     Patient was non-toxic and not-ill appearing on arrival.     Vital signs stable on arrival.     Patient's presentation raises suspicion for differentials including, but not limited to, electrolyte abnormality, VT, VT storm, ACS.     External (non-ED) record review: none    Given this, Jeanie was placed on the monitor. Laboratory studies and imaging studies were ordered including cbc, cmp, EKG, troponin.     Jeanie was given amiodarone and started on an amiodraone infusion.    Patient's initial EKG shows a paced rhythm with slight elevation in V3 of 4 mm which appears to be consistent with his previous EKGs.  His pacemaker was interrogated and I was called by the rep and was told that the patient might be in VT storm.  Patient was started on amiodarone  infusion and amiodarone bolus was also administered.  We discussed his case with the cardiologist on call as well as the electrophysiologist who ultimately admitted the patient to the ICU for further care.  Patient had not had any further defibrillation's in the ER and is currently hemodynamically stable and is chest pain-free.      Signed by:   Hanna Knox MD 11/3/2023 08:43 CDT   Emergency Medicine Physician    Dragon disclaimer:  Part of this note may be an electronic transcription/translation of spoken language to printed text using the Dragon Dictation System.         Problems Addressed:  Ventricular tachycardia, paroxysmal: complicated acute illness or injury    Amount and/or Complexity of Data Reviewed  Labs: ordered.  Radiology: ordered.  ECG/medicine tests: ordered.    Risk  Prescription drug management.  Decision regarding hospitalization.        Final diagnoses:   Ventricular tachycardia, paroxysmal       ED Disposition  ED Disposition       ED Disposition   Decision to Admit    Condition   --    Comment   Level of Care: Critical Care [6]   Diagnosis: Ventricular tachycardia (paroxysmal) [317406]   Admitting Physician: YUMIKO BOWMAN [[id number]]   Attending Physician: YUMIKO BOWMAN [[id number]]   Certification: I Certify That Inpatient Hospital Services Are Medically Necessary For Greater Than 2 Midnights                 No follow-up provider specified.       Medication List      No changes were made to your prescriptions during this visit.            Hanna Knox MD  11/03/23 0844       Hanna Knox MD  11/03/23 0844

## 2023-11-03 NOTE — CASE MANAGEMENT/SOCIAL WORK
Continued Stay Note   [facility]     Patient Name: Jeanie Grossman  MRN: [id number]  Today's Date: 11/3/2023    Admit Date: 11/2/2023    Plan: Acute Care Transfer   Discharge Plan       Row Name 11/03/23 1559       Plan    Plan Acute Care Transfer    Plan Comments Was advised by nurse that patient has a bed now at [facility] in [place].  SW placed [facility] on standby.  Call [facility] at [phone] when ready for .                   Discharge Codes    No documentation.                       KIRSTIN BeckmanW

## 2023-11-03 NOTE — DISCHARGE SUMMARY
DISCHARGE NOTE    Patient Name: Jeanie Grossman  Age/Sex: 63 y.o. male  : 1960  MRN: [id number]    Date of Discharge:  11/3/2023   Date of Admit: 2023  Encounter Provider: Chalo Rodas MD  Place of Service: [facility]  Patient Care Team:  Conor Denny DO as PCP - General (Internal Medicine)  Butch Saleh MD as Cardiologist (Cardiology)    Subjective:     Discharge Diagnosis:    Ventricular tachycardia (paroxysmal)        History of present illness:  History of Present Illness  EP Problems:   Ventricular tachycardia  - 2016:  VT ablation, [place]  - 10/2017:  VT ablation, [facility]  - 2019:  VT ablation, [facility]  2.  Presence of an ICD  - :  DOI  -2022:  Gen change, [employer]     Cardiology problems:   HTN  Chronic systolic heart failure with recovered EF?  HOCM     Jeanie Grossman is a 63 y.o. male with past medical history as above who presents to the hospital for treatment of VT storm with multiple ICD shocks.  He has a complicated history with evidence of recurrent ventricular tachycardia and ICD shocks.  He has had a total of 3 previous ventricular tachycardia ablations.  The first ablation was in  in [place] with 2 subsequent ablations in  and  in [facility].  He has done reasonably well over the years, however last week presented to the hospital with ventricular tachycardia requiring ATP.  He had not taken his mexiletine that morning and mexiletine was restarted.  He had also not been on amiodarone as this was previously discontinued by his electrophysiologist at the [facility].  Given this, he was restarted on amiodarone discharged home.  He did well for several days, however today states that he was taking out the trash when he ended up having numerous ICD shocks.  He presented to the ER where his device was interrogated and he was found to have ventricular  tachycardia on his device.        Hospital Course:   Mr. Grossman was admitted to the hospital after presenting with multiple ICD shocks exhausting tachycardia device therapies.  He was admitted to the CCU and treated with IV amiodarone infusion.  His mexiletine was increased to 200 mg 3 times per day.  He did well with this treatment without further evidence of ventricular tachycardia.  Given his presentation, we discussed consideration of epicardial ventricular tachycardia ablation.  Given this procedure is not done at our hospital, we discussed transfer to [place] at [facility] for consideration of this procedure to be done there.      Vital Signs  Temp:  [97.6 °F (36.4 °C)-98 °F (36.7 °C)] 97.6 °F (36.4 °C)  Heart Rate:  [40-68] 50  Resp:  [14-20] 14  BP: (127-179)/(59-95) 144/81    Intake/Output Summary (Last 24 hours) at 11/3/2023 1611  Last data filed at 11/3/2023 0200  Gross per 24 hour   Intake 360 ml   Output 1150 ml   Net -790 ml       Physical Exam:  Physical Exam  Vitals reviewed.   Constitutional:       Appearance: He is obese.   HENT:      Head: Normocephalic and atraumatic.   Eyes:      Extraocular Movements: Extraocular movements intact.      Conjunctiva/sclera: Conjunctivae normal.   Cardiovascular:      Rate and Rhythm: Normal rate and regular rhythm.      Pulses: Normal pulses.      Heart sounds: Normal heart sounds.   Pulmonary:      Effort: Pulmonary effort is normal.      Breath sounds: Normal breath sounds.   Musculoskeletal:         General: No swelling.   Neurological:      General: No focal deficit present.      Mental Status: He is alert and oriented to person, place, and time.   Psychiatric:         Mood and Affect: Mood normal.         Judgment: Judgment normal.          Discharge Diet:    Dietary Orders (From admission, onward)       Start     Ordered    11/02/23 0049  Diet: Cardiac Diets; Healthy Heart (2-3 Na+); Texture: Regular Texture (IDDSI 7); Fluid Consistency: Thin  (IDDSI 0)  Diet Effective Now        References:    Diet Order [place]   Question Answer Comment   Diets: Cardiac Diets    Cardiac Diet: Healthy Heart (2-3 Na+)    Texture: Regular Texture (IDDSI 7)    Fluid Consistency: Thin (IDDSI 0)        11/02/23 1730                    Activity Restrictions at Discharge:    None    Medication changes:  Per discharging providers in [facility].    Discharge Medications     Discharge Medications        ASK your doctor about these medications        Instructions Start Date   amiodarone 200 MG tablet  Commonly known as: PACERONE  Ask about: Which instructions should I use?   200 mg, Oral, Daily      aspirin 81 MG EC tablet  Ask about: Which instructions should I use?   81 mg, Oral, Daily      mexiletine 150 MG capsule  Commonly known as: MEXITIL   150 mg, Oral, 2 Times Daily      sildenafil 50 MG tablet  Commonly known as: VIAGRA   50 mg, Oral, Daily PRN               Discharge disposition: [facility] for VT ablation    Follow-up Appointments    Future Appointments   Date Time Provider Department Center   11/7/2023 11:30 AM Chalo Rodas MD [facility] CD PAD PAD     I spent 60 minutes caring for Jeanie on this date of service. This time includes time spent by me in the following activities:obtaining and/or reviewing a separately obtained history, performing a medically appropriate examination and/or evaluation , documenting information in the medical record, and care coordination      Chalo Rodas MD  11/03/23  16:11 CDT

## 2023-11-03 NOTE — PLAN OF CARE
Goal Outcome Evaluation:  Plan of Care Reviewed With: patient        Progress: no change  Outcome Evaluation: pt vss. remains on iv amiodarone gtt. transfer accepted with bed at [facility] will be transferring by ambulance this pm with [facility]. Just spoke with night shift captain tentative arrival around 8pm.

## 2023-11-03 NOTE — PLAN OF CARE
Goal Outcome Evaluation:   Pt rested well during the night. Request use of home medication indomethacin for foot/toe pain that was not included in pt home med list upon admission. EKG obtained and Oruc MD contacted and updated of pt request and informed of pt bradycardic episodes dipping into upper 30's and low 40's, also informed of pt BP that remained systolic 140's-180's even after admin of home meds. Verbal order (see MAR) placed by signee with readback for indomethacin, Oruc reports no concern with BP or HR at this time. Moderate interaction between indomethacin and eliquis noted and pharmacy contacted. Pt states both are current home meds but that they have never been taken together. Indomethacin held for pt concerns and signee concerns for increased risk for bleeding. Pt able to rest well for remiander of the night with no new complaints or issues arising during HS.

## 2023-11-03 NOTE — NURSING NOTE
Pt with toiletry bag at bedside offered to place in security pt refuses and wishes to keep at bedside. Pt states it has his extra socks and body lotion. When pt is given his medications he asks for each med to be taken individually and what each medication is for. Each medication explained to pt individually and RN witnessed pt placing meds in mouth.

## 2023-11-03 NOTE — DISCHARGE PLACEMENT REQUEST
"To: Jeanie Pandya (63 y.o. Male)       Date of Birth   1960    Social Security Number       Address   [address]  [facility] [address]    Home Phone   [phone]    MRN   [id number]       [place]   Other    Marital Status   Single                            Admission Date   11/2/23    Admission Type   Emergency    Admitting Provider   Chalo Rodas MD    Attending Provider   Chalo Rodas MD    Department, Room/Bed   [facility], C010/1       Discharge Date       Discharge Disposition       Discharge Destination                                 Attending Provider: Chalo Rodas MD    Allergies: No Known Allergies    Isolation: None   Infection: None   Code Status: CPR    Ht: 190.5 cm (75\")   Wt: 136 kg (300 lb 4.3 oz)    Admission Cmt: None   Principal Problem: Ventricular tachycardia (paroxysmal) [I47.29]                   Active Insurance as of 11/2/2023       Primary Coverage       Payor Plan Insurance Group Employer/Plan Group    MEDICARE MEDICARE A & B        Payor Plan Address Payor Plan Phone Number Payor Plan Fax Number Effective Dates    [address] [phone]  4/1/2019 - None Entered    [place] [address]         Subscriber Name Subscriber Birth Date Member ID       JEANIE GROSSMAN 1960 [id number]               Secondary Coverage       Payor Plan Insurance Group Employer/Plan Group    [place]        Payor Plan Address Payor Plan Phone Number Payor Plan Fax Number Effective Dates    [address] [address] [phone]  9/14/2016 - None Entered    [facility] [id number]         Subscriber Name Subscriber Birth Date Member ID       JEANIE GROSSMAN 1960 [id number]                     Emergency Contacts        (Rel.) Home Phone Work Phone Mobile Phone    Uri Grossman (Brother) [phone] -- --              Insurance Information                  MEDICARE/MEDICARE A & B Phone: [phone]    Subscriber: Ngoc " Jeanie ARAGON Subscriber#: [id number]    Group#: -- Precert#: --        [place] Phone: [phone]    Subscriber: Jeanie Grossman Subscriber#: [id number]    Group#: -- Precert#: --

## 2023-11-03 NOTE — OUTREACH NOTE
Medical Week 1 Survey      Flowsheet Row Responses   [facility] patient discharged from? [place]   Does the patient have one of the following disease processes/diagnoses(primary or secondary)? Other   Week 1 attempt successful? No   Unsuccessful attempts Attempt 2   Revoke Readmitted            Caitlyn H - Registered Nurse

## 2023-11-03 NOTE — CASE MANAGEMENT/SOCIAL WORK
"Discharge Planning Assessment  [facility]     Patient Name: Jeanie Grossman  MRN: [id number]  Today's Date: 11/3/2023    Admit Date: 11/2/2023        Discharge Needs Assessment       Row Name 11/03/23 1114       Living Environment    Current Living Arrangements home    Primary Care Provided by self       Resource/Environmental Concerns    Resource/Environmental Concerns reliable transportation    Transportation Concerns no car       Transition Planning    Transportation Anticipated public transportation       Discharge Needs Assessment    Readmission Within the Last 30 Days other (see comments)    Equipment Currently Used at Home none    Concerns to be Addressed discharge planning    Anticipated Changes Related to Illness none    Equipment Needed After Discharge none    Discharge Facility/Level of Care Needs [facility]    Current Discharge Risk chronically ill    Discharge Coordination/Progress  received the following consult, \"discharge transportation if transfer from here to [place] will they be able to help him find some transportation back home.\"  [place] will assist patients with transportation to return home after all other options have been exhausted.                   Discharge Plan    No documentation.                 Continued Care and Services - Admitted Since 11/2/2023    Coordination has not been started for this encounter.          Demographic Summary    No documentation.                  Functional Status    No documentation.                  Psychosocial    No documentation.                  Abuse/Neglect    No documentation.                  Legal    No documentation.                  Substance Abuse    No documentation.                  Patient Forms    No documentation.                     Debra Hagen, MARIO    "

## 2023-11-03 NOTE — PROGRESS NOTES
"Medication Reconciliation - Pharmacy Findings  Medication reconciliation reviewed for Jeanie Grossman (: 1960)     Medication list information Source:  Last Dispensed Meds Report  Verbal from Petra at [facility] only & [facility]     Patient Interview:  Primary Information Source: Patient.  Information Quality: Poor  Additional Notes from Interview: Compliance issues noted (Reason: Spoke with three different pharmacies to confirm patients medications, he stated he fills at [facility]- [facility] staff states he is non-compliant with his medications, he does not fill his medication regularly and if it has a copay he does not wish to pick it up) Financial issues noted (Details: unable to afford) Meds to Beds (Enrolled Patient)   PATIENT IS A READMISSION FROM 10/28/23  Interviewed patient at bedside. Patient listed off medications he is \"actively \" taking. Only one of the listed medications had recent fill dates. (Amiodarone 10/28/23)  Mexitil was sent in at the pharmacy on 10/28/23 but has sent been put on HOLD and not picked up by patient.    NO RECORD OF OR RECENT FILL DATES AVAILABLE FOR: ELIQUIS, ALLOPURINOL, AMLODIPINE, ASA (could be OTC though), CHLORTHALIDONE, CLONIDINE, COLCHICINE, FLONASE (could be OTC), FUROSEMIDE, MUCINEX, NORCO, LISINOPRIL, MEXITIL, NITRO,SIMVASTATIN,VALPROIC ACID. (All the prior listed medications the patient states he is taking- pharmacy records, List Dispensed Meds tab and PDC indicate otherwise/non-compliance.)  Patient did state he needed a new rx for Nitro.   _______________________  Consider contacting Provider: Med(s) ordered on admit. Patient no longer taking: RECOMMEND REMOVE FROM LIST:  ELIQUIS,  ALLOPURINOL,  AMLODIPINE,  ASA,  CHLORTHALIDONE,  CLONIDINE,  COLCHICINE,  FLONASE,  FUROSEMIDE,  MUCINEX,  NORCO,  LISINOPRIL,  MEXITIL,  NITRO,  SIMVASTATIN,  VALPROIC ACID     Consults to be placed by Pharmacist:  Pharmacist to provide medication counseling: " NON-COMPLIANCE  Case Managmeent Consult for financial needs: UNABLE TO AFFORD PRESCRIPTION CO-PAYS PER PHARMACY .     Deangelo Gomez, Pharmacy Technician  11/03/23 10:21 CDT  -------------------------------------------------------------      I have reviewed the notes, assessments, and/or procedures performed by Deangelo, I concur with her documentation of Jeanie Grossman.    I discussed the case with Chalo Rodas MD, and the patient's nurse, Ranjana Arellano RN.     Naman Dhillon, PharmD  11/03/23  13:12 CDT

## 2023-11-04 NOTE — PLAN OF CARE
Problem: Adult Inpatient Plan of Care  Goal: Absence of Hospital-Acquired Illness or Injury  Intervention: Identify and Manage Fall Risk  Description: Perform standard risk assessment on admission using a validated tool or comprehensive approach appropriate to the patient; reassess fall risk frequently, with change in status or transfer to another level of care.  Communicate fall injury risk to interprofessional healthcare team.  Determine need for increased observation, equipment and environmental modification, such as low bed, signage and supportive, nonskid footwear.  Adjust safety measures to individual developmental age, stage and identified risk factors.  Reinforce the importance of safety and physical activity with patient and family.  Perform regular intentional rounding to assess need for position change, pain assessment and personal needs, including assistance with toileting.  Recent Flowsheet Documentation  Taken 11/3/2023 2200 by Cece Weiner RN  Safety Promotion/Fall Prevention: safety round/check completed  Taken 11/3/2023 2100 by Cece Weiner, RN  Safety Promotion/Fall Prevention: safety round/check completed  Taken 11/3/2023 2000 by Cece Weiner, RN  Safety Promotion/Fall Prevention: safety round/check completed  Intervention: Prevent Skin Injury  Description: Perform a screening for skin injury risk, such as pressure or moisture associated skin damage on admission and at regular intervals throughout hospital stay.  Keep all areas of skin (especially folds) clean and dry.  Maintain adequate skin hydration.  Relieve and redistribute pressure and protect bony prominences; implement measures based on patient-specific risk factors.  Match turning and repositioning schedule to clinical condition.  Encourage weight shift frequently; assist with reposition if unable to complete independently.  Float heels off bed; avoid pressure on the Achilles tendon.  Keep skin free from extended contact with  medical devices.  Encourage functional activity and mobility, as early as tolerated.  Use aids (e.g., slide boards, mechanical lift) during transfer.  Recent Flowsheet Documentation  Taken 11/3/2023 2200 by Cece Weiner RN  Body Position:   position changed independently   right  Taken 11/3/2023 2000 by Cece Weiner RN  Body Position:   position changed independently   left  Intervention: Prevent and Manage VTE (Venous Thromboembolism) Risk  Description: Assess for VTE (venous thromboembolism) risk.  Encourage and assist with early ambulation.  Initiate and maintain compression or other therapy, as indicated, based on identified risk in accordance with organizational protocol and provider order.  Encourage both active and passive leg exercises while in bed, if unable to ambulate.  Recent Flowsheet Documentation  Taken 11/3/2023 2000 by Cece Weiner RN  Activity Management: bedrest  VTE Prevention/Management: (see MAr) other (see comments)  Range of Motion: ROM (range of motion) performed     Problem: Hypertension Comorbidity  Goal: Blood Pressure in Desired Range  Intervention: Maintain Blood Pressure Management  Description: Evaluate adherence to home antihypertensive regimen (e.g., exercise and activity, diet modification, medication).  Provide scheduled antihypertensive medication; consider administration time and effects (e.g., avoid giving diuretic prior to bedtime).  Monitor response to antihypertensive medication therapy (e.g., blood pressure, electrolyte levels, medication effects).  Minimize risk of orthostatic hypotension; encourage caution with position changes, particularly if elderly.  Recent Flowsheet Documentation  Taken 11/3/2023 2100 by Cece Weiner RN  Medication Review/Management: medications reviewed  Taken 11/3/2023 2000 by Cece Weiner RN  Medication Review/Management: medications reviewed   Goal Outcome Evaluation:

## 2023-11-04 NOTE — NURSING NOTE
EMS arrived to transfer pt to [facility]. Pt remains on amio gtt. VSS on transfer. Belongings sent with pt.

## 2023-11-05 LAB
QT INTERVAL: 470 MS
QT INTERVAL: 508 MS
QTC INTERVAL: 437 MS
QTC INTERVAL: 472 MS

## 2023-11-08 NOTE — PAYOR COMM NOTE
"ADMIT INPT 10-27-23  DC HOME 10-28-23  ID [id number]  ICD CODE I47.20  NPI [id number]    Jeanie Grossman (63 y.o. Male)       Date of Birth   1960    Social Security Number       Address   [address] [place] [address]    Home Phone   [phone]    MRN   [id number]       Religious   Other    Marital Status   Single                            Admission Date   10/27/23    Admission Type   Emergency    Admitting Provider   Aaron Frazier DO    Attending Provider       Department, Room/Bed   [facility] CARDIAC CARE, C010/1       Discharge Date   10/28/2023    Discharge Disposition   Home or Self Care    Discharge Destination                                 Attending Provider: (none)   Allergies: No Known Allergies    Isolation: None   Infection: None   Code Status: Prior    Ht: 190.5 cm (75\")   Wt: 136 kg (300 lb 4.3 oz)    Admission Cmt: None   Principal Problem: VT (ventricular tachycardia) [I47.20]                   Active Insurance as of 10/27/2023       Primary Coverage       Payor Plan Insurance Group Employer/Plan Group    MISC MEDICARE REPLACEMENT MISC MCARE REPLACEMENT        Coverage Address Coverage Phone Number Coverage Fax Number Effective Dates    [address] [phone]  4/1/2023 - 10/31/2023    [facility] [place] [address]         Subscriber Name Subscriber Birth Date Member ID       JEANIE GROSSMAN 1960 [id number]               Secondary Coverage       Payor Plan Insurance Group Employer/Plan Group    [employer] BETTER [employer] [facility] [employer] [facility]        Payor Plan Address Payor Plan Phone Number Payor Plan Fax Number Effective Dates    [address] [phone]  4/1/2023 - 10/31/2023    [facility] [place] [address]         Subscriber Name Subscriber Birth Date Member ID       JEANIE GROSSMAN 1960 [id number]                     Emergency Contacts        (Rel.) Home Phone Work Phone Mobile Phone    [employer] (Brother) [phone] -- --               " "  History & Physical        Ruba Franz DO at 10/27/23 2014              [facility] Medicine Services  HISTORY AND PHYSICAL    Date of Admission: 10/27/2023  Primary Care Physician: Conor Denny DO    Subjective   Primary Historian: Patient    Chief Complaint: Palpitations    Palpitations   Pertinent negatives include no chest pain, coughing, fever, nausea or shortness of breath.     63-year-old male who presents emergency department with episode of palpitations.  He notes that he has been having trouble sleeping due to nasal congestion.  He went to the [employer] to get nasal spray, and started having irregular heartbeat at the store.  He states \"My heart was going crazy.\"  He has chronic lower extremity edema.  He did not have chest pain.  He notes that his irregular heartbeat has happened many times.  He has no acute bowel or kidney changes.        Review of Systems   Constitutional:  Negative for chills and fever.   Respiratory:  Negative for cough and shortness of breath.    Cardiovascular:  Positive for palpitations and leg swelling. Negative for chest pain.   Gastrointestinal:  Negative for diarrhea and nausea.   Psychiatric/Behavioral:  Positive for sleep disturbance.       Otherwise complete ROS reviewed and negative except as mentioned in the HPI.    Past Medical History:   Past Medical History:   Diagnosis Date    Hyperlipidemia     Hypertension     Hypertrophic cardiomyopathy     s/p AICD    Migraine     Prostate cancer     Tachycardia     Ventricular tachycardia     Ventricular tachycardia, sustained 10/14/2016     Past Surgical History:  Past Surgical History:   Procedure Laterality Date    CARDIAC ABLATION  01/28/2016 2/2016, 10/18/2016 (Dr. Doss in [place], [place])    CARDIAC ABLATION      2018    CARDIAC CATHETERIZATION      CARDIAC DEFIBRILLATOR PLACEMENT      CARDIAC DEFIBRILLATOR PLACEMENT      PROSTATE BIOPSY  2019    PROSTATE BIOPSY  2023 "     Social History:  reports that he has never smoked. He does not have any smokeless tobacco history on file. He reports that he does not drink alcohol and does not use drugs.    Family History: Family history is unknown by patient.       Allergies:  No Known Allergies    Medications:  Prior to Admission medications    Medication Sig Start Date End Date Taking? Authorizing Provider   allopurinol (ZYLOPRIM) 300 MG tablet Take 1 tablet by mouth Daily.    Fabiola Casanova MD   amiodarone (PACERONE) 200 MG tablet Take 1 tablet by mouth Every 12 (Twelve) Hours. 10/2/17   Nia Garcia APRN   amLODIPine (NORVASC) 2.5 MG tablet Take 1 tablet by mouth Daily.    Fabiola Casanova MD   amoxicillin-clavulanate (AUGMENTIN) 875-125 MG per tablet Take 1 tablet by mouth 2 (Two) Times a Day.  Patient not taking: Reported on 9/13/2023    Fabiola Casanova MD   apixaban (Eliquis) 5 MG tablet tablet     Fabiola Casanova MD   aspirin 81 MG EC tablet Take 1 tablet by mouth Daily.    Fabiola Casanova MD   carvedilol (COREG) 12.5 MG tablet Take 1 tablet by mouth 2 (Two) Times a Day.    Fabiola Casanova MD   chlorthalidone (HYGROTON) 25 MG tablet     Fabiola Casanova MD   cloNIDine (CATAPRES) 0.1 MG tablet Take 1 tablet by mouth 2 (Two) Times a Day.    Fabiola Casanova MD   colchicine 0.6 MG tablet Take 1 tablet by mouth Daily.    Fabiola Casanova MD   cyclobenzaprine (FLEXERIL) 10 MG tablet Take 10 mg by mouth 3 (Three) Times a Day As Needed for Muscle Spasms.  Patient not taking: Reported on 9/13/2023    Fabiola Casanova MD   fluticasone (FLONASE) 50 MCG/ACT nasal spray 2 sprays into the nostril(s) as directed by provider.    Fabiola Casanova MD   furosemide (LASIX) 20 MG tablet Take 1 tablet by mouth Daily. 7/1/19   Luis Alberto Salgado PA-C   furosemide (LASIX) 40 MG tablet Take 40 mg by mouth 2 (Two) Times a Day.  Patient not taking: Reported on 9/13/2023    Fabiola Casanova  MD   guaiFENesin (MUCINEX) 600 MG 12 hr tablet Take 1 tablet by mouth Every 12 (Twelve) Hours. 10/2/17   Nia Garcia APRN   HYDROcodone-acetaminophen (NORCO) 5-325 MG per tablet     Fabiola Casanova MD   ibuprofen (ADVIL,MOTRIN) 600 MG tablet Take 1 tablet by mouth Every 6 (Six) Hours As Needed for Mild Pain . 7/12/19   Brett Mendez MD   indomethacin (INDOCIN) 50 MG capsule Take 1 capsule by mouth 3 (Three) Times a Day As Needed for Mild Pain.    Fabiola Casanova MD   lidocaine in D5W 4-5 MG/ML-% infusion Infuse 2 mg/min into a venous catheter Continuous. 10/2/17   Nia Garcia APRN   lisinopril (PRINIVIL,ZESTRIL) 20 MG tablet Take 1 tablet by mouth Daily.    Fabiola Casanova MD   metoprolol tartrate (LOPRESSOR) 5 MG/5ML injection Infuse 5 mL into a venous catheter Every 6 (Six) Hours As Needed (HR>110). 10/2/17   Nia Garcia APRN   mexiletine (MEXITIL) 150 MG capsule     Fabiola Casanova MD   neomycin-polymyxin-hydrocortisone (CORTISPORIN) 3.5-91741-2 otic suspension neomycin-polymyxin-hydrocort 3.5 mg-10,000 unit/mL-1 % ear drops,susp    Fabiola Casanova MD   nitroglycerin (NITROSTAT) 0.4 MG SL tablet Place 1 tablet under the tongue.    Fabiola Casanova MD   penicillin v potassium (VEETID) 500 MG tablet Every 12 (Twelve) Hours.    Fabiola Casanova MD   pravastatin (PRAVACHOL) 20 MG tablet TK 1 T PO QD 10/28/16   Fabiola Casanova MD   pravastatin (PRAVACHOL) 40 MG tablet Take 40 mg by mouth Daily.  Patient not taking: Reported on 9/13/2023    Fabiola Casanova MD   sildenafil (VIAGRA) 50 MG tablet Take 1 tablet by mouth Daily As Needed for Erectile Dysfunction.    Fabiola Casanova MD   simvastatin (ZOCOR) 20 MG tablet     Fabiola Casanova MD   traMADol (ULTRAM) 50 MG tablet Take 50 mg by mouth Every 6 (Six) Hours As Needed for Moderate Pain .  Patient not taking: Reported on 9/13/2023    Fabiola Casanova MD   valproic acid (DEPAKENE) 250  MG capsule Take  by mouth Daily.    Provider, MD Fabiola   vardenafil (Levitra) 20 MG tablet TAKE 1 TABLET BY ORAL ROUTE EVERY DAY AS NEEDED SEXUAL ACTIVITY MUST LAST 90 DAYS    Provider, MD Fabiola   verapamil (CALAN) 80 MG tablet Take 1 tablet by mouth Every 8 (Eight) Hours.  Patient not taking: Reported on 9/13/2023 10/2/17   Nia Garcia APRN I have utilized all available immediate resources to obtain, update, or review the patient's current medications (including all prescriptions, over-the-counter products, herbals, cannabis/cannabidiol products, and vitamin/mineral/dietary (nutritional) supplements).    Objective     Vital Signs: /92   Pulse 52   Temp 98.1 °F (36.7 °C)   Resp 18   SpO2 100%   Physical Exam  Vitals reviewed.   Constitutional:       Appearance: He is obese.   HENT:      Head: Normocephalic and atraumatic.      Right Ear: External ear normal.      Left Ear: External ear normal.      Nose: Nose normal.      Mouth/Throat:      Mouth: Mucous membranes are moist.      Pharynx: No oropharyngeal exudate.   Eyes:      General: No scleral icterus.     Conjunctiva/sclera: Conjunctivae normal.   Cardiovascular:      Rate and Rhythm: Normal rate and regular rhythm.      Heart sounds: Normal heart sounds.   Pulmonary:      Effort: Pulmonary effort is normal.      Breath sounds: Normal breath sounds.   Abdominal:      General: There is no distension.      Palpations: Abdomen is soft.      Tenderness: There is no abdominal tenderness.   Musculoskeletal:         General: Swelling present. No tenderness.      Cervical back: Normal range of motion and neck supple.      Right lower leg: Edema present.      Left lower leg: Edema present.   Skin:     General: Skin is warm and dry.   Neurological:      General: No focal deficit present.      Mental Status: He is alert.      Cranial Nerves: No cranial nerve deficit.   Psychiatric:         Mood and Affect: Mood normal.         Behavior:  Behavior normal.        Results Reviewed:  Lab Results (last 24 hours)       Procedure Component Value Units Date/Time    Fentanyl, Urine - Urine, Clean Catch [[id number]]  (Normal) Collected: 10/27/23 1730    Specimen: Urine, Clean Catch Updated: 10/27/23 1803     Fentanyl, Urine Negative    Narrative:      Negative Threshold:      Fentanyl 5 ng/mL     The normal value for the drug tested is negative. This report includes final unconfirmed screening results to be used for medical treatment purposes only. Unconfirmed results must not be used for non-medical purposes such as employment or legal testing. Clinical consideration should be applied to any drug of abuse test, particularly when unconfirmed results are used.           Urine Drug Screen - Urine, Clean Catch [[id number]]  (Normal) Collected: 10/27/23 1730    Specimen: Urine, Clean Catch Updated: 10/27/23 1802     THC, Screen, Urine Negative     Phencyclidine (PCP), Urine Negative     Cocaine Screen, Urine Negative     Methamphetamine, Ur Negative     Opiate Screen Negative     Amphetamine Screen, Urine Negative     Benzodiazepine Screen, Urine Negative     Tricyclic Antidepressants Screen Negative     Methadone Screen, Urine Negative     Barbiturates Screen, Urine Negative     Oxycodone Screen, Urine Negative     Propoxyphene Screen Negative     Buprenorphine, Screen, Urine Negative    Narrative:      Cutoff For Drugs Screened:    Amphetamines               500 ng/ml  Barbiturates               200 ng/ml  Benzodiazepines            150 ng/ml  Cocaine                    150 ng/ml  Methadone                  200 ng/ml  Opiates                    100 ng/ml  Phencyclidine               25 ng/ml  THC                            50 ng/ml  Methamphetamine            500 ng/ml  Tricyclic Antidepressants  300 ng/ml  Oxycodone                  100 ng/ml  Propoxyphene               300 ng/ml  Buprenorphine               10 ng/ml    The normal value for all drugs tested is  negative. This report includes unconfirmed screening results, with the cutoff values listed, to be used for medical treatment purposes only.  Unconfirmed results must not be used for non-medical purposes such as employment or legal testing.  Clinical consideration should be applied to any drug of abuse test, particularly when unconfirmed results are used.      TSH [[id number]]  (Normal) Collected: 10/27/23 1642    Specimen: Blood Updated: 10/27/23 1724     TSH 0.843 uIU/mL     Basic Metabolic Panel [[id number]]  (Abnormal) Collected: 10/27/23 1642    Specimen: Blood Updated: 10/27/23 1717     Glucose 116 mg/dL      BUN 17 mg/dL      Creatinine 1.14 mg/dL      Sodium 141 mmol/L      Potassium 4.3 mmol/L      Comment: Slight hemolysis detected by analyzer. Results may be affected.        Chloride 108 mmol/L      CO2 24.0 mmol/L      Calcium 8.6 mg/dL      BUN/Creatinine Ratio 14.9     Anion Gap 9.0 mmol/L      eGFR 71.8 mL/min/1.73     Narrative:      GFR Normal >60  Chronic Kidney Disease <60  Kidney Failure <15      Magnesium [[id number]]  (Normal) Collected: 10/27/23 1642    Specimen: Blood Updated: 10/27/23 1717     Magnesium 2.1 mg/dL     BNP [[id number]]  (Abnormal) Collected: 10/27/23 1642    Specimen: Blood Updated: 10/27/23 1714     proBNP 1,111.0 pg/mL     Narrative:      This assay is used as an aid in the diagnosis of individuals suspected of having heart failure. It can be used as an aid in the diagnosis of acute decompensated heart failure (ADHF) in patients presenting with signs and symptoms of ADHF to the emergency department (ED). In addition, NT-proBNP of <300 pg/mL indicates ADHF is not likely.    Age Range Result Interpretation  NT-proBNP Concentration (pg/mL:      <50             Positive            >450                   Gray                 300-450                    Negative             <300    50-75           Positive            >900                  Gray                300-900                   Negative            <300      >75             Positive            >1800                  Gray                300-1800                  Negative            <300    Single High Sensitivity Troponin T [[id number]]  (Abnormal) Collected: 10/27/23 1642    Specimen: Blood Updated: 10/27/23 1714     HS Troponin T 42 ng/L     Narrative:      High Sensitive Troponin T Reference Range:  <10.0 ng/L- Negative Female for AMI  <15.0 ng/L- Negative Male for AMI  >=10 - Abnormal Female indicating possible myocardial injury.  >=15 - Abnormal Male indicating possible myocardial injury.   Clinicians would have to utilize clinical acumen, EKG, Troponin, and serial changes to determine if it is an Acute Myocardial Infarction or myocardial injury due to an underlying chronic condition.         Ethanol [[id number]] Collected: 10/27/23 1642    Specimen: Blood Updated: 10/27/23 1712     Ethanol % <0.010 %     Narrative:      Not for legal purposes. Chain of Custody not followed.     Protime-INR [[id number]]  (Normal) Collected: 10/27/23 1642    Specimen: Blood Updated: 10/27/23 1705     Protime 13.9 Seconds      INR 1.06    CBC & Differential [[id number]]  (Abnormal) Collected: 10/27/23 1642    Specimen: Blood Updated: 10/27/23 1656    Narrative:      The following orders were created for panel order CBC & Differential.  Procedure                               Abnormality         Status                     ---------                               -----------         ------                     CBC Auto Differential[[id number]]        Abnormal            Final result                 Please view results for these tests on the individual orders.    CBC Auto Differential [[id number]]  (Abnormal) Collected: 10/27/23 1642    Specimen: Blood Updated: 10/27/23 1656     WBC 5.26 10*3/mm3      RBC 5.37 10*6/mm3      Hemoglobin 15.0 g/dL      Hematocrit 47.6 %      MCV 88.6 fL      MCH 27.9 pg      MCHC 31.5 g/dL      RDW 17.1 %      RDW-SD 54.5 fl      MPV  11.8 fL      Platelets 158 10*3/mm3      Neutrophil % 62.7 %      Lymphocyte % 26.6 %      Monocyte % 7.8 %      Eosinophil % 2.3 %      Basophil % 0.4 %      Immature Grans % 0.2 %      Neutrophils, Absolute 3.30 10*3/mm3      Lymphocytes, Absolute 1.40 10*3/mm3      Monocytes, Absolute 0.41 10*3/mm3      Eosinophils, Absolute 0.12 10*3/mm3      Basophils, Absolute 0.02 10*3/mm3      Immature Grans, Absolute 0.01 10*3/mm3      nRBC 0.0 /100 WBC           Imaging Results (Last 24 Hours)       Procedure Component Value Units Date/Time    XR Chest 1 View [[id number]] Collected: 10/27/23 1702     Updated: 10/27/23 1706    Narrative:      EXAM: XR CHEST 1 VW- 10/27/2023 4:50 PM CDT     HISTORY: Palpitation       COMPARISON: 7/9/2019.     TECHNIQUE: Single frontal radiograph of the chest was obtained.     FINDINGS:      Support Devices: Cardiac pacemaker device with leads overlying the right  atrium and right ventricle.     Cardiac and Mediastinal Silhouettes: Heart size is enlarged, likely  unchanged.     Lungs/Pleura: No focal consolidation. No sizable pleural effusion. No  visible pneumothorax.     Osseous structures: No acute osseous finding.     Other: None.       Impression:         No acute cardiopulmonary abnormality.     Cardiomegaly.           This report was signed and finalized on 10/27/2023 5:02 PM CDT by Eris Mckeon.             I have personally reviewed and interpreted the radiology studies and ECG obtained at time of admission.     Assessment / Plan   Assessment:   Active Hospital Problems    Diagnosis     **VT (ventricular tachycardia)      Impression:  VT  Hypertrophic cardiomyopathy with AICD  Hypertension  Hyperlipidemia     Treatment Plan  Admit to the CCU  Continue current home medications  FU labs in the am with EKG  Consult with EP and cardiology.   5. Amiodarone drip started in the ED.     The patient will be admitted to my service here at [facility].  Primary team to take over  the morning    Medical Decision Making  Number and Complexity of problems: 4, moderate  Differential Diagnosis: Arrhythmia    Conditions and Status        Condition is unchanged.     [facility] Data  External documents reviewed: None  Cardiac tracing (EKG, telemetry) interpretation: None  Radiology interpretation: None  Labs reviewed: Reviewed  Any tests that were considered but not ordered: None     Decision rules/scores evaluated (example [id number], Wells, etc): None     Discussed with: Patient     Care Planning  Shared decision making: Patient and ED staff  Code status and discussions: Full    Disposition  Social Determinants of Health that impact treatment or disposition: Medication noncompliance  Estimated length of stay is 2 to 3 days.     I confirmed that the patient's advanced care plan is present, code status is documented, and a surrogate decision maker is listed in the patient's medical record.     The patient's surrogate decision maker is family.     The patient was seen and examined by me on 10/27/2023 at 730.    Electronically signed by Ruba Franz DO, 10/27/23, 20:14 CDT.                Electronically signed by Ruba Franz DO at 10/27/23 2108       Physician Progress Notes (all)    No notes of this type exist for this encounter.          Consult Notes (all)        Martin Ramachandran MD at 10/28/23 0936        Consult Orders    1. Inpatient Cardiology Consult [[id number]] ordered by Ruba Franz DO                 Inpatient Cardiology Consult  Consult performed by: Martin Ramachandran MD  Consult ordered by: Ruba Franz DO  Reason for consult: VT        Chief Complaint   Patient presents with    Palpitations     HPI: This is a 63-year-old -American male with reported hypertrophic cardiomyopathy, an ICD in place, previous ICD discharges for ventricular tachycardia, also prior ablation who presents with complaints of palpitations.  The patient says that he was at  a local store yesterday when he began to feel some palpitations and irregularity to his pulse.  He says that he has become very aware of ventricular arrhythmias and thought that he was likely in some type of ventricular arrhythmia once again.  He called EMS and was brought here for further evaluation.  Reportedly, the patient's ICD was interrogated and did show ventricular tachycardia but did not require any ICD discharges.  The patient says that he has had multiple ICD discharges in the past but has been on mexiletine for quite some time after previously being on amiodarone.  His rhythm has been stable since that time.  He also says that since his most recent generator change, he has not received any shocks from his defibrillator.  He denies having any preceding symptoms other than the fact that he has been having some trouble sleeping lately due to some congestion.  He denies having any chest pain, shortness of breath.  He notes some chronic lower extremity edema that is unchanged.  He denies having orthopnea, PND.  No lightheadedness, dizziness or syncope.    Past Medical History:   Diagnosis Date    Hyperlipidemia     Hypertension     Hypertrophic cardiomyopathy     s/p AICD    Migraine     Prostate cancer     Tachycardia     Ventricular tachycardia     Ventricular tachycardia, sustained 10/14/2016     Past Surgical History:   Procedure Laterality Date    CARDIAC ABLATION  01/28/2016 2/2016, 10/18/2016 (Dr. Doss in [place], [place])    CARDIAC ABLATION      2018    CARDIAC CATHETERIZATION      CARDIAC DEFIBRILLATOR PLACEMENT      CARDIAC DEFIBRILLATOR PLACEMENT      PROSTATE BIOPSY  2019    PROSTATE BIOPSY  2023     Prior to Admission medications    Medication Sig Start Date End Date Taking? Authorizing Provider   allopurinol (ZYLOPRIM) 300 MG tablet Take 1 tablet by mouth Daily.    Provider, MD Fabiola   amiodarone (PACERONE) 200 MG tablet Take 1 tablet by mouth Every 12 (Twelve) Hours. 10/2/17   Jose  NORMA Corbin   amLODIPine (NORVASC) 2.5 MG tablet Take 1 tablet by mouth Daily.    Fabiola Casanova MD   amoxicillin-clavulanate (AUGMENTIN) 875-125 MG per tablet Take 1 tablet by mouth 2 (Two) Times a Day.  Patient not taking: Reported on 9/13/2023    Fabiola Casanova MD   apixaban (Eliquis) 5 MG tablet tablet     Fabiola Casanova MD   aspirin 81 MG EC tablet Take 1 tablet by mouth Daily.    Fabiola Casanova MD   carvedilol (COREG) 12.5 MG tablet Take 1 tablet by mouth 2 (Two) Times a Day.    Fabiola Casanova MD   chlorthalidone (HYGROTON) 25 MG tablet     Fabiola Casanova MD   cloNIDine (CATAPRES) 0.1 MG tablet Take 1 tablet by mouth 2 (Two) Times a Day.    Fabiola Casanova MD   colchicine 0.6 MG tablet Take 1 tablet by mouth Daily.    Fabiola Casanova MD   cyclobenzaprine (FLEXERIL) 10 MG tablet Take 10 mg by mouth 3 (Three) Times a Day As Needed for Muscle Spasms.  Patient not taking: Reported on 9/13/2023    Fabiola Casanova MD   fluticasone (FLONASE) 50 MCG/ACT nasal spray 2 sprays into the nostril(s) as directed by provider.    Fabiola Casanova MD   furosemide (LASIX) 20 MG tablet Take 1 tablet by mouth Daily. 7/1/19   Luis Alberto Salgado PA-C   furosemide (LASIX) 40 MG tablet Take 40 mg by mouth 2 (Two) Times a Day.  Patient not taking: Reported on 9/13/2023    Fabiola Casanova MD   guaiFENesin (MUCINEX) 600 MG 12 hr tablet Take 1 tablet by mouth Every 12 (Twelve) Hours. 10/2/17   Nia Garcia APRN   HYDROcodone-acetaminophen (NORCO) 5-325 MG per tablet     Fabiola Casanova MD   ibuprofen (ADVIL,MOTRIN) 600 MG tablet Take 1 tablet by mouth Every 6 (Six) Hours As Needed for Mild Pain . 7/12/19   Brett Mendez MD   indomethacin (INDOCIN) 50 MG capsule Take 1 capsule by mouth 3 (Three) Times a Day As Needed for Mild Pain.    Fabiola Casanova MD   lidocaine in D5W 4-5 MG/ML-% infusion Infuse 2 mg/min into a venous catheter Continuous.  10/2/17   Nia Garcia APRN   lisinopril (PRINIVIL,ZESTRIL) 20 MG tablet Take 1 tablet by mouth Daily.    Fabiola Casanova MD   metoprolol tartrate (LOPRESSOR) 5 MG/5ML injection Infuse 5 mL into a venous catheter Every 6 (Six) Hours As Needed (HR>110). 10/2/17   Nia Garcia APRN   mexiletine (MEXITIL) 150 MG capsule     Fabiola Casanova MD   neomycin-polymyxin-hydrocortisone (CORTISPORIN) 3.5-12654-7 otic suspension neomycin-polymyxin-hydrocort 3.5 mg-10,000 unit/mL-1 % ear drops,susp    Fabiola Casanova MD   nitroglycerin (NITROSTAT) 0.4 MG SL tablet Place 1 tablet under the tongue.    Fabiola Casanova MD   penicillin v potassium (VEETID) 500 MG tablet Every 12 (Twelve) Hours.    Fbaiola Casanova MD   pravastatin (PRAVACHOL) 20 MG tablet TK 1 T PO QD 10/28/16   Fabiola Casanova MD   pravastatin (PRAVACHOL) 40 MG tablet Take 40 mg by mouth Daily.  Patient not taking: Reported on 9/13/2023    Fabiola Casanova MD   sildenafil (VIAGRA) 50 MG tablet Take 1 tablet by mouth Daily As Needed for Erectile Dysfunction.    Fabiola Casanova MD   simvastatin (ZOCOR) 20 MG tablet     Fabiola Casanova MD   traMADol (ULTRAM) 50 MG tablet Take 50 mg by mouth Every 6 (Six) Hours As Needed for Moderate Pain .  Patient not taking: Reported on 9/13/2023    Fabiola Casanova MD   valproic acid (DEPAKENE) 250 MG capsule Take  by mouth Daily.    Fabiola Casanova MD   vardenafil (Levitra) 20 MG tablet TAKE 1 TABLET BY ORAL ROUTE EVERY DAY AS NEEDED SEXUAL ACTIVITY MUST LAST 90 DAYS    Fabiola Casanova MD   verapamil (CALAN) 80 MG tablet Take 1 tablet by mouth Every 8 (Eight) Hours.  Patient not taking: Reported on 9/13/2023 10/2/17   Nia Garcia APRN     No Known Allergies    Social History     Tobacco Use    Smoking status: Never    Smokeless tobacco: Not on file   Substance Use Topics    Alcohol use: No     Family History   Family history unknown: Yes     Review of  Systems   Constitutional: Negative for chills, fever and weight loss.   Eyes:  Negative for blurred vision and pain.   Cardiovascular:  Positive for palpitations. Negative for chest pain, dyspnea on exertion, leg swelling, orthopnea, paroxysmal nocturnal dyspnea and syncope.   Respiratory:  Negative for cough, shortness of breath and wheezing.    Endocrine: Negative for cold intolerance and heat intolerance.   Hematologic/Lymphatic: Negative for adenopathy and bleeding problem.   Skin:  Negative for poor wound healing and rash.   Musculoskeletal:  Negative for myalgias and neck pain.   Gastrointestinal:  Negative for abdominal pain, nausea and vomiting.   Neurological:  Negative for dizziness, headaches, light-headedness, loss of balance and numbness.   Psychiatric/Behavioral:  Negative for altered mental status.    Allergic/Immunologic: Negative for hives and persistent infections.     Physical Exam:    /81   Pulse 52   Temp 98 °F (36.7 °C) (Oral)   Resp 18   SpO2 99%   Temp:  [98 °F (36.7 °C)-98.1 °F (36.7 °C)] 98 °F (36.7 °C)  Heart Rate:  [49-78] 52  Resp:  [18] 18  BP: (147-199)/() 149/81    Vitals reviewed.   Constitutional:       General: Not in acute distress.     Appearance: Normal appearance. Well-developed and not in distress. Not toxic-appearing or diaphoretic.   Eyes:      General: Lids are normal.      Extraocular Movements: Extraocular movements intact.      Pupils: Pupils are equal, round, and reactive to light.   HENT:      Head: Normocephalic and atraumatic.      Right Ear: External ear normal.      Left Ear: External ear normal.      Nose: Nose normal.    Mouth/Throat:      Mouth: Mucous membranes are not pale, not dry and not cyanotic.   Neck:      Vascular: No carotid bruit or JVD.   Pulmonary:      Effort: Pulmonary effort is normal. No accessory muscle usage or respiratory distress.      Breath sounds: Normal breath sounds. No wheezing. No rhonchi. No rales.   Chest:      Chest  wall: Not tender to palpatation.   Cardiovascular:      Normal rate. Regular rhythm.      Murmurs: There is no murmur.      No gallop.    Pulses:     Intact distal pulses.   Edema:     Peripheral edema present.     Pretibial: bilateral trace edema of the pretibial area.     Ankle: bilateral trace edema of the ankle.  Abdominal:      General: Abdomen is protuberant. Bowel sounds are normal. There is no distension or abdominal bruit.      Palpations: Abdomen is soft.      Tenderness: There is no abdominal tenderness.   Musculoskeletal: Normal range of motion.         General: No tenderness or deformity.      Extremities: No clubbing present.Skin:     General: Skin is warm and dry. There is no cyanosis.      Findings: No erythema or rash.   Neurological:      General: No focal deficit present.      Mental Status: Oriented to person, place, and time and oriented to person, place and time.      Cranial Nerves: No cranial nerve deficit.   Psychiatric:         Attention and Perception: Attention normal.         Mood and Affect: Mood normal.         Speech: Speech normal.       Diagnostic Data:    Lab Results   Component Value Date    WBC 4.69 10/28/2023    HGB 14.5 10/28/2023    HCT 46.6 10/28/2023    MCV 89.3 10/28/2023     (L) 10/28/2023     Lab Results   Component Value Date    GLUCOSE 100 (H) 10/28/2023    CALCIUM 8.7 10/28/2023     10/28/2023    K 4.1 10/28/2023    CO2 23.0 10/28/2023     10/28/2023    BUN 17 10/28/2023    CREATININE 1.11 10/28/2023    EGFR 74.2 10/28/2023    BCR 15.3 10/28/2023    ANIONGAP 9.0 10/28/2023     High-sensitivity troponin: 42, 49, 39  proBNP: 1111  TSH: 0.843  Urine drug screen negative    Chest x-ray: Cardiomegaly but no acute cardiopulmonary findings.    Cardiac catheterization at [facility] on 3/18/2022: Described as no significant coronary artery disease and a false positive nuclear stress test.    Echocardiogram at [facility] on 3/17/2022:   Technically  difficult study with poor images due to body habitus    Normal left ventricular size with decreased systolic function due to mild    apical hypokinesis -estimated ejection fraction 45%    Severe concentric left ventricular hypertrophy with moderate [grade 2]    diastolic dysfunction    Mild left atrial enlargement    Poor visualization of the aortic valve without evidence of stenosis and    with mild insufficiency    Mild mitral annular calcification with poor visualization of the mitral    valve and no demonstrated regurgitation    No evidence of pulmonic stenosis or insufficiency    Normal right atrial size with mild right ventricular enlargement    Trace tricuspid regurgitation    IVC appears mildly dilated with decreased inspiratory motion consistent    with elevated right atrial filling pressures    Aortic root dimensions fall within normal limits with dilatation of the    ascending aorta measuring 3.7 cm    No significant pericardial effusion    Poor visualization of the aortic arch    AICD leads seen in right-sided chambers    Definity contrast utilized to better define endocardial borders     ECG on 10/27/2023 at 4:45 PM: Atrial paced rhythm, ventricular rate 52, prolonged AV conduction noted, left bundle branch block pattern  ECG today at 6:27 AM: Ventricular rate 50, atrial paced with prolonged AV conduction, left axis deviation with near left bundle branch block pattern    ASSESSMENT/PLAN:    1.  Ventricular tachycardia  2.  Hypertrophic cardiomyopathy  3.  Elevated troponin, considered myocardial injury, not an acute coronary syndrome, in the setting of problem #1  3.  Primary hypertension  4.  Mixed hyperlipidemia    -Both cardiology and electrophysiology were asked to consult on this patient.  Unfortunately, the patient has a significant arrhythmic history with VT, prior ablation, an ICD and is currently taking mexiletine as an outpatient.  Therefore, I will have to defer to electrophysiology regarding  any further medication recommendations.  The patient is known to have no significant coronary artery disease therefore is not considered to need any invasive work-up in terms of coronary artery disease, etc.  -At this time, interventional cardiology will sign off of this patient but there is an EP consultation pending at this time, placed by the admitting physician overnight, for further recommendations.      Electronically signed by Martin Ramachandran MD at 10/28/23 0988          Discharge Summary        Aaron Frazier DO at 10/28/23 1117              [facility]  DISCHARGE SUMMARY       Date of Admission: 10/27/2023  Date of Discharge:  10/28/2023  Primary Care Physician: Conor Denny DO    Discharge Diagnoses:  Active Hospital Problems    Diagnosis     **VT (ventricular tachycardia)     Type 2 diabetes mellitus without complication     AICD (automatic cardioverter/defibrillator) present     Hypertrophic cardiomyopathy     Essential hypertension          Presenting Problem/History of Present Illness:  VT (ventricular tachycardia) [I47.20]     Chief Complaint on Day of Discharge:   No complaints    History of Present Illness on Day of Discharge:   The patient feels well today.  He has no complaints and is anxious for discharge home.  I discussed the case with Marixa Ramachandran and Clark.  Dr. Ramachandran has nothing else to offer with regard to arrhythmia management.  I spoke at length with Dr. Rodas about the patient having been off mexiletine because he ran out and his former electrophysiologist at [place] is no longer in practice there and he could not have the prescription refilled.  He has been out of mexiletine for about 2 weeks now.  He has been instructed to restart mexiletine and amiodarone.  Dr. Rodas feels it is appropriate to discharge the patient today with follow-up in his office next week after restarting mexiletine.    [facility]  "Course  63-year-old male who presents emergency department with episode of palpitations.  He notes that he has been having trouble sleeping due to nasal congestion.  He went to the [employer] to get nasal spray, and started having irregular heartbeat at the store.  He states \"My heart was going crazy.\"  He has chronic lower extremity edema.  He did not have chest pain.  He notes that his irregular heartbeat has happened many times.  He has no acute bowel or kidney changes.   Treatment Plan  Admit to the CCU  Continue current home medications  FU labs in the am with EKG  Consult with EP and cardiology.   5. Amiodarone drip started in the ED.         Consults:   Cardiology:  ASSESSMENT/PLAN:     1.  Ventricular tachycardia  2.  Hypertrophic cardiomyopathy  3.  Elevated troponin, considered myocardial injury, not an acute coronary syndrome, in the setting of problem #1  3.  Primary hypertension  4.  Mixed hyperlipidemia     -Both cardiology and electrophysiology were asked to consult on this patient.  Unfortunately, the patient has a significant arrhythmic history with VT, prior ablation, an ICD and is currently taking mexiletine as an outpatient.  Therefore, I will have to defer to electrophysiology regarding any further medication recommendations.  The patient is known to have no significant coronary artery disease therefore is not considered to need any invasive work-up in terms of coronary artery disease, etc.  -At this time, interventional cardiology will sign off of this patient but there is an EP consultation pending at this time, placed by the admitting physician overnight, for further recommendations.    Result Review   Result Review:  I have personally reviewed the results from the time of this admission to 10/28/2023 11:19 CDT and agree with these findings:  []  Laboratory  []  Microbiology  []  Radiology  []  EKG/Telemetry   []  Cardiology/Vascular   []  Pathology  []  Old records  []  Other:    Condition on " Discharge:    Stable and at baseline with no further arrhythmias    Physical Exam on Discharge:  /74   Pulse 50   Temp 98 °F (36.7 °C) (Oral)   Resp 18   SpO2 94%   Physical Exam     Constitutional:       Appearance: He is obese.   HENT:      Head: Normocephalic and atraumatic.      Right Ear: External ear normal.      Left Ear: External ear normal.      Nose: Nose normal.      Mouth/Throat:      Mouth: Mucous membranes are moist.      Pharynx: No oropharyngeal exudate.   Eyes:      General: No scleral icterus.     Conjunctiva/sclera: Conjunctivae normal.   Cardiovascular:      Rate and Rhythm: Normal rate and regular rhythm.      Heart sounds: Normal heart sounds.   Pulmonary:      Effort: Pulmonary effort is normal.      Breath sounds: Normal breath sounds.   Abdominal:      General: There is no distension.      Palpations: Abdomen is soft.      Tenderness: There is no abdominal tenderness.   Musculoskeletal:         General: Swelling present. No tenderness.      Cervical back: Normal range of motion and neck supple.      Right lower leg: Edema present.      Left lower leg: Edema present.   Skin:     General: Skin is warm and dry.   Neurological:      General: No focal deficit present.      Mental Status: He is alert.      Cranial Nerves: No cranial nerve deficit.   Psychiatric:         Mood and Affect: Mood normal.         Behavior: Behavior normal.       Discharge Disposition:  Home or Self Care    Discharge Medications:     Discharge Medications        Changes to Medications        Instructions Start Date   mexiletine 150 MG capsule  Commonly known as: MEXITIL  What changed: See the new instructions.   150 mg, Oral, 2 Times Daily             Continue These Medications        Instructions Start Date   allopurinol 300 MG tablet  Commonly known as: ZYLOPRIM   300 mg, Oral, Daily      amiodarone 200 MG tablet  Commonly known as: PACERONE   200 mg, Oral, Every 12 Hours Scheduled      amLODIPine 2.5 MG  tablet  Commonly known as: NORVASC   1 tablet, Oral, Daily      aspirin 81 MG EC tablet   81 mg, Oral, Daily      chlorthalidone 25 MG tablet  Commonly known as: HYGROTON       cloNIDine 0.1 MG tablet  Commonly known as: CATAPRES   0.1 mg, Oral, 2 Times Daily      colchicine 0.6 MG tablet   0.6 mg, Oral, Daily      Eliquis 5 MG tablet tablet  Generic drug: apixaban       fluticasone 50 MCG/ACT nasal spray  Commonly known as: FLONASE   2 sprays, Nasal      guaiFENesin 600 MG 12 hr tablet  Commonly known as: MUCINEX   600 mg, Oral, Every 12 Hours Scheduled      HYDROcodone-acetaminophen 5-325 MG per tablet  Commonly known as: NORCO       lisinopril 20 MG tablet  Commonly known as: PRINIVIL,ZESTRIL   20 mg, Oral, Daily      nitroglycerin 0.4 MG SL tablet  Commonly known as: NITROSTAT   0.4 mg, Sublingual      sildenafil 50 MG tablet  Commonly known as: VIAGRA   50 mg, Oral, Daily PRN      simvastatin 20 MG tablet  Commonly known as: ZOCOR       valproic acid 250 MG capsule  Commonly known as: DEPAKENE   Oral, Daily             Stop These Medications      amoxicillin-clavulanate 875-125 MG per tablet  Commonly known as: AUGMENTIN     carvedilol 12.5 MG tablet  Commonly known as: COREG     cyclobenzaprine 10 MG tablet  Commonly known as: FLEXERIL     furosemide 20 MG tablet  Commonly known as: LASIX     ibuprofen 600 MG tablet  Commonly known as: ADVIL,MOTRIN     indomethacin 50 MG capsule  Commonly known as: INDOCIN     Levitra 20 MG tablet  Generic drug: vardenafil     lidocaine in D5W 4-5 MG/ML-% infusion     metoprolol tartrate 5 MG/5ML injection  Commonly known as: LOPRESSOR     neomycin-polymyxin-hydrocortisone 3.5-57549-2 otic suspension  Commonly known as: CORTISPORIN     penicillin v potassium 500 MG tablet  Commonly known as: VEETID     pravastatin 20 MG tablet  Commonly known as: PRAVACHOL     pravastatin 40 MG tablet  Commonly known as: PRAVACHOL     traMADol 50 MG tablet  Commonly known as: ULTRAM      verapamil 80 MG tablet  Commonly known as: CALAN            ASK your doctor about these medications        Instructions Start Date   furosemide 40 MG tablet  Commonly known as: LASIX   40 mg, 2 Times Daily               Discharge Diet:   Diet Instructions       Diet: Cardiac Diets; No Salt Packet; Thin (IDDSI 0)      Discharge Diet: Cardiac Diets    Cardiac Diet: No Salt Packet    Fluid Consistency: Thin (IDDSI 0)            Discharge Care Plan / Instructions:   Discharge home  Hold carvedilol until seen by Dr. Rodas (bradycardia into the 30s)    Activity at Discharge:   Activity Instructions       Activity as Tolerated              Follow-up Appointments:  Follow-up with Dr. Rodas next week    Electronically signed by Aaron Frazier DO, 10/28/23, 11:19 CDT.    Time: Discharge less than 30 min    Part of this note may be an electronic transcription/translation of spoken language to printed text using the Dragon Dictation system.        Electronically signed by Aaron Frazier DO at 10/28/23 1120

## 2024-02-16 ENCOUNTER — APPOINTMENT (OUTPATIENT)
Dept: GENERAL RADIOLOGY | Age: 64
End: 2024-02-16
Payer: MEDICARE

## 2024-02-16 ENCOUNTER — HOSPITAL ENCOUNTER (EMERGENCY)
Age: 64
Discharge: HOME | End: 2024-02-16
Attending: PEDIATRICS
Payer: MEDICARE

## 2024-02-16 VITALS
DIASTOLIC BLOOD PRESSURE: 83 MMHG | HEART RATE: 53 BPM | RESPIRATION RATE: 17 BRPM | BODY MASS INDEX: 37.5 KG/M2 | WEIGHT: 300 LBS | SYSTOLIC BLOOD PRESSURE: 158 MMHG | TEMPERATURE: 98.7 F | OXYGEN SATURATION: 95 %

## 2024-02-16 DIAGNOSIS — R00.2 PALPITATIONS: ICD-10-CM

## 2024-02-16 DIAGNOSIS — I50.9 CONGESTIVE HEART FAILURE, UNSPECIFIED HF CHRONICITY, UNSPECIFIED HEART FAILURE TYPE (HCC): ICD-10-CM

## 2024-02-16 DIAGNOSIS — R07.9 CHEST PAIN, UNSPECIFIED TYPE: Primary | ICD-10-CM

## 2024-02-16 LAB
ALBUMIN SERPL-MCNC: 3.8 G/DL (ref 3.5–5.2)
ALP SERPL-CCNC: 85 U/L (ref 40–130)
ALT SERPL-CCNC: 22 U/L (ref 5–41)
ANION GAP SERPL CALCULATED.3IONS-SCNC: 9 MMOL/L (ref 7–19)
AST SERPL-CCNC: 26 U/L (ref 5–40)
BASOPHILS # BLD: 0 K/UL (ref 0–0.2)
BASOPHILS NFR BLD: 0.7 % (ref 0–1)
BILIRUB SERPL-MCNC: 0.6 MG/DL (ref 0.2–1.2)
BNP BLD-MCNC: 1948 PG/ML (ref 0–124)
BUN SERPL-MCNC: 15 MG/DL (ref 8–23)
CALCIUM SERPL-MCNC: 8.9 MG/DL (ref 8.8–10.2)
CHLORIDE SERPL-SCNC: 104 MMOL/L (ref 98–111)
CO2 SERPL-SCNC: 28 MMOL/L (ref 22–29)
CREAT SERPL-MCNC: 1.5 MG/DL (ref 0.5–1.2)
EOSINOPHIL # BLD: 0.1 K/UL (ref 0–0.6)
EOSINOPHIL NFR BLD: 1.8 % (ref 0–5)
ERYTHROCYTE [DISTWIDTH] IN BLOOD BY AUTOMATED COUNT: 18.6 % (ref 11.5–14.5)
GLUCOSE SERPL-MCNC: 118 MG/DL (ref 74–109)
HCT VFR BLD AUTO: 48.6 % (ref 42–52)
HGB BLD-MCNC: 15.9 G/DL (ref 14–18)
IMM GRANULOCYTES # BLD: 0 K/UL
LYMPHOCYTES # BLD: 1.6 K/UL (ref 1.1–4.5)
LYMPHOCYTES NFR BLD: 26.8 % (ref 20–40)
MCH RBC QN AUTO: 30.2 PG (ref 27–31)
MCHC RBC AUTO-ENTMCNC: 32.7 G/DL (ref 33–37)
MCV RBC AUTO: 92.2 FL (ref 80–94)
MONOCYTES # BLD: 0.5 K/UL (ref 0–0.9)
MONOCYTES NFR BLD: 8.2 % (ref 0–10)
NEUTROPHILS # BLD: 3.8 K/UL (ref 1.5–7.5)
NEUTS SEG NFR BLD: 62.2 % (ref 50–65)
PLATELET # BLD AUTO: 220 K/UL (ref 130–400)
PMV BLD AUTO: 10.6 FL (ref 9.4–12.4)
POTASSIUM SERPL-SCNC: 4.4 MMOL/L (ref 3.5–5)
PROT SERPL-MCNC: 6.8 G/DL (ref 6.6–8.7)
RBC # BLD AUTO: 5.27 M/UL (ref 4.7–6.1)
REASON FOR REJECTION: NORMAL
REJECTED TEST: NORMAL
SODIUM SERPL-SCNC: 141 MMOL/L (ref 136–145)
TROPONIN, HIGH SENSITIVITY: 65 NG/L (ref 0–22)
TROPONIN, HIGH SENSITIVITY: 66 NG/L (ref 0–22)
WBC # BLD AUTO: 6.1 K/UL (ref 4.8–10.8)

## 2024-02-16 PROCEDURE — 83880 ASSAY OF NATRIURETIC PEPTIDE: CPT

## 2024-02-16 PROCEDURE — 71045 X-RAY EXAM CHEST 1 VIEW: CPT

## 2024-02-16 PROCEDURE — 96374 THER/PROPH/DIAG INJ IV PUSH: CPT

## 2024-02-16 PROCEDURE — 36415 COLL VENOUS BLD VENIPUNCTURE: CPT

## 2024-02-16 PROCEDURE — 6370000000 HC RX 637 (ALT 250 FOR IP): Performed by: PEDIATRICS

## 2024-02-16 PROCEDURE — 96375 TX/PRO/DX INJ NEW DRUG ADDON: CPT

## 2024-02-16 PROCEDURE — 85025 COMPLETE CBC W/AUTO DIFF WBC: CPT

## 2024-02-16 PROCEDURE — 84484 ASSAY OF TROPONIN QUANT: CPT

## 2024-02-16 PROCEDURE — 99254 IP/OBS CNSLTJ NEW/EST MOD 60: CPT | Performed by: INTERNAL MEDICINE

## 2024-02-16 PROCEDURE — 99285 EMERGENCY DEPT VISIT HI MDM: CPT

## 2024-02-16 PROCEDURE — 80053 COMPREHEN METABOLIC PANEL: CPT

## 2024-02-16 PROCEDURE — 6360000002 HC RX W HCPCS: Performed by: EMERGENCY MEDICINE

## 2024-02-16 PROCEDURE — 2500000003 HC RX 250 WO HCPCS: Performed by: EMERGENCY MEDICINE

## 2024-02-16 RX ORDER — AMLODIPINE BESYLATE 5 MG/1
5 TABLET ORAL ONCE
Status: COMPLETED | OUTPATIENT
Start: 2024-02-16 | End: 2024-02-16

## 2024-02-16 RX ORDER — METOPROLOL TARTRATE 1 MG/ML
2.5 INJECTION, SOLUTION INTRAVENOUS ONCE
Status: COMPLETED | OUTPATIENT
Start: 2024-02-16 | End: 2024-02-16

## 2024-02-16 RX ORDER — FUROSEMIDE 10 MG/ML
40 INJECTION INTRAMUSCULAR; INTRAVENOUS ONCE
Status: COMPLETED | OUTPATIENT
Start: 2024-02-16 | End: 2024-02-16

## 2024-02-16 RX ORDER — METOPROLOL TARTRATE 1 MG/ML
5 INJECTION, SOLUTION INTRAVENOUS ONCE
Status: DISCONTINUED | OUTPATIENT
Start: 2024-02-16 | End: 2024-02-16

## 2024-02-16 RX ORDER — CARVEDILOL 12.5 MG/1
12.5 TABLET ORAL ONCE
Status: COMPLETED | OUTPATIENT
Start: 2024-02-16 | End: 2024-02-16

## 2024-02-16 RX ADMIN — AMLODIPINE BESYLATE 5 MG: 5 TABLET ORAL at 06:24

## 2024-02-16 RX ADMIN — METOPROLOL TARTRATE 2.5 MG: 5 INJECTION INTRAVENOUS at 07:33

## 2024-02-16 RX ADMIN — CARVEDILOL 12.5 MG: 12.5 TABLET, FILM COATED ORAL at 06:24

## 2024-02-16 RX ADMIN — FUROSEMIDE 40 MG: 10 INJECTION, SOLUTION INTRAMUSCULAR; INTRAVENOUS at 08:51

## 2024-02-16 ASSESSMENT — PAIN DESCRIPTION - DESCRIPTORS: DESCRIPTORS: PRESSURE

## 2024-02-16 ASSESSMENT — PAIN SCALES - GENERAL: PAINLEVEL_OUTOF10: 1

## 2024-02-16 ASSESSMENT — PAIN - FUNCTIONAL ASSESSMENT: PAIN_FUNCTIONAL_ASSESSMENT: 0-10

## 2024-02-16 ASSESSMENT — PAIN DESCRIPTION - LOCATION: LOCATION: CHEST

## 2024-02-16 NOTE — ED PROVIDER NOTES
for syncope and light-headedness.   Psychiatric/Behavioral:  Negative for agitation and confusion.    All other systems reviewed and are negative.           PAST MEDICALHISTORY     Past Medical History:   Diagnosis Date    A-fib (HCC)     sees [place] [facility]    BPH (benign prostatic hyperplasia)     Cardiomyopathy (HCC)     per pt    CHF (congestive heart failure) (HCC)     Hyperlipidemia     Hypertension     V tach (HCC)          SURGICAL HISTORY       Past Surgical History:   Procedure Laterality Date    CARDIAC DEFIBRILLATOR PLACEMENT      CARDIAC DEFIBRILLATOR PLACEMENT Left     CARDIAC ELECTROPHYSIOLOGY STUDY AND ABLATION      COLONOSCOPY  07/16/2021    Dr RUDY Rdz-Large atypical appearing lesion was noted, appeared to be arising from the squamous tissue of the anus and no rectal in origin, surg referral, 10 yr recall    COLONOSCOPY N/A 07/16/2021    COLORECTAL CANCER SCREENING, NOT HIGH RISK performed by Ricky Rdz MD at [facility]    PACEMAKER INSERTION      PACEMAKER PLACEMENT      MEDTRONIC    PROSTATE SURGERY N/A 7/6/2023    TRANSRECTAL ULTRASOUND OF PROSTATE WITH TRANSRECTAL NEEDLE BIOPSY OF PROSTATE performed by Angus Soto MD at [facility] OR    ULTRASOUND PROSTATE/TRANSRECTAL N/A 12/31/2019    TRANSRECTAL ULTRASOUND BIOPSY OF PROSTATE performed by Angus Soto MD at [facility] OR    UPPER GASTROINTESTINAL ENDOSCOPY      Not sure who did it and done a long time ago         CURRENT MEDICATIONS     Previous Medications    AMLODIPINE (NORVASC) 5 MG TABLET    Take 1 tablet by mouth daily    ASPIRIN 81 MG TABLET    Take 1 tablet by mouth daily    CARVEDILOL (COREG) 12.5 MG TABLET    Take 1 tablet by mouth daily    COLCHICINE (COLCRYS) 0.6 MG TABLET    Take 1 tablet by mouth daily as needed    ERGOCALCIFEROL (ERGOCALCIFEROL) 1.25 MG (54758 UT) CAPSULE    Take 1,250 mcg by mouth daily    FINASTERIDE (PROSCAR) 5 MG TABLET    Take 1 tablet by mouth daily    FLUTICASONE (FLONASE) 50 MCG/ACT NASAL SPRAY     2 sprays by Nasal route daily as needed    INDOMETHACIN (INDOCIN) 50 MG CAPSULE    Take 1 capsule by mouth daily as needed for Pain    LISINOPRIL (PRINIVIL;ZESTRIL) 20 MG TABLET    Take 1 tablet by mouth nightly    MELOXICAM (MOBIC) 15 MG TABLET    Take 1 tablet by mouth daily    MEXILETINE (MEXITIL) 150 MG CAPSULE    Take 1 capsule by mouth daily    NITROGLYCERIN (NITROSTAT) 0.4 MG SL TABLET    Place 1 tablet under the tongue every 5 minutes as needed    SIMVASTATIN (ZOCOR) 20 MG TABLET    Take 1 tablet by mouth nightly    TOPIRAMATE (TOPAMAX) 25 MG TABLET    Take 1 tablet by mouth daily    VIAGRA 100 MG TABLET    Take 1 tablet by mouth as needed       ALLERGIES     Patient has no known allergies.    FAMILY HISTORY       Family History   Problem Relation Age of Onset    Colon Cancer Neg Hx     Colon Polyps Neg Hx     Esophageal Cancer Neg Hx     Liver Cancer Neg Hx     Liver Disease Neg Hx     Rectal Cancer Neg Hx     Stomach Cancer Neg Hx           SOCIAL HISTORY       Social History     Socioeconomic History    Marital status: Single     Spouse name: None    Number of children: None    Years of education: None    Highest education level: None   Tobacco Use    Smoking status: Never    Smokeless tobacco: Never   Vaping Use    Vaping Use: Never used   Substance and Sexual Activity    Alcohol use: Never    Drug use: Never     Social Determinants of Health     Physical Activity: Unknown (3/17/2022)    Exercise Vital Sign     Days of Exercise per Week: 7 days       SCREENINGS    Pooja Coma Scale  Eye Opening: Spontaneous  Best Verbal Response: Oriented  Best Motor Response: Obeys commands  [place] Coma Scale Score: 15        PHYSICAL EXAM    (up to 7 for level 4, 8 or more for level 5)     ED Triage Vitals   BP Temp Temp Source Pulse Respirations SpO2 Height Weight - Scale   02/16/24 0601 02/16/24 0601 02/16/24 0601 02/16/24 0601 02/16/24 0601 02/16/24 0601 -- 02/16/24 0600   (!) 215/110 98.7 °F (37.1 °C) Oral 78

## 2024-02-16 NOTE — CONSULTS
[facility]  Cardiology Consult      Requesting MD:  Rafy King MD   Admit Status:         History obtained from:   [] Patient  [] Other (specify):     Patient:  Nikki Dahl  [id number]     Chief Complaint:   Chief Complaint   Patient presents with    Chest Pain     Chest pain with SOA started tonight. Pt reports pain free at this time.          HPI: Mr. Dahl is a 64 y.o. male with a history of HTN HLD, hypertrophic cardiomyopathy, VT s/p ICD  Per outpatient cardiology notes extensive cardiac workup with cardiac MRI, CTA of the heart, PYP scan, left heart cath and endomyocardial biopsy and all of these were unrevealing and demonstrated only hypertrophic cardiomyopathy.  He presented with chest pain that began a few hours prior to coming to the emergency room.  Describes it as a pressure with palpitations that lasted for 15 minutes and has resolved after bowel movement.  He was concerned I Might be a VT episode though does note that it was slightly different feeling than his VT episode in the past.  He is feeling much better now and ambulating well without any shortness of breath or other symptoms    Review of Systems:  Review of Systems   Respiratory:  Positive for chest tightness.    Cardiovascular:  Positive for palpitations.   All other systems reviewed and are negative.      Cardiac Specific Data:  Specialty Problems          Cardiology Problems    Hypertrophic cardiomyopathy (HCC)        CHF (congestive heart failure), NYHA class III, acute on chronic, combined (HCC)        Diastolic heart failure (HCC)        Hypertrophy of inter-atrial septum        Systolic heart failure (HCC)        Ventricular tachycardia (paroxysmal) (HCC)        Atypical chest pain           Past Medical History:  Past Medical History:   Diagnosis Date    A-fib (HCC)     [facility]    BPH (benign prostatic hyperplasia)     Cardiomyopathy (HCC)     per pt    CHF (congestive heart failure) (HCC)   apical and mid anteroseptal akinesis  Prior cath 3/2022 showing NICM, no significant CAD  ECG showing underlying atrial flutter with rate controlled with  wide IVCD QRS>160msec  He has been considered for flutter ablation and upgrade to BiV-ICD     First troponin is elevated at 65 with proBNP of nearly 2000.  His creatinine is also elevated at 1.5 compared to baseline.  Reviewed device interrogation which shows normal range impedance thresholds sensitivity with ventricular lead.  No VT events.  He spends 100% of time in AT.  The OptiVol fluid index is also rising.  All of this suggests mild volume overload and have suggested him to increase his Bumex to twice daily dosing for the next 5 days.  He will need to get repeat blood work done early next week to reassess his creatinine and electrolytes.  He needs to reach out to his cardiologist or primary care office to arrange this.  Elevated troponin likely representing demand ischemia.  Will need to repeat and ensure it is stable or downtrending.  As his symptoms are very mild and resolved at this time his mild decompensated heart failure can be managed as an outpatient.

## 2024-02-16 NOTE — ED PROVIDER NOTES
[facility] EMERGENCY DEPT  eMERGENCYdEPARTMENT eNCOUnter      Pt Name: Nikki Dahl  MRN: [id number]  Birthdate 1960  Date of evaluation: 2/16/2024  Provider:YADIRA CASON MD    Emergency Department care of this patient was assumed at 0642 from Dr. Holder.  We have discussed the case and the plan of care.  I have seen and evaluated patient and reviewed ED course.      CHIEF COMPLAINT       Chief Complaint   Patient presents with    Chest Pain     Chest pain with SOA started tonight. Pt reports pain free at this time.          PHYSICAL EXAM    (up to 7 for level 4, 8 or more for level 5)     ED Triage Vitals   BP Temp Temp Source Pulse Respirations SpO2 Height Weight - Scale   02/16/24 0601 02/16/24 0601 02/16/24 0601 02/16/24 0601 02/16/24 0601 02/16/24 0601 -- 02/16/24 0600   (!) 215/110 98.7 °F (37.1 °C) Oral 78 20 96 %  136.1 kg (300 lb)       Physical Exam  Vitals and nursing note reviewed.   Constitutional:       General: He is not in acute distress.     Appearance: Normal appearance. He is not ill-appearing.   Cardiovascular:      Rate and Rhythm: Normal rate. Rhythm irregular.   Musculoskeletal:      Right lower leg: Edema present.      Left lower leg: Edema present.   Neurological:      Mental Status: He is alert.         DIAGNOSTIC RESULTS     EKG: All EKG's are interpreted by the Emergency Department Physician who either signs or Co-signs this chart inthe absence of a cardiologist.    64 atrial flutter LVH no obvious ST changes QTc 496 nondiagnostic EKG    RADIOLOGY:   Non-plain film imagessuch as CT, Ultrasound and MRI are read by the radiologist. Plain radiographic images are visualized and preliminarily interpreted by the emergency physician with the below findings:        XR CHEST PORTABLE   Final Result   No acute cardiopulmonary process               ______________________________________    Electronically signed by: EVELINA RUSHING M.D.   Date:     02/16/2024   Time:    06:50               LABS:  Labs  edema in extrem so given lasix here he's on bumex at home.  Trop 65 and 66 which comparing prior has had some less and some much higher.  Neg cath march 2022 by Dr. Nguyen.  Non ischemic cardiomyopathy most likely.  D/w Dr. Lindsey who will eval in ED 1025    Dr. Lindsey has seen in ED agrees ok for outpt treatment and will have increase diuretic for next 5 days.  Will have follow up with cardiology.  Understands return precautions.     CONSULTS:  None    PROCEDURES:  Unless otherwise noted below, none     Procedures    FINAL IMPRESSION      1. Chest pain, unspecified type    2. Palpitations    3. Congestive heart failure, unspecified HF chronicity, unspecified heart failure type (HCC)          DISPOSITION/PLAN   DISPOSITION Decision To Discharge    PATIENT REFERRED TO:  Benito Maradiaga MD  [facility] [address] [id number] [phone]            DISCHARGE MEDICATIONS:  New Prescriptions    No medications on file          (Please note that portions ofthis note were completed with a voice recognition program.  Efforts were made to edit the dictations but occasionally words are mis-transcribed.)    YADIRA CASON MD(electronically signed)  Attending Emergency Physician          Yadira Cason MD  02/16/24 9059

## 2024-02-20 LAB
EKG P AXIS: 21 DEGREES
EKG P-R INTERVAL: 236 MS
EKG Q-T INTERVAL: 436 MS
EKG QRS DURATION: 170 MS
EKG QTC CALCULATION (BAZETT): 457 MS
EKG T AXIS: 113 DEGREES

## 2024-02-28 ENCOUNTER — HOSPITAL ENCOUNTER (EMERGENCY)
Age: 64
Discharge: HOME | End: 2024-02-28
Attending: EMERGENCY MEDICINE
Payer: MEDICARE

## 2024-02-28 ENCOUNTER — APPOINTMENT (OUTPATIENT)
Dept: GENERAL RADIOLOGY | Age: 64
End: 2024-02-28
Payer: MEDICARE

## 2024-02-28 VITALS
HEART RATE: 62 BPM | WEIGHT: 295 LBS | RESPIRATION RATE: 18 BRPM | BODY MASS INDEX: 36.87 KG/M2 | SYSTOLIC BLOOD PRESSURE: 163 MMHG | OXYGEN SATURATION: 99 % | TEMPERATURE: 97.4 F | DIASTOLIC BLOOD PRESSURE: 88 MMHG

## 2024-02-28 DIAGNOSIS — I10 UNCONTROLLED HYPERTENSION: ICD-10-CM

## 2024-02-28 DIAGNOSIS — I50.40 COMBINED SYSTOLIC AND DIASTOLIC CONGESTIVE HEART FAILURE, UNSPECIFIED HF CHRONICITY (HCC): Primary | ICD-10-CM

## 2024-02-28 DIAGNOSIS — R07.9 CHEST PAIN, UNSPECIFIED TYPE: ICD-10-CM

## 2024-02-28 LAB
ALBUMIN SERPL-MCNC: 4 G/DL (ref 3.5–5.2)
ALP SERPL-CCNC: 78 U/L (ref 40–130)
ALT SERPL-CCNC: 15 U/L (ref 5–41)
ANION GAP SERPL CALCULATED.3IONS-SCNC: 10 MMOL/L (ref 7–19)
AST SERPL-CCNC: 20 U/L (ref 5–40)
BASOPHILS # BLD: 0 K/UL (ref 0–0.2)
BASOPHILS NFR BLD: 0.7 % (ref 0–1)
BILIRUB SERPL-MCNC: 1.2 MG/DL (ref 0.2–1.2)
BNP BLD-MCNC: 2488 PG/ML (ref 0–124)
BUN SERPL-MCNC: 11 MG/DL (ref 8–23)
CALCIUM SERPL-MCNC: 9.4 MG/DL (ref 8.8–10.2)
CHLORIDE SERPL-SCNC: 102 MMOL/L (ref 98–111)
CO2 SERPL-SCNC: 28 MMOL/L (ref 22–29)
CREAT SERPL-MCNC: 1.3 MG/DL (ref 0.5–1.2)
EOSINOPHIL # BLD: 0 K/UL (ref 0–0.6)
EOSINOPHIL NFR BLD: 0.9 % (ref 0–5)
ERYTHROCYTE [DISTWIDTH] IN BLOOD BY AUTOMATED COUNT: 17.5 % (ref 11.5–14.5)
GLUCOSE SERPL-MCNC: 97 MG/DL (ref 74–109)
HCT VFR BLD AUTO: 44.9 % (ref 42–52)
HGB BLD-MCNC: 15.2 G/DL (ref 14–18)
IMM GRANULOCYTES # BLD: 0 K/UL
LYMPHOCYTES # BLD: 1 K/UL (ref 1.1–4.5)
LYMPHOCYTES NFR BLD: 21.1 % (ref 20–40)
MCH RBC QN AUTO: 29.9 PG (ref 27–31)
MCHC RBC AUTO-ENTMCNC: 33.9 G/DL (ref 33–37)
MCV RBC AUTO: 88.2 FL (ref 80–94)
MONOCYTES # BLD: 0.3 K/UL (ref 0–0.9)
MONOCYTES NFR BLD: 7 % (ref 0–10)
NEUTROPHILS # BLD: 3.2 K/UL (ref 1.5–7.5)
NEUTS SEG NFR BLD: 69.9 % (ref 50–65)
PLATELET # BLD AUTO: 161 K/UL (ref 130–400)
PMV BLD AUTO: 10.5 FL (ref 9.4–12.4)
POTASSIUM SERPL-SCNC: 4.7 MMOL/L (ref 3.5–5)
PROT SERPL-MCNC: 6.9 G/DL (ref 6.6–8.7)
RBC # BLD AUTO: 5.09 M/UL (ref 4.7–6.1)
REASON FOR REJECTION: NORMAL
REASON FOR REJECTION: NORMAL
REJECTED TEST: NORMAL
REJECTED TEST: NORMAL
SODIUM SERPL-SCNC: 140 MMOL/L (ref 136–145)
TROPONIN, HIGH SENSITIVITY: 58 NG/L (ref 0–22)
WBC # BLD AUTO: 4.6 K/UL (ref 4.8–10.8)

## 2024-02-28 PROCEDURE — 83880 ASSAY OF NATRIURETIC PEPTIDE: CPT

## 2024-02-28 PROCEDURE — 84484 ASSAY OF TROPONIN QUANT: CPT

## 2024-02-28 PROCEDURE — 99284 EMERGENCY DEPT VISIT MOD MDM: CPT

## 2024-02-28 PROCEDURE — 6360000002 HC RX W HCPCS: Performed by: EMERGENCY MEDICINE

## 2024-02-28 PROCEDURE — 96374 THER/PROPH/DIAG INJ IV PUSH: CPT

## 2024-02-28 PROCEDURE — 36415 COLL VENOUS BLD VENIPUNCTURE: CPT

## 2024-02-28 PROCEDURE — 80053 COMPREHEN METABOLIC PANEL: CPT

## 2024-02-28 PROCEDURE — 71045 X-RAY EXAM CHEST 1 VIEW: CPT

## 2024-02-28 PROCEDURE — 85025 COMPLETE CBC W/AUTO DIFF WBC: CPT

## 2024-02-28 RX ORDER — FUROSEMIDE 10 MG/ML
40 INJECTION INTRAMUSCULAR; INTRAVENOUS ONCE
Status: COMPLETED | OUTPATIENT
Start: 2024-02-28 | End: 2024-02-28

## 2024-02-28 RX ADMIN — FUROSEMIDE 40 MG: 10 INJECTION, SOLUTION INTRAMUSCULAR; INTRAVENOUS at 18:03

## 2024-02-28 ASSESSMENT — ENCOUNTER SYMPTOMS
SHORTNESS OF BREATH: 0
ABDOMINAL PAIN: 0
VOMITING: 0
SORE THROAT: 0
NAUSEA: 0
RHINORRHEA: 0
DIARRHEA: 0
SINUS PRESSURE: 0

## 2024-02-28 ASSESSMENT — PAIN SCALES - GENERAL: PAINLEVEL_OUTOF10: 2

## 2024-02-28 ASSESSMENT — PAIN - FUNCTIONAL ASSESSMENT: PAIN_FUNCTIONAL_ASSESSMENT: 0-10

## 2024-02-28 NOTE — ED PROVIDER NOTES
Genitourinary:  Negative for difficulty urinating and dysuria.   Musculoskeletal:  Negative for arthralgias and myalgias.   Skin:  Negative for rash.   Neurological:  Negative for weakness.   Psychiatric/Behavioral:  Negative for confusion.    All other systems reviewed and are negative.           PAST MEDICALHISTORY     Past Medical History:   Diagnosis Date    A-fib (HCC)     sees [place] [facility]    BPH (benign prostatic hyperplasia)     Cardiomyopathy (HCC)     per pt    CHF (congestive heart failure) (HCC)     Hyperlipidemia     Hypertension     V tach (HCC)          SURGICAL HISTORY       Past Surgical History:   Procedure Laterality Date    CARDIAC DEFIBRILLATOR PLACEMENT      CARDIAC DEFIBRILLATOR PLACEMENT Left     CARDIAC ELECTROPHYSIOLOGY STUDY AND ABLATION      COLONOSCOPY  07/16/2021    Dr RUDY Rdz-Large atypical appearing lesion was noted, appeared to be arising from the squamous tissue of the anus and no rectal in origin, surg referral, 10 yr recall    COLONOSCOPY N/A 07/16/2021    COLORECTAL CANCER SCREENING, NOT HIGH RISK performed by Ricky Rdz MD at [facility]    PACEMAKER INSERTION      PACEMAKER PLACEMENT      [employer]    PROSTATE SURGERY N/A 7/6/2023    TRANSRECTAL ULTRASOUND OF PROSTATE WITH TRANSRECTAL NEEDLE BIOPSY OF PROSTATE performed by Angus Soto MD at [facility] OR    ULTRASOUND PROSTATE/TRANSRECTAL N/A 12/31/2019    TRANSRECTAL ULTRASOUND BIOPSY OF PROSTATE performed by Angus Soto MD at [facility] OR    UPPER GASTROINTESTINAL ENDOSCOPY      Not sure who did it and done a long time ago         CURRENT MEDICATIONS     Previous Medications    AMLODIPINE (NORVASC) 5 MG TABLET    Take 1 tablet by mouth daily    ASPIRIN 81 MG TABLET    Take 1 tablet by mouth daily    CARVEDILOL (COREG) 12.5 MG TABLET    Take 1 tablet by mouth daily    COLCHICINE (COLCRYS) 0.6 MG TABLET    Take 1 tablet by mouth daily as needed    ERGOCALCIFEROL (ERGOCALCIFEROL) 1.25 MG (78624 UT) CAPSULE

## 2024-02-29 NOTE — DISCHARGE INSTRUCTIONS
Review all your diuretic medications with your cardiologist at [place].  You should consider taking furosemide and your Bumex at different times.  Make sure you are taking all the medications you are supposed to be taking.  Return immediately to the emergency room for any new or worsening symptoms.

## 2024-05-18 ENCOUNTER — HOSPITAL ENCOUNTER (EMERGENCY)
Age: 64
Discharge: HOME | End: 2024-05-18
Attending: EMERGENCY MEDICINE
Payer: MEDICARE

## 2024-05-18 ENCOUNTER — APPOINTMENT (OUTPATIENT)
Dept: GENERAL RADIOLOGY | Age: 64
End: 2024-05-18
Payer: MEDICARE

## 2024-05-18 VITALS
HEART RATE: 64 BPM | RESPIRATION RATE: 21 BRPM | HEIGHT: 75 IN | DIASTOLIC BLOOD PRESSURE: 82 MMHG | WEIGHT: 297 LBS | BODY MASS INDEX: 36.93 KG/M2 | SYSTOLIC BLOOD PRESSURE: 177 MMHG | OXYGEN SATURATION: 97 % | TEMPERATURE: 98.4 F

## 2024-05-18 DIAGNOSIS — R00.2 PALPITATIONS: Primary | ICD-10-CM

## 2024-05-18 LAB
ALBUMIN SERPL-MCNC: 4 G/DL (ref 3.5–5.2)
ALP SERPL-CCNC: 60 U/L (ref 40–130)
ALT SERPL-CCNC: 17 U/L (ref 5–41)
ANION GAP SERPL CALCULATED.3IONS-SCNC: 12 MMOL/L (ref 7–19)
AST SERPL-CCNC: 29 U/L (ref 5–40)
BASOPHILS # BLD: 0 K/UL (ref 0–0.2)
BASOPHILS NFR BLD: 0.7 % (ref 0–1)
BILIRUB SERPL-MCNC: 0.7 MG/DL (ref 0.2–1.2)
BUN SERPL-MCNC: 18 MG/DL (ref 8–23)
CALCIUM SERPL-MCNC: 9.2 MG/DL (ref 8.8–10.2)
CHLORIDE SERPL-SCNC: 103 MMOL/L (ref 98–111)
CO2 SERPL-SCNC: 24 MMOL/L (ref 22–29)
CREAT SERPL-MCNC: 1.3 MG/DL (ref 0.5–1.2)
EKG P AXIS: 6 DEGREES
EKG P-R INTERVAL: 326 MS
EKG Q-T INTERVAL: 464 MS
EKG QRS DURATION: 174 MS
EKG QTC CALCULATION (BAZETT): 469 MS
EKG T AXIS: 120 DEGREES
EOSINOPHIL # BLD: 0.1 K/UL (ref 0–0.6)
EOSINOPHIL NFR BLD: 1.8 % (ref 0–5)
ERYTHROCYTE [DISTWIDTH] IN BLOOD BY AUTOMATED COUNT: 17.2 % (ref 11.5–14.5)
GLUCOSE SERPL-MCNC: 114 MG/DL (ref 74–109)
HCT VFR BLD AUTO: 49.2 % (ref 42–52)
HGB BLD-MCNC: 16 G/DL (ref 14–18)
IMM GRANULOCYTES # BLD: 0 K/UL
LYMPHOCYTES # BLD: 1.7 K/UL (ref 1.1–4.5)
LYMPHOCYTES NFR BLD: 30.2 % (ref 20–40)
MCH RBC QN AUTO: 28.8 PG (ref 27–31)
MCHC RBC AUTO-ENTMCNC: 32.5 G/DL (ref 33–37)
MCV RBC AUTO: 88.6 FL (ref 80–94)
MONOCYTES # BLD: 0.5 K/UL (ref 0–0.9)
MONOCYTES NFR BLD: 9.1 % (ref 0–10)
NEUTROPHILS # BLD: 3.2 K/UL (ref 1.5–7.5)
NEUTS SEG NFR BLD: 58 % (ref 50–65)
PLATELET # BLD AUTO: 197 K/UL (ref 130–400)
PMV BLD AUTO: 10.8 FL (ref 9.4–12.4)
POTASSIUM SERPL-SCNC: 4.4 MMOL/L (ref 3.5–5)
PROT SERPL-MCNC: 6.7 G/DL (ref 6.6–8.7)
RBC # BLD AUTO: 5.55 M/UL (ref 4.7–6.1)
SODIUM SERPL-SCNC: 139 MMOL/L (ref 136–145)
TROPONIN, HIGH SENSITIVITY: 44 NG/L (ref 0–22)
TROPONIN, HIGH SENSITIVITY: 50 NG/L (ref 0–22)
WBC # BLD AUTO: 5.5 K/UL (ref 4.8–10.8)

## 2024-05-18 PROCEDURE — 71045 X-RAY EXAM CHEST 1 VIEW: CPT

## 2024-05-18 PROCEDURE — 80053 COMPREHEN METABOLIC PANEL: CPT

## 2024-05-18 PROCEDURE — 93005 ELECTROCARDIOGRAM TRACING: CPT | Performed by: EMERGENCY MEDICINE

## 2024-05-18 PROCEDURE — 93010 ELECTROCARDIOGRAM REPORT: CPT | Performed by: INTERNAL MEDICINE

## 2024-05-18 PROCEDURE — 36415 COLL VENOUS BLD VENIPUNCTURE: CPT

## 2024-05-18 PROCEDURE — 99285 EMERGENCY DEPT VISIT HI MDM: CPT

## 2024-05-18 PROCEDURE — 85025 COMPLETE CBC W/AUTO DIFF WBC: CPT

## 2024-05-18 PROCEDURE — 84484 ASSAY OF TROPONIN QUANT: CPT

## 2024-05-18 ASSESSMENT — ENCOUNTER SYMPTOMS
ABDOMINAL DISTENTION: 0
NAUSEA: 0
ABDOMINAL PAIN: 0
COUGH: 0
DIARRHEA: 0

## 2024-05-18 NOTE — ED PROVIDER NOTES
[facility] EMERGENCY DEPT  eMERGENCY dEPARTMENT eNCOUnter      Pt Name: Nikki Dahl  MRN: [id number]  Birthdate 1960  Date of evaluation: 5/18/2024  Provider: Feliciano Basilio MD    CHIEF COMPLAINT       Chief Complaint   Patient presents with    Palpitations         HISTORY OF PRESENT ILLNESS   (Location/Symptom, Timing/Onset,Context/Setting, Quality, Duration, Modifying Factors, Severity)  Note limiting factors.   Nikki Dahl is a 64 y.o. male who presents to the emergency department for evaluation regarding feelings of palpitations.  Patient states that the symptoms occurred tonight although he was not asleep.  He describes feelings that his heart is beating fast.  He is not experiencing any of episodes of acute chest pain or feelings of shortness of breath.  Patient does have a prior history of atrial fibrillation, cardiomyopathy and follows with cardiology as an outpatient.  He is currently maintained on Coreg.  Takes lisinopril and Norvasc for management of his hypertension.  Patient with prior history of AICD plantation.  Prior Lexiscan in 2019 that was unremarkable for evidence of cardiac ischemia.  Echocardiogram in 2023 reveals reduced ejection fraction at 36 to 40%.    HPI    NursingNotes were reviewed.    REVIEW OF SYSTEMS    (2-9 systems for level 4, 10 or more for level 5)     Review of Systems   Constitutional:  Negative for chills and fever.   Respiratory:  Negative for cough and shortness of breath.    Cardiovascular:  Positive for palpitations. Negative for chest pain and leg swelling.   Gastrointestinal:  Negative for abdominal distention, abdominal pain, diarrhea and nausea.   Neurological:  Negative for dizziness and syncope.   All other systems reviewed and are negative.           PAST MEDICALHISTORY     Past Medical History:   Diagnosis Date    A-fib (HCC)     sees [place] cardiology    BPH (benign prostatic hyperplasia)     Cardiomyopathy (HCC)     per pt    CHF (congestive heart failure) ([facility])

## 2024-10-14 ENCOUNTER — APPOINTMENT (OUTPATIENT)
Dept: GENERAL RADIOLOGY | Facility: HOSPITAL | Age: 64
End: 2024-10-14
Payer: MEDICARE

## 2024-10-14 ENCOUNTER — HOSPITAL ENCOUNTER (OUTPATIENT)
Facility: HOSPITAL | Age: 64
Setting detail: OBSERVATION
LOS: 1 days | Discharge: HOME | End: 2024-10-17
Attending: EMERGENCY MEDICINE | Admitting: FAMILY MEDICINE
Payer: MEDICARE

## 2024-10-14 DIAGNOSIS — I48.20 CHRONIC A-FIB: ICD-10-CM

## 2024-10-14 DIAGNOSIS — R79.89 TROPONIN I ABOVE REFERENCE RANGE: ICD-10-CM

## 2024-10-14 DIAGNOSIS — I50.9 CONGESTIVE HEART FAILURE, UNSPECIFIED HF CHRONICITY, UNSPECIFIED HEART FAILURE TYPE: Primary | ICD-10-CM

## 2024-10-14 PROBLEM — L30.9 DERMATITIS NEGLECTA: Status: ACTIVE | Noted: 2024-10-14

## 2024-10-14 PROBLEM — I48.92 ATRIAL FLUTTER: Status: ACTIVE | Noted: 2024-10-14

## 2024-10-14 LAB
ABSOLUTE LUNG FLUID CONTENT: 48 % (ref 20–35)
ALBUMIN SERPL-MCNC: 3.8 G/DL (ref 3.5–5.2)
ALBUMIN/GLOB SERPL: 1.4 G/DL
ALP SERPL-CCNC: 67 U/L (ref 39–117)
ALT SERPL W P-5'-P-CCNC: 13 U/L (ref 1–41)
ANION GAP SERPL CALCULATED.3IONS-SCNC: 12 MMOL/L (ref 5–15)
AST SERPL-CCNC: 17 U/L (ref 1–40)
BASOPHILS # BLD AUTO: 0.02 10*3/MM3 (ref 0–0.2)
BASOPHILS NFR BLD AUTO: 0.5 % (ref 0–1.5)
BILIRUB SERPL-MCNC: 1 MG/DL (ref 0–1.2)
BUN SERPL-MCNC: 10 MG/DL (ref 8–23)
BUN/CREAT SERPL: 9 (ref 7–25)
CALCIUM SPEC-SCNC: 8.7 MG/DL (ref 8.6–10.5)
CHLORIDE SERPL-SCNC: 107 MMOL/L (ref 98–107)
CO2 SERPL-SCNC: 22 MMOL/L (ref 22–29)
CREAT SERPL-MCNC: 1.11 MG/DL (ref 0.76–1.27)
DEPRECATED RDW RBC AUTO: 61.5 FL (ref 37–54)
EGFRCR SERPLBLD CKD-EPI 2021: 73.7 ML/MIN/1.73
EOSINOPHIL # BLD AUTO: 0.06 10*3/MM3 (ref 0–0.4)
EOSINOPHIL NFR BLD AUTO: 1.5 % (ref 0.3–6.2)
ERYTHROCYTE [DISTWIDTH] IN BLOOD BY AUTOMATED COUNT: 17.3 % (ref 12.3–15.4)
GEN 5 2HR TROPONIN T REFLEX: 65 NG/L
GLOBULIN UR ELPH-MCNC: 2.8 GM/DL
GLUCOSE SERPL-MCNC: 158 MG/DL (ref 65–99)
HBA1C MFR BLD: 5.7 % (ref 4.8–5.6)
HCT VFR BLD AUTO: 40.9 % (ref 37.5–51)
HGB BLD-MCNC: 13.7 G/DL (ref 13–17.7)
HOLD SPECIMEN: NORMAL
HOLD SPECIMEN: NORMAL
IMM GRANULOCYTES # BLD AUTO: 0.02 10*3/MM3 (ref 0–0.05)
IMM GRANULOCYTES NFR BLD AUTO: 0.5 % (ref 0–0.5)
LYMPHOCYTES # BLD AUTO: 0.45 10*3/MM3 (ref 0.7–3.1)
LYMPHOCYTES NFR BLD AUTO: 11.4 % (ref 19.6–45.3)
MAGNESIUM SERPL-MCNC: 2 MG/DL (ref 1.6–2.4)
MCH RBC QN AUTO: 32.7 PG (ref 26.6–33)
MCHC RBC AUTO-ENTMCNC: 33.5 G/DL (ref 31.5–35.7)
MCV RBC AUTO: 97.6 FL (ref 79–97)
MONOCYTES # BLD AUTO: 0.33 10*3/MM3 (ref 0.1–0.9)
MONOCYTES NFR BLD AUTO: 8.4 % (ref 5–12)
NEUTROPHILS NFR BLD AUTO: 3.07 10*3/MM3 (ref 1.7–7)
NEUTROPHILS NFR BLD AUTO: 77.7 % (ref 42.7–76)
NRBC BLD AUTO-RTO: 0 /100 WBC (ref 0–0.2)
NT-PROBNP SERPL-MCNC: 8665 PG/ML (ref 0–900)
PLATELET # BLD AUTO: 143 10*3/MM3 (ref 140–450)
PMV BLD AUTO: 11.1 FL (ref 6–12)
POTASSIUM SERPL-SCNC: 3.5 MMOL/L (ref 3.5–5.2)
PROT SERPL-MCNC: 6.6 G/DL (ref 6–8.5)
RBC # BLD AUTO: 4.19 10*6/MM3 (ref 4.14–5.8)
SODIUM SERPL-SCNC: 141 MMOL/L (ref 136–145)
TROPONIN T DELTA: 3 NG/L
TROPONIN T SERPL HS-MCNC: 62 NG/L
WBC NRBC COR # BLD AUTO: 3.95 10*3/MM3 (ref 3.4–10.8)
WHOLE BLOOD HOLD COAG: NORMAL
WHOLE BLOOD HOLD SPECIMEN: NORMAL

## 2024-10-14 PROCEDURE — 25010000002 FUROSEMIDE PER 20 MG

## 2024-10-14 PROCEDURE — 93010 ELECTROCARDIOGRAM REPORT: CPT | Performed by: EMERGENCY MEDICINE

## 2024-10-14 PROCEDURE — G0378 HOSPITAL OBSERVATION PER HR: HCPCS

## 2024-10-14 PROCEDURE — 85025 COMPLETE CBC W/AUTO DIFF WBC: CPT | Performed by: EMERGENCY MEDICINE

## 2024-10-14 PROCEDURE — 80053 COMPREHEN METABOLIC PANEL: CPT | Performed by: EMERGENCY MEDICINE

## 2024-10-14 PROCEDURE — 71045 X-RAY EXAM CHEST 1 VIEW: CPT

## 2024-10-14 PROCEDURE — 94726 PLETHYSMOGRAPHY LUNG VOLUMES: CPT

## 2024-10-14 PROCEDURE — 96375 TX/PRO/DX INJ NEW DRUG ADDON: CPT

## 2024-10-14 PROCEDURE — 96374 THER/PROPH/DIAG INJ IV PUSH: CPT

## 2024-10-14 PROCEDURE — 93005 ELECTROCARDIOGRAM TRACING: CPT

## 2024-10-14 PROCEDURE — 36415 COLL VENOUS BLD VENIPUNCTURE: CPT

## 2024-10-14 PROCEDURE — 25010000002 FUROSEMIDE PER 20 MG: Performed by: EMERGENCY MEDICINE

## 2024-10-14 PROCEDURE — 83880 ASSAY OF NATRIURETIC PEPTIDE: CPT | Performed by: EMERGENCY MEDICINE

## 2024-10-14 PROCEDURE — 96376 TX/PRO/DX INJ SAME DRUG ADON: CPT

## 2024-10-14 PROCEDURE — 84484 ASSAY OF TROPONIN QUANT: CPT | Performed by: EMERGENCY MEDICINE

## 2024-10-14 PROCEDURE — 83735 ASSAY OF MAGNESIUM: CPT | Performed by: EMERGENCY MEDICINE

## 2024-10-14 PROCEDURE — 99285 EMERGENCY DEPT VISIT HI MDM: CPT

## 2024-10-14 PROCEDURE — 93005 ELECTROCARDIOGRAM TRACING: CPT | Performed by: EMERGENCY MEDICINE

## 2024-10-14 PROCEDURE — 83036 HEMOGLOBIN GLYCOSYLATED A1C: CPT

## 2024-10-14 PROCEDURE — 94726 PLETHYSMOGRAPHY LUNG VOLUMES: CPT | Performed by: HOSPITALIST

## 2024-10-14 RX ORDER — MELOXICAM 15 MG/1
15 TABLET ORAL DAILY
Status: ON HOLD | COMMUNITY
End: 2024-10-14

## 2024-10-14 RX ORDER — ACETAMINOPHEN 160 MG/5ML
650 SOLUTION ORAL EVERY 4 HOURS PRN
Status: DISCONTINUED | OUTPATIENT
Start: 2024-10-14 | End: 2024-10-17 | Stop reason: HOSPADM

## 2024-10-14 RX ORDER — NITROGLYCERIN 0.4 MG/1
0.4 TABLET SUBLINGUAL
Status: DISCONTINUED | OUTPATIENT
Start: 2024-10-14 | End: 2024-10-17 | Stop reason: HOSPADM

## 2024-10-14 RX ORDER — BISACODYL 5 MG/1
5 TABLET, DELAYED RELEASE ORAL DAILY PRN
Status: DISCONTINUED | OUTPATIENT
Start: 2024-10-14 | End: 2024-10-17 | Stop reason: HOSPADM

## 2024-10-14 RX ORDER — ACETAMINOPHEN 650 MG/1
650 SUPPOSITORY RECTAL EVERY 4 HOURS PRN
Status: DISCONTINUED | OUTPATIENT
Start: 2024-10-14 | End: 2024-10-17 | Stop reason: HOSPADM

## 2024-10-14 RX ORDER — PAROXETINE 20 MG/1
20 TABLET, FILM COATED ORAL EVERY MORNING
Status: DISCONTINUED | OUTPATIENT
Start: 2024-10-15 | End: 2024-10-17 | Stop reason: HOSPADM

## 2024-10-14 RX ORDER — ROSUVASTATIN CALCIUM 10 MG/1
10 TABLET, COATED ORAL NIGHTLY
Status: ON HOLD | COMMUNITY
End: 2024-10-14

## 2024-10-14 RX ORDER — HYDRALAZINE HYDROCHLORIDE 25 MG/1
25 TABLET, FILM COATED ORAL 2 TIMES DAILY
Status: ON HOLD | COMMUNITY
End: 2024-10-14

## 2024-10-14 RX ORDER — TAMSULOSIN HYDROCHLORIDE 0.4 MG/1
0.4 CAPSULE ORAL DAILY
Status: DISCONTINUED | OUTPATIENT
Start: 2024-10-14 | End: 2024-10-17 | Stop reason: HOSPADM

## 2024-10-14 RX ORDER — CARVEDILOL 25 MG/1
25 TABLET ORAL 2 TIMES DAILY WITH MEALS
Status: ON HOLD | COMMUNITY
End: 2024-10-14

## 2024-10-14 RX ORDER — COLCHICINE 0.6 MG/1
0.3 TABLET ORAL DAILY
Status: ON HOLD | COMMUNITY
End: 2024-10-14

## 2024-10-14 RX ORDER — SODIUM CHLORIDE 0.9 % (FLUSH) 0.9 %
10 SYRINGE (ML) INJECTION EVERY 12 HOURS SCHEDULED
Status: DISCONTINUED | OUTPATIENT
Start: 2024-10-14 | End: 2024-10-17 | Stop reason: HOSPADM

## 2024-10-14 RX ORDER — MEXILETINE HYDROCHLORIDE 150 MG/1
150 CAPSULE ORAL ONCE
Status: COMPLETED | OUTPATIENT
Start: 2024-10-14 | End: 2024-10-14

## 2024-10-14 RX ORDER — OXYBUTYNIN CHLORIDE 5 MG/1
5 TABLET ORAL 3 TIMES DAILY
Status: DISCONTINUED | OUTPATIENT
Start: 2024-10-14 | End: 2024-10-17 | Stop reason: HOSPADM

## 2024-10-14 RX ORDER — AMIODARONE HYDROCHLORIDE 200 MG/1
200 TABLET ORAL DAILY
Status: DISCONTINUED | OUTPATIENT
Start: 2024-10-15 | End: 2024-10-17 | Stop reason: HOSPADM

## 2024-10-14 RX ORDER — ACETAMINOPHEN 325 MG/1
650 TABLET ORAL EVERY 4 HOURS PRN
Status: DISCONTINUED | OUTPATIENT
Start: 2024-10-14 | End: 2024-10-17 | Stop reason: HOSPADM

## 2024-10-14 RX ORDER — AMOXICILLIN 250 MG
2 CAPSULE ORAL 2 TIMES DAILY PRN
Status: DISCONTINUED | OUTPATIENT
Start: 2024-10-14 | End: 2024-10-17 | Stop reason: HOSPADM

## 2024-10-14 RX ORDER — PAROXETINE 20 MG/1
20 TABLET, FILM COATED ORAL EVERY MORNING
Status: ON HOLD | COMMUNITY
End: 2024-10-14

## 2024-10-14 RX ORDER — CARVEDILOL 25 MG/1
25 TABLET ORAL 2 TIMES DAILY WITH MEALS
Status: DISCONTINUED | OUTPATIENT
Start: 2024-10-14 | End: 2024-10-17 | Stop reason: HOSPADM

## 2024-10-14 RX ORDER — ASPIRIN 81 MG/1
324 TABLET, CHEWABLE ORAL ONCE
Status: COMPLETED | OUTPATIENT
Start: 2024-10-14 | End: 2024-10-14

## 2024-10-14 RX ORDER — SODIUM CHLORIDE 0.9 % (FLUSH) 0.9 %
10 SYRINGE (ML) INJECTION AS NEEDED
Status: DISCONTINUED | OUTPATIENT
Start: 2024-10-14 | End: 2024-10-17 | Stop reason: HOSPADM

## 2024-10-14 RX ORDER — FUROSEMIDE 10 MG/ML
80 INJECTION INTRAMUSCULAR; INTRAVENOUS ONCE
Status: COMPLETED | OUTPATIENT
Start: 2024-10-14 | End: 2024-10-14

## 2024-10-14 RX ORDER — FUROSEMIDE 10 MG/ML
40 INJECTION INTRAMUSCULAR; INTRAVENOUS EVERY 12 HOURS
Status: DISCONTINUED | OUTPATIENT
Start: 2024-10-14 | End: 2024-10-16

## 2024-10-14 RX ORDER — SODIUM CHLORIDE 0.9 % (FLUSH) 0.9 %
10 SYRINGE (ML) INJECTION AS NEEDED
Status: DISCONTINUED | OUTPATIENT
Start: 2024-10-14 | End: 2024-10-16 | Stop reason: SDUPTHER

## 2024-10-14 RX ORDER — BISACODYL 10 MG
10 SUPPOSITORY, RECTAL RECTAL DAILY PRN
Status: DISCONTINUED | OUTPATIENT
Start: 2024-10-14 | End: 2024-10-17 | Stop reason: HOSPADM

## 2024-10-14 RX ORDER — MEXILETINE HYDROCHLORIDE 150 MG/1
150 CAPSULE ORAL 2 TIMES DAILY
Status: DISCONTINUED | OUTPATIENT
Start: 2024-10-14 | End: 2024-10-17 | Stop reason: HOSPADM

## 2024-10-14 RX ORDER — CLONIDINE HYDROCHLORIDE 0.1 MG/1
0.1 TABLET ORAL
Status: ON HOLD | COMMUNITY
End: 2024-10-14

## 2024-10-14 RX ORDER — TAMSULOSIN HYDROCHLORIDE 0.4 MG/1
1 CAPSULE ORAL DAILY
Status: ON HOLD | COMMUNITY
End: 2024-10-14

## 2024-10-14 RX ORDER — AMIODARONE HYDROCHLORIDE 200 MG/1
200 TABLET ORAL
Status: DISCONTINUED | OUTPATIENT
Start: 2024-10-14 | End: 2024-10-14

## 2024-10-14 RX ORDER — ROSUVASTATIN CALCIUM 10 MG/1
10 TABLET, COATED ORAL NIGHTLY
Status: DISCONTINUED | OUTPATIENT
Start: 2024-10-14 | End: 2024-10-17 | Stop reason: HOSPADM

## 2024-10-14 RX ORDER — OXYBUTYNIN CHLORIDE 5 MG/1
5 TABLET ORAL 3 TIMES DAILY
Status: ON HOLD | COMMUNITY
End: 2024-10-14

## 2024-10-14 RX ORDER — MEXILETINE HYDROCHLORIDE 150 MG/1
150 CAPSULE ORAL 3 TIMES DAILY
Status: ON HOLD | COMMUNITY
End: 2024-10-14 | Stop reason: SDDI

## 2024-10-14 RX ORDER — METOPROLOL TARTRATE 1 MG/ML
5 INJECTION, SOLUTION INTRAVENOUS ONCE
Status: COMPLETED | OUTPATIENT
Start: 2024-10-14 | End: 2024-10-14

## 2024-10-14 RX ORDER — POLYETHYLENE GLYCOL 3350 17 G/17G
17 POWDER, FOR SOLUTION ORAL DAILY PRN
Status: DISCONTINUED | OUTPATIENT
Start: 2024-10-14 | End: 2024-10-17 | Stop reason: HOSPADM

## 2024-10-14 RX ADMIN — FUROSEMIDE 40 MG: 10 INJECTION, SOLUTION INTRAVENOUS at 17:41

## 2024-10-14 RX ADMIN — ROSUVASTATIN 10 MG: 10 TABLET, FILM COATED ORAL at 21:01

## 2024-10-14 RX ADMIN — CARVEDILOL 25 MG: 25 TABLET, FILM COATED ORAL at 17:41

## 2024-10-14 RX ADMIN — MEXILETINE HYDROCHLORIDE 150 MG: 150 CAPSULE ORAL at 14:09

## 2024-10-14 RX ADMIN — OXYBUTYNIN CHLORIDE 5 MG: 5 TABLET ORAL at 17:41

## 2024-10-14 RX ADMIN — TAMSULOSIN HYDROCHLORIDE 0.4 MG: 0.4 CAPSULE ORAL at 17:41

## 2024-10-14 RX ADMIN — MEXILETINE HYDROCHLORIDE 150 MG: 150 CAPSULE ORAL at 23:40

## 2024-10-14 RX ADMIN — METOPROLOL TARTRATE 5 MG: 5 INJECTION INTRAVENOUS at 13:22

## 2024-10-14 RX ADMIN — ASPIRIN 324 MG: 81 TABLET, CHEWABLE ORAL at 13:21

## 2024-10-14 RX ADMIN — OXYBUTYNIN CHLORIDE 5 MG: 5 TABLET ORAL at 21:01

## 2024-10-14 RX ADMIN — FUROSEMIDE 80 MG: 10 INJECTION, SOLUTION INTRAVENOUS at 14:01

## 2024-10-14 RX ADMIN — Medication 10 ML: at 21:01

## 2024-10-14 RX ADMIN — APIXABAN 5 MG: 5 TABLET, FILM COATED ORAL at 21:00

## 2024-10-14 RX ADMIN — AMIODARONE HYDROCHLORIDE 200 MG: 200 TABLET ORAL at 14:08

## 2024-10-14 NOTE — H&P
[facility] Medicine Services  HISTORY AND PHYSICAL    Date of Admission: 10/14/2024  Primary Care Physician: Conor Denny DO Subjective   Primary Historian: Patient    Chief Complaint: This of breath    History of Present Illness  Ngoc is a 64-year-old male with a past medical history of hyperlipidemia, hypertension, hypertrophic cardiomyopathy, last echocardiogram available revealed an EF of 36 to 40% 11/2023, migraines, prostate cancer, tachycardia, requiring defibrillator, and chronic elevated troponin.  Patient presented to [facility] emergency department today with complaints of shortness of breath, elevated blood pressure and he felt that his AICD may have fired.  He denies any chest pain, nausea or vomiting. A ICD was interrogated with no history of recent defibrillation or ventricular tachycardia.  However patient did present in atrial flutter with ventricular rate of 71 and atrial rate of 194.  Patient stated he had not taken of his morning medicine.  Patient was given , Lopressor 5 mg, 80 mg of IV Lasix due to BNP of 8665, amiodarone 200 mg per home dosage and Mexitil 150.    Upon assessment patient is resting comfortably on room air with no family at bedside.  Patient is alert and oriented and able to participate in assessment and history.  Patient states he has not had extreme shortness of breath however his stamina with exertion has been decreasing over the last several weeks after he finished his radiation for his prostate cancer.  Patient states he woke up this morning and just knew that his defibrillator had fired.  Patient states his only complaints other than the dyspnea on exertion have been hot flashes and minimal nausea which she was told by his oncologist was from the radiation.  Patient is normally seen by [place] cardiology, and sided this morning that he would like to be seen in [place] rather than travel to [place].  Additionally  "stated that he was aware of his intermittent atrial flutter with RVR and that he had been told that he needed to see an electrophysiologist regarding the need for a \"third lead\".  Was made aware that he will be diuresed, Reds vest, new echocardiogram and electrophysiology consultation for evaluation of atrial fibs/atrial flutter RVR.  All patient's questions were answered to the best my ability and he is in agreement for admission    Chest x-ray revealed probable edema with small right pleural effusion and enlarged cardiac silhouette.    Summary: [facility] echocardiogram 6/28/2024    1. Left ventricle: The cavity size is moderately increased. Wall thickness      is markedly increased. There is severe concentric hypertrophy. Systolic      function is moderately reduced. The estimated ejection fraction is      30-35%. Moderate diffuse hypokinesis. Doppler parameters are consistent      with restrictive physiology, indicative of decreased left ventricular      diastolic compliance and/or increased left atrial pressure. There is no      evidence of a thrombus on this study. Laminated left ventricle thrombus      described on prior cardiac magnetic resonance imaging cannot be excluded      with transthoracic echocardiography techniques.   2. Left atrium: The atrium is markedly dilated.   3. Right ventricle: The cavity size is increased. Systolic function is      reduced.   4. Right atrium: The atrium is dilated.   5. Atrial septum: Agitated saline contrast study at baseline or with      provocation, shows no right-to-left atrial level shunt.     Review of Systems   Constitutional:  Positive for fatigue.   Respiratory:  Positive for shortness of breath.    Cardiovascular:  Positive for leg swelling. Negative for chest pain.   Gastrointestinal:  Positive for nausea. Negative for diarrhea and vomiting.   Neurological:  Positive for weakness.      Otherwise complete ROS reviewed and negative except as " mentioned in the HPI.    Past Medical History:   Past Medical History:   Diagnosis Date    Hyperlipidemia     Hypertension     Hypertrophic cardiomyopathy     s/p AICD    Migraine     Prostate cancer     Tachycardia     Ventricular tachycardia     Ventricular tachycardia, sustained 10/14/2016     Past Surgical History:  Past Surgical History:   Procedure Laterality Date    CARDIAC ABLATION  01/28/2016 2/2016, 10/18/2016 (Dr. Doss in [place], [place])    CARDIAC ABLATION      2018    CARDIAC CATHETERIZATION      CARDIAC DEFIBRILLATOR PLACEMENT      CARDIAC DEFIBRILLATOR PLACEMENT      PROSTATE BIOPSY  2019    PROSTATE BIOPSY  2023     Social History:  reports that he has never smoked. He does not have any smokeless tobacco history on file. He reports that he does not drink alcohol and does not use drugs.    Family History: Family history is unknown by patient.       Allergies:  No Known Allergies    Medications:  Prior to Admission medications    Medication Sig Start Date End Date Taking? Authorizing Provider   amiodarone (PACERONE) 200 MG tablet Take 1 tablet by mouth Daily.    Fabiola Casanova MD   apixaban (ELIQUIS) 5 MG tablet tablet Take 1 tablet by mouth 2 (Two) Times a Day.    Fabiola Casanova MD   carvedilol (COREG) 25 MG tablet Take 1 tablet by mouth 2 (Two) Times a Day With Meals.    Fabiola Casanova MD   cloNIDine (CATAPRES) 0.1 MG tablet Take 1 tablet by mouth Every 3 (Three) Hours As Needed for High Blood Pressure.    Fabiola Casanova MD   colchicine 0.6 MG tablet Take 0.5 tablets by mouth Daily.    Fabiola Casanova MD   hydrALAZINE (APRESOLINE) 25 MG tablet Take 1 tablet by mouth 2 (Two) Times a Day.    Fabiola Casanova MD   meloxicam (MOBIC) 15 MG tablet Take 1 tablet by mouth Daily.    Fabiola Casanova MD   mexiletine (MEXITIL) 150 MG capsule Take 1 capsule by mouth 2 (Two) Times a Day.  Patient taking differently: Take 1 capsule by mouth 3 times a day. 10/28/23    "Aaron Frazier DO   oxybutynin (DITROPAN) 5 MG tablet Take 1 tablet by mouth 3 (Three) Times a Day.    Fabiola Casanova MD   PARoxetine (PAXIL) 20 MG tablet Take 1 tablet by mouth Every Morning.    Fabiola Casanova MD   rosuvastatin (CRESTOR) 10 MG tablet Take 1 tablet by mouth Every Night.    Fabiola Casanova MD   sildenafil (VIAGRA) 50 MG tablet Take 1 tablet by mouth Daily As Needed for Erectile Dysfunction.    Fabiola Casanova MD   tamsulosin (FLOMAX) 0.4 MG capsule 24 hr capsule Take 1 capsule by mouth Daily.    Fabiola Casanova MD   aspirin 81 MG EC tablet Take 1 tablet by mouth Daily.  10/14/24  Fabiola Casanova MD I have utilized all available immediate resources to obtain, update, or review the patient's current medications (including all prescriptions, over-the-counter products, herbals, cannabis/cannabidiol products, and vitamin/mineral/dietary (nutritional) supplements).    Objective     Vital Signs: /65   Pulse 76   Temp 98.2 °F (36.8 °C) (Oral)   Resp 20   Ht 190.5 cm (75\")   Wt 127 kg (280 lb)   SpO2 95%   BMI 35.00 kg/m²   Physical Exam  Vitals and nursing note reviewed.   Constitutional:       General: He is not in acute distress.     Appearance: He is normal weight. He is not ill-appearing.      Comments: Disheveled     HENT:      Head: Normocephalic.      Right Ear: Tympanic membrane normal.      Left Ear: Tympanic membrane normal.      Nose: Congestion present.      Mouth/Throat:      Mouth: Mucous membranes are moist.      Pharynx: Oropharynx is clear.   Eyes:      Pupils: Pupils are equal, round, and reactive to light.   Cardiovascular:      Rate and Rhythm: Tachycardia present. Rhythm irregular.   Pulmonary:      Effort: No tachypnea, accessory muscle usage or respiratory distress.      Breath sounds: Examination of the right-middle field reveals decreased breath sounds. Examination of the left-middle field reveals decreased breath sounds. " Examination of the right-lower field reveals decreased breath sounds. Examination of the left-lower field reveals decreased breath sounds.   Abdominal:      General: Abdomen is flat. There is no distension.      Palpations: Abdomen is soft.      Tenderness: There is no abdominal tenderness.   Genitourinary:     Comments: Voiding per urinal  Musculoskeletal:      Cervical back: Normal range of motion and neck supple.      Right lower leg: Edema present.      Left lower leg: Edema present.      Comments: Generalized weakness due to exertional dyspnea   Skin:     General: Skin is warm.      Capillary Refill: Capillary refill takes less than 2 seconds.      Coloration: Skin is pale.   Neurological:      General: No focal deficit present.      Mental Status: He is oriented to person, place, and time.   Psychiatric:         Mood and Affect: Mood normal.         Behavior: Behavior normal. Behavior is cooperative.         Thought Content: Thought content normal.         Judgment: Judgment normal.        Results Reviewed:  Lab Results (last 24 hours)       Procedure Component Value Units Date/Time    High Sensitivity Troponin T [[id number]]  (Abnormal) Collected: 10/14/24 1303    Specimen: Blood Updated: 10/14/24 1338     HS Troponin T 62 ng/L             Comprehensive Metabolic Panel [[id number]]  (Abnormal) Collected: 10/14/24 1303    Specimen: Blood Updated: 10/14/24 1334     Glucose 158 mg/dL      BUN 10 mg/dL      Creatinine 1.11 mg/dL      Sodium 141 mmol/L      Potassium 3.5 mmol/L      Chloride 107 mmol/L      CO2 22.0 mmol/L      Calcium 8.7 mg/dL      Total Protein 6.6 g/dL      Albumin 3.8 g/dL      ALT (SGPT) 13 U/L      AST (SGOT) 17 U/L      Alkaline Phosphatase 67 U/L      Total Bilirubin 1.0 mg/dL      Globulin 2.8 gm/dL      A/G Ratio 1.4 g/dL      BUN/Creatinine Ratio 9.0     Anion Gap 12.0 mmol/L      eGFR 73.7 mL/min/1.73     Narrative:      GFR Normal >60  Chronic Kidney Disease <60  Kidney Failure <15       Magnesium [[id number]]  (Normal) Collected: 10/14/24 1303    Specimen: Blood Updated: 10/14/24 1334     Magnesium 2.0 mg/dL     BNP [[id number]]  (Abnormal) Collected: 10/14/24 1303    Specimen: Blood Updated: 10/14/24 1332     proBNP 8,665.0 pg/mL     Narrative:      T    CBC & Differential [[id number]]  (Abnormal) Collected: 10/14/24 1303    Specimen: Blood Updated: 10/14/24 1325            CBC Auto Differential [[id number]]  (Abnormal) Collected: 10/14/24 1303    Specimen: Blood Updated: 10/14/24 1325     WBC 3.95 10*3/mm3      RBC 4.19 10*6/mm3      Hemoglobin 13.7 g/dL      Hematocrit 40.9 %      MCV 97.6 fL      MCH 32.7 pg      MCHC 33.5 g/dL      RDW 17.3 %      RDW-SD 61.5 fl      MPV 11.1 fL      Platelets 143 10*3/mm3      Neutrophil % 77.7 %      Lymphocyte % 11.4 %      Monocyte % 8.4 %      Eosinophil % 1.5 %      Basophil % 0.5 %      Immature Grans % 0.5 %      Neutrophils, Absolute 3.07 10*3/mm3      Lymphocytes, Absolute 0.45 10*3/mm3      Monocytes, Absolute 0.33 10*3/mm3      Eosinophils, Absolute 0.06 10*3/mm3      Basophils, Absolute 0.02 10*3/mm3      Immature Grans, Absolute 0.02 10*3/mm3      nRBC 0.0 /100 WBC                                                                                                                 Imaging Results (Last 24 Hours)       Procedure Component Value Units Date/Time    XR Chest 1 View [[id number]] Collected: 10/14/24 1318     Updated: 10/14/24 1322    Narrative:      EXAM: XR CHEST 1 VW-      DATE: 10/14/2024 12:14 PM     HISTORY: Chest Pain Protocol       COMPARISON: 11/2/2023.     TECHNIQUE:  Frontal view(s) of the chest submitted.     FINDINGS:    Interstitial prominence and prominent vasculature is likely due to  volume overload/edema. There is a small right pleural effusion. Left  costophrenic angle is not included. There is enlargement of the cardiac  silhouette. Cardiac pacemaker and AICD generator and leads appear  unchanged.          Impression:          1. Probable edema with small right pleural effusion and enlarged cardiac  silhouette.     This report was signed and finalized on 10/14/2024 1:19 PM by Jamie Lyons.               Assessment / Plan   Assessment:   Active Hospital Problems    Diagnosis     **Acute on chronic congestive heart failure     Dermatitis neglecta     Atrial flutter     Prostate cancer, finished radiation on 9/30/2024     Type 2 diabetes mellitus without complication     Ventricular tachycardia     Hypertrophic cardiomyopathy     Essential hypertension     AICD (automatic cardioverter/defibrillator) present        Treatment Plan  The patient will be admitted to Dr. Chao's  service here at [facility].    1.  Acute on chronic congestive heart failure # hypertrophic cardiomyopathy-most recent EF 30-35%, defibrillator in place, interrogation with no discharges or VT in the last several weeks.  BNP of 8665 patient is normally seen in [place] however last BMP available is from 2/28/2024 at 2488.  Was given 80 mg of IV Lasix per ED will continue at 40 mg every 12, Reds vest reading, heart failure pathway, new echocardiogram, daily weights and strict intake and output.    2.  Atrial fibs/atrial flutter with RVR/AICD-patient had not taken his medications, patient was given p.o. Amio and Mexitil, will continue to follow closely for initiation of amiodarone gtt. if needed.  Consult EP for evaluation and discussion with patient regarding his possible need for lead revision?    3.  Diabetes mellitus without complication-patient has a history of DM, not currently on any medication pending A1c continue to monitor.    4.  Ventricular tachycardia-patient initially presented to the ED with complaints of possible defibrillator discharge, interrogation was negative.  Will continue to monitor patient closely, continue cardiac telemetry and EP consultation.    5.  Essential hypertension-will resume patient's home Damir Lawson  clonidine, hydralazine and monitor if any adjustments need to be made.  Every 4 vital signs.    VTE prophylaxis with Eliquis  Labs in a.m.    Medical Decision Making  Acute on chronic congestive heart failure, acute on chronic, high complexity, unchanged  Hypertrophic cardiomyopathy acute on chronic, moderate complexity, unchanged  Atrial fibs/atrial flutter with RVR, acute on chronic high complexity, unchanged  Diabetes mellitus, chronic, moderate complexity, unchanged  Ventricular tachycardia, chronic, moderate complexity, unchanged  Essential hypertension, chronic, moderate complexity, unchanged  Number and Complexity of problems: 7  Differential Diagnosis: None    Conditions and Status        Condition is unchanged.     [facility] Data  External documents reviewed: Care everywhere review of oncology and cardiology notes from [facility]  Cardiac tracing (EKG, telemetry) interpretation: 6/20/24 echocardiogram per cardiology at [place], 10/14/2024 EKG per cardiology reviewed  Radiology interpretation: 9/14/2024 chest x-ray per radiology reviewed  Labs reviewed: None 14 2024 CBC, CMP, troponin, BNP, magnesium reviewed, repeat labs in a.m.  Any tests that were considered but not ordered: New echocardiogram given patient's lack of significant shortness of breath will utilize results from June of this year     Decision rules/scores evaluated (example [id number], [place], etc): [id number] score of 4     Discussed with: Dr. Chao and patient     Care Planning  Shared decision making: Dr. Chao and patient  Code status and discussions: Full code per patient    Disposition  Social Determinants of Health that impact treatment or disposition: None determined at this time  Estimated length of stay is 1-2 days.     I confirmed that the patient's advanced care plan is present, code status is documented, and a surrogate decision maker is listed in the patient's medical record.     The patient's surrogate decision  maker is his brother Carmen.     The patient was seen and examined by me on 10/14/2024 at 1506.    Electronically signed by NORMA Cary, 10/14/24, 15:03 CDT.

## 2024-10-14 NOTE — ED PROVIDER NOTES
Subjective   History of Present Illness  Patient is 64-year-old who came to the ER after his AICD discharge in the middle the night.  He is complaining some shortness of breath elevated blood pressure.  Denies any chest pain or nausea vomiting AICD will place in [place].  Has got history of diastolic and systolic heart failure with episodes of VT for which she has got AICD in place.    AICD Problem  Location:  AICD discharge  Onset quality:  Sudden  Associated symptoms: congestion and shortness of breath    Associated symptoms: no abdominal pain, no chest pain, no cough, no diarrhea, no fatigue, no fever, no headaches, no loss of consciousness, no myalgias, no nausea, no rash, no rhinorrhea, no sore throat, no vomiting and no wheezing        Review of Systems   Constitutional: Negative.  Negative for chills, fatigue and fever.   HENT:  Positive for congestion. Negative for rhinorrhea and sore throat.    Respiratory:  Positive for shortness of breath. Negative for cough, chest tightness, wheezing and stridor.    Cardiovascular: Negative.  Negative for chest pain.   Gastrointestinal: Negative.  Negative for abdominal distention, abdominal pain, diarrhea, nausea and vomiting.   Endocrine: Negative.    Genitourinary: Negative.  Negative for difficulty urinating and flank pain.   Musculoskeletal: Negative.  Negative for myalgias.   Skin: Negative.  Negative for color change and rash.   Neurological: Negative.  Negative for dizziness, loss of consciousness and headaches.   All other systems reviewed and are negative.      Past Medical History:   Diagnosis Date    Hyperlipidemia     Hypertension     Hypertrophic cardiomyopathy     s/p AICD    Migraine     Prostate cancer     Tachycardia     Ventricular tachycardia     Ventricular tachycardia, sustained 10/14/2016       No Known Allergies    Past Surgical History:   Procedure Laterality Date    CARDIAC ABLATION  01/28/2016 2/2016, 10/18/2016 (Dr. Doss in [place],  [place])    CARDIAC ABLATION      2018    CARDIAC CATHETERIZATION      CARDIAC DEFIBRILLATOR PLACEMENT      CARDIAC DEFIBRILLATOR PLACEMENT      PROSTATE BIOPSY  2019    PROSTATE BIOPSY  2023       Family History   Family history unknown: Yes       Social History     Socioeconomic History    Marital status: Single   Tobacco Use    Smoking status: Never   Vaping Use    Vaping status: Never Used   Substance and Sexual Activity    Alcohol use: No    Drug use: No    Sexual activity: Defer           Objective   Physical Exam  Vitals and nursing note reviewed. Exam conducted with a chaperone present.   Constitutional:       General: He is not in acute distress.     Appearance: Normal appearance. He is well-developed. He is not toxic-appearing.   HENT:      Head: Normocephalic and atraumatic.      Nose: Nose normal.      Mouth/Throat:      Mouth: Mucous membranes are moist.      Pharynx: Uvula midline.   Eyes:      General: Lids are normal. Lids are everted, no foreign bodies appreciated.      Conjunctiva/sclera: Conjunctivae normal.      Pupils: Pupils are equal, round, and reactive to light.   Neck:      Vascular: Normal carotid pulses. No carotid bruit or JVD.      Trachea: Trachea and phonation normal. No tracheal deviation.   Cardiovascular:      Rate and Rhythm: Regular rhythm. Tachycardia present.      Chest Wall: PMI is not displaced.      Pulses: Normal pulses.      Heart sounds: Normal heart sounds.      No gallop.   Pulmonary:      Effort: Pulmonary effort is normal. No tachypnea, accessory muscle usage or respiratory distress.      Breath sounds: Normal breath sounds. No stridor. No decreased breath sounds, wheezing, rhonchi or rales.   Abdominal:      General: Bowel sounds are normal. There is no distension.      Palpations: Abdomen is soft.      Tenderness: There is no abdominal tenderness.   Musculoskeletal:         General: No swelling or tenderness. Normal range of motion.      Cervical back: Full passive range  of motion without pain, normal range of motion and neck supple. No rigidity.      Right lower leg: Edema present.      Left lower leg: Edema present.      Comments: Lower extremity exam bilaterally is unremarkable.  There is no right or left calf tenderness .  There is no palpable venous cord.  No obvious difference in the size of the legs.  No pitting edema.  The dorsalis pedis and posterior tibial femoral and popliteal pulses are palpable and +2 bilaterally.  Homans sign is negative   Skin:     General: Skin is warm and dry.      Capillary Refill: Capillary refill takes less than 2 seconds.      Coloration: Skin is not jaundiced or pale.      Nails: There is no clubbing.   Neurological:      General: No focal deficit present.      Mental Status: He is alert and oriented to person, place, and time.      GCS: GCS eye subscore is 4. GCS verbal subscore is 5. GCS motor subscore is 6.      Cranial Nerves: No cranial nerve deficit.      Motor: Motor function is intact.      Gait: Gait normal.      Deep Tendon Reflexes: Reflexes are normal and symmetric. Reflexes normal.   Psychiatric:         Speech: Speech normal.         Behavior: Behavior normal.         Procedures           ED Course  ED Course as of 10/14/24 1415   Mon Oct 14, 2024   1313 EKG shows a flutter with variable AV block left distal deviation intraventricular delay [TS]   1405     During today's evaluation, the patient was assessed in the context of a current shortage of intravenous (IV) fluids. As a result, their management and treatment plan were modified in accordance with [facility]'s best practices and institutional recommendations. An alternative path for treating and managing the patient may have been employed in order to provide optimal care during this shortage.    The decision to adjust the treatment protocol was made to ensure the patient's safety and to optimize care under the existing constraints. Alternative methods of hydration  and medication administration were discussed with the patient, and the importance of monitoring was emphasized.    The patient was informed of these adjustments, and their understanding was confirmed. Continuous evaluation will be conducted to assess the effectiveness of the modified treatment plan. All relevant documentation regarding this alteration in care has been recorded in the patient's chart.  Will give him p.o. amiodarone. [TS]   1406 Patient came to the ED after he thinks he was defibrillated but the interrogation reveals no defibrillation.  He is in CHF got history of CHF.  Was given Lasix overhead in the ED magnesium and chemistry within normal limits will be admitted to the hospital in observation he is got episodes of A-fib which she has a history of he is not taking his morning amiodarone we will give that to him also. [TS]      ED Course User Index  [TS] Karlos Bailey MD                                             Medical Decision Making  Differential Diagnosis:  I considered pulmonary etiology, asthma, chronic obstructive pulmonary disease, pneumonia, pulmonary embolism, adult respiratory distress syndrome, pneumothorax, pleural effusion, pulmonary fibrosis, cardiac etiology, congestive heart failure, myocardial infarction, metabolic etiology, diabetic ketoacidosis, uremia, acidosis, sepsis, anemia, drug related etiology, hyperventilation and CNS disease as a possible cause of dyspnea in this patient. This is a partial list of diagnoses considered.           Problems Addressed:  Chronic a-fib: chronic illness or injury  Congestive heart failure, unspecified HF chronicity, unspecified heart failure type: chronic illness or injury with exacerbation, progression, or side effects of treatment  Troponin I above reference range: chronic illness or injury    Amount and/or Complexity of Data Reviewed  Labs: ordered.     Details: Labs reviewed  Radiology: ordered.     Details: Pulmonary edema  ECG/medicine  tests: ordered.     Details: EKG shows flutter with PVCs paced rhythm.  Discussion of management or test interpretation with external provider(s): Discussed with the hospitalist CAROLE    Risk  OTC drugs.  Prescription drug management.  Decision regarding hospitalization.  Risk Details: Patient came into the ED with AICD discharge appears to be having some heart failure.  There was no issue discharge as per the interrogation will be admitted to medicine service for IV diuretics and rate control.  Troponin I is chronically elevated he denies any chest pain there is no clinical evidence of VTE at this time.  The pacemaker interrogation did not reveal any VT either        Final diagnoses:   Congestive heart failure, unspecified HF chronicity, unspecified heart failure type   Chronic a-fib   Troponin I above reference range       ED Disposition  ED Disposition       ED Disposition   Decision to Admit    Condition   --    Comment   Level of Care: Telemetry [5]   Diagnosis: Acute on chronic congestive heart failure [417335]   Admitting Physician: MAURA DALTON [[id number]]   Attending Physician: MAURA DALTON [[id number]]   Certification: I Certify That Inpatient Hospital Services Are Medically Necessary For Greater Than 2 Midnights                 No follow-up provider specified.       Medication List      No changes were made to your prescriptions during this visit.            Karlos Bailey MD  10/14/24 1313       Karlos Bailey MD  10/14/24 1408       Karlos Bailey MD  10/14/24 1415

## 2024-10-14 NOTE — PLAN OF CARE
Goal Outcome Evaluation:  Plan of Care Reviewed With: patient        Progress: no change  Outcome Evaluation: Pt. admitted from ER for CHF. No c/o pain since arrival to floor. VSS. Good urine output. Will continue to monitor.

## 2024-10-14 NOTE — PROGRESS NOTES
Heart Failure Clinic    Date:  10/14/24     Vitals:    10/14/24 1558   BP: 160/87   Pulse: 57   Resp: 18   Temp: 97.7 °F (36.5 °C)   SpO2: 98%        Indication:  Heart Failure    Procedure:  ReDS device sensor unit applied to right side of chest and right side of back.  Appropriate positioning confirmed based off of the unit's calculation.  Chest measurement obtained with the chest size ruler.  Measurement session performed over 45 seconds.      Results:  ReDS Value= 48    Interpretation:  >46% - markedly elevated lung fluid content    Jg Kingston, RRT 10/14/24 17:17 CDT

## 2024-10-15 LAB
ABSOLUTE LUNG FLUID CONTENT: 50 % (ref 20–35)
ANION GAP SERPL CALCULATED.3IONS-SCNC: 9 MMOL/L (ref 5–15)
BUN SERPL-MCNC: 12 MG/DL (ref 8–23)
BUN/CREAT SERPL: 10.6 (ref 7–25)
CALCIUM SPEC-SCNC: 9.1 MG/DL (ref 8.6–10.5)
CHLORIDE SERPL-SCNC: 101 MMOL/L (ref 98–107)
CHOLEST SERPL-MCNC: 168 MG/DL (ref 0–200)
CO2 SERPL-SCNC: 28 MMOL/L (ref 22–29)
CREAT SERPL-MCNC: 1.13 MG/DL (ref 0.76–1.27)
DEPRECATED RDW RBC AUTO: 62.2 FL (ref 37–54)
EGFRCR SERPLBLD CKD-EPI 2021: 72.1 ML/MIN/1.73
ERYTHROCYTE [DISTWIDTH] IN BLOOD BY AUTOMATED COUNT: 17.2 % (ref 12.3–15.4)
GLUCOSE SERPL-MCNC: 93 MG/DL (ref 65–99)
HCT VFR BLD AUTO: 40.7 % (ref 37.5–51)
HDLC SERPL-MCNC: 34 MG/DL (ref 40–60)
HGB BLD-MCNC: 13.3 G/DL (ref 13–17.7)
LDLC SERPL CALC-MCNC: 120 MG/DL (ref 0–100)
LDLC/HDLC SERPL: 3.52 {RATIO}
MAGNESIUM SERPL-MCNC: 1.9 MG/DL (ref 1.6–2.4)
MCH RBC QN AUTO: 32 PG (ref 26.6–33)
MCHC RBC AUTO-ENTMCNC: 32.7 G/DL (ref 31.5–35.7)
MCV RBC AUTO: 98.1 FL (ref 79–97)
PLATELET # BLD AUTO: 129 10*3/MM3 (ref 140–450)
PMV BLD AUTO: 11.1 FL (ref 6–12)
POTASSIUM SERPL-SCNC: 3.3 MMOL/L (ref 3.5–5.2)
RBC # BLD AUTO: 4.15 10*6/MM3 (ref 4.14–5.8)
SODIUM SERPL-SCNC: 138 MMOL/L (ref 136–145)
TRIGL SERPL-MCNC: 72 MG/DL (ref 0–150)
TSH SERPL DL<=0.05 MIU/L-ACNC: 0.95 UIU/ML (ref 0.27–4.2)
VLDLC SERPL-MCNC: 14 MG/DL (ref 5–40)
WBC NRBC COR # BLD AUTO: 2.75 10*3/MM3 (ref 3.4–10.8)

## 2024-10-15 PROCEDURE — 84443 ASSAY THYROID STIM HORMONE: CPT

## 2024-10-15 PROCEDURE — 85027 COMPLETE CBC AUTOMATED: CPT

## 2024-10-15 PROCEDURE — 94726 PLETHYSMOGRAPHY LUNG VOLUMES: CPT | Performed by: HOSPITALIST

## 2024-10-15 PROCEDURE — 80048 BASIC METABOLIC PNL TOTAL CA: CPT

## 2024-10-15 PROCEDURE — G0378 HOSPITAL OBSERVATION PER HR: HCPCS

## 2024-10-15 PROCEDURE — 96376 TX/PRO/DX INJ SAME DRUG ADON: CPT

## 2024-10-15 PROCEDURE — 25010000002 FUROSEMIDE PER 20 MG

## 2024-10-15 PROCEDURE — 99214 OFFICE O/P EST MOD 30 MIN: CPT | Performed by: STUDENT IN AN ORGANIZED HEALTH CARE EDUCATION/TRAINING PROGRAM

## 2024-10-15 PROCEDURE — 94726 PLETHYSMOGRAPHY LUNG VOLUMES: CPT

## 2024-10-15 PROCEDURE — 83735 ASSAY OF MAGNESIUM: CPT

## 2024-10-15 PROCEDURE — 80061 LIPID PANEL: CPT

## 2024-10-15 RX ORDER — HYDRALAZINE HYDROCHLORIDE 20 MG/ML
10 INJECTION INTRAMUSCULAR; INTRAVENOUS EVERY 4 HOURS PRN
Status: DISCONTINUED | OUTPATIENT
Start: 2024-10-15 | End: 2024-10-17 | Stop reason: HOSPADM

## 2024-10-15 RX ORDER — CARVEDILOL 25 MG/1
25 TABLET ORAL 2 TIMES DAILY WITH MEALS
Qty: 60 TABLET | Refills: 11 | Status: SHIPPED | OUTPATIENT
Start: 2024-10-15

## 2024-10-15 RX ORDER — MEXILETINE HYDROCHLORIDE 150 MG/1
150 CAPSULE ORAL 2 TIMES DAILY
Qty: 90 CAPSULE | Refills: 11 | Status: SHIPPED | OUTPATIENT
Start: 2024-10-15

## 2024-10-15 RX ORDER — AMIODARONE HYDROCHLORIDE 200 MG/1
200 TABLET ORAL DAILY
Qty: 90 TABLET | Refills: 3 | Status: SHIPPED | OUTPATIENT
Start: 2024-10-15

## 2024-10-15 RX ADMIN — APIXABAN 5 MG: 5 TABLET, FILM COATED ORAL at 09:13

## 2024-10-15 RX ADMIN — Medication 10 ML: at 20:27

## 2024-10-15 RX ADMIN — Medication 10 ML: at 09:13

## 2024-10-15 RX ADMIN — TAMSULOSIN HYDROCHLORIDE 0.4 MG: 0.4 CAPSULE ORAL at 09:13

## 2024-10-15 RX ADMIN — FUROSEMIDE 40 MG: 10 INJECTION, SOLUTION INTRAVENOUS at 06:11

## 2024-10-15 RX ADMIN — APIXABAN 5 MG: 5 TABLET, FILM COATED ORAL at 20:27

## 2024-10-15 RX ADMIN — DOCUSATE SODIUM 50 MG AND SENNOSIDES 8.6 MG 2 TABLET: 8.6; 5 TABLET, FILM COATED ORAL at 17:42

## 2024-10-15 RX ADMIN — PAROXETINE HYDROCHLORIDE 20 MG: 20 TABLET, FILM COATED ORAL at 06:11

## 2024-10-15 RX ADMIN — OXYBUTYNIN CHLORIDE 5 MG: 5 TABLET ORAL at 09:13

## 2024-10-15 RX ADMIN — OXYBUTYNIN CHLORIDE 5 MG: 5 TABLET ORAL at 17:35

## 2024-10-15 RX ADMIN — AMIODARONE HYDROCHLORIDE 200 MG: 200 TABLET ORAL at 09:13

## 2024-10-15 RX ADMIN — CARVEDILOL 25 MG: 25 TABLET, FILM COATED ORAL at 09:13

## 2024-10-15 RX ADMIN — OXYBUTYNIN CHLORIDE 5 MG: 5 TABLET ORAL at 20:27

## 2024-10-15 RX ADMIN — ROSUVASTATIN 10 MG: 10 TABLET, FILM COATED ORAL at 20:27

## 2024-10-15 RX ADMIN — MEXILETINE HYDROCHLORIDE 150 MG: 150 CAPSULE ORAL at 20:27

## 2024-10-15 RX ADMIN — MEXILETINE HYDROCHLORIDE 150 MG: 150 CAPSULE ORAL at 09:17

## 2024-10-15 RX ADMIN — CARVEDILOL 25 MG: 25 TABLET, FILM COATED ORAL at 17:35

## 2024-10-15 RX ADMIN — FUROSEMIDE 40 MG: 10 INJECTION, SOLUTION INTRAVENOUS at 17:35

## 2024-10-15 NOTE — PROGRESS NOTES
Heart Failure Clinic    Date:  10/15/24     Vitals:    10/15/24 1233   BP: 144/72   Pulse: 57   Resp: 18   Temp: 98.4 °F (36.9 °C)   SpO2: 96%        Indication:  Heart Failure    Procedure:  ReDS device sensor unit applied to right side of chest and right side of back.  Appropriate positioning confirmed based off of the unit's calculation.  Chest measurement obtained with the chest size ruler.  Measurement session performed over 45 seconds.      Results:  ReDS Value=50    Interpretation:  >46% - markedly elevated lung fluid content    Joselin Holliday CRT 10/15/24 15:51 CDT

## 2024-10-15 NOTE — PROGRESS NOTES
Brief EP note, full note to follow:    64-year-old man with hypertrophic cardiomyopathy and multiple VT ablations presenting with phantom shock.  No shocks on device.  Was noted to have atrial flutter during this hospitalization.  Device interrogation reveals that this is being ongoing since at least the beginning of the year.  He is rate controlled and predominantly ventricular paced.  Will arrange for outpatient follow-up in the EP clinic.  I do think that an atrial flutter ablation and CRT-D upgrade may be reasonable options for him.  Would preferentially start with an atrial flutter ablation prior to CRT-D implant however.  I did discuss with him that this could be done here in [place], however that the wait time for the procedure could be up to several months in duration.  He is clinically stable otherwise and I do not see any emergent indication for the ablation, particularly as he has had this ongoing since the beginning of the year.  I did offer to potentially refer him to another site to have this done sooner, which she is in agreement with.  Will make appropriate contacts, though no guarantee that a sooner ablation may be feasible.    Plan:  -Okay for discharge home from EP standpoint  -Will arrange outpatient follow-up  -Atrial flutter ablation to be done as an outpatient  -Continue amiodarone and mexiletine (refilled)  -Continue apixaban for anticoagulation (refilled)

## 2024-10-15 NOTE — CASE MANAGEMENT/SOCIAL WORK
Discharge Planning Assessment  [facility]     Patient Name: Jeanie Grossman  MRN: [id number]  Today's Date: 10/15/2024    Admit Date: 10/14/2024        Discharge Needs Assessment       Row Name 10/15/24 1436       Living Environment    People in Home alone    Current Living Arrangements home    Primary Care Provided by self    Able to Return to Prior Arrangements yes       Transition Planning    Patient/Family Anticipates Transition to home       Discharge Needs Assessment    Equipment Currently Used at Home none    Current Discharge Risk chronically ill;lives alone    Discharge Coordination/Progress Spoke with patient at bedside for dc planning. Patient independent and lives alone. Does not use dme or outpatient services. Has pcp and [place] / [place] for insurance. Does not have a scale for CHF management. Scales given to patient. Patient states may need assist with ride home at discharge.                   Discharge Plan    No documentation.                 Continued Care and Services - Admitted Since 10/14/2024    No active coordination exists for this encounter.          Demographic Summary    No documentation.                  Functional Status    No documentation.                  Psychosocial    No documentation.                  Abuse/Neglect    No documentation.                  Legal    No documentation.                  Substance Abuse    No documentation.                  Patient Forms    No documentation.                     Merlina A Fletcher, RN

## 2024-10-15 NOTE — PROGRESS NOTES
[facility] Medicine Services  INPATIENT PROGRESS NOTE    Patient Name: Jeanie Grossman  Date of Admission: 10/14/2024  Today's Date: 10/15/24  Length of Stay: 1  Primary Care Physician: Conor Denny DO    Subjective   Chief Complaint: f/u sob, chf    HPI   Patient seen resting in bed.  He had over 4 L out with diuresis yesterday and another 2-1/2 so far today.  Essentially net negative about 6 L.  He is lost 13 pounds per daily weight.  He is on room air.  Feels better but states he still does not feel ready to go home.  Still having response to diuretic.  Still swollen.  Denies chest pain or nausea.  No dizziness.        Review of Systems   All pertinent negatives and positives are as above. All other systems have been reviewed and are negative unless otherwise stated.     Objective    Temp:  [97.1 °F (36.2 °C)-98.4 °F (36.9 °C)] 98.4 °F (36.9 °C)  Heart Rate:  [] 57  Resp:  [18] 18  BP: (119-172)/() 144/72  Physical Exam  GEN: Awake, alert, interactive, in NAD  HEENT:  PERRLA, EOMI, Anicteric, Trachea midline  Lungs: diminished, no wheezing/rales  Heart: irreg, +S1/s2, no rub  ABD: soft, nt/nd, +BS, no guarding/rebound  Extremities: atraumatic, no cyanosis, 1+ b/l LE edema  Skin: no rashes or lesions  Neuro: AAOx3, no focal deficits  Psych: normal mood & affect        Results Review:  I have reviewed the labs, radiology results, and diagnostic studies.    Laboratory Data:   Results from last 7 days   Lab Units 10/15/24  0708 10/14/24  1303   WBC 10*3/mm3 2.75* 3.95   HEMOGLOBIN g/dL 13.3 13.7   HEMATOCRIT % 40.7 40.9   PLATELETS 10*3/mm3 129* 143        Results from last 7 days   Lab Units 10/15/24  0708 10/14/24  1303   SODIUM mmol/L 138 141   POTASSIUM mmol/L 3.3* 3.5   CHLORIDE mmol/L 101 107   CO2 mmol/L 28.0 22.0   BUN mg/dL 12 10   CREATININE mg/dL 1.13 1.11   CALCIUM mg/dL 9.1 8.7   BILIRUBIN mg/dL  --  1.0   ALK PHOS U/L  --  67   ALT (SGPT) U/L  --  13  "  AST (SGOT) U/L  --  17   GLUCOSE mg/dL 93 158*       Culture Data:   No results found for: \"BLOODCX\", \"URINECX\", \"WOUNDCX\", \"MRSACX\", \"RESPCX\", \"STOOLCX\"    Radiology Data:   Imaging Results (Last 24 Hours)       ** No results found for the last 24 hours. **            I have reviewed the patient's current medications.     Assessment/Plan   Assessment  Active Hospital Problems    Diagnosis     **Acute on chronic congestive heart failure     Dermatitis neglecta     Atrial flutter     Acute on chronic heart failure     Prostate cancer, finished radiation on 9/30/2024     Type 2 diabetes mellitus without complication     Ventricular tachycardia     Hypertrophic cardiomyopathy     Essential hypertension     AICD (automatic cardioverter/defibrillator) present        Treatment Plan  #1 acute on chronic systolic CHF -EF of 30-35% by last echo.  Patient is on Coreg chronically.  Also takes hydralazine at home but does not appear to be on Isordil.  Does not appear to be on ACE I or ARB.  Does not appear to be on SGLT2 inhibitor.  Unclear why.  Renal function is normal.  No listed allergies.  Patient does not follow locally for note review.  Does have an AICD.  Reds vest continues to be elevated.  Still having a response to diuretic.  Continue Lasix IV twice daily.  Continue to add goal-directed therapy as able.    #2 A-fib/flutter -with AICD.  Was having a phantom shock prior to arrival.  Interrogation did not show any discharge.  Patient was seen by EP recommends outpatient follow-up at this time and no acute intervention.  Continue Eliquis.    #3 DM2 -by history.  On no meds at home for this.  A1c is only 5.7.  Sugars have been okay here.    #4 essential hypertension -pressure was high on arrival and is doing better today with initiation of some prior medications.  He is not even on his full home regimen.  Unclear how compliant patient is at home.    Medical Decision Making  Number and Complexity of problems: 1 acute on " chronic, multiple chronic  Differential Diagnosis: As above    Conditions and Status        Clinically appears stable.  Not yet at goal.     MDM Data  External documents reviewed: None  Cardiac tracing (EKG, telemetry) interpretation: Appears V-paced  Radiology interpretation: Reports reviewed  Labs reviewed: As above  Any tests that were considered but not ordered: None     Decision rules/scores evaluated (example [id number], Wells, etc): None     Discussed with: Patient,      Care Planning  Shared decision making: Patient apprised of current labs, vitals, imaging and treatment plan.  They are agreeable with proceeding with plans as discussed.    Code status and discussions: full code    Disposition  Social Determinants of Health that impact treatment or disposition: none  I expect the patient to be discharged to home likely tomorrow     Electronically signed by Martin Chao DO, 10/15/24, 13:45 CDT.

## 2024-10-15 NOTE — PLAN OF CARE
Goal Outcome Evaluation:              Outcome Evaluation: BP elevated this am night time MD notified and prn hydralazine ordered.  Pt also takes clonidine 0.2 TID at home and nighttime MD notified, day shift MD to address.  Pt on RA Running v-paced and a flutter 50-81.  Pt has had good amount of urine this shift.  No c/o pain.  Has been NPO since midnight per order.  EP consult for today. Safety maintained.

## 2024-10-15 NOTE — PLAN OF CARE
Goal Outcome Evaluation:   A&Ox4, on room air, resting in bed. IV lasix given- urinal at bedside- adequate urine output. Hourly rounding. Safety maintained. PO Senna given for constipation. Will continue to monitor until change of shift.

## 2024-10-16 PROBLEM — I50.9 ACUTE ON CHRONIC HEART FAILURE: Status: RESOLVED | Noted: 2024-10-14 | Resolved: 2024-10-16

## 2024-10-16 PROBLEM — I50.23 ACUTE ON CHRONIC HFREF (HEART FAILURE WITH REDUCED EJECTION FRACTION): Status: ACTIVE | Noted: 2024-10-16

## 2024-10-16 LAB
ANION GAP SERPL CALCULATED.3IONS-SCNC: 10 MMOL/L (ref 5–15)
BUN SERPL-MCNC: 15 MG/DL (ref 8–23)
BUN/CREAT SERPL: 13.4 (ref 7–25)
CALCIUM SPEC-SCNC: 8.4 MG/DL (ref 8.6–10.5)
CHLORIDE SERPL-SCNC: 100 MMOL/L (ref 98–107)
CO2 SERPL-SCNC: 27 MMOL/L (ref 22–29)
CREAT SERPL-MCNC: 1.12 MG/DL (ref 0.76–1.27)
DEPRECATED RDW RBC AUTO: 61.4 FL (ref 37–54)
EGFRCR SERPLBLD CKD-EPI 2021: 72.9 ML/MIN/1.73
ERYTHROCYTE [DISTWIDTH] IN BLOOD BY AUTOMATED COUNT: 17.1 % (ref 12.3–15.4)
GLUCOSE SERPL-MCNC: 91 MG/DL (ref 65–99)
HCT VFR BLD AUTO: 40.8 % (ref 37.5–51)
HGB BLD-MCNC: 13.2 G/DL (ref 13–17.7)
MAGNESIUM SERPL-MCNC: 2.2 MG/DL (ref 1.6–2.4)
MCH RBC QN AUTO: 31.7 PG (ref 26.6–33)
MCHC RBC AUTO-ENTMCNC: 32.4 G/DL (ref 31.5–35.7)
MCV RBC AUTO: 98.1 FL (ref 79–97)
PLATELET # BLD AUTO: 126 10*3/MM3 (ref 140–450)
PMV BLD AUTO: 10.9 FL (ref 6–12)
POTASSIUM SERPL-SCNC: 3.5 MMOL/L (ref 3.5–5.2)
QT INTERVAL: 458 MS
QT INTERVAL: 522 MS
QTC INTERVAL: 497 MS
QTC INTERVAL: 508 MS
RBC # BLD AUTO: 4.16 10*6/MM3 (ref 4.14–5.8)
SODIUM SERPL-SCNC: 137 MMOL/L (ref 136–145)
WBC NRBC COR # BLD AUTO: 2.59 10*3/MM3 (ref 3.4–10.8)

## 2024-10-16 PROCEDURE — 83735 ASSAY OF MAGNESIUM: CPT | Performed by: INTERNAL MEDICINE

## 2024-10-16 PROCEDURE — 25010000002 FUROSEMIDE PER 20 MG

## 2024-10-16 PROCEDURE — 85027 COMPLETE CBC AUTOMATED: CPT | Performed by: INTERNAL MEDICINE

## 2024-10-16 PROCEDURE — 96376 TX/PRO/DX INJ SAME DRUG ADON: CPT

## 2024-10-16 PROCEDURE — 80048 BASIC METABOLIC PNL TOTAL CA: CPT | Performed by: INTERNAL MEDICINE

## 2024-10-16 PROCEDURE — G0378 HOSPITAL OBSERVATION PER HR: HCPCS

## 2024-10-16 RX ORDER — POTASSIUM CHLORIDE 1500 MG/1
20 TABLET, EXTENDED RELEASE ORAL DAILY
Qty: 30 TABLET | Refills: 0 | Status: SHIPPED | OUTPATIENT
Start: 2024-10-16 | End: 2024-11-15

## 2024-10-16 RX ORDER — FUROSEMIDE 40 MG
40 TABLET ORAL
Status: DISCONTINUED | OUTPATIENT
Start: 2024-10-16 | End: 2024-10-17 | Stop reason: HOSPADM

## 2024-10-16 RX ORDER — ROSUVASTATIN CALCIUM 10 MG/1
10 TABLET, COATED ORAL NIGHTLY
Qty: 30 TABLET | Refills: 0 | Status: SHIPPED | OUTPATIENT
Start: 2024-10-16 | End: 2024-11-15

## 2024-10-16 RX ORDER — FUROSEMIDE 40 MG
40 TABLET ORAL 2 TIMES DAILY
Qty: 60 TABLET | Refills: 0 | Status: SHIPPED | OUTPATIENT
Start: 2024-10-16 | End: 2024-11-15

## 2024-10-16 RX ORDER — POTASSIUM CHLORIDE 750 MG/1
40 CAPSULE, EXTENDED RELEASE ORAL ONCE
Status: COMPLETED | OUTPATIENT
Start: 2024-10-16 | End: 2024-10-16

## 2024-10-16 RX ORDER — LOSARTAN POTASSIUM 25 MG/1
25 TABLET ORAL DAILY
Qty: 30 TABLET | Refills: 0 | Status: SHIPPED | OUTPATIENT
Start: 2024-10-16 | End: 2024-11-15

## 2024-10-16 RX ADMIN — FUROSEMIDE 40 MG: 40 TABLET ORAL at 18:13

## 2024-10-16 RX ADMIN — AMIODARONE HYDROCHLORIDE 200 MG: 200 TABLET ORAL at 09:26

## 2024-10-16 RX ADMIN — MEXILETINE HYDROCHLORIDE 150 MG: 150 CAPSULE ORAL at 21:01

## 2024-10-16 RX ADMIN — PAROXETINE HYDROCHLORIDE 20 MG: 20 TABLET, FILM COATED ORAL at 09:26

## 2024-10-16 RX ADMIN — APIXABAN 5 MG: 5 TABLET, FILM COATED ORAL at 21:01

## 2024-10-16 RX ADMIN — Medication 10 ML: at 09:29

## 2024-10-16 RX ADMIN — CARVEDILOL 25 MG: 25 TABLET, FILM COATED ORAL at 09:26

## 2024-10-16 RX ADMIN — OXYBUTYNIN CHLORIDE 5 MG: 5 TABLET ORAL at 09:27

## 2024-10-16 RX ADMIN — TAMSULOSIN HYDROCHLORIDE 0.4 MG: 0.4 CAPSULE ORAL at 09:27

## 2024-10-16 RX ADMIN — OXYBUTYNIN CHLORIDE 5 MG: 5 TABLET ORAL at 21:01

## 2024-10-16 RX ADMIN — MEXILETINE HYDROCHLORIDE 150 MG: 150 CAPSULE ORAL at 09:27

## 2024-10-16 RX ADMIN — ROSUVASTATIN 10 MG: 10 TABLET, FILM COATED ORAL at 21:01

## 2024-10-16 RX ADMIN — ACETAMINOPHEN 650 MG: 325 TABLET ORAL at 03:56

## 2024-10-16 RX ADMIN — FUROSEMIDE 40 MG: 10 INJECTION, SOLUTION INTRAVENOUS at 06:41

## 2024-10-16 RX ADMIN — POTASSIUM CHLORIDE 40 MEQ: 750 CAPSULE, EXTENDED RELEASE ORAL at 09:26

## 2024-10-16 RX ADMIN — OXYBUTYNIN CHLORIDE 5 MG: 5 TABLET ORAL at 17:06

## 2024-10-16 RX ADMIN — CARVEDILOL 25 MG: 25 TABLET, FILM COATED ORAL at 17:06

## 2024-10-16 RX ADMIN — APIXABAN 5 MG: 5 TABLET, FILM COATED ORAL at 09:26

## 2024-10-16 NOTE — CASE MANAGEMENT/SOCIAL WORK
Continued Stay Note  [facility]     Patient Name: Jeanie Grossman  MRN: [id number]  Today's Date: 10/16/2024    Admit Date: 10/14/2024        Discharge Plan       Row Name 10/16/24 1523       Plan    Plan Comments Cab voucher provided to patient for transportation home.    Final Discharge Disposition Code 01 - home or self-care                   Discharge Codes    No documentation.                 Expected Discharge Date and Time       Expected Discharge Date Expected Discharge Time    Oct 16, 2024               MARIO Beckman

## 2024-10-16 NOTE — PLAN OF CARE
Goal Outcome Evaluation:              Outcome Evaluation: VSS on RA.  V-paced 50-87.  Patient continues to diuresis well. C/o headache and prn tylenol given.  Safety maintained.   Medical release form signed by patient and placed in patients chart and faxed to medical records.        Daily Care Plan Summary: Heart Failure    Diuretic in use (IV or PO):   IV        Daily weight (up or down):    loss: 1lbs      Output > Intake (yes/no):Yes      O2 Requirements (current, any change?): room air      Symptoms noted with Activity (Respiratory Tolerance, functional state):     shortness of breath with exertion      Anticipated Discharge Plans:     possible dc today

## 2024-10-16 NOTE — PLAN OF CARE
Goal Outcome Evaluation:  Plan of Care Reviewed With: patient        Progress: improving  Outcome Evaluation: Pt aox4. Very pleasant and cooperative. Legs still very edemetous 3-4+. Was anticipating dc today and IV's were pulled. Lasix changed to PO. 2250 UOP this shift. No complaints of SOB. Tele removed in anticpation of dc. Pt did not feel safe to go home alone and asked to be discharged tomorrow after he works with PT and MD agreed. Safety maintained.                              36.7

## 2024-10-16 NOTE — CONSULTS
"EP CONSULT NOTE    Subjective        History of Present Illness    EP Problems:   Ventricular tachycardia  - 1/2016:  VT ablation, [place]  - 10/2017:  VT ablation, [facility]  - 1/2019:  VT ablation, [facility]  2.  Presence of an ICD  - 2008:  DOI  -2/2022:  Gen change, [employer]  3.  Atrial flutter, uncertain type     Cardiology problems:   HTN  Chronic systolic heart failure   HOCM  Stroke  LV thrombus    Medical problems:  1.  Concern for nonadherence  2.  Obesity  3.  Prostate cancer    Jeanie Grossman is a 64 y.o. male with problem list as above for whom EP is consulted regarding atrial flutter, phantom ICD shock.    He was last seen in our institution in November 2023.  At that time, he presented with VT storm and multiple ICD shocks.  He was ultimately transferred to [facility] for epicardial VT ablation.  Records are not available at this time from that hospitalization.    He was ultimately lost to follow-up and came to this hospital concerns for ICD shock.  He states that he had typical sensation from an ICD shock in his chest and flashing in his eyes as he has had before with previous ICD shocks.  He came to the ER for evaluation however was found to have no ICD shocks upon his interrogation.  He was having increased lower extremity swelling and shortness of breath so was admitted to the hospital for IV diuresis.  He was also noted to be in atrial flutter with slow ventricular rates and frequent ventricular pacing.  He has reportedly been told previously that he needs to have a CRT-D upgrade of his device.    Of note, he was in the hospital several months ago for CVA.  Objective   Vital Signs:  /67 (BP Location: Right arm, Patient Position: Sitting)   Pulse 50   Temp 97.6 °F (36.4 °C) (Oral)   Resp 16   Ht 190.5 cm (75\")   Wt 119 kg (263 lb 6.4 oz)   SpO2 99%   BMI 32.92 kg/m²   Estimated body mass index is 32.92 kg/m² as calculated from the following:    Height as of this encounter: " "190.5 cm (75\").    Weight as of this encounter: 119 kg (263 lb 6.4 oz).      Physical Exam  Vitals reviewed.   Constitutional:       Appearance: He is obese.   Cardiovascular:      Rate and Rhythm: Normal rate and regular rhythm.      Pulses: Normal pulses.      Heart sounds: Murmur heard.   Pulmonary:      Effort: Pulmonary effort is normal.      Breath sounds: Normal breath sounds.   Musculoskeletal:         General: Swelling present.   Neurological:      Mental Status: He is alert and oriented to person, place, and time.   Psychiatric:         Mood and Affect: Mood normal.         Judgment: Judgment normal.          Result Review :  The following data was reviewed by: Chalo Rodas MD on 10/14/2024:  CMP          5/18/2024    04:53 10/14/2024    13:03 10/15/2024    07:08   CMP   Glucose 114     158  93    BUN 18     10  12    Creatinine 1.3     1.11  1.13    EGFR  73.7  72.1    Sodium 139     141  138    Potassium 4.4     3.5  3.3    Chloride 103     107  101    Calcium 9.2     8.7  9.1    Total Protein 6.7     6.6     Albumin 4.0     3.8     Globulin  2.8     Total Bilirubin 0.7     1.0     Alkaline Phosphatase 60     67     AST (SGOT) 29     17     ALT (SGPT) 17     13     Albumin/Globulin Ratio  1.4     BUN/Creatinine Ratio  9.0  10.6    Anion Gap 12     12.0  9.0       Details          This result is from an external source.             CBC          6/29/2024    06:24 10/14/2024    13:03 10/15/2024    07:08   CBC   WBC 3.7     3.95  2.75    RBC 5.41     4.19  4.15    Hemoglobin 16.2     13.7  13.3    Hematocrit 49.8     40.9  40.7    MCV 92.1     97.6  98.1    MCH 29.9     32.7  32.0    MCHC 32.5     33.5  32.7    RDW 16.7     17.3  17.2    Platelets 156     143  129       Details          This result is from an external source.             TSH          10/27/2023    16:42 11/2/2023    12:36 10/15/2024    07:08   TSH   TSH 0.843  2.500  0.949      Chest x-ray from yesterday directly visualized independently " reviewed: Pulmonary vasocongestion, possible mild pulmonary edema, cardiomegaly, ICD in place    [id number] SCORE   [id number] Score: 4 (10/14/2024  3:05 PM)             Assessment and Plan   Problems:  Atrial flutter, uncertain type  History of ventricular tachycardia  Phantom ICD shock  Acute on chronic systolic HF     Jeanie Grossman is a 64 y.o. male with problem list as above for whom EP is consulted regarding atrial flutter, acute on chronic systolic HF.  Exam appears improved today and appears to be comfortable on room air with normal saturations.  He still has subjective shortness of breath so it may be reasonable to consider an additional day of IV diuresis.  Otherwise, he has been in atrial flutter for over 10 months and I generally suspect that this is not the main  of his acute disc compensation.  Regardless, I do think that long-term a flutter ablation to restore sinus rhythm prior to consideration of CRT-D implant would be a reasonable option.  I do think that doing this in the outpatient setting is going to be appropriate.  I have offered for him to have this done here but have informed him that our wait time for this procedure is likely (several months in duration.  He is potentially interested but would also like for me to reach out to other centers to see if it can be done sooner elsewhere.  I certainly think this is a reasonable request and we will try to see if any other centers for available to do this for him sooner.  Otherwise, it may be reasonable to consider a cardioversion for him.  There have been some concerns about medication compliance in the past, and I think that the safest option would be to make sure that he is fully anticoagulated for at least 3 weeks prior to this procedure to avoid the risk of a NEYDA.    Plan:  -Agree with an additional day of IV diuresis  -Continue anticoagulation with apixaban given elevated [id number]  -Can consider an outpatient cardioversion after 3  weeks of anticoagulation with repeat outpatient echo to reassess status of LV thrombus  -Atrial flutter ablation offered (we will reach out to additional centers to see if this can be reasonably performed sooner at other sites)   -CRT-D upgrade does seem reasonable once maintaining sinus rhythm  -Continue amiodarone and mexiletine for long-term VT suppression               Part of this note may be an electronic transcription/translation of spoken language to printed text using the Dragon Dictation System.      Addendum: [facility] records from last November obtained.  He did not undergo VT ablation due to CT imaging showing evidence of a layered, mural LV thrombus on initial CT imaging.  He was treated with continued oral medications with amiodarone and mexiletine.

## 2024-10-16 NOTE — PROGRESS NOTES
Nutrition Services    Patient Name:  Jeanie Grossman  YOB: 1960  MRN: [id number]  Admit Date:  10/14/2024    Pt requested double portion of food, as he sometimes feels hungrier for more. Dietitian talked to pt and explained the snacks available on the unit and that he could have double portions of vegetables to start with, and pt agreed to this. Pt was encouraged to follow up with the dietitian if he still feels hungry with the double portion of vegetables and snacks. Will monitor.     Electronically signed by:  Leonie Mehta  10/16/24 10:57 CDT

## 2024-10-16 NOTE — PROGRESS NOTES
[facility] Medicine Services  INPATIENT PROGRESS NOTE    Patient Name: Jeanie Grossman  Date of Admission: 10/14/2024  Today's Date: 10/16/24  Length of Stay: 1  Primary Care Physician: Conor Denny DO    Subjective   Chief Complaint: Shortness of breath  HPI     64-year-old male with history of hypertension, hypertrophic cardiomyopathy, systolic dysfunction, prostate cancer, hyperlipidemia, presented to [facility] emergency department on 10/14/2024 reporting that his implanted defibrillator may had fired.  Patient with history of poor compliance with medical treatment.  He was diagnosed with acute on chronic heart failure.  The patient was started on medical management with IV diuretics.  He continued on his oral medications, amiodarone and mexiletine.  Patient was evaluated by EP cardiologist.  Considered to be a phantom shock.  Recommended outpatient atrial flutter ablation.    I started taking care of this patient on 10/16/2024.    Initial weight was 280 pounds, today 262 pounds.    In the last 24 hours, 4.2 L output with negative balance of 3.7 L.  The patient feels better.  He has been able to ambulate.        Review of Systems   All pertinent negatives and positives are as above. All other systems have been reviewed and are negative unless otherwise stated.     Objective    Temp:  [97.6 °F (36.4 °C)-98.4 °F (36.9 °C)] 97.8 °F (36.6 °C)  Heart Rate:  [50-83] 83  Resp:  [16-18] 18  BP: (115-159)/(67-98) 149/94  Physical Exam  Constitutional:       Appearance: Normal appearance.  Alert oriented x 3.  No respiratory distress.  HENT:      Head: Normocephalic and atraumatic.      Nose: Nose normal.      Mouth/Throat:      Mouth: Mucous membranes are moist.      Pharynx: Oropharynx is clear.   Eyes:      Extraocular Movements: Extraocular movements intact.      Conjunctiva/sclera: Conjunctivae normal.      Pupils: Pupils are equal, round, and reactive to light.   Cardiovascular:  "     Rate and Rhythm: Normal rate and regular rhythm.      Pulses: Normal pulses.   Pulmonary:      Effort: No respiratory distress.      Breath sounds: Scant bibasilar Rales.  Abdominal:      General: Abdomen is flat. Bowel sounds are normal.      Palpations: Abdomen is soft.      Tenderness: There is no guarding or rebound.   Musculoskeletal:         General: Normal range of motion.      Cervical back: Normal range of motion and neck supple.   Extremities:  No lower extremity edema.  Skin:     Capillary Refill: Capillary refill takes less than 2 seconds.      Coloration: Skin is not jaundiced.      Findings: No rash.   Neurological:      General: No focal deficit present.      Mental Status: Patient is alert, oriented to place time and person.     Sensory: No sensory deficit.      Motor: No weakness.      Coordination: Coordination normal.   Psychiatric:         Mood and Affect: Mood normal.         Behavior: Behavior normal.           Results Review:  I have reviewed the labs, radiology results, and diagnostic studies.    Laboratory Data:   Results from last 7 days   Lab Units 10/16/24  0349 10/15/24  0708 10/14/24  1303   WBC 10*3/mm3 2.59* 2.75* 3.95   HEMOGLOBIN g/dL 13.2 13.3 13.7   HEMATOCRIT % 40.8 40.7 40.9   PLATELETS 10*3/mm3 126* 129* 143        Results from last 7 days   Lab Units 10/16/24  0349 10/15/24  0708 10/14/24  1303   SODIUM mmol/L 137 138 141   POTASSIUM mmol/L 3.5 3.3* 3.5   CHLORIDE mmol/L 100 101 107   CO2 mmol/L 27.0 28.0 22.0   BUN mg/dL 15 12 10   CREATININE mg/dL 1.12 1.13 1.11   CALCIUM mg/dL 8.4* 9.1 8.7   BILIRUBIN mg/dL  --   --  1.0   ALK PHOS U/L  --   --  67   ALT (SGPT) U/L  --   --  13   AST (SGOT) U/L  --   --  17   GLUCOSE mg/dL 91 93 158*       Culture Data:   No results found for: \"BLOODCX\", \"URINECX\", \"WOUNDCX\", \"MRSACX\", \"RESPCX\", \"STOOLCX\"    Radiology Data:   Imaging Results (Last 24 Hours)       ** No results found for the last 24 hours. **            I have reviewed " the patient's current medications.     Assessment/Plan   Assessment  Active Hospital Problems    Diagnosis     **Acute on chronic congestive heart failure     Dermatitis neglecta     Atrial flutter     Acute on chronic heart failure     Prostate cancer, finished radiation on 9/30/2024     Type 2 diabetes mellitus without complication     Ventricular tachycardia     Hypertrophic cardiomyopathy     Essential hypertension     AICD (automatic cardioverter/defibrillator) present      Acute on chronic heart failure  Chronic combined cardiomyopathy EF 35-40%  Hypertrophic cardiomyopathy  History of ventricular tachycardia  Atrial flutter  Hypertension  Hyperlipidemia  Prostate cancer  AICD in place      Renal function preserved.  Creatinine 1.12.  Potassium 3.5.  Platelet count 126,000.  White blood cell count 2590.  Hemoglobin 13.2      Treatment Plan  Recommend to continue amiodarone 200 mg p.o. daily.  Eliquis 5 mg p.o. twice daily.  Carvedilol 25 p.o. twice daily.  Will transition to oral Lasix on discharge 40 mg p.o. every 12 hours.  Continue Crestor 10 mg p.o. at night.  I added losartan 12.5 p.o. daily to his regimen.    Recommended for patient to have outpatient heart failure clinic follow-up, as well as EP cardiology follow-up for possible ablation.    Recommended to continue adherence to medical management.    Medical Decision Making  Number and Complexity of problems: 9, moderate to high complexity  Differential Diagnosis: See above    Conditions and Status        Condition is improving.     MDM Data  External documents reviewed: None  Cardiac tracing (EKG, telemetry) interpretation: Sinus bradycardia  Radiology interpretation: Radiology reports reviewed  Labs reviewed: Yes  Any tests that were considered but not ordered: No     Decision rules/scores evaluated (example [id number], [place], etc): See chart     Discussed with: Patient and EP cardiologist     Care Planning  Shared decision making: With  patient  Code status and discussions: Full code    Disposition  Social Determinants of Health that impact treatment or disposition: Poor medical compliance  I expect the patient to be discharged to home today.     Electronically signed by Ian Rachel MD, 10/16/24, 09:28 CDT.

## 2024-10-16 NOTE — DISCHARGE SUMMARY
[facility] Medicine Services  DISCHARGE SUMMARY       Date of Admission: 10/14/2024  Date of Discharge:  10/16/2024  Primary Care Physician: Conor Denny DO    Presenting Problem/History of Present Illness:  64-year-old male with history of hypertension, hypertrophic cardiomyopathy, systolic dysfunction, prostate cancer, hyperlipidemia, presented to [facility] emergency department on 10/14/2024 reporting that his implanted defibrillator may had fired.   Final Discharge Diagnoses:  Active Hospital Problems    Diagnosis     **Acute on chronic congestive heart failure     Dermatitis neglecta     Atrial flutter     Prostate cancer, finished radiation on 9/30/2024     Type 2 diabetes mellitus without complication     Ventricular tachycardia     Hypertrophic cardiomyopathy     Essential hypertension     AICD (automatic cardioverter/defibrillator) present        Consults: EP cardiology    Procedures Performed: None    Pertinent Test Results:   Results for orders placed during the hospital encounter of 11/02/23    Adult Transthoracic Echo Complete w/ Color, Spectral and Contrast if Necessary Per Protocol    Interpretation Summary    Left ventricular systolic function is moderately decreased. Calculated left ventricular EF = 39% Left ventricular ejection fraction appears to be 36 - 40%.    The following left ventricular wall segments are akinetic: apical anterior, apical lateral, apical inferior, apical septal, apex and mid anteroseptal.    Left atrial volume is severely increased.    There is calcification of the aortic valve.    Estimated right ventricular systolic pressure from tricuspid regurgitation is normal (<35 mmHg).    LVEF is similar to prior ejection fraction by nuclear perfusion scan      Imaging Results (All)       Procedure Component Value Units Date/Time    XR Chest 1 View [[id number]] Collected: 10/14/24 1318     Updated: 10/14/24 1322    Narrative:      EXAM: XR  CHEST 1 VW-      DATE: 10/14/2024 12:14 PM     HISTORY: Chest Pain Protocol       COMPARISON: 11/2/2023.     TECHNIQUE:  Frontal view(s) of the chest submitted.     FINDINGS:    Interstitial prominence and prominent vasculature is likely due to  volume overload/edema. There is a small right pleural effusion. Left  costophrenic angle is not included. There is enlargement of the cardiac  silhouette. Cardiac pacemaker and AICD generator and leads appear  unchanged.          Impression:         1. Probable edema with small right pleural effusion and enlarged cardiac  silhouette.     This report was signed and finalized on 10/14/2024 1:19 PM by Jamie Lyons.             LAB RESULTS:      Lab 10/16/24  0349 10/15/24  0708 10/14/24  1303   WBC 2.59* 2.75* 3.95   HEMOGLOBIN 13.2 13.3 13.7   HEMATOCRIT 40.8 40.7 40.9   PLATELETS 126* 129* 143   NEUTROS ABS  --   --  3.07   IMMATURE GRANS (ABS)  --   --  0.02   LYMPHS ABS  --   --  0.45*   MONOS ABS  --   --  0.33   EOS ABS  --   --  0.06   MCV 98.1* 98.1* 97.6*         Lab 10/16/24  0349 10/15/24  0708 10/14/24  1303   SODIUM 137 138 141   POTASSIUM 3.5 3.3* 3.5   CHLORIDE 100 101 107   CO2 27.0 28.0 22.0   ANION GAP 10.0 9.0 12.0   BUN 15 12 10   CREATININE 1.12 1.13 1.11   EGFR 72.9 72.1 73.7   GLUCOSE 91 93 158*   CALCIUM 8.4* 9.1 8.7   MAGNESIUM 2.2 1.9 2.0   HEMOGLOBIN A1C  --   --  5.70*   TSH  --  0.949  --          Lab 10/14/24  1303   TOTAL PROTEIN 6.6   ALBUMIN 3.8   GLOBULIN 2.8   ALT (SGPT) 13   AST (SGOT) 17   BILIRUBIN 1.0   ALK PHOS 67         Lab 10/14/24  1459 10/14/24  1303   PROBNP  --  8,665.0*   HSTROP T 65* 62*         Lab 10/15/24  0708   CHOLESTEROL 168   LDL CHOL 120*   HDL CHOL 34*   TRIGLYCERIDES 72             Brief Urine Lab Results  (Last result in the past 365 days)        Color   Clarity   Blood   Leuk Est   Nitrite   Protein   CREAT   Urine HCG        11/15/23 1712             236.0  Comment: Interpretive Data  No reference range  "established.  Current interpretive data was last revised 05/14/2019.               Microbiology Results (last 10 days)       ** No results found for the last 240 hours. **            Hospital Course: Patient was admitted to the hospital for medical management, was diagnosed with acute on chronic heart failure.  The patient was started on  IV diuretics.  He continued on his oral medications, amiodarone and mexiletine.  Patient was evaluated by EP cardiologist.  Considered to be a phantom shock.  Recommended outpatient atrial flutter ablation. I started taking care of this patient on 10/16/2024.  Initial weight was 280 pounds, today 262 pounds.    In the last 24 hours, 4.2 L output with negative balance of 3.7 L.  The patient reported was feeling better with medical management.   Renal function was preserved.  Creatinine 1.12.  Potassium 3.5.. Platelet count 126,000. White blood cell count 2590.  Hemoglobin 13.2  Recommend to continue amiodarone 200 mg p.o. daily.  Eliquis 5 mg p.o. twice daily.  Carvedilol 25 p.o. twice daily.  Will transition to oral Lasix on discharge 40 mg p.o. every 12 hours.  Continue Crestor 10 mg p.o. at night.  I added losartan 12.5 p.o. daily to his regimen.  Added Jardiance 10 mg p.o. daily.  He reached maximal benefit of this hospital admission.  Recommended for patient to have outpatient heart failure clinic follow-up, as well as EP cardiology follow-up for possible ablation.  Recommended to continue adherence to medical management.      Physical Exam on Discharge:  /94 (BP Location: Right arm, Patient Position: Sitting)   Pulse 83   Temp 97.8 °F (36.6 °C) (Oral)   Resp 18   Ht 190.5 cm (75\")   Wt 119 kg (262 lb 9.6 oz)   SpO2 100%   BMI 32.82 kg/m²   Physical Exam  Performed on the same day.  See progress note    Condition on Discharge: Stable    Discharge Disposition:  Home or Self Care    Discharge Medications:     Discharge Medications        New Medications        " Instructions Start Date   empagliflozin 10 MG tablet tablet  Commonly known as: JARDIANCE   10 mg, Oral, Daily      furosemide 40 MG tablet  Commonly known as: Lasix   40 mg, Oral, 2 Times Daily      losartan 25 MG tablet  Commonly known as: Cozaar   25 mg, Oral, Daily      potassium chloride 20 MEQ CR tablet  Commonly known as: KLOR-CON M20   20 mEq, Oral, Daily             Changes to Medications        Instructions Start Date   mexiletine 150 MG capsule  Commonly known as: MEXITIL  What changed: when to take this   150 mg, Oral, 2 Times Daily             Continue These Medications        Instructions Start Date   amiodarone 200 MG tablet  Commonly known as: PACERONE   200 mg, Oral, Daily      apixaban 5 MG tablet tablet  Commonly known as: ELIQUIS   5 mg, Oral, 2 Times Daily      carvedilol 25 MG tablet  Commonly known as: COREG   25 mg, Oral, 2 Times Daily With Meals      rosuvastatin 10 MG tablet  Commonly known as: CRESTOR   10 mg, Oral, Nightly               This patient has current or prior documentation of an left ventricular ejection fraction (LVEF) of less than or equal to 40%.    The patient was prescribed or already taking an ACE/ARB.    The patient was prescribed already taking bisoprolol, carvedilol, or sustained release metoprolol succinate.     Discharge Diet:   Diet Instructions       Diet: Cardiac Diets; Healthy Heart (2-3 Na+); Regular (IDDSI 7); Thin (IDDSI 0)      Discharge Diet: Cardiac Diets    Cardiac Diet: Healthy Heart (2-3 Na+)    Texture: Regular (IDDSI 7)    Fluid Consistency: Thin (IDDSI 0)            Activity at Discharge: As tolerated    Follow-up Appointments:   No future appointments.    Test Results Pending at Discharge: None    Electronically signed by Ian Rachel MD, 10/16/24, 09:38 CDT.    Time: 45 minutes.

## 2024-10-17 ENCOUNTER — READMISSION MANAGEMENT (OUTPATIENT)
Dept: CALL CENTER | Facility: HOSPITAL | Age: 64
End: 2024-10-17
Payer: MEDICARE

## 2024-10-17 VITALS
HEART RATE: 83 BPM | WEIGHT: 263.25 LBS | OXYGEN SATURATION: 100 % | DIASTOLIC BLOOD PRESSURE: 90 MMHG | RESPIRATION RATE: 16 BRPM | SYSTOLIC BLOOD PRESSURE: 144 MMHG | HEIGHT: 75 IN | BODY MASS INDEX: 32.73 KG/M2 | TEMPERATURE: 98.3 F

## 2024-10-17 PROCEDURE — 97161 PT EVAL LOW COMPLEX 20 MIN: CPT

## 2024-10-17 PROCEDURE — G0378 HOSPITAL OBSERVATION PER HR: HCPCS

## 2024-10-17 RX ORDER — FLUTICASONE PROPIONATE 50 UG/1
2 SPRAY, METERED NASAL 2 TIMES DAILY PRN
Status: DISCONTINUED | OUTPATIENT
Start: 2024-10-17 | End: 2024-10-17 | Stop reason: HOSPADM

## 2024-10-17 RX ADMIN — OXYBUTYNIN CHLORIDE 5 MG: 5 TABLET ORAL at 11:00

## 2024-10-17 RX ADMIN — FUROSEMIDE 40 MG: 40 TABLET ORAL at 11:00

## 2024-10-17 RX ADMIN — FLUTICASONE PROPIONATE 2 SPRAY: 50 SPRAY, METERED NASAL at 04:18

## 2024-10-17 RX ADMIN — APIXABAN 5 MG: 5 TABLET, FILM COATED ORAL at 11:00

## 2024-10-17 RX ADMIN — AMIODARONE HYDROCHLORIDE 200 MG: 200 TABLET ORAL at 11:00

## 2024-10-17 RX ADMIN — CARVEDILOL 25 MG: 25 TABLET, FILM COATED ORAL at 11:00

## 2024-10-17 RX ADMIN — PAROXETINE HYDROCHLORIDE 20 MG: 20 TABLET, FILM COATED ORAL at 11:00

## 2024-10-17 RX ADMIN — TAMSULOSIN HYDROCHLORIDE 0.4 MG: 0.4 CAPSULE ORAL at 11:00

## 2024-10-17 RX ADMIN — MEXILETINE HYDROCHLORIDE 150 MG: 150 CAPSULE ORAL at 11:00

## 2024-10-17 NOTE — PLAN OF CARE
Goal Outcome Evaluation:  Plan of Care Reviewed With: patient           Outcome Evaluation: PT eval complete. He is alert and oriented. Reports living alone where he was independent in his mobility. Today he remains at his baseline mobility and was supervision from PT to stand and to ambulate 125 ft. He demos his baseline mobility and he had no further PT needs, questions or concerns. He reports he is not SOB with gait but does demo significant B LE edema. PT to sign off. d/c home.    Anticipated Discharge Disposition (PT): home, home with home health

## 2024-10-17 NOTE — PLAN OF CARE
Goal Outcome Evaluation:              Outcome Evaluation: VSS on RA.  Alert and oriented x4.  No c/o pain.  Lasixs PO.  Patient to work with PT today and possible be discharged after.  No tele. Safety maintained.

## 2024-10-17 NOTE — DISCHARGE SUMMARY
[facility] Medicine Services  DISCHARGE SUMMARY       Date of Admission: 10/14/2024  Date of Discharge:  10/17/2024  Primary Care Physician: Conor Denny DO    Presenting Problem/History of Present Illness:  64-year-old male with history of hypertension, hypertrophic cardiomyopathy, systolic dysfunction, prostate cancer, hyperlipidemia, presented to [facility] emergency department on 10/14/2024 reporting that his implanted defibrillator may had fired.   Final Discharge Diagnoses:  Active Hospital Problems    Diagnosis     **Acute on chronic congestive heart failure     Acute on chronic HFrEF (heart failure with reduced ejection fraction)     Dermatitis neglecta     Atrial flutter     Prostate cancer, finished radiation on 9/30/2024     Type 2 diabetes mellitus without complication     Ventricular tachycardia     Hypertrophic cardiomyopathy     Essential hypertension     AICD (automatic cardioverter/defibrillator) present        Consults: EP cardiology    Procedures Performed: None    Pertinent Test Results:   Results for orders placed during the hospital encounter of 11/02/23    Adult Transthoracic Echo Complete w/ Color, Spectral and Contrast if Necessary Per Protocol    Interpretation Summary    Left ventricular systolic function is moderately decreased. Calculated left ventricular EF = 39% Left ventricular ejection fraction appears to be 36 - 40%.    The following left ventricular wall segments are akinetic: apical anterior, apical lateral, apical inferior, apical septal, apex and mid anteroseptal.    Left atrial volume is severely increased.    There is calcification of the aortic valve.    Estimated right ventricular systolic pressure from tricuspid regurgitation is normal (<35 mmHg).    LVEF is similar to prior ejection fraction by nuclear perfusion scan      Imaging Results (All)       Procedure Component Value Units Date/Time    XR Chest 1 View [[id number]]  Collected: 10/14/24 1318     Updated: 10/14/24 1322    Narrative:      EXAM: XR CHEST 1 VW-      DATE: 10/14/2024 12:14 PM     HISTORY: Chest Pain Protocol       COMPARISON: 11/2/2023.     TECHNIQUE:  Frontal view(s) of the chest submitted.     FINDINGS:    Interstitial prominence and prominent vasculature is likely due to  volume overload/edema. There is a small right pleural effusion. Left  costophrenic angle is not included. There is enlargement of the cardiac  silhouette. Cardiac pacemaker and AICD generator and leads appear  unchanged.          Impression:         1. Probable edema with small right pleural effusion and enlarged cardiac  silhouette.     This report was signed and finalized on 10/14/2024 1:19 PM by Jamie Lyons.             LAB RESULTS:      Lab 10/16/24  0349 10/15/24  0708 10/14/24  1303   WBC 2.59* 2.75* 3.95   HEMOGLOBIN 13.2 13.3 13.7   HEMATOCRIT 40.8 40.7 40.9   PLATELETS 126* 129* 143   NEUTROS ABS  --   --  3.07   IMMATURE GRANS (ABS)  --   --  0.02   LYMPHS ABS  --   --  0.45*   MONOS ABS  --   --  0.33   EOS ABS  --   --  0.06   MCV 98.1* 98.1* 97.6*         Lab 10/16/24  0349 10/15/24  0708 10/14/24  1303   SODIUM 137 138 141   POTASSIUM 3.5 3.3* 3.5   CHLORIDE 100 101 107   CO2 27.0 28.0 22.0   ANION GAP 10.0 9.0 12.0   BUN 15 12 10   CREATININE 1.12 1.13 1.11   EGFR 72.9 72.1 73.7   GLUCOSE 91 93 158*   CALCIUM 8.4* 9.1 8.7   MAGNESIUM 2.2 1.9 2.0   HEMOGLOBIN A1C  --   --  5.70*   TSH  --  0.949  --          Lab 10/14/24  1303   TOTAL PROTEIN 6.6   ALBUMIN 3.8   GLOBULIN 2.8   ALT (SGPT) 13   AST (SGOT) 17   BILIRUBIN 1.0   ALK PHOS 67         Lab 10/14/24  1459 10/14/24  1303   PROBNP  --  8,665.0*   HSTROP T 65* 62*         Lab 10/15/24  0708   CHOLESTEROL 168   LDL CHOL 120*   HDL CHOL 34*   TRIGLYCERIDES 72             Brief Urine Lab Results  (Last result in the past 365 days)        Color   Clarity   Blood   Leuk Est   Nitrite   Protein   CREAT   Urine HCG         "11/15/23 1712             236.0  Comment: Interpretive Data  No reference range established.  Current interpretive data was last revised 05/14/2019.               Microbiology Results (last 10 days)       ** No results found for the last 240 hours. **            Hospital Course: Patient was admitted to the hospital for medical management, was diagnosed with acute on chronic heart failure.  The patient was started on  IV diuretics.  He continued on his oral medications, amiodarone and mexiletine.  Patient was evaluated by EP cardiologist.  Considered to be a phantom shock.  Recommended outpatient atrial flutter ablation. I started taking care of this patient on 10/16/2024.  Initial weight was 280 pounds, down to 262 pounds.    Patient had a negative balance of more than 3.5 L in the last 24 hours.  The patient reported was feeling better with medical management.   Renal function was preserved.  Creatinine 1.12.  Platelet count 126,000. White blood cell count 2590.  Hemoglobin 13.2  Recommend to continue amiodarone 200 mg p.o. daily.  Eliquis 5 mg p.o. twice daily.  Carvedilol 25 p.o. twice daily.  Patient to transition to oral Lasix on discharge 40 mg p.o. every 12 hours.  Continue Crestor 10 mg p.o. at night. I added losartan 12.5 p.o. daily to his regimen.  Added Jardiance 10 mg p.o. daily.  He reached maximal benefit of this hospital admission.  Recommended for patient to have outpatient heart failure clinic follow-up, as well as EP cardiology follow-up for possible ablation.  Recommended to continue adherence to medical management.    He was discharged on 10/16/2024 but asked to remain in the hospital 1 more night.  He was evaluated with physical therapy, and patient was safely discharged home.      Physical Exam on Discharge:  /90 (BP Location: Right arm, Patient Position: Sitting)   Pulse 83   Temp 98.3 °F (36.8 °C) (Oral)   Resp 16   Ht 190.5 cm (75\")   Wt 119 kg (263 lb 4 oz)   SpO2 100%   BMI " 32.90 kg/m²   Physical Exam  Performed on the same day.  See progress note    Condition on Discharge: Stable    Discharge Disposition:  Home or Self Care    Discharge Medications:     Discharge Medications        New Medications        Instructions Start Date   empagliflozin 10 MG tablet tablet  Commonly known as: JARDIANCE   10 mg, Oral, Daily      furosemide 40 MG tablet  Commonly known as: Lasix   40 mg, Oral, 2 Times Daily      losartan 25 MG tablet  Commonly known as: Cozaar   25 mg, Oral, Daily      potassium chloride 20 MEQ CR tablet  Commonly known as: KLOR-CON M20   20 mEq, Oral, Daily             Changes to Medications        Instructions Start Date   mexiletine 150 MG capsule  Commonly known as: MEXITIL  What changed: when to take this   150 mg, Oral, 2 Times Daily             Continue These Medications        Instructions Start Date   amiodarone 200 MG tablet  Commonly known as: PACERONE   200 mg, Oral, Daily      apixaban 5 MG tablet tablet  Commonly known as: ELIQUIS   5 mg, Oral, 2 Times Daily      carvedilol 25 MG tablet  Commonly known as: COREG   25 mg, Oral, 2 Times Daily With Meals      rosuvastatin 10 MG tablet  Commonly known as: CRESTOR   10 mg, Oral, Nightly               This patient has current or prior documentation of an left ventricular ejection fraction (LVEF) of less than or equal to 40%.    The patient was prescribed or already taking an ACE/ARB.    The patient was prescribed already taking bisoprolol, carvedilol, or sustained release metoprolol succinate.     Discharge Diet:   Diet Instructions       Diet: Cardiac Diets; Healthy Heart (2-3 Na+); Regular (IDDSI 7); Thin (IDDSI 0)      Discharge Diet: Cardiac Diets    Cardiac Diet: Healthy Heart (2-3 Na+)    Texture: Regular (IDDSI 7)    Fluid Consistency: Thin (IDDSI 0)            Activity at Discharge:   Activity Instructions    Activity as tolerated       As tolerated    Follow-up Appointments:   Future Appointments   Date Time  Provider Department Center   10/22/2024 10:00 AM  [facility] None       Test Results Pending at Discharge: None    Electronically signed by Ian Rachel MD, 10/17/24, 08:53 CDT.    Time: 45 minutes.

## 2024-10-17 NOTE — THERAPY DISCHARGE NOTE
Patient Name: Jeanie Grossman  : 1960    MRN: [id number]                              Today's Date: 10/17/2024       Admit Date: 10/14/2024    Visit Dx:     ICD-10-CM ICD-9-CM   1. Congestive heart failure, unspecified HF chronicity, unspecified heart failure type  I50.9 428.0   2. Chronic a-fib  I48.20 427.31   3. Troponin I above reference range  R79.89 790.6     Patient Active Problem List   Diagnosis    Ventricular tachycardia    Hypertrophic cardiomyopathy    Essential hypertension    AICD (automatic cardioverter/defibrillator) present    Ventricular tachycardia, sustained    Abdominal pain    UTI (urinary tract infection)    Type 2 diabetes mellitus without complication    Pancytopenia    Chest pain in adult    Prostate cancer, finished radiation on 2024    Non-smoker    VT (ventricular tachycardia)    Ventricular tachycardia (paroxysmal)    Acute on chronic congestive heart failure    Dermatitis neglecta    Atrial flutter    Acute on chronic HFrEF (heart failure with reduced ejection fraction)     Past Medical History:   Diagnosis Date    Hyperlipidemia     Hypertension     Hypertrophic cardiomyopathy     s/p AICD    Migraine     Prostate cancer     Tachycardia     Ventricular tachycardia     Ventricular tachycardia, sustained 10/14/2016     Past Surgical History:   Procedure Laterality Date    CARDIAC ABLATION  2016, 10/18/2016 (Dr. Doss in [place], [place])    CARDIAC ABLATION      2018    CARDIAC CATHETERIZATION      CARDIAC DEFIBRILLATOR PLACEMENT      CARDIAC DEFIBRILLATOR PLACEMENT      PROSTATE BIOPSY  2019    PROSTATE BIOPSY        General Information       Row Name 10/17/24 0981          Physical Therapy Time and Intention    Document Type discharge evaluation/summary  SOB, elevated BP, defibrillator fired?. Dx: a/c HF, a-fib/a-flutter  -MORENO     Mode of Treatment physical therapy  -MORENO       Row Name 10/17/24 0920          General Information    Patient Profile Reviewed yes   -MORENO     Prior Level of Function independent:;bed mobility;transfer;gait  -MORENO     Existing Precautions/Restrictions no known precautions/restrictions  -MORENO     Barriers to Rehab none identified  -MORENO       Row Name 10/17/24 0948          Living Environment    People in Home alone  -MORENO       Row Name 10/17/24 0948          Home Main Entrance    Number of Stairs, Main Entrance one  -MORENO       Row Name 10/17/24 0948          Stairs Within Home, Primary    Number of Stairs, Within Home, Primary none  -MORENO       Row Name 10/17/24 0948          Cognition    Orientation Status (Cognition) oriented to;person;place;situation  -MORENO       Row Name 10/17/24 0948          Safety Issues/Impairments Affecting Functional Mobility    Impairments Affecting Function (Mobility) other (see comments)  B LE swelling  -MORENO               User Key  (r) = Recorded By, (t) = Taken By, (c) = Cosigned By      Initials Name Provider Type    Mejia Ramos, PT DPT Physical Therapist                   Mobility       Row Name 10/17/24 0948          Bed Mobility    Comment, (Bed Mobility) sitting EOB, left as found with Pharmacy in room  -MORENO       Row Name 10/17/24 0948          Sit-Stand Transfer    Sit-Stand [place] (Transfers) supervision  -MORENO       Row Name 10/17/24 0948          Gait/Stairs (Locomotion)    [place] Level (Gait) supervision  -MORENO     Distance in Feet (Gait) 125  -MORENO     Comment, (Gait/Stairs) L LE externally rotated with gait  -MORENO               User Key  (r) = Recorded By, (t) = Taken By, (c) = Cosigned By      Initials Name Provider Type    Mejia Ramos, PT DPT Physical Therapist                   Obj/Interventions       Row Name 10/17/24 0948          Range of Motion Comprehensive    General Range of Motion bilateral lower extremity ROM WFL  -MORENO       Row Name 10/17/24 0948          Strength Comprehensive (MMT)    Comment, General Manual Muscle Testing (MMT) Assessment B LE functionally 4/5  -MORENO       Row Name  10/17/24 0948          Balance    Balance Assessment sitting dynamic balance;standing dynamic balance  -MORENO     Dynamic Sitting Balance independent  -MORENO     Position, Sitting Balance unsupported;sitting edge of bed  -MORENO     Dynamic Standing Balance supervision  -MORENO     Position/Device Used, Standing Balance unsupported  -MORENO       Row Name 10/17/24 0948          Sensory Assessment (Somatosensory)    Sensory Assessment (Somatosensory) LE sensation intact  Significant B LE edema  -MORENO               User Key  (r) = Recorded By, (t) = Taken By, (c) = Cosigned By      Initials Name Provider Type    Mejia Ramos, PT DPT Physical Therapist                   Goals/Plan    No documentation.                  Clinical Impression       Row Name 10/17/24 0948          Pain    Pretreatment Pain Rating 0/10 - no pain  -MORENO       Row Name 10/17/24 0948          Plan of Care Review    Plan of Care Reviewed With patient  -MORENO     Outcome Evaluation PT eval complete. He is alert and oriented. Reports living alone where he was independent in his mobility. Today he remains at his baseline mobility and was supervision from PT to stand and to ambulate 125 ft. He demos his baseline mobility and he had no further PT needs, questions or concerns. He reports he is not SOB with gait but does demo significant B LE edema. PT to sign off. d/c home.  -MORENO       Row Name 10/17/24 0948          Therapy Assessment/Plan (PT)    Patient/Family Therapy Goals Statement (PT) did not state  -MORENO     Criteria for Skilled Interventions Met (PT) no;does not meet criteria for skilled intervention  -MORENO     Therapy Frequency (PT) evaluation only  -MORENO       Row Name 10/17/24 0948          Positioning and Restraints    Pre-Treatment Position in bed  -MORENO     Post Treatment Position bed  -MORENO     In Bed sitting EOB;call light within reach;encouraged to call for assist;with other staff  Pharmacy in room  -MORENO               User Key  (r) = Recorded By, (t) = Taken By,  (c) = Cosigned By      Initials Name Provider Type    Mejia Ramos, PT DPT Physical Therapist                   Outcome Measures       Row Name 10/17/24 0948          How much help from another person do you currently need...    Turning from your back to your side while in flat bed without using bedrails? 4  -MORENO     Moving from lying on back to sitting on the side of a flat bed without bedrails? 4  -MORENO     Moving to and from a bed to a chair (including a wheelchair)? 4  -MORENO     Standing up from a chair using your arms (e.g., wheelchair, bedside chair)? 4  -MORENO     Climbing 3-5 steps with a railing? 3  -MORENO     To walk in hospital room? 3  -MORENO     AM-PAC 6 Clicks Score (PT) 22  -MORENO     Highest Level of Mobility Goal 7 --> Walk 25 feet or more  -MORENO       Row Name 10/17/24 0948          Functional Assessment    Outcome Measure Options AM-PAC 6 Clicks Basic Mobility (PT)  -MORENO               User Key  (r) = Recorded By, (t) = Taken By, (c) = Cosigned By      Initials Name Provider Type    Mejia Ramos, PT DPT Physical Therapist                  Physical Therapy Education       Title: PT OT SLP Therapies (Resolved)       Topic: Physical Therapy (Resolved)       Point: Mobility training (Resolved)       Learning Progress Summary            Patient Acceptance, E, VU by MORENO at 10/17/2024 0948    Comment: benefits of activity, gait safety                                      User Key       Initials Effective Dates Name Provider Type Discipline    MORENO 02/03/23 -  Mejia Barajas, PT DPT Physical Therapist PT                  PT Recommendation and Plan     Outcome Evaluation: PT eval complete. He is alert and oriented. Reports living alone where he was independent in his mobility. Today he remains at his baseline mobility and was supervision from PT to stand and to ambulate 125 ft. He demos his baseline mobility and he had no further PT needs, questions or concerns. He reports he is not SOB with gait but does demo  significant B LE edema. PT to sign off. d/c home.     Time Calculation:         PT Charges       Row Name 10/17/24 1041             Time Calculation    Start Time 0948  -MORENO      Stop Time 1030  -MORENO      Time Calculation (min) 42 min  -MORENO      PT Received On 10/17/24  -MORENO                User Key  (r) = Recorded By, (t) = Taken By, (c) = Cosigned By      Initials Name Provider Type    Mejia Ramos, PT DPT Physical Therapist                  Therapy Charges for Today       Code Description Service Date Service Provider Modifiers Qty    50404768761 HC PT EVAL LOW COMPLEXITY 3 10/17/2024 Mejia Barajas, PT DPT GP 1            PT G-Codes  Outcome Measure Options: AM-PAC 6 Clicks Basic Mobility (PT)  AM-PAC 6 Clicks Score (PT): 22    PT Discharge Summary  Anticipated Discharge Disposition (PT): home, home with home health    Mejia Barajas, PT DPT  10/17/2024

## 2024-10-18 NOTE — OUTREACH NOTE
Prep Survey      Flowsheet Row Responses   [facility] facility patient discharged from? [place]   Is LACE score < 7 ? No   Eligibility Readm Mgmt   Discharge diagnosis Acute on chronic congestive heart failure   Does the patient have one of the following disease processes/diagnoses(primary or secondary)? CHF   Does the patient have Home health ordered? No   Is there a DME ordered? No   Prep survey completed? Yes            JARED ANGEL - Registered Nurse

## 2024-10-21 ENCOUNTER — READMISSION MANAGEMENT (OUTPATIENT)
Dept: CALL CENTER | Facility: HOSPITAL | Age: 64
End: 2024-10-21
Payer: MEDICARE

## 2024-10-21 NOTE — OUTREACH NOTE
CHF Week 1 Survey      Flowsheet Row Responses   [facility] [place] facility patient discharged from? [place]   Does the patient have one of the following disease processes/diagnoses(primary or secondary)? CHF   CHF Week 1 attempt successful? No   Unsuccessful attempts Attempt 1            NIDA ESPINOZA - Registered Nurse

## 2024-10-25 ENCOUNTER — READMISSION MANAGEMENT (OUTPATIENT)
Dept: CALL CENTER | Facility: HOSPITAL | Age: 64
End: 2024-10-25
Payer: MEDICARE

## 2024-10-25 NOTE — OUTREACH NOTE
CHF Week 1 Survey      Flowsheet Row Responses   [facility] facility patient discharged from? [place]   Does the patient have one of the following disease processes/diagnoses(primary or secondary)? CHF   CHF Week 1 attempt successful? No   Unsuccessful attempts Attempt 2            AGUILA CASTREJON - Registered Nurse

## 2024-10-31 ENCOUNTER — TELEPHONE (OUTPATIENT)
Dept: CARDIOLOGY | Facility: CLINIC | Age: 64
End: 2024-10-31
Payer: MEDICARE

## 2024-10-31 NOTE — TELEPHONE ENCOUNTER
Patient called in stating that [facility] will not refill patients Amiodarone. Can you verify if patient should be taking 200 mg or 400 mg daily?

## 2024-11-05 ENCOUNTER — READMISSION MANAGEMENT (OUTPATIENT)
Dept: CALL CENTER | Facility: HOSPITAL | Age: 64
End: 2024-11-05
Payer: MEDICARE

## 2024-11-05 NOTE — OUTREACH NOTE
CHF Week 3 Survey      Flowsheet Row Responses   [facility] facility patient discharged from? [place]   Does the patient have one of the following disease processes/diagnoses(primary or secondary)? CHF   Week 3 attempt successful? No   Unsuccessful attempts Attempt 1            Kimberlyn TRUJILLO - Registered Nurse

## 2024-11-10 RX ORDER — AMIODARONE HYDROCHLORIDE 200 MG/1
200 TABLET ORAL 2 TIMES DAILY
Qty: 180 TABLET | Refills: 1 | Status: SHIPPED | OUTPATIENT
Start: 2024-11-10

## 2024-11-14 ENCOUNTER — APPOINTMENT (OUTPATIENT)
Dept: GENERAL RADIOLOGY | Facility: HOSPITAL | Age: 64
DRG: 291 | End: 2024-11-14
Payer: MEDICARE

## 2024-11-14 ENCOUNTER — HOSPITAL ENCOUNTER (INPATIENT)
Facility: HOSPITAL | Age: 64
LOS: 3 days | Discharge: HOME HEALTH | DRG: 291 | End: 2024-11-18
Attending: STUDENT IN AN ORGANIZED HEALTH CARE EDUCATION/TRAINING PROGRAM | Admitting: FAMILY MEDICINE
Payer: MEDICARE

## 2024-11-14 DIAGNOSIS — I50.23 ACUTE ON CHRONIC HFREF (HEART FAILURE WITH REDUCED EJECTION FRACTION): ICD-10-CM

## 2024-11-14 DIAGNOSIS — S81.809A MULTIPLE OPEN WOUNDS OF LOWER LEG, INITIAL ENCOUNTER: ICD-10-CM

## 2024-11-14 DIAGNOSIS — I50.9 ACUTE ON CHRONIC CONGESTIVE HEART FAILURE, UNSPECIFIED HEART FAILURE TYPE: Primary | ICD-10-CM

## 2024-11-14 LAB
ALBUMIN SERPL-MCNC: 4 G/DL (ref 3.5–5.2)
ALBUMIN/GLOB SERPL: 1.3 G/DL
ALP SERPL-CCNC: 75 U/L (ref 39–117)
ALT SERPL W P-5'-P-CCNC: 13 U/L (ref 1–41)
ANION GAP SERPL CALCULATED.3IONS-SCNC: 9 MMOL/L (ref 5–15)
AST SERPL-CCNC: 18 U/L (ref 1–40)
BASOPHILS # BLD AUTO: 0.02 10*3/MM3 (ref 0–0.2)
BASOPHILS NFR BLD AUTO: 0.6 % (ref 0–1.5)
BILIRUB SERPL-MCNC: 1.1 MG/DL (ref 0–1.2)
BUN SERPL-MCNC: 9 MG/DL (ref 8–23)
BUN/CREAT SERPL: 8.5 (ref 7–25)
CALCIUM SPEC-SCNC: 9 MG/DL (ref 8.6–10.5)
CHLORIDE SERPL-SCNC: 105 MMOL/L (ref 98–107)
CO2 SERPL-SCNC: 26 MMOL/L (ref 22–29)
CREAT SERPL-MCNC: 1.06 MG/DL (ref 0.76–1.27)
DEPRECATED RDW RBC AUTO: 60.9 FL (ref 37–54)
EGFRCR SERPLBLD CKD-EPI 2021: 77.9 ML/MIN/1.73
EOSINOPHIL # BLD AUTO: 0.05 10*3/MM3 (ref 0–0.4)
EOSINOPHIL NFR BLD AUTO: 1.4 % (ref 0.3–6.2)
ERYTHROCYTE [DISTWIDTH] IN BLOOD BY AUTOMATED COUNT: 17 % (ref 12.3–15.4)
GLOBULIN UR ELPH-MCNC: 3.1 GM/DL
GLUCOSE SERPL-MCNC: 85 MG/DL (ref 65–99)
HCT VFR BLD AUTO: 39.6 % (ref 37.5–51)
HGB BLD-MCNC: 12.8 G/DL (ref 13–17.7)
HOLD SPECIMEN: NORMAL
IMM GRANULOCYTES # BLD AUTO: 0.02 10*3/MM3 (ref 0–0.05)
IMM GRANULOCYTES NFR BLD AUTO: 0.6 % (ref 0–0.5)
LIPASE SERPL-CCNC: 12 U/L (ref 13–60)
LYMPHOCYTES # BLD AUTO: 0.43 10*3/MM3 (ref 0.7–3.1)
LYMPHOCYTES NFR BLD AUTO: 11.8 % (ref 19.6–45.3)
MCH RBC QN AUTO: 31.8 PG (ref 26.6–33)
MCHC RBC AUTO-ENTMCNC: 32.3 G/DL (ref 31.5–35.7)
MCV RBC AUTO: 98.5 FL (ref 79–97)
MONOCYTES # BLD AUTO: 0.29 10*3/MM3 (ref 0.1–0.9)
MONOCYTES NFR BLD AUTO: 8 % (ref 5–12)
NEUTROPHILS NFR BLD AUTO: 2.82 10*3/MM3 (ref 1.7–7)
NEUTROPHILS NFR BLD AUTO: 77.6 % (ref 42.7–76)
NRBC BLD AUTO-RTO: 1.1 /100 WBC (ref 0–0.2)
NT-PROBNP SERPL-MCNC: 8428 PG/ML (ref 0–900)
PLATELET # BLD AUTO: 204 10*3/MM3 (ref 140–450)
PMV BLD AUTO: 10.6 FL (ref 6–12)
POTASSIUM SERPL-SCNC: 4.2 MMOL/L (ref 3.5–5.2)
PROT SERPL-MCNC: 7.1 G/DL (ref 6–8.5)
RBC # BLD AUTO: 4.02 10*6/MM3 (ref 4.14–5.8)
SODIUM SERPL-SCNC: 140 MMOL/L (ref 136–145)
TROPONIN T SERPL HS-MCNC: 48 NG/L
WBC NRBC COR # BLD AUTO: 3.63 10*3/MM3 (ref 3.4–10.8)
WHOLE BLOOD HOLD COAG: NORMAL
WHOLE BLOOD HOLD SPECIMEN: NORMAL

## 2024-11-14 PROCEDURE — 99285 EMERGENCY DEPT VISIT HI MDM: CPT

## 2024-11-14 PROCEDURE — 93010 ELECTROCARDIOGRAM REPORT: CPT | Performed by: HOSPITALIST

## 2024-11-14 PROCEDURE — 83880 ASSAY OF NATRIURETIC PEPTIDE: CPT | Performed by: STUDENT IN AN ORGANIZED HEALTH CARE EDUCATION/TRAINING PROGRAM

## 2024-11-14 PROCEDURE — 83690 ASSAY OF LIPASE: CPT | Performed by: STUDENT IN AN ORGANIZED HEALTH CARE EDUCATION/TRAINING PROGRAM

## 2024-11-14 PROCEDURE — 93005 ELECTROCARDIOGRAM TRACING: CPT

## 2024-11-14 PROCEDURE — 85025 COMPLETE CBC W/AUTO DIFF WBC: CPT | Performed by: STUDENT IN AN ORGANIZED HEALTH CARE EDUCATION/TRAINING PROGRAM

## 2024-11-14 PROCEDURE — 84484 ASSAY OF TROPONIN QUANT: CPT | Performed by: STUDENT IN AN ORGANIZED HEALTH CARE EDUCATION/TRAINING PROGRAM

## 2024-11-14 PROCEDURE — 80053 COMPREHEN METABOLIC PANEL: CPT | Performed by: STUDENT IN AN ORGANIZED HEALTH CARE EDUCATION/TRAINING PROGRAM

## 2024-11-14 PROCEDURE — 71045 X-RAY EXAM CHEST 1 VIEW: CPT

## 2024-11-14 PROCEDURE — 83735 ASSAY OF MAGNESIUM: CPT | Performed by: INTERNAL MEDICINE

## 2024-11-14 RX ORDER — ASPIRIN 81 MG/1
81 TABLET ORAL DAILY
Status: ON HOLD | COMMUNITY
End: 2024-11-18

## 2024-11-15 ENCOUNTER — APPOINTMENT (OUTPATIENT)
Dept: CARDIOLOGY | Facility: HOSPITAL | Age: 64
DRG: 291 | End: 2024-11-15
Payer: MEDICARE

## 2024-11-15 PROBLEM — I50.9 CHF EXACERBATION: Status: ACTIVE | Noted: 2024-11-15

## 2024-11-15 LAB
ANION GAP SERPL CALCULATED.3IONS-SCNC: 11 MMOL/L (ref 5–15)
AV VENA CONTRACTA: 0.62 CM
BH CV ECHO MEAS - AI P1/2T: 730.6 MSEC
BH CV ECHO MEAS - AO MAX PG: 11.4 MMHG
BH CV ECHO MEAS - AO MEAN PG: 5 MMHG
BH CV ECHO MEAS - AO V2 MAX: 169 CM/SEC
BH CV ECHO MEAS - AO V2 VTI: 32.8 CM
BH CV ECHO MEAS - AVA(I,D): 3 CM2
BH CV ECHO MEAS - EDV(CUBED): 232.6 ML
BH CV ECHO MEAS - EDV(MOD-SP2): 266 ML
BH CV ECHO MEAS - EDV(MOD-SP4): 176 ML
BH CV ECHO MEAS - EF(MOD-SP2): 61.3 %
BH CV ECHO MEAS - EF(MOD-SP4): 43.2 %
BH CV ECHO MEAS - ESV(CUBED): 42.1 ML
BH CV ECHO MEAS - ESV(MOD-SP2): 103 ML
BH CV ECHO MEAS - ESV(MOD-SP4): 100 ML
BH CV ECHO MEAS - FS: 43.4 %
BH CV ECHO MEAS - IVS/LVPW: 0.91 CM
BH CV ECHO MEAS - IVSD: 1.5 CM
BH CV ECHO MEAS - LA DIMENSION: 5.8 CM
BH CV ECHO MEAS - LAT PEAK E' VEL: 6.9 CM/SEC
BH CV ECHO MEAS - LV DIASTOLIC VOL/BSA (35-75): 68 CM2
BH CV ECHO MEAS - LV MASS(C)D: 474.1 GRAMS
BH CV ECHO MEAS - LV MAX PG: 5.8 MMHG
BH CV ECHO MEAS - LV MEAN PG: 3 MMHG
BH CV ECHO MEAS - LV SYSTOLIC VOL/BSA (12-30): 38.6 CM2
BH CV ECHO MEAS - LV V1 MAX: 120 CM/SEC
BH CV ECHO MEAS - LV V1 VTI: 23.5 CM
BH CV ECHO MEAS - LVIDD: 6.2 CM
BH CV ECHO MEAS - LVIDS: 3.5 CM
BH CV ECHO MEAS - LVOT AREA: 4.2 CM2
BH CV ECHO MEAS - LVOT DIAM: 2.3 CM
BH CV ECHO MEAS - LVPWD: 1.64 CM
BH CV ECHO MEAS - MED PEAK E' VEL: 5.6 CM/SEC
BH CV ECHO MEAS - MR MAX PG: 69.6 MMHG
BH CV ECHO MEAS - MR MAX VEL: 417 CM/SEC
BH CV ECHO MEAS - MV A MAX VEL: 34.6 CM/SEC
BH CV ECHO MEAS - MV DEC SLOPE: 568 CM/SEC2
BH CV ECHO MEAS - MV E MAX VEL: 96.4 CM/SEC
BH CV ECHO MEAS - MV E/A: 2.8
BH CV ECHO MEAS - MV P1/2T: 58.3 MSEC
BH CV ECHO MEAS - MVA(P1/2T): 3.8 CM2
BH CV ECHO MEAS - PA V2 MAX: 88.4 CM/SEC
BH CV ECHO MEAS - RAP SYSTOLE: 3 MMHG
BH CV ECHO MEAS - RV MAX PG: 0.91 MMHG
BH CV ECHO MEAS - RV V1 MAX: 47.7 CM/SEC
BH CV ECHO MEAS - RVDD: 3.3 CM
BH CV ECHO MEAS - RVSP: 23.3 MMHG
BH CV ECHO MEAS - SV(LVOT): 97.6 ML
BH CV ECHO MEAS - SV(MOD-SP2): 163 ML
BH CV ECHO MEAS - SV(MOD-SP4): 76 ML
BH CV ECHO MEAS - SVI(LVOT): 37.7 ML/M2
BH CV ECHO MEAS - SVI(MOD-SP2): 63 ML/M2
BH CV ECHO MEAS - SVI(MOD-SP4): 29.4 ML/M2
BH CV ECHO MEAS - TAPSE (>1.6): 2.08 CM
BH CV ECHO MEAS - TR MAX PG: 20.3 MMHG
BH CV ECHO MEAS - TR MAX VEL: 225 CM/SEC
BH CV ECHO MEASUREMENTS AVERAGE E/E' RATIO: 15.42
BH CV XLRA - RV BASE: 4.1 CM
BUN SERPL-MCNC: 9 MG/DL (ref 8–23)
BUN/CREAT SERPL: 8.4 (ref 7–25)
CALCIUM SPEC-SCNC: 9.2 MG/DL (ref 8.6–10.5)
CHLORIDE SERPL-SCNC: 104 MMOL/L (ref 98–107)
CO2 SERPL-SCNC: 27 MMOL/L (ref 22–29)
CREAT SERPL-MCNC: 1.07 MG/DL (ref 0.76–1.27)
EGFRCR SERPLBLD CKD-EPI 2021: 77 ML/MIN/1.73
GEN 5 2HR TROPONIN T REFLEX: 44 NG/L
GLUCOSE SERPL-MCNC: 81 MG/DL (ref 65–99)
LEFT ATRIUM VOLUME INDEX: 81.9 ML/M2
LEFT ATRIUM VOLUME: 212 ML
MAGNESIUM SERPL-MCNC: 2 MG/DL (ref 1.6–2.4)
POTASSIUM SERPL-SCNC: 4.1 MMOL/L (ref 3.5–5.2)
SINUS: 3 CM
SODIUM SERPL-SCNC: 142 MMOL/L (ref 136–145)
STJ: 3 CM
TROPONIN T DELTA: -4 NG/L

## 2024-11-15 PROCEDURE — 25510000001 PERFLUTREN 6.52 MG/ML SUSPENSION: Performed by: FAMILY MEDICINE

## 2024-11-15 PROCEDURE — 93306 TTE W/DOPPLER COMPLETE: CPT

## 2024-11-15 PROCEDURE — 93306 TTE W/DOPPLER COMPLETE: CPT | Performed by: INTERNAL MEDICINE

## 2024-11-15 PROCEDURE — 36415 COLL VENOUS BLD VENIPUNCTURE: CPT

## 2024-11-15 PROCEDURE — 25010000002 FUROSEMIDE PER 20 MG: Performed by: STUDENT IN AN ORGANIZED HEALTH CARE EDUCATION/TRAINING PROGRAM

## 2024-11-15 PROCEDURE — 80048 BASIC METABOLIC PNL TOTAL CA: CPT | Performed by: INTERNAL MEDICINE

## 2024-11-15 PROCEDURE — 25010000002 FUROSEMIDE PER 20 MG: Performed by: INTERNAL MEDICINE

## 2024-11-15 RX ORDER — ACETAMINOPHEN 160 MG/5ML
650 SOLUTION ORAL EVERY 4 HOURS PRN
Status: DISCONTINUED | OUTPATIENT
Start: 2024-11-15 | End: 2024-11-18 | Stop reason: HOSPADM

## 2024-11-15 RX ORDER — HYDRALAZINE HYDROCHLORIDE 20 MG/ML
5 INJECTION INTRAMUSCULAR; INTRAVENOUS EVERY 4 HOURS PRN
Status: DISCONTINUED | OUTPATIENT
Start: 2024-11-15 | End: 2024-11-18 | Stop reason: HOSPADM

## 2024-11-15 RX ORDER — AMOXICILLIN 250 MG
2 CAPSULE ORAL 2 TIMES DAILY PRN
Status: DISCONTINUED | OUTPATIENT
Start: 2024-11-15 | End: 2024-11-18 | Stop reason: HOSPADM

## 2024-11-15 RX ORDER — FUROSEMIDE 10 MG/ML
80 INJECTION INTRAMUSCULAR; INTRAVENOUS ONCE
Status: COMPLETED | OUTPATIENT
Start: 2024-11-15 | End: 2024-11-15

## 2024-11-15 RX ORDER — MEXILETINE HYDROCHLORIDE 150 MG/1
150 CAPSULE ORAL 2 TIMES DAILY
Status: DISCONTINUED | OUTPATIENT
Start: 2024-11-15 | End: 2024-11-18 | Stop reason: HOSPADM

## 2024-11-15 RX ORDER — FUROSEMIDE 10 MG/ML
40 INJECTION INTRAMUSCULAR; INTRAVENOUS EVERY 12 HOURS
Status: DISCONTINUED | OUTPATIENT
Start: 2024-11-15 | End: 2024-11-16

## 2024-11-15 RX ORDER — SODIUM CHLORIDE 0.9 % (FLUSH) 0.9 %
10 SYRINGE (ML) INJECTION EVERY 12 HOURS SCHEDULED
Status: DISCONTINUED | OUTPATIENT
Start: 2024-11-15 | End: 2024-11-18 | Stop reason: HOSPADM

## 2024-11-15 RX ORDER — CARVEDILOL 3.12 MG/1
3.12 TABLET ORAL 2 TIMES DAILY WITH MEALS
Status: ON HOLD | COMMUNITY
End: 2024-11-18

## 2024-11-15 RX ORDER — ACETAMINOPHEN 325 MG/1
650 TABLET ORAL EVERY 4 HOURS PRN
Status: DISCONTINUED | OUTPATIENT
Start: 2024-11-15 | End: 2024-11-18 | Stop reason: HOSPADM

## 2024-11-15 RX ORDER — CARVEDILOL 25 MG/1
25 TABLET ORAL 2 TIMES DAILY WITH MEALS
Status: DISCONTINUED | OUTPATIENT
Start: 2024-11-15 | End: 2024-11-18 | Stop reason: HOSPADM

## 2024-11-15 RX ORDER — ONDANSETRON 2 MG/ML
4 INJECTION INTRAMUSCULAR; INTRAVENOUS EVERY 6 HOURS PRN
Status: DISCONTINUED | OUTPATIENT
Start: 2024-11-15 | End: 2024-11-18 | Stop reason: HOSPADM

## 2024-11-15 RX ORDER — SODIUM CHLORIDE 9 MG/ML
40 INJECTION, SOLUTION INTRAVENOUS AS NEEDED
Status: DISCONTINUED | OUTPATIENT
Start: 2024-11-15 | End: 2024-11-18 | Stop reason: HOSPADM

## 2024-11-15 RX ORDER — SILDENAFIL 100 MG/1
50-100 TABLET, FILM COATED ORAL DAILY PRN
COMMUNITY

## 2024-11-15 RX ORDER — NITROGLYCERIN 0.4 MG/1
0.4 TABLET SUBLINGUAL
Status: DISCONTINUED | OUTPATIENT
Start: 2024-11-15 | End: 2024-11-18 | Stop reason: HOSPADM

## 2024-11-15 RX ORDER — POTASSIUM CHLORIDE 1500 MG/1
20 TABLET, EXTENDED RELEASE ORAL DAILY
Status: COMPLETED | OUTPATIENT
Start: 2024-11-15 | End: 2024-11-15

## 2024-11-15 RX ORDER — BISACODYL 5 MG/1
5 TABLET, DELAYED RELEASE ORAL DAILY PRN
Status: DISCONTINUED | OUTPATIENT
Start: 2024-11-15 | End: 2024-11-18 | Stop reason: HOSPADM

## 2024-11-15 RX ORDER — ROSUVASTATIN CALCIUM 10 MG/1
10 TABLET, COATED ORAL NIGHTLY
Status: DISCONTINUED | OUTPATIENT
Start: 2024-11-15 | End: 2024-11-18 | Stop reason: HOSPADM

## 2024-11-15 RX ORDER — SODIUM CHLORIDE 0.9 % (FLUSH) 0.9 %
10 SYRINGE (ML) INJECTION AS NEEDED
Status: DISCONTINUED | OUTPATIENT
Start: 2024-11-15 | End: 2024-11-18 | Stop reason: HOSPADM

## 2024-11-15 RX ORDER — LOSARTAN POTASSIUM 25 MG/1
25 TABLET ORAL DAILY
Status: DISCONTINUED | OUTPATIENT
Start: 2024-11-15 | End: 2024-11-18 | Stop reason: HOSPADM

## 2024-11-15 RX ORDER — ACETAMINOPHEN 650 MG/1
650 SUPPOSITORY RECTAL EVERY 4 HOURS PRN
Status: DISCONTINUED | OUTPATIENT
Start: 2024-11-15 | End: 2024-11-18 | Stop reason: HOSPADM

## 2024-11-15 RX ORDER — POLYETHYLENE GLYCOL 3350 17 G/17G
17 POWDER, FOR SOLUTION ORAL DAILY PRN
Status: DISCONTINUED | OUTPATIENT
Start: 2024-11-15 | End: 2024-11-18 | Stop reason: HOSPADM

## 2024-11-15 RX ORDER — BISACODYL 10 MG
10 SUPPOSITORY, RECTAL RECTAL DAILY PRN
Status: DISCONTINUED | OUTPATIENT
Start: 2024-11-15 | End: 2024-11-18 | Stop reason: HOSPADM

## 2024-11-15 RX ORDER — ASPIRIN 325 MG
325 TABLET ORAL DAILY
Status: DISCONTINUED | OUTPATIENT
Start: 2024-11-15 | End: 2024-11-18 | Stop reason: HOSPADM

## 2024-11-15 RX ORDER — AMIODARONE HYDROCHLORIDE 200 MG/1
200 TABLET ORAL 2 TIMES DAILY
Status: DISCONTINUED | OUTPATIENT
Start: 2024-11-15 | End: 2024-11-18 | Stop reason: HOSPADM

## 2024-11-15 RX ADMIN — EMPAGLIFLOZIN 10 MG: 10 TABLET, FILM COATED ORAL at 09:22

## 2024-11-15 RX ADMIN — APIXABAN 5 MG: 5 TABLET, FILM COATED ORAL at 17:20

## 2024-11-15 RX ADMIN — FUROSEMIDE 40 MG: 10 INJECTION, SOLUTION INTRAVENOUS at 17:20

## 2024-11-15 RX ADMIN — MEXILETINE HYDROCHLORIDE 150 MG: 150 CAPSULE ORAL at 09:22

## 2024-11-15 RX ADMIN — LOSARTAN POTASSIUM 25 MG: 25 TABLET, FILM COATED ORAL at 09:22

## 2024-11-15 RX ADMIN — ROSUVASTATIN 10 MG: 10 TABLET, FILM COATED ORAL at 20:15

## 2024-11-15 RX ADMIN — FUROSEMIDE 80 MG: 10 INJECTION, SOLUTION INTRAVENOUS at 02:22

## 2024-11-15 RX ADMIN — CARVEDILOL 25 MG: 6.25 TABLET, FILM COATED ORAL at 04:48

## 2024-11-15 RX ADMIN — FUROSEMIDE 40 MG: 10 INJECTION, SOLUTION INTRAVENOUS at 06:03

## 2024-11-15 RX ADMIN — MEXILETINE HYDROCHLORIDE 150 MG: 150 CAPSULE ORAL at 20:15

## 2024-11-15 RX ADMIN — APIXABAN 5 MG: 5 TABLET, FILM COATED ORAL at 04:48

## 2024-11-15 RX ADMIN — PERFLUTREN 13.04 MG: 6.52 INJECTION, SUSPENSION INTRAVENOUS at 16:26

## 2024-11-15 RX ADMIN — Medication 10 ML: at 09:22

## 2024-11-15 RX ADMIN — AMIODARONE HYDROCHLORIDE 200 MG: 200 TABLET ORAL at 04:48

## 2024-11-15 RX ADMIN — Medication 10 ML: at 20:15

## 2024-11-15 RX ADMIN — AMIODARONE HYDROCHLORIDE 200 MG: 200 TABLET ORAL at 17:20

## 2024-11-15 RX ADMIN — POTASSIUM CHLORIDE 20 MEQ: 1500 TABLET, EXTENDED RELEASE ORAL at 09:22

## 2024-11-15 RX ADMIN — ASPIRIN 325 MG: 325 TABLET, FILM COATED ORAL at 09:22

## 2024-11-15 NOTE — CASE MANAGEMENT/SOCIAL WORK
Discharge Planning Assessment  [facility]     Patient Name: Jeanie Grossman  MRN: [id number]  Today's Date: 11/15/2024    Admit Date: 11/14/2024        Discharge Needs Assessment       Row Name 11/15/24 1522       Living Environment    People in Home alone    Current Living Arrangements home    Potentially Unsafe Housing Conditions none    Primary Care Provided by self    Provides Primary Care For no one    Family Caregiver if Needed sibling(s)    Quality of Family Relationships helpful;involved;supportive    Able to Return to Prior Arrangements yes       Resource/Environmental Concerns    Resource/Environmental Concerns financial       Transition Planning    Patient/Family Anticipates Transition to home    Patient/Family Anticipated Services at Transition none    Transportation Anticipated family or friend will provide       Discharge Needs Assessment    Readmission Within the Last 30 Days no previous admission in last 30 days    Concerns to be Addressed financial/insurance    Anticipated Changes Related to Illness none    Equipment Needed After Discharge none    Current Discharge Risk financial support inadequate    Discharge Coordination/Progress Spoke with pt about d/c needs. Pt lives at home alone and plans to return home at d/c. Rec'd a referral that pt is noncompliant with his medication. He states he does have rx coverage but it does not pay for the full cost. Asked which medication is the most expensive and that he is having a hard time affording and he says it is lasix and amiodarone. Checked for pt assistance programs for those but was not able to find any. Did find out that the cost of the medication without insurance is $4.00 for the lasix and $16.85 for the amiodarone at [facility]. Will follow.      Row Name 11/15/24 1512       Living Environment    People in Home alone    Current Living Arrangements home    Potentially Unsafe Housing Conditions none    Primary Care Provided by self    Provides Primary Care  For no one    Quality of Family Relationships helpful;involved;supportive    Able to Return to Prior Arrangements yes                   Discharge Plan    No documentation.                 Continued Care and Services - Admitted Since 11/14/2024    No active coordination exists for this encounter.          Demographic Summary    No documentation.                  Functional Status    No documentation.                  Psychosocial    No documentation.                  Abuse/Neglect    No documentation.                  Legal    No documentation.                  Substance Abuse    No documentation.                  Patient Forms    No documentation.                     LIZBETH Christianson

## 2024-11-15 NOTE — ED PROVIDER NOTES
Subjective   History of Present Illness  64-year-old male PMH DM, HTN, HLD, V. tach s/p pacemaker/defibrillator ([employer]), prostate cancer on radiation, presents for shortness of breath.  He reports symptoms been going on for at least a month.  Occasionally has chest pain with it but has not had chest pain today.  Patient denies any acute worsening but states symptoms have been going on for a while and he wanted to make sure nothing else was going on.  He is set to follow-up with his cardiologist for an outpatient ablation but does not have an appointment yet.  Denies any syncopal episodes or his defibrillator firing        Review of Systems   All other systems reviewed and are negative.      Past Medical History:   Diagnosis Date    CHF (congestive heart failure)     Diabetes mellitus     Hyperlipidemia     Hypertension     Hypertrophic cardiomyopathy     s/p AICD    Migraine     Prostate cancer     Tachycardia     Ventricular tachycardia     Ventricular tachycardia, sustained 10/14/2016       No Known Allergies    Past Surgical History:   Procedure Laterality Date    CARDIAC ABLATION  01/28/2016 2/2016, 10/18/2016 (Dr. Doss in [place], [place])    CARDIAC ABLATION      2018    CARDIAC CATHETERIZATION      CARDIAC DEFIBRILLATOR PLACEMENT      CARDIAC DEFIBRILLATOR PLACEMENT      PROSTATE BIOPSY  2019    PROSTATE BIOPSY  2023       Family History   Family history unknown: Yes       Social History     Socioeconomic History    Marital status: Single   Tobacco Use    Smoking status: Never   Vaping Use    Vaping status: Never Used   Substance and Sexual Activity    Alcohol use: No    Drug use: No    Sexual activity: Defer           Objective   Physical Exam  Vitals reviewed.   Constitutional:       Appearance: He is well-developed.   HENT:      Head: Normocephalic and atraumatic.   Eyes:      Extraocular Movements: Extraocular movements intact.   Cardiovascular:      Rate and Rhythm: Normal rate and regular rhythm.    Pulmonary:      Effort: Pulmonary effort is normal. No tachypnea, accessory muscle usage or respiratory distress.      Breath sounds: Normal breath sounds.   Abdominal:      Palpations: Abdomen is soft.   Musculoskeletal:         General: Normal range of motion.      Cervical back: Normal range of motion and neck supple.   Skin:     General: Skin is warm.   Neurological:      General: No focal deficit present.      Mental Status: He is alert and oriented to person, place, and time.   Psychiatric:         Mood and Affect: Mood normal.         Behavior: Behavior normal.         Procedures           ED Course  ED Course as of 11/15/24 0242   u Nov 14, 2024   2135 EKG 2110  Read at 2111   [AN]   Fri Nov 15, 2024   0007 BNP 8428-similar to his prior from 1 month ago.  CBC grossly similar.  Initial high sensitive troponin 48, second 44.  Delta troponin negative for  Lipase normal [AN]   0223 Patient desatted to 88% on room air.  Given pulmonary edema, lower extremity swelling and hypoxia with ambulation will admit for further diuresis.    Lasix 80 mg IVP received in the ED. [AN]      ED Course User Index  [AN] Donaldo Mathias, DO                                                       Medical Decision Making  64-year-old male presented for shortness of breath.     ED exam was notable for bilateral lower extremity edema.  He maintained normal O2 sats at rest.  Chest x-ray did have findings of edema but not significantly changed from previous chest x-ray.    However on ambulatory pulse ox patient desatted to 88%.    BNP elevated above 8000.  He is currently taking Lasix 40 mg p.o. twice daily.  Lasix 80 mg IV given  in the ED    Case discussed with the hospitalist who will admit the patient for acute CHF exacerbation    Problems Addressed:  Acute on chronic congestive heart failure, unspecified heart failure type: complicated acute illness or injury    Amount and/or Complexity of Data Reviewed  Labs: ordered.  Radiology:  ordered.    Risk  Prescription drug management.  Decision regarding hospitalization.        Final diagnoses:   Acute on chronic congestive heart failure, unspecified heart failure type       ED Disposition  ED Disposition       ED Disposition   Decision to Admit    Condition   --    Comment   --               No follow-up provider specified.       Medication List      No changes were made to your prescriptions during this visit.            Donaldo Mathias DO  11/15/24 0243

## 2024-11-15 NOTE — PROGRESS NOTES
Patient evaluated and admitted by night physician.    64-year-old male with history of hypertension, hyperlipidemia, chronic cardiomyopathy with systolic dysfunction, atrial fibrillation, prostate cancer, AICD in place, came to the hospital reporting worsening shortness of breath on exertion for the past 3 days.  He states that he has not been taking Lasix as it makes him urinate too much. Associated edema of the lower extremities.    Sodium 142.  Potassium 4.1.  Creatinine 1.07, glucose 81; troponin 48 and 44  proBNP above 8000  Hemoglobin 12.8.    Echocardiogram November 2023 report  LV cavity size is moderately dilated with akinesis of the   apex, mid and distal septum, as well as basal infero-   septum wall (multivessel CAD). LVEF estimated 40% by   Duckworth's method (visually around 35%). There is a small   0.8*1.0 cm protruding apical thrombus.  LA is normal.   Normal RV cavity size and function. Mild MR. Mild-moderate   AR. No adequate TR to estimate PA pressure. Grade III   diastolic function with elevated LV filling pressures.   Dilated inferior vena cava. Normal aorta. No prior study.       Continue Lasix 40 mg IV every 12 hours.  Amiodarone 200 mg p.o. twice a day, Eliquis 5 mg p.o. twice a day  Continue carvedilol, Jardiance, losartan  Continue Crestor  With patient daily, monitor intake and output.  Echocardiogram requested

## 2024-11-15 NOTE — H&P
[facility] Medicine Services  HISTORY AND PHYSICAL    Date of Admission: 11/14/2024  Primary Care Physician: Conor Denny DO Subjective   Primary Historian: Patient    Chief Complaint: Shortness of breath    History of Present Illness  The patient is a 64-year-old male with a past medical history of hyperlipidemia, hypertension, hypertrophic cardiomyopathy, chronic systolic congestive heart failure, atrial fibrillation/atrial flutter, migraines, prostate cancer, ventricular tachycardia s/p ICD defibrillator, and chronic elevated troponin.  He presents with complaints of worsening shortness of breath and fatigue of 1 month duration.    Patient states that he has not been compliant with his diuretic medications due to frequent need to urinate which interferes with his activities like driving.  He has noticed worsening shortness of breath particular on exertion over the last 1 month along with fatigue.  He also has worsening pedal edema.  He denies persistent chest pain, cough, fevers or chills.  On account of his symptoms, he came to the emergency room.  His chest x-ray was consistent with mild interstitial edema. proBNP was elevated at 8428. Troponin was mildly elevated with a flat trend. Received IV Lasix 80 mg x 1 and was recommended for admission.    Review of Systems   Otherwise complete ROS reviewed and negative except as mentioned in the HPI.    Past Medical History:   Past Medical History:   Diagnosis Date    CHF (congestive heart failure)     Diabetes mellitus     Hyperlipidemia     Hypertension     Hypertrophic cardiomyopathy     s/p AICD    Migraine     Prostate cancer     Tachycardia     Ventricular tachycardia     Ventricular tachycardia, sustained 10/14/2016     Past Surgical History:  Past Surgical History:   Procedure Laterality Date    CARDIAC ABLATION  01/28/2016 2/2016, 10/18/2016 (Dr. Doss in [place], [place])    CARDIAC ABLATION      2018     CARDIAC CATHETERIZATION      CARDIAC DEFIBRILLATOR PLACEMENT      CARDIAC DEFIBRILLATOR PLACEMENT      PROSTATE BIOPSY  2019    PROSTATE BIOPSY  2023     Social History:  reports that he has never smoked. He does not have any smokeless tobacco history on file. He reports that he does not drink alcohol and does not use drugs.    Family History: Family history is unknown by patient.       Allergies:  No Known Allergies    Medications:  Prior to Admission medications    Medication Sig Start Date End Date Taking? Authorizing Provider   amiodarone (PACERONE) 200 MG tablet Take 1 tablet by mouth 2 (Two) Times a Day. 11/10/24   Chalo Rodas MD   apixaban (ELIQUIS) 5 MG tablet tablet Take 1 tablet by mouth 2 (Two) Times a Day. 10/15/24   Chalo Rodas MD   aspirin 325 MG tablet Take 1 tablet by mouth Daily.    Provider, MD Fabiola   carvedilol (COREG) 25 MG tablet Take 1 tablet by mouth 2 (Two) Times a Day With Meals. 10/15/24   Chalo Rodas MD   empagliflozin (JARDIANCE) 10 MG tablet tablet Take 1 tablet by mouth Daily. 10/16/24   Ian Rachel MD   furosemide (Lasix) 40 MG tablet Take 1 tablet by mouth 2 (Two) Times a Day for 30 days. 10/16/24 11/15/24  Ian Rachel MD   losartan (Cozaar) 25 MG tablet Take 1 tablet by mouth Daily for 30 days. 10/16/24 11/15/24  Ian Rachel MD   mexiletine (MEXITIL) 150 MG capsule Take 1 capsule by mouth 2 (Two) Times a Day. 10/15/24   Chalo Rodas MD   potassium chloride (KLOR-CON M20) 20 MEQ CR tablet Take 1 tablet by mouth Daily for 30 days. 10/16/24 11/15/24  Ian Rachel MD   rosuvastatin (CRESTOR) 10 MG tablet Take 1 tablet by mouth Every Night for 30 days. 10/16/24 11/15/24  Ian Rachel MD     I have utilized all available immediate resources to obtain, update, or review the patient's current medications (including all prescriptions, over-the-counter products, herbals, cannabis/cannabidiol products, and vitamin/mineral/dietary (nutritional)  "supplements).    Objective     Vital Signs: BP (!) 188/92   Pulse 67   Temp 98.3 °F (36.8 °C) (Oral)   Resp 18   Ht 190.5 cm (75\")   Wt 134 kg (295 lb 1.6 oz)   SpO2 93% Comment: sitting  BMI 36.88 kg/m²   Physical Exam  Constitutional:       General: He is not in acute distress.     Appearance: He is well-developed. He is obese. He is not diaphoretic.   HENT:      Head: Normocephalic.   Eyes:      General: No scleral icterus.     Conjunctiva/sclera: Conjunctivae normal.      Pupils: Pupils are equal, round, and reactive to light.   Neck:      Thyroid: No thyromegaly.      Vascular: No JVD.      Trachea: No tracheal deviation.   Cardiovascular:      Rate and Rhythm: Normal rate and regular rhythm.      Heart sounds: Normal heart sounds. No murmur heard.     No friction rub. No gallop.   Pulmonary:      Effort: Pulmonary effort is normal. No respiratory distress.      Breath sounds: No stridor. No wheezing or rales.      Comments: Reduced breath sounds globally with few basal fine crepitations.  Chest:      Chest wall: No tenderness.   Abdominal:      General: Bowel sounds are normal. There is no distension.      Palpations: Abdomen is soft. There is no mass.      Tenderness: There is no abdominal tenderness. There is no guarding or rebound.      Hernia: No hernia is present.   Musculoskeletal:         General: No tenderness or deformity. Normal range of motion.      Cervical back: Normal range of motion and neck supple.      Right lower leg: Edema present.      Left lower leg: Edema present.   Lymphadenopathy:      Cervical: No cervical adenopathy.   Skin:     General: Skin is warm and dry.      Coloration: Skin is not pale.      Findings: No erythema or rash.   Neurological:      General: No focal deficit present.      Mental Status: He is alert and oriented to person, place, and time.      Cranial Nerves: No cranial nerve deficit.      Sensory: No sensory deficit.      Motor: No abnormal muscle tone.      " Coordination: Coordination normal.   Psychiatric:         Mood and Affect: Mood normal.         Behavior: Behavior normal.         Thought Content: Thought content normal.        Results Reviewed:  Lab Results (last 24 hours)       Procedure Component Value Units Date/Time    High Sensitivity Troponin T 2Hr [[id number]]  (Abnormal) Collected: 11/14/24 2335    Specimen: Blood Updated: 11/15/24 0004     HS Troponin T 44 ng/L      Troponin T Delta -4 ng/L     Narrative:      High Sensitive Troponin T Reference Range:  <14.0 ng/L- Negative Female for AMI  <22.0 ng/L- Negative Male for AMI  >=14 - Abnormal Female indicating possible myocardial injury.  >=22 - Abnormal Male indicating possible myocardial injury.   Clinicians would have to utilize clinical acumen, EKG, Troponin, and serial changes to determine if it is an Acute Myocardial Infarction or myocardial injury due to an underlying chronic condition.         BNP [[id number]]  (Abnormal) Collected: 11/14/24 2132    Specimen: Blood Updated: 11/14/24 2211     proBNP 8,428.0 pg/mL     Narrative:      This assay is used as an aid in the diagnosis of individuals suspected of having heart failure. It can be used as an aid in the diagnosis of acute decompensated heart failure (ADHF) in patients presenting with signs and symptoms of ADHF to the emergency department (ED). In addition, NT-proBNP of <300 pg/mL indicates ADHF is not likely.    Age Range Result Interpretation  NT-proBNP Concentration (pg/mL:      <50             Positive            >450                   Gray                 300-450                    Negative             <300    50-75           Positive            >900                  Gray                300-900                  Negative            <300      >75             Positive            >1800                  Gray                300-1800                  Negative            <300    High Sensitivity Troponin T [[id number]]  (Abnormal) Collected: 11/14/24  2132    Specimen: Blood Updated: 11/14/24 2210     HS Troponin T 48 ng/L     Narrative:      High Sensitive Troponin T Reference Range:  <14.0 ng/L- Negative Female for AMI  <22.0 ng/L- Negative Male for AMI  >=14 - Abnormal Female indicating possible myocardial injury.  >=22 - Abnormal Male indicating possible myocardial injury.   Clinicians would have to utilize clinical acumen, EKG, Troponin, and serial changes to determine if it is an Acute Myocardial Infarction or myocardial injury due to an underlying chronic condition.         Comprehensive Metabolic Panel [[id number]] Collected: 11/14/24 2132    Specimen: Blood Updated: 11/14/24 2208     Glucose 85 mg/dL      BUN 9 mg/dL      Creatinine 1.06 mg/dL      Sodium 140 mmol/L      Potassium 4.2 mmol/L      Comment: Slight hemolysis detected by analyzer. Result may be falsely elevated.        Chloride 105 mmol/L      CO2 26.0 mmol/L      Calcium 9.0 mg/dL      Total Protein 7.1 g/dL      Albumin 4.0 g/dL      ALT (SGPT) 13 U/L      AST (SGOT) 18 U/L      Alkaline Phosphatase 75 U/L      Total Bilirubin 1.1 mg/dL      Globulin 3.1 gm/dL      A/G Ratio 1.3 g/dL      BUN/Creatinine Ratio 8.5     Anion Gap 9.0 mmol/L      eGFR 77.9 mL/min/1.73     Narrative:      GFR Normal >60  Chronic Kidney Disease <60  Kidney Failure <15      Lipase [[id number]]  (Abnormal) Collected: 11/14/24 2132    Specimen: Blood Updated: 11/14/24 2203     Lipase 12 U/L     [place] Draw [[id number]] Collected: 11/14/24 2132    Specimen: Blood Updated: 11/14/24 2200    Narrative:      The following orders were created for panel order [place] Draw.  Procedure                               Abnormality         Status                     ---------                               -----------         ------                     Green Top (Gel)[[id number]]                                  Final result               Lavender Top[[id number]]                                     Final result               Red  Top[[id number]]                                          Final result               Light Blue Top[[id number]]                                   Final result                 Please view results for these tests on the individual orders.    [facility] Urine - Urine, Clean Catch [[id number]] Collected: 11/14/24 2149    Specimen: Urine, Clean Catch Updated: 11/14/24 2200     Extra Tube Hold for add-ons.     Comment: Auto resulted.       Lavender Top [[id number]] Collected: 11/14/24 2132    Specimen: Blood Updated: 11/14/24 2200     Extra Tube hold for add-on     Comment: Auto resulted       Light Blue Top [[id number]] Collected: 11/14/24 2132    Specimen: Blood Updated: 11/14/24 2200     Extra Tube Hold for add-ons.     Comment: Auto resulted       Green Top (Gel) [[id number]] Collected: 11/14/24 2132    Specimen: Blood Updated: 11/14/24 2200     Extra Tube Hold for add-ons.     Comment: Auto resulted.       Red Top [[id number]] Collected: 11/14/24 2132    Specimen: Blood Updated: 11/14/24 2200     Extra Tube Hold for add-ons.     Comment: Auto resulted.       CBC & Differential [[id number]]  (Abnormal) Collected: 11/14/24 2132    Specimen: Blood Updated: 11/14/24 2150    Narrative:      The following orders were created for panel order CBC & Differential.  Procedure                               Abnormality         Status                     ---------                               -----------         ------                     CBC Auto Differential[[id number]]        Abnormal            Final result                 Please view results for these tests on the individual orders.    CBC Auto Differential [[id number]]  (Abnormal) Collected: 11/14/24 2132    Specimen: Blood Updated: 11/14/24 2150     WBC 3.63 10*3/mm3      RBC 4.02 10*6/mm3      Hemoglobin 12.8 g/dL      Hematocrit 39.6 %      MCV 98.5 fL      MCH 31.8 pg      MCHC 32.3 g/dL      RDW 17.0 %      RDW-SD 60.9 fl      MPV 10.6 fL      Platelets 204 10*3/mm3      Neutrophil  % 77.6 %      Lymphocyte % 11.8 %      Monocyte % 8.0 %      Eosinophil % 1.4 %      Basophil % 0.6 %      Immature Grans % 0.6 %      Neutrophils, Absolute 2.82 10*3/mm3      Lymphocytes, Absolute 0.43 10*3/mm3      Monocytes, Absolute 0.29 10*3/mm3      Eosinophils, Absolute 0.05 10*3/mm3      Basophils, Absolute 0.02 10*3/mm3      Immature Grans, Absolute 0.02 10*3/mm3      nRBC 1.1 /100 WBC           Imaging Results (Last 24 Hours)       Procedure Component Value Units Date/Time    XR Chest 1 View [[id number]] Collected: 11/14/24 2155     Updated: 11/14/24 2159    Narrative:      XR CHEST 1 VW- 11/14/2024 8:52 PM     HISTORY: shortness of breath       COMPARISON: Chest x-ray dated 10/14/2024     FINDINGS:  Upright frontal radiograph of the chest was obtained        Cardiomegaly. Bilateral interstitial coarsening. No airspace opacities,  consolidation or pleural effusion. No pneumothorax. Left chest wall  AICD. No acute bony abnormality.       Impression:      1.  Cardiomegaly with mild interstitial edema. No consolidation or  obvious pleural effusions. Lungs are well expanded.     This report was signed and finalized on 11/14/2024 9:56 PM by Dr Rafat Thomas.             I have personally reviewed and interpreted the radiology studies and ECG obtained at time of admission.     Assessment / Plan   Assessment:   Active Hospital Problems    Diagnosis     **Acute on chronic HFrEF (heart failure with reduced ejection fraction)     CHF exacerbation     Atrial flutter     Type 2 diabetes mellitus without complication     Essential hypertension      Treatment Plan  The patient will be admitted to my service here at [facility].  Patient has been noncompliant with medications and he was counseled.  Will start IV Lasix 40 mg every 12 hours for CHF.  Daily weights and strict input and output charting.  Continue aspirin, Coreg and losartan for essential hypertension and CHF.    Continue medications for atrial  fibrillation/atrial flutter with p.o. Amio and Mexitil.  Continue anticoagulation with Eliquis.    Insulin sliding scale for type 2 diabetes mellitus    DVT prophylaxis with Eliquis    CODE STATUS is full code     Medical Decision Making  Number and Complexity of problems: 2 acute, moderate severity, moderate complexity medical problems.  Differential Diagnosis: None    Conditions and Status        Condition is unchanged.     [facility] Data  External documents reviewed: None  Cardiac tracing (EKG, telemetry) interpretation: Normal sinus rhythm  Radiology interpretation:   Labs reviewed: CBC, BMP reviewed by me  Any tests that were considered but not ordered: None     Decision rules/scores evaluated (example [id number], Wells, etc): [id number] score of 4      Discussed with: Patient     Care Planning  Shared decision making: Patient and his brother Uri Grossman  Code status and discussions: CODE STATUS is full code    Disposition  Social Determinants of Health that impact treatment or disposition: None  Estimated length of stay is 2 to 5 days    I confirmed that the patient's advanced care plan is present, code status is documented, and a surrogate decision maker is listed in the patient's medical record.     The patient's surrogate decision maker is his brother Uri Grossman    The patient was seen and examined by me on 11/15/2024 at 3 AM    Electronically signed by Rangel Rob MD, 11/15/24, 02:43 CST.

## 2024-11-15 NOTE — ED NOTES
O2 walking test on room air: 88% while walking, had to stop multiple times. After 1 min rest, O2 93%

## 2024-11-16 LAB
ANION GAP SERPL CALCULATED.3IONS-SCNC: 8 MMOL/L (ref 5–15)
BASOPHILS # BLD AUTO: 0.02 10*3/MM3 (ref 0–0.2)
BASOPHILS NFR BLD AUTO: 0.6 % (ref 0–1.5)
BUN SERPL-MCNC: 14 MG/DL (ref 8–23)
BUN/CREAT SERPL: 11.9 (ref 7–25)
CALCIUM SPEC-SCNC: 8.9 MG/DL (ref 8.6–10.5)
CHLORIDE SERPL-SCNC: 104 MMOL/L (ref 98–107)
CO2 SERPL-SCNC: 27 MMOL/L (ref 22–29)
CREAT SERPL-MCNC: 1.18 MG/DL (ref 0.76–1.27)
DEPRECATED RDW RBC AUTO: 58.8 FL (ref 37–54)
EGFRCR SERPLBLD CKD-EPI 2021: 68.5 ML/MIN/1.73
EOSINOPHIL # BLD AUTO: 0.08 10*3/MM3 (ref 0–0.4)
EOSINOPHIL NFR BLD AUTO: 2.4 % (ref 0.3–6.2)
ERYTHROCYTE [DISTWIDTH] IN BLOOD BY AUTOMATED COUNT: 17 % (ref 12.3–15.4)
GLUCOSE SERPL-MCNC: 104 MG/DL (ref 65–99)
HCT VFR BLD AUTO: 37.3 % (ref 37.5–51)
HGB BLD-MCNC: 12.5 G/DL (ref 13–17.7)
IMM GRANULOCYTES # BLD AUTO: 0.01 10*3/MM3 (ref 0–0.05)
IMM GRANULOCYTES NFR BLD AUTO: 0.3 % (ref 0–0.5)
LYMPHOCYTES # BLD AUTO: 0.62 10*3/MM3 (ref 0.7–3.1)
LYMPHOCYTES NFR BLD AUTO: 18.6 % (ref 19.6–45.3)
MCH RBC QN AUTO: 32.3 PG (ref 26.6–33)
MCHC RBC AUTO-ENTMCNC: 33.5 G/DL (ref 31.5–35.7)
MCV RBC AUTO: 96.4 FL (ref 79–97)
MONOCYTES # BLD AUTO: 0.4 10*3/MM3 (ref 0.1–0.9)
MONOCYTES NFR BLD AUTO: 12 % (ref 5–12)
NEUTROPHILS NFR BLD AUTO: 2.2 10*3/MM3 (ref 1.7–7)
NEUTROPHILS NFR BLD AUTO: 66.1 % (ref 42.7–76)
NRBC BLD AUTO-RTO: 0.6 /100 WBC (ref 0–0.2)
PLATELET # BLD AUTO: 198 10*3/MM3 (ref 140–450)
PMV BLD AUTO: 10 FL (ref 6–12)
POTASSIUM SERPL-SCNC: 3.9 MMOL/L (ref 3.5–5.2)
QT INTERVAL: 456 MS
QTC INTERVAL: 470 MS
RBC # BLD AUTO: 3.87 10*6/MM3 (ref 4.14–5.8)
SODIUM SERPL-SCNC: 139 MMOL/L (ref 136–145)
WBC NRBC COR # BLD AUTO: 3.33 10*3/MM3 (ref 3.4–10.8)

## 2024-11-16 PROCEDURE — 87077 CULTURE AEROBIC IDENTIFY: CPT | Performed by: FAMILY MEDICINE

## 2024-11-16 PROCEDURE — 87070 CULTURE OTHR SPECIMN AEROBIC: CPT | Performed by: FAMILY MEDICINE

## 2024-11-16 PROCEDURE — 25010000002 FUROSEMIDE PER 20 MG: Performed by: INTERNAL MEDICINE

## 2024-11-16 PROCEDURE — 80048 BASIC METABOLIC PNL TOTAL CA: CPT | Performed by: INTERNAL MEDICINE

## 2024-11-16 PROCEDURE — 87186 SC STD MICRODIL/AGAR DIL: CPT | Performed by: FAMILY MEDICINE

## 2024-11-16 PROCEDURE — 36415 COLL VENOUS BLD VENIPUNCTURE: CPT | Performed by: INTERNAL MEDICINE

## 2024-11-16 PROCEDURE — 85025 COMPLETE CBC W/AUTO DIFF WBC: CPT | Performed by: INTERNAL MEDICINE

## 2024-11-16 PROCEDURE — 87205 SMEAR GRAM STAIN: CPT | Performed by: FAMILY MEDICINE

## 2024-11-16 PROCEDURE — 25010000002 FUROSEMIDE PER 20 MG: Performed by: FAMILY MEDICINE

## 2024-11-16 RX ORDER — FUROSEMIDE 10 MG/ML
20 INJECTION INTRAMUSCULAR; INTRAVENOUS EVERY 12 HOURS
Status: DISCONTINUED | OUTPATIENT
Start: 2024-11-16 | End: 2024-11-16

## 2024-11-16 RX ORDER — FUROSEMIDE 10 MG/ML
40 INJECTION INTRAMUSCULAR; INTRAVENOUS EVERY 12 HOURS
Status: DISCONTINUED | OUTPATIENT
Start: 2024-11-16 | End: 2024-11-17

## 2024-11-16 RX ORDER — DOXYCYCLINE 100 MG/1
100 TABLET ORAL EVERY 12 HOURS SCHEDULED
Status: DISCONTINUED | OUTPATIENT
Start: 2024-11-16 | End: 2024-11-18 | Stop reason: HOSPADM

## 2024-11-16 RX ADMIN — Medication 10 ML: at 21:26

## 2024-11-16 RX ADMIN — FUROSEMIDE 40 MG: 10 INJECTION, SOLUTION INTRAVENOUS at 17:26

## 2024-11-16 RX ADMIN — DOXYCYCLINE 100 MG: 100 TABLET ORAL at 21:26

## 2024-11-16 RX ADMIN — ACETAMINOPHEN 650 MG: 325 TABLET, FILM COATED ORAL at 13:55

## 2024-11-16 RX ADMIN — AMIODARONE HYDROCHLORIDE 200 MG: 200 TABLET ORAL at 17:26

## 2024-11-16 RX ADMIN — ROSUVASTATIN 10 MG: 10 TABLET, FILM COATED ORAL at 21:26

## 2024-11-16 RX ADMIN — DOXYCYCLINE 100 MG: 100 TABLET ORAL at 11:17

## 2024-11-16 RX ADMIN — AMIODARONE HYDROCHLORIDE 200 MG: 200 TABLET ORAL at 05:08

## 2024-11-16 RX ADMIN — EMPAGLIFLOZIN 10 MG: 10 TABLET, FILM COATED ORAL at 09:10

## 2024-11-16 RX ADMIN — DOCUSATE SODIUM 50 MG AND SENNOSIDES 8.6 MG 2 TABLET: 8.6; 5 TABLET, FILM COATED ORAL at 09:10

## 2024-11-16 RX ADMIN — FUROSEMIDE 40 MG: 10 INJECTION, SOLUTION INTRAVENOUS at 05:08

## 2024-11-16 RX ADMIN — LOSARTAN POTASSIUM 25 MG: 25 TABLET, FILM COATED ORAL at 09:10

## 2024-11-16 RX ADMIN — Medication 10 ML: at 09:11

## 2024-11-16 RX ADMIN — POLYETHYLENE GLYCOL 3350 17 G: 17 POWDER, FOR SOLUTION ORAL at 09:10

## 2024-11-16 RX ADMIN — MEXILETINE HYDROCHLORIDE 150 MG: 150 CAPSULE ORAL at 21:26

## 2024-11-16 RX ADMIN — CARVEDILOL 25 MG: 6.25 TABLET, FILM COATED ORAL at 17:34

## 2024-11-16 RX ADMIN — MEXILETINE HYDROCHLORIDE 150 MG: 150 CAPSULE ORAL at 11:17

## 2024-11-16 RX ADMIN — APIXABAN 5 MG: 5 TABLET, FILM COATED ORAL at 05:08

## 2024-11-16 RX ADMIN — ASPIRIN 325 MG: 325 TABLET, FILM COATED ORAL at 09:10

## 2024-11-16 RX ADMIN — APIXABAN 5 MG: 5 TABLET, FILM COATED ORAL at 17:26

## 2024-11-16 RX ADMIN — CARVEDILOL 25 MG: 6.25 TABLET, FILM COATED ORAL at 09:10

## 2024-11-16 NOTE — PLAN OF CARE
Goal Outcome Evaluation:      Patient A/O x4 this shift. Patient standby assist. Patient spent the night eating food and using the urinal. Patient states he feels cold all the time despite many warm blankets on at all times. No unaddressed needs in the room this shift. Will update oncoming dayshift nurse at bedside shift report in the a.m. as appropriate. IPASS updated.

## 2024-11-16 NOTE — PLAN OF CARE
Goal Outcome Evaluation:  Plan of Care Reviewed With: patient           Outcome Evaluation: Pt arrived from ED, AxOx4, BLE edema noted, Pulses assessed using doppler.  Wounds / blisters on BLE, pictures taken and wound consult placed.  Lasix given, pt uses urinal.  Call light within reach.

## 2024-11-16 NOTE — PLAN OF CARE
Goal Outcome Evaluation:           Progress: no change  Outcome Evaluation: Pt A&Ox4, VSS, pt up ad homero in room, voiding without difficulty in urinal, wound culture taken of L Lower leg, drsg's applied to BLLE per orders with wound care, up to chair, elevated legs, reminded and ecouraged pt to keep legs elevated to help swelling come down, IV lasix given, plan of care ongoing.  I had also found a home med pill bottle in bed when changing the sheets and it is locked up in pharmacy.

## 2024-11-16 NOTE — PROGRESS NOTES
[facility] Medicine Services  INPATIENT PROGRESS NOTE    Patient Name: Jeanie Grossman  Date of Admission: 11/14/2024  Today's Date: 11/16/24  Length of Stay: 1  Primary Care Physician: Conor Denny DO    Subjective   Chief Complaint: Edema and fatigue  HPI   64-year-old male with history of hypertension, hyperlipidemia, chronic cardiomyopathy with systolic dysfunction, atrial fibrillation, prostate cancer, AICD in place, came to the hospital reporting worsening shortness of breath on exertion for the past 3 days.  He states that he has not been taking Lasix as it makes him urinate too much. Associated edema of the lower extremities.     Today, patient reports feeling much better.  Shortness of breath and fatigue have improved.  Still with edema of the lower extremities with associated wounds that are more prominent on the left leg.  Today, patient reported he has not been taking medications as prescribed.    Negative fluid balance 6,975 mL  Weight decreased.  Reported to 295 lbs on admission and today 264 lbs    Review of Systems   All pertinent negatives and positives are as above. All other systems have been reviewed and are negative unless otherwise stated.     Objective    Temp:  [98 °F (36.7 °C)-98.8 °F (37.1 °C)] 98.8 °F (37.1 °C)  Heart Rate:  [] 58  Resp:  [20] 20  BP: (121-158)/(69-99) 158/77  Physical Exam  Constitutional:       Appearance: Normal appearance.  Alert oriented x 3 no respiratory distress.  HENT:      Head: Normocephalic and atraumatic.      Nose: Nose normal.      Mouth/Throat:      Mouth: Mucous membranes are moist.   Eyes:      Extraocular Movements: Extraocular movements intact.      Conjunctiva/sclera: Conjunctivae normal.   Cardiovascular:      Rate and Rhythm: Normal rate and regular rhythm.      Pulses: Normal pulses.   Pulmonary:      Effort: No respiratory distress.      Breath sounds: Decreased breath sounds in bases.  No rales.  No  "wheezes  Abdominal:      General: Abdomen is flat. Bowel sounds are normal.      Palpations: Abdomen is soft.   Extremities: Lateral lower extremity edema 3+.  Both legs with circular round subcentimeter wounds, on the left leg oozing serous and purulent discharge  Skin: See extremity examination.     Capillary Refill: Capillary refill takes less than 2 seconds.      Coloration: Skin is not jaundiced.   Neurological:      General: No focal deficit present.      Mental Status: Patient is alert, oriented to place time and person.     Motor: No weakness.   Psychiatric:         Mood and Affect: Mood normal.         Behavior: Behavior normal.           Results Review:  I have reviewed the labs, radiology results, and diagnostic studies.    Laboratory Data:   Results from last 7 days   Lab Units 11/16/24  0527 11/14/24  2132   WBC 10*3/mm3 3.33* 3.63   HEMOGLOBIN g/dL 12.5* 12.8*   HEMATOCRIT % 37.3* 39.6   PLATELETS 10*3/mm3 198 204        Results from last 7 days   Lab Units 11/16/24  0527 11/15/24  0602 11/14/24  2132   SODIUM mmol/L 139 142 140   POTASSIUM mmol/L 3.9 4.1 4.2   CHLORIDE mmol/L 104 104 105   CO2 mmol/L 27.0 27.0 26.0   BUN mg/dL 14 9 9   CREATININE mg/dL 1.18 1.07 1.06   CALCIUM mg/dL 8.9 9.2 9.0   BILIRUBIN mg/dL  --   --  1.1   ALK PHOS U/L  --   --  75   ALT (SGPT) U/L  --   --  13   AST (SGOT) U/L  --   --  18   GLUCOSE mg/dL 104* 81 85       Culture Data:   No results found for: \"BLOODCX\", \"URINECX\", \"WOUNDCX\", \"MRSACX\", \"RESPCX\", \"STOOLCX\"    Radiology Data:   Imaging Results (Last 24 Hours)       ** No results found for the last 24 hours. **            I have reviewed the patient's current medications.     Assessment/Plan   Assessment  Active Hospital Problems    Diagnosis     **Acute on chronic HFrEF (heart failure with reduced ejection fraction)     CHF exacerbation     Atrial flutter     Type 2 diabetes mellitus without complication     Essential hypertension        Acute on chronic heart " failure  Chronic combined cardiomyopathy ejection fraction 36 to 40%  Poor medical compliance  Bilateral lower extremity cellulitis  Paroxysmal atrial flutter/fibrillation  Chronic anticoagulation  Diabetes mellitus type 2  Hypertension  AICD status  Hyperlipidemia  Prostate cancer         Echocardiogram November 2023 report  LV cavity size is moderately dilated with akinesis of the   apex, mid and distal septum, as well as basal infero-   septum wall (multivessel CAD). LVEF estimated 40% by   Duckworth's method (visually around 35%). There is a small   0.8*1.0 cm protruding apical thrombus.  LA is normal.   Normal RV cavity size and function. Mild MR. Mild-moderate   AR. No adequate TR to estimate PA pressure. Grade III   diastolic function with elevated LV filling pressures.   Dilated inferior vena cava. Normal aorta. No prior study.       Echocardiogram 11/15/2024    Left ventricular systolic function is moderately decreased. Left ventricular ejection fraction appears to be 36 - 40%.    The following left ventricular wall segments are hypokinetic: mid inferolateral, basal inferoseptal and basal inferoseptal. The following left ventricular wall segments are akinetic: mid anterolateral, apical lateral, apical inferior, mid inferior, apical septal and apex.    Moderate to severe aortic valve regurgitation is present.    Left ventricular wall thickness is consistent with moderate concentric hypertrophy.    The left atrial cavity is dilated.        Treatment Plan  Continue Lasix 40 mg IV every 12 hours.  Patient daily.  Strict intake and output measurement.    He is on amiodarone 200 mg p.o. twice a day.  Continue Eliquis 5 mg p.o. twice a day  On aspirin 325 p.o. daily  Continue beta-blocker, carvedilol 25 p.o. twice daily  Continue Jardiance, losartan  Continue Crestor    Start doxycycline for lower extremity cellulitis superimposed on significant edema.  Wound culture.  Wound care evaluation.    Will continue medical  management at least for the next 24 hours, and will reassess.  He will likely require wound care clinic follow-up and referral will be made.  Advised patient to follow closely with cardiology/heart failure clinic and I advised him to follow medical treatment as prescribed, to avoid hospital admissions.      Medical Decision Making  Number and Complexity of problems: More than 10, moderate to high complexity  Differential Diagnosis: See above    Conditions and Status        Condition is improving.     MDM Data  External documents reviewed: No  Cardiac tracing (EKG, telemetry) interpretation: Sinus rhythm  Radiology interpretation: Radiology reports reviewed  Labs reviewed: Yes  Any tests that were considered but not ordered: No     Decision rules/scores evaluated (example [id number], Wells, etc): See chart     Discussed with: Patient and nurse     Care Planning  Shared decision making: With patient  Code status and discussions: Full code    Disposition  Social Determinants of Health that impact treatment or disposition: Poor medication compliance, poor follow-up  I expect the patient to be discharged to home with home health in 1-2 days.         Electronically signed by Ian Rachel MD, 11/16/24, 09:59 CST.

## 2024-11-17 LAB
ANION GAP SERPL CALCULATED.3IONS-SCNC: 16 MMOL/L (ref 5–15)
BASOPHILS # BLD AUTO: 0.04 10*3/MM3 (ref 0–0.2)
BASOPHILS NFR BLD AUTO: 1.3 % (ref 0–1.5)
BUN SERPL-MCNC: 16 MG/DL (ref 8–23)
BUN/CREAT SERPL: 12.5 (ref 7–25)
CALCIUM SPEC-SCNC: 8.5 MG/DL (ref 8.6–10.5)
CHLORIDE SERPL-SCNC: 103 MMOL/L (ref 98–107)
CO2 SERPL-SCNC: 20 MMOL/L (ref 22–29)
CREAT SERPL-MCNC: 1.28 MG/DL (ref 0.76–1.27)
DEPRECATED RDW RBC AUTO: 66.3 FL (ref 37–54)
EGFRCR SERPLBLD CKD-EPI 2021: 62.1 ML/MIN/1.73
EOSINOPHIL # BLD AUTO: 0.07 10*3/MM3 (ref 0–0.4)
EOSINOPHIL NFR BLD AUTO: 2.4 % (ref 0.3–6.2)
ERYTHROCYTE [DISTWIDTH] IN BLOOD BY AUTOMATED COUNT: 17.5 % (ref 12.3–15.4)
GLUCOSE SERPL-MCNC: 102 MG/DL (ref 65–99)
HCT VFR BLD AUTO: 39.9 % (ref 37.5–51)
HGB BLD-MCNC: 12.4 G/DL (ref 13–17.7)
IMM GRANULOCYTES # BLD AUTO: 0.06 10*3/MM3 (ref 0–0.05)
IMM GRANULOCYTES NFR BLD AUTO: 2 % (ref 0–0.5)
LYMPHOCYTES # BLD AUTO: 0.54 10*3/MM3 (ref 0.7–3.1)
LYMPHOCYTES NFR BLD AUTO: 18.2 % (ref 19.6–45.3)
MCH RBC QN AUTO: 32.3 PG (ref 26.6–33)
MCHC RBC AUTO-ENTMCNC: 31.1 G/DL (ref 31.5–35.7)
MCV RBC AUTO: 103.9 FL (ref 79–97)
MONOCYTES # BLD AUTO: 0.32 10*3/MM3 (ref 0.1–0.9)
MONOCYTES NFR BLD AUTO: 10.8 % (ref 5–12)
NEUTROPHILS NFR BLD AUTO: 1.94 10*3/MM3 (ref 1.7–7)
NEUTROPHILS NFR BLD AUTO: 65.3 % (ref 42.7–76)
NRBC BLD AUTO-RTO: 0 /100 WBC (ref 0–0.2)
PLATELET # BLD AUTO: 172 10*3/MM3 (ref 140–450)
PMV BLD AUTO: 10.8 FL (ref 6–12)
POTASSIUM SERPL-SCNC: 4.1 MMOL/L (ref 3.5–5.2)
RBC # BLD AUTO: 3.84 10*6/MM3 (ref 4.14–5.8)
SODIUM SERPL-SCNC: 139 MMOL/L (ref 136–145)
WBC NRBC COR # BLD AUTO: 2.97 10*3/MM3 (ref 3.4–10.8)

## 2024-11-17 PROCEDURE — 25010000002 FUROSEMIDE PER 20 MG: Performed by: FAMILY MEDICINE

## 2024-11-17 PROCEDURE — 80048 BASIC METABOLIC PNL TOTAL CA: CPT | Performed by: FAMILY MEDICINE

## 2024-11-17 PROCEDURE — 85025 COMPLETE CBC W/AUTO DIFF WBC: CPT | Performed by: INTERNAL MEDICINE

## 2024-11-17 RX ORDER — SPIRONOLACTONE 25 MG/1
25 TABLET ORAL DAILY
Status: DISCONTINUED | OUTPATIENT
Start: 2024-11-17 | End: 2024-11-18 | Stop reason: HOSPADM

## 2024-11-17 RX ORDER — FUROSEMIDE 10 MG/ML
40 INJECTION INTRAMUSCULAR; INTRAVENOUS EVERY 8 HOURS
Status: DISCONTINUED | OUTPATIENT
Start: 2024-11-17 | End: 2024-11-18 | Stop reason: HOSPADM

## 2024-11-17 RX ADMIN — MEXILETINE HYDROCHLORIDE 150 MG: 150 CAPSULE ORAL at 20:04

## 2024-11-17 RX ADMIN — DOXYCYCLINE 100 MG: 100 TABLET ORAL at 20:04

## 2024-11-17 RX ADMIN — Medication 10 ML: at 08:50

## 2024-11-17 RX ADMIN — APIXABAN 5 MG: 5 TABLET, FILM COATED ORAL at 17:14

## 2024-11-17 RX ADMIN — CARVEDILOL 25 MG: 6.25 TABLET, FILM COATED ORAL at 08:49

## 2024-11-17 RX ADMIN — FUROSEMIDE 40 MG: 10 INJECTION, SOLUTION INTRAVENOUS at 20:05

## 2024-11-17 RX ADMIN — EMPAGLIFLOZIN 10 MG: 10 TABLET, FILM COATED ORAL at 08:49

## 2024-11-17 RX ADMIN — FUROSEMIDE 40 MG: 10 INJECTION, SOLUTION INTRAVENOUS at 06:05

## 2024-11-17 RX ADMIN — SPIRONOLACTONE 25 MG: 25 TABLET ORAL at 08:53

## 2024-11-17 RX ADMIN — AMIODARONE HYDROCHLORIDE 200 MG: 200 TABLET ORAL at 17:14

## 2024-11-17 RX ADMIN — LOSARTAN POTASSIUM 25 MG: 25 TABLET, FILM COATED ORAL at 08:49

## 2024-11-17 RX ADMIN — APIXABAN 5 MG: 5 TABLET, FILM COATED ORAL at 06:05

## 2024-11-17 RX ADMIN — Medication 10 ML: at 20:05

## 2024-11-17 RX ADMIN — AMIODARONE HYDROCHLORIDE 200 MG: 200 TABLET ORAL at 06:05

## 2024-11-17 RX ADMIN — MEXILETINE HYDROCHLORIDE 150 MG: 150 CAPSULE ORAL at 08:49

## 2024-11-17 RX ADMIN — ASPIRIN 325 MG: 325 TABLET, FILM COATED ORAL at 08:49

## 2024-11-17 RX ADMIN — DOXYCYCLINE 100 MG: 100 TABLET ORAL at 08:49

## 2024-11-17 RX ADMIN — FUROSEMIDE 40 MG: 10 INJECTION, SOLUTION INTRAVENOUS at 14:24

## 2024-11-17 RX ADMIN — ROSUVASTATIN 10 MG: 10 TABLET, FILM COATED ORAL at 20:04

## 2024-11-17 NOTE — PLAN OF CARE
Goal Outcome Evaluation:  Plan of Care Reviewed With: patient        Progress: no change  Outcome Evaluation: Pt A/Ox4. Pt repeatedly asked for drinks and snacks this shift. No c/o pain. Safety maintained. Call light in reach.

## 2024-11-17 NOTE — PLAN OF CARE
Goal Outcome Evaluation:  Plan of Care Reviewed With: patient        Progress: no change  Outcome Evaluation: Pt A&Ox4, VSS, pt up ad homero in room, voiding without difficulty in urinal, up to chair, elevated legs, reminded and ecouraged pt to keep legs elevated to help swelling come down, BLLE drsg CDI, IV lasix given, aldactone added on, plan of care ongoing. Including fluid restriction added.

## 2024-11-17 NOTE — PROGRESS NOTES
[facility] Medicine Services  INPATIENT PROGRESS NOTE    Patient Name: Jeanie Grossman  Date of Admission: 11/14/2024  Today's Date: 11/17/24  Length of Stay: 2  Primary Care Physician: Conor Denny DO    Subjective   Chief Complaint: Edema and fatigue  Shortness of Breath  Associated symptoms include chest pain.   Chest Pain   Associated symptoms include shortness of breath.   Fatigue  Associated symptoms include chest pain and fatigue.      64-year-old male with history of hypertension, hyperlipidemia, chronic cardiomyopathy with systolic dysfunction, atrial fibrillation, prostate cancer, AICD in place, came to the hospital reporting worsening shortness of breath on exertion for the past 3 days.  He states that he has not been taking Lasix as it makes him urinate too much. Associated edema of the lower extremities.     Today, patient reports feeling much better.  Shortness of breath and fatigue have improved.  Still with edema of the lower extremities with associated wounds that are more prominent on the left leg.  Patient reported he has not been taking medications as prescribed.    Negative fluid balance 3,110 mL  Weight decreased.  Reported to 295 lbs on admission and yesterday 264 lbs; today, no measurement has been performed.    Review of systems  All pertinent negatives and positives are as above. All other systems have been reviewed and are negative unless otherwise stated.     Objective    Temp:  [97.6 °F (36.4 °C)-98.6 °F (37 °C)] 98.6 °F (37 °C)  Heart Rate:  [50-57] 50  Resp:  [18-20] 18  BP: (125-151)/(63-91) 132/77  Physical Exam  Constitutional:       Appearance: Normal appearance.  Alert oriented x 3 no respiratory distress.  HENT:      Head: Normocephalic and atraumatic.      Nose: Nose normal.      Mouth/Throat:      Mouth: Mucous membranes are moist.   Eyes:      Extraocular Movements: Extraocular movements intact.      Conjunctiva/sclera: Conjunctivae  "normal.   Cardiovascular:      Rate and Rhythm: Normal rate and regular rhythm.      Pulses: Normal pulses.   Pulmonary:      Effort: No respiratory distress.      Breath sounds: Decreased breath sounds in bases.  No rales.  No wheezes  Abdominal:      General: Abdomen is flat. Bowel sounds are normal.      Palpations: Abdomen is soft.   Extremities: Lateral lower extremity edema 3+.  Both legs with circular round subcentimeter wounds, on the left leg oozing serous and purulent discharge  Skin: See extremity examination.     Capillary Refill: Capillary refill takes less than 2 seconds.      Coloration: Skin is not jaundiced.   Neurological:      General: No focal deficit present.      Mental Status: Patient is alert, oriented to place time and person.     Motor: No weakness.   Psychiatric:         Mood and Affect: Mood normal.         Behavior: Behavior normal.           Results Review:  I have reviewed the labs, radiology results, and diagnostic studies.    Laboratory Data:   Results from last 7 days   Lab Units 11/17/24  0525 11/16/24  0527 11/14/24  2132   WBC 10*3/mm3 2.97* 3.33* 3.63   HEMOGLOBIN g/dL 12.4* 12.5* 12.8*   HEMATOCRIT % 39.9 37.3* 39.6   PLATELETS 10*3/mm3 172 198 204        Results from last 7 days   Lab Units 11/17/24  0525 11/16/24  0527 11/15/24  0602 11/14/24  2132   SODIUM mmol/L 139 139 142 140   POTASSIUM mmol/L 4.1 3.9 4.1 4.2   CHLORIDE mmol/L 103 104 104 105   CO2 mmol/L 20.0* 27.0 27.0 26.0   BUN mg/dL 16 14 9 9   CREATININE mg/dL 1.28* 1.18 1.07 1.06   CALCIUM mg/dL 8.5* 8.9 9.2 9.0   BILIRUBIN mg/dL  --   --   --  1.1   ALK PHOS U/L  --   --   --  75   ALT (SGPT) U/L  --   --   --  13   AST (SGOT) U/L  --   --   --  18   GLUCOSE mg/dL 102* 104* 81 85       Culture Data:   No results found for: \"BLOODCX\", \"URINECX\", \"WOUNDCX\", \"MRSACX\", \"RESPCX\", \"STOOLCX\"    Radiology Data:   Imaging Results (Last 24 Hours)       ** No results found for the last 24 hours. **            I have " reviewed the patient's current medications.     Assessment/Plan   Assessment  Active Hospital Problems    Diagnosis     **Acute on chronic HFrEF (heart failure with reduced ejection fraction)     CHF exacerbation     Atrial flutter     Type 2 diabetes mellitus without complication     Essential hypertension        Acute on chronic heart failure  Chronic combined cardiomyopathy ejection fraction 36 to 40%  Poor medical compliance  Bilateral lower extremity cellulitis  Paroxysmal atrial flutter/fibrillation  Chronic anticoagulation  Diabetes mellitus type 2  Hypertension  AICD status  Hyperlipidemia  Prostate cancer        Echocardiogram 11/15/2024    Left ventricular systolic function is moderately decreased. Left ventricular ejection fraction appears to be 36 - 40%.    The following left ventricular wall segments are hypokinetic: mid inferolateral, basal inferoseptal and basal inferoseptal. The following left ventricular wall segments are akinetic: mid anterolateral, apical lateral, apical inferior, mid inferior, apical septal and apex.    Moderate to severe aortic valve regurgitation is present.    Left ventricular wall thickness is consistent with moderate concentric hypertrophy.    The left atrial cavity is dilated.      Creatinine is slightly increased to 1.28.  Sodium 139, potassium 4.1.  Glucose 102.  Hemoglobin 12.4, white blood cell count 2970.  ; platelet count 172,000.      Treatment Plan  Continue Lasix, increased to 40 mg IV every 8 hours.  Weight daily.  Strict intake and output measurement.    He is on amiodarone 200 mg p.o. twice a day.  Continue Eliquis 5 mg p.o. twice a day  On aspirin 325 p.o. daily  Continue beta-blocker, carvedilol 25 p.o. twice daily  Continue Jardiance, losartan  Add Aldactone 25 mg p.o. daily  Continue Crestor    On doxycycline day #2 for lower extremity cellulitis superimposed on significant edema.  Wound culture in process.  Wound care evaluation.  Wound care clinic  follow-up on discharge and referral made.      Advised patient to follow closely with cardiology/heart failure clinic and I advised him to follow medical treatment as prescribed, to avoid hospital admissions.      Medical Decision Making  Number and Complexity of problems: More than 10, moderate to high complexity  Differential Diagnosis: See above    Conditions and Status        Condition is improving.     [facility] Data  External documents reviewed: No  Cardiac tracing (EKG, telemetry) interpretation: Sinus rhythm  Radiology interpretation: Radiology reports reviewed  Labs reviewed: Yes  Any tests that were considered but not ordered: No     Decision rules/scores evaluated (example [id number], Wells, etc): See chart     Discussed with: Patient and nurse     Care Planning  Shared decision making: With patient  Code status and discussions: Full code    Disposition  Social Determinants of Health that impact treatment or disposition: Poor medication compliance, poor follow-up  I expect the patient to be discharged to home with home health in 1 days.         Electronically signed by Ian Rachel MD, 11/17/24, 11:35 CST.

## 2024-11-18 ENCOUNTER — READMISSION MANAGEMENT (OUTPATIENT)
Dept: CALL CENTER | Facility: HOSPITAL | Age: 64
End: 2024-11-18
Payer: MEDICARE

## 2024-11-18 VITALS
TEMPERATURE: 98 F | HEIGHT: 72 IN | HEART RATE: 50 BPM | BODY MASS INDEX: 37.69 KG/M2 | RESPIRATION RATE: 16 BRPM | OXYGEN SATURATION: 96 % | DIASTOLIC BLOOD PRESSURE: 50 MMHG | SYSTOLIC BLOOD PRESSURE: 127 MMHG | WEIGHT: 278.3 LBS

## 2024-11-18 LAB
ANION GAP SERPL CALCULATED.3IONS-SCNC: 11 MMOL/L (ref 5–15)
BASOPHILS # BLD AUTO: 0.01 10*3/MM3 (ref 0–0.2)
BASOPHILS NFR BLD AUTO: 0.3 % (ref 0–1.5)
BUN SERPL-MCNC: 19 MG/DL (ref 8–23)
BUN/CREAT SERPL: 16.4 (ref 7–25)
CALCIUM SPEC-SCNC: 8.5 MG/DL (ref 8.6–10.5)
CHLORIDE SERPL-SCNC: 102 MMOL/L (ref 98–107)
CO2 SERPL-SCNC: 24 MMOL/L (ref 22–29)
CREAT SERPL-MCNC: 1.16 MG/DL (ref 0.76–1.27)
DEPRECATED RDW RBC AUTO: 59.2 FL (ref 37–54)
EGFRCR SERPLBLD CKD-EPI 2021: 69.9 ML/MIN/1.73
EOSINOPHIL # BLD AUTO: 0.07 10*3/MM3 (ref 0–0.4)
EOSINOPHIL NFR BLD AUTO: 2.1 % (ref 0.3–6.2)
ERYTHROCYTE [DISTWIDTH] IN BLOOD BY AUTOMATED COUNT: 16.7 % (ref 12.3–15.4)
GLUCOSE SERPL-MCNC: 96 MG/DL (ref 65–99)
HCT VFR BLD AUTO: 39.6 % (ref 37.5–51)
HGB BLD-MCNC: 12.8 G/DL (ref 13–17.7)
IMM GRANULOCYTES # BLD AUTO: 0.02 10*3/MM3 (ref 0–0.05)
IMM GRANULOCYTES NFR BLD AUTO: 0.6 % (ref 0–0.5)
LYMPHOCYTES # BLD AUTO: 0.46 10*3/MM3 (ref 0.7–3.1)
LYMPHOCYTES NFR BLD AUTO: 13.8 % (ref 19.6–45.3)
MCH RBC QN AUTO: 31.4 PG (ref 26.6–33)
MCHC RBC AUTO-ENTMCNC: 32.3 G/DL (ref 31.5–35.7)
MCV RBC AUTO: 97.1 FL (ref 79–97)
MONOCYTES # BLD AUTO: 0.41 10*3/MM3 (ref 0.1–0.9)
MONOCYTES NFR BLD AUTO: 12.3 % (ref 5–12)
NEUTROPHILS NFR BLD AUTO: 2.37 10*3/MM3 (ref 1.7–7)
NEUTROPHILS NFR BLD AUTO: 70.9 % (ref 42.7–76)
NRBC BLD AUTO-RTO: 0 /100 WBC (ref 0–0.2)
PLATELET # BLD AUTO: 224 10*3/MM3 (ref 140–450)
PMV BLD AUTO: 10.8 FL (ref 6–12)
POTASSIUM SERPL-SCNC: 3.8 MMOL/L (ref 3.5–5.2)
RBC # BLD AUTO: 4.08 10*6/MM3 (ref 4.14–5.8)
SODIUM SERPL-SCNC: 137 MMOL/L (ref 136–145)
WBC NRBC COR # BLD AUTO: 3.34 10*3/MM3 (ref 3.4–10.8)

## 2024-11-18 PROCEDURE — 99222 1ST HOSP IP/OBS MODERATE 55: CPT | Performed by: NURSE PRACTITIONER

## 2024-11-18 PROCEDURE — 29580 STRAPPING UNNA BOOT: CPT

## 2024-11-18 PROCEDURE — 85025 COMPLETE CBC W/AUTO DIFF WBC: CPT | Performed by: INTERNAL MEDICINE

## 2024-11-18 PROCEDURE — 25010000002 FUROSEMIDE PER 20 MG: Performed by: FAMILY MEDICINE

## 2024-11-18 PROCEDURE — 80048 BASIC METABOLIC PNL TOTAL CA: CPT | Performed by: FAMILY MEDICINE

## 2024-11-18 PROCEDURE — 97161 PT EVAL LOW COMPLEX 20 MIN: CPT

## 2024-11-18 RX ORDER — LOSARTAN POTASSIUM 25 MG/1
25 TABLET ORAL DAILY
Qty: 30 TABLET | Refills: 0 | Status: SHIPPED | OUTPATIENT
Start: 2024-11-18 | End: 2024-12-18

## 2024-11-18 RX ORDER — CARVEDILOL 3.12 MG/1
3.12 TABLET ORAL 2 TIMES DAILY WITH MEALS
Qty: 60 TABLET | Refills: 0 | Status: SHIPPED | OUTPATIENT
Start: 2024-11-18 | End: 2024-12-18

## 2024-11-18 RX ORDER — ASPIRIN 81 MG/1
81 TABLET ORAL DAILY
Qty: 30 TABLET | Refills: 0 | Status: SHIPPED | OUTPATIENT
Start: 2024-11-18 | End: 2024-12-18

## 2024-11-18 RX ORDER — FUROSEMIDE 40 MG/1
40 TABLET ORAL 2 TIMES DAILY
Qty: 60 TABLET | Refills: 0 | Status: SHIPPED | OUTPATIENT
Start: 2024-11-18 | End: 2024-12-18

## 2024-11-18 RX ORDER — SPIRONOLACTONE 25 MG/1
12.5 TABLET ORAL DAILY
Qty: 15 TABLET | Refills: 0 | Status: SHIPPED | OUTPATIENT
Start: 2024-11-19 | End: 2024-12-19

## 2024-11-18 RX ORDER — AMIODARONE HYDROCHLORIDE 200 MG/1
200 TABLET ORAL 2 TIMES DAILY
Qty: 60 TABLET | Refills: 0 | Status: SHIPPED | OUTPATIENT
Start: 2024-11-18 | End: 2024-12-18

## 2024-11-18 RX ORDER — DOXYCYCLINE 100 MG/1
100 TABLET ORAL EVERY 12 HOURS SCHEDULED
Qty: 5 TABLET | Refills: 0 | Status: SHIPPED | OUTPATIENT
Start: 2024-11-18 | End: 2024-11-19 | Stop reason: HOSPADM

## 2024-11-18 RX ADMIN — ASPIRIN 325 MG: 325 TABLET, FILM COATED ORAL at 09:27

## 2024-11-18 RX ADMIN — APIXABAN 5 MG: 5 TABLET, FILM COATED ORAL at 05:51

## 2024-11-18 RX ADMIN — DOXYCYCLINE 100 MG: 100 TABLET ORAL at 09:27

## 2024-11-18 RX ADMIN — EMPAGLIFLOZIN 10 MG: 10 TABLET, FILM COATED ORAL at 09:27

## 2024-11-18 RX ADMIN — AMIODARONE HYDROCHLORIDE 200 MG: 200 TABLET ORAL at 05:51

## 2024-11-18 RX ADMIN — LOSARTAN POTASSIUM 25 MG: 25 TABLET, FILM COATED ORAL at 09:27

## 2024-11-18 RX ADMIN — FUROSEMIDE 40 MG: 10 INJECTION, SOLUTION INTRAVENOUS at 05:52

## 2024-11-18 RX ADMIN — FUROSEMIDE 40 MG: 10 INJECTION, SOLUTION INTRAVENOUS at 15:25

## 2024-11-18 RX ADMIN — MEXILETINE HYDROCHLORIDE 150 MG: 150 CAPSULE ORAL at 09:27

## 2024-11-18 RX ADMIN — CARVEDILOL 25 MG: 6.25 TABLET, FILM COATED ORAL at 09:27

## 2024-11-18 RX ADMIN — Medication 10 ML: at 09:28

## 2024-11-18 RX ADMIN — AMOXICILLIN AND CLAVULANATE POTASSIUM 1 TABLET: 875; 125 TABLET, FILM COATED ORAL at 11:48

## 2024-11-18 RX ADMIN — SPIRONOLACTONE 25 MG: 25 TABLET ORAL at 09:27

## 2024-11-18 NOTE — PLAN OF CARE
Goal Outcome Evaluation:  Plan of Care Reviewed With: patient        Progress: improving  Outcome Evaluation: Pt A/Ox4. Fluid restriction in effect. IV lasix given with good effect. Safety maintained. Call light in reach.

## 2024-11-18 NOTE — CASE MANAGEMENT/SOCIAL WORK
Continued Stay Note   [place]     Patient Name: Jeanie Grossman  MRN: [id number]  Today's Date: 11/18/2024    Admit Date: 11/14/2024    Plan: [facility]   Discharge Plan       Row Name 11/18/24 1153       Plan    Plan [facility]    Patient/Family in Agreement with Plan yes    Provided Post Acute Provider List? Yes    Post Acute Provider List Home Health    Provided Post Acute Provider Quality & Resource List? Yes    Post Acute Provider Quality and Resource List Home Health    Delivered To Patient    Method of Delivery Telephone    Final Discharge Disposition Code 06 - home with home health care    Final Note Pt is being discharged home. He needs a ride to [address] [place] [address].  Will provide via Pt Assistance Fund.  He does not care with options for HH so provided [facility] referral to Pri[facility] Mor[facility] for [facility].                   Discharge Codes    No documentation.                 Expected Discharge Date and Time       Expected Discharge Date Expected Discharge Time    Nov 18, 2024               MARIO Brooke

## 2024-11-18 NOTE — PLAN OF CARE
Goal Outcome Evaluation:      Aox4. VSS on RA. Up independently. Strict I/Os, fluid restriction enforced. Pt voiding frequently. Unna boots placed by PT. IV removed per orders. DC education provided, understanding verbalized. Pt home meds retrieved from pharmacy and given to pt. Pt DC home via taxi using cab voucher.

## 2024-11-18 NOTE — THERAPY WOUND CARE TREATMENT
Acute Care - Wound/Debridement Initial Evaluation  [facility]     Patient Name: Jeanie Grossman  : 1960  MRN: [id number]  Today's Date: 2024                Admit Date: 2024    Visit Dx:    ICD-10-CM ICD-9-CM   1. Acute on chronic congestive heart failure, unspecified heart failure type  I50.9 428.0   2. Acute on chronic HFrEF (heart failure with reduced ejection fraction)  I50.23 428.23   3. Multiple open wounds of lower leg, initial encounter  S81.809A 894.0       Patient Active Problem List   Diagnosis    Ventricular tachycardia    Hypertrophic cardiomyopathy    Essential hypertension    AICD (automatic cardioverter/defibrillator) present    Ventricular tachycardia, sustained    Abdominal pain    UTI (urinary tract infection)    Type 2 diabetes mellitus without complication    Pancytopenia    Chest pain in adult    Prostate cancer, finished radiation on 2024    Non-smoker    VT (ventricular tachycardia)    Ventricular tachycardia (paroxysmal)    Acute on chronic congestive heart failure    Dermatitis neglecta    Atrial flutter    Acute on chronic HFrEF (heart failure with reduced ejection fraction)    CHF exacerbation        Past Medical History:   Diagnosis Date    CHF (congestive heart failure)     Diabetes mellitus     Hyperlipidemia     Hypertension     Hypertrophic cardiomyopathy     s/p AICD    Migraine     Prostate cancer     Tachycardia     Ventricular tachycardia     Ventricular tachycardia, sustained 10/14/2016        Past Surgical History:   Procedure Laterality Date    CARDIAC ABLATION  2016, 10/18/2016 (Dr. Doss in [place], [place])    CARDIAC ABLATION      2018    CARDIAC CATHETERIZATION      CARDIAC DEFIBRILLATOR PLACEMENT      CARDIAC DEFIBRILLATOR PLACEMENT      PROSTATE BIOPSY      PROSTATE BIOPSY             Wound 11/15/24 1511 Right lower leg (Active)   Dressing Appearance dry;intact;no drainage 24 08   Closure Unable to assess 24 08    Periwound intact;dry;pink;warm;edematous 11/18/24 1516   Periwound Temperature warm 11/18/24 1516   Periwound Skin Turgor soft 11/18/24 1516   Drainage Amount none;scant 11/18/24 1516   Care, Wound barrier applied;enzymatic agent applied;silver agent applied;pressure applied;maria t boot 11/18/24 1516   Dressing Care dressing applied;dressing reinforced;coban;elastic bandage;silver impregnated;tubular wrap 11/18/24 1516   Periwound Care absorptive dressing applied;dry periwound area maintained 11/18/24 1516       Wound 11/15/24 1511 Left lower leg (Active)   Wound Image   11/18/24 1516   Dressing Appearance moist drainage 11/18/24 1516   Closure Unable to assess 11/18/24 0830   Base moist;granulating;dry;blanchable;red 11/18/24 1516   Drainage Amount none 11/18/24 0830   Care, Wound maria t boot;wound temperature maintained;pressure applied;moist wound environment maintained;barrier applied 11/18/24 1516   Dressing Care dressing applied;dressing reinforced;tubular wrap;skin barrier agent applied;silver impregnated;cotton;elastic bandage 11/18/24 1516   Periwound Care barrier film applied 11/18/24 1516         WOUND DEBRIDEMENT                     PT Assessment (Last 12 Hours)       PT Evaluation and Treatment       Row Name 11/18/24 1516          Wound 11/15/24 1511 Right lower leg    Wound - Properties Group Placement Date: 11/15/24  -TB Placement Time: 1511  -TB Side: Right  -TB Orientation: lower  -TB Location: leg  -TB    Wound Image --  image in L LE wound  -AJ     Periwound intact;dry;pink;warm;edematous  -AJ     Periwound Temperature warm  -AJ     Periwound Skin Turgor soft  -AJ     Drainage Amount none;scant  -AJ     Care, Wound barrier applied;enzymatic agent applied;silver agent applied;pressure applied;maria t boot  -AJ     Dressing Care dressing applied;dressing reinforced;coban;elastic bandage;silver impregnated;tubular wrap  -AJ     Periwound Care absorptive dressing applied;dry periwound area maintained  -AJ      Retired Wound - Properties Group Placement Date: 11/15/24  -TB Placement Time: 1511 -TB Side: Right  -TB Orientation: lower  -TB Location: leg  -TB    Retired Wound - Properties Group Placement Date: 11/15/24  -TB Placement Time: 1511  -TB Side: Right  -TB Orientation: lower  -TB Location: leg  -TB    Retired Wound - Properties Group Date first assessed: 11/15/24  -TB Time first assessed: 1511  -TB Side: Right  -TB Location: leg  -TB      Row Name 11/18/24 1516          Wound 11/15/24 1511 Left lower leg    Wound - Properties Group Placement Date: 11/15/24  -TB Placement Time: 1511 -TB Side: Left  -TB Orientation: lower  -TB Location: leg  -TB    Wound Image Images linked: 1  -AJ     Dressing Appearance moist drainage  -     Base moist;granulating;dry;blanchable;red  -AJ     Care, Wound maria t boot;wound temperature maintained;pressure applied;moist wound environment maintained;barrier applied  -     Dressing Care dressing applied;dressing reinforced;tubular wrap;skin barrier agent applied;silver impregnated;cotton;elastic bandage  -     Periwound Care barrier film applied  -AJ     Retired Wound - Properties Group Placement Date: 11/15/24  -TB Placement Time: 1511 -TB Side: Left  -TB Orientation: lower  -TB Location: leg  -TB    Retired Wound - Properties Group Placement Date: 11/15/24  -TB Placement Time: 1511 -TB Side: Left  -TB Orientation: lower  -TB Location: leg  -TB    Retired Wound - Properties Group Date first assessed: 11/15/24  -TB Time first assessed: 1511 -TB Side: Left  -TB Location: leg  -TB      Row Name 11/18/24 1516          Plan of Care Review    Plan of Care Reviewed With patient  -     Outcome Evaluation PT eval complete for wound care and unna boot pressure. Pt in recliner and educated on benefits of unna boot and compression therapy and verbalized understanding. PT placed picture of B LE and wounds in chart. There were numerous wounds with scant drainage on B LE, L >R. The wounds  were covered with Opticell Ag. The legs were then covered with Unna Z wrapping from metatarsal heads to tibial tuberosity on B LE, prior to being covered with coban providing compression to improve edema management and swelling. The capillary refill was checked and remained within 2 seconds B LE. PT educated pt on the dressings remaining intact for 5-7 days or changed if become soiled. PT educated pt to call wound care office if excessive drainage, new pain or symptoms arise or if the dressings need to be changed. Per RN, pt was scheduled for d/c this afternoon and will follow up with wound care in outpatient setting.  -       Row Name 11/18/24 0830          Physical Therapy Goals    Wound Management Goal Selection (PT) wound management, PT goal 1;wound management, PT goal 2  -       Row Name 11/18/24 0830          Wound Management Goal 1 (PT)    Wound Management Goal (Wound Goal 1, PT) Pt will demo decrease in edema in R LE as needed to improve edema management and return to PLOF.  -AJ     Time Frame (Wound Goal 1, PT) long-term goal (LTG);other (see comments)  10 days  -AJ     Progress/Outcomes (Wound Goal 1, PT) --  new goal  -       Row Name 11/18/24 0830          Wound Management Goal 2 (PT)    Wound Management Goal (Wound Goal 2, PT) Pt will demo decrease in edema in L LE as needed to improve edema management and return to PLOF.  -AJ     Time Frame (Wound Goal 2, PT) long-term goal (LTG);other (see comments)  10 days  -AJ     Progress/Outcomes (Wound Goal 2, PT) --  new goal  -       Row Name 11/18/24 0830          Patient Education Goal (PT)    Activity (Patient Education Goal, PT) Pt will verbalize and demo indep with calling for assist with change in symptoms as needed for unna boot change  -AJ     [place]/Cues/Accuracy (Memory Goal 2, PT) demonstrates adequately;independent;verbalizes understanding  -AJ     Time Frame (Patient Education Goal, PT) long term goal (LTG);10 days  -AJ      Progress/Outcome (Patient Education Goal, PT) new goal  -               User Key  (r) = Recorded By, (t) = Taken By, (c) = Cosigned By      Initials Name Provider Type     Dee Davila, RN Registered Nurse    Eris Hoffmann, PT DPT Physical Therapist                  Physical Therapy Education       Title: PT OT SLP Therapies (Done)       Topic: Physical Therapy (Done)       Point: Mobility training (Done)       Learning Progress Summary            Patient Acceptance, E, VU by RUIZ at 11/18/2024 1616    Comment: role of unna boot and compression therapy, call for assist                      Point: Home exercise program (Done)       Learning Progress Summary            Patient Acceptance, E, VU by RUIZ at 11/18/2024 1616    Comment: role of unna boot and compression therapy, call for assist                      Point: Body mechanics (Done)       Learning Progress Summary            Patient Acceptance, E, VU by RUIZ at 11/18/2024 1616    Comment: role of unna boot and compression therapy, call for assist                      Point: Precautions (Done)       Learning Progress Summary            Patient Acceptance, E, VU by RUIZ at 11/18/2024 1616    Comment: role of unna boot and compression therapy, call for assist                                      User Key       Initials Effective Dates Name Provider Type Discipline     08/15/24 -  Eris Montgomery, PT DPT Physical Therapist PT                    Recommendation and Plan  Anticipated Discharge Disposition (PT): home  Therapy Frequency (PT): daily  Plan of Care Reviewed With: patient  Plan of Care Reviewed With: patient           Outcome Evaluation: PT eval complete for wound care and unna boot pressure. Pt in recliner and educated on benefits of unna boot and compression therapy and verbalized understanding. PT placed picture of B LE and wounds in chart. There were numerous wounds with scant drainage on B LE, L >R. The wounds were covered with Opticell Ag. The  legs were then covered with Unna Z wrapping from metatarsal heads to tibial tuberosity on B LE, prior to being covered with coban providing compression to improve edema management and swelling. The capillary refill was checked and remained within 2 seconds B LE. PT educated pt on the dressings remaining intact for 5-7 days or changed if become soiled. PT educated pt to call wound care office if excessive drainage, new pain or symptoms arise or if the dressings need to be changed. Per RN, pt was scheduled for d/c this afternoon and will follow up with wound care in outpatient setting.  Plan of Care Reviewed With: patient            Time Calculation   PT Charges       Row Name 11/18/24 1516             Time Calculation    Start Time 1516  -AJ      Stop Time 1605  +5 for chart review and communication with staff  -RUIZ      Time Calculation (min) 49 min  -AJ      PT Received On 11/18/24  -AJ      PT Goal Re-Cert Due Date 11/28/24  -AJ         Untimed Charges    PT Eval/Re-eval Minutes 30  -AJ      Wound Care 83301 Unna boot  -AJ      29580-Unna Boot 24  -AJ         Total Minutes    Untimed Charges Total Minutes 54  -AJ       Total Minutes 54  -AJ                User Key  (r) = Recorded By, (t) = Taken By, (c) = Cosigned By      Initials Name Provider Type    AJ Eris Montgomery, PT DPT Physical Therapist                      Therapy Charges for Today       Code Description Service Date Service Provider Modifiers Qty    19768489750 HC PT EVAL LOW COMPLEXITY 2 11/18/2024 Eris Montgomery, PT DPT GP 1    37127329712 HC PT STAPPING UNNA BOOT 11/18/2024 Eris Montgomery, PT DPT GP 2              PT G-Codes  AM-PAC 6 Clicks Score (PT): 23       Eris Montgomery PT DPT  11/18/2024

## 2024-11-18 NOTE — CONSULTS
[facility]  INPATIENT WOUND & OSTOMY CONSULTATION    Today's Date: 11/18/24    Patient Name: Jeanie Grossman  MRN: [id number]  CSN: [id number]  PCP: Conor Denny DO  Referring Provider:   Consulting Provider (From admission, onward)      Start Ordered     Status Ordering Provider    11/15/24 1718 11/15/24 1718  Inpatient Wound Care MD Consult  Once        Specialty:  Wound Care  Provider:  Petra Zamorano APRN Acknowledged SORZANO, CARLOS F           Attending Provider: Ian Rachel MD  Length of Stay: 3    SUBJECTIVE   Chief Complaint: Wounds of bilateral lower extremities    HPI: Jeanie Grossman, a 64 y.o.male, presents with a past medical history of hypertension, hyperlipidemia, hypertrophic cardiomyopathy, prostate cancer, congestive heart failure.  A full past medical history is listed below.  Inpatient wound care consulted due to wounds of bilateral lower extremities. Patient states he has had wounds for 2 to 3 weeks .  They initially developed as blisters that have ruptured and ulcerated.  Patient has multiple wounds of bilateral lower extremities that are down to subcutaneous tissue.  Hydrocolloid dressing was placed over wounds and Ace wrap applied.  Significant odor noted with removal of hydrocolloid which is not surprising due to occlusive dressing being used.  Patient has edema bilateral lower extremities.  He is asking about getting toenails trimmed because he left his clippers at home.  Patient has discharge orders in place and is planning to be discharged home in [place].  Patient is currently sitting up in his room and the chair in room 353 eating lunch.      Visit Dx:    ICD-10-CM ICD-9-CM   1. Acute on chronic congestive heart failure, unspecified heart failure type  I50.9 428.0   2. Acute on chronic HFrEF (heart failure with reduced ejection fraction)  I50.23 428.23   3. Multiple open wounds of lower leg, initial encounter  S81.809A 894.0       Hospital Problem  List:     Acute on chronic HFrEF (heart failure with reduced ejection fraction)    Essential hypertension    Type 2 diabetes mellitus without complication    Atrial flutter    CHF exacerbation      History:   Past Medical History:   Diagnosis Date    CHF (congestive heart failure)     Diabetes mellitus     Hyperlipidemia     Hypertension     Hypertrophic cardiomyopathy     s/p AICD    Migraine     Prostate cancer     Tachycardia     Ventricular tachycardia     Ventricular tachycardia, sustained 10/14/2016     Past Surgical History:   Procedure Laterality Date    CARDIAC ABLATION  01/28/2016 2/2016, 10/18/2016 (Dr. Doss in [place], [place])    CARDIAC ABLATION      2018    CARDIAC CATHETERIZATION      CARDIAC DEFIBRILLATOR PLACEMENT      CARDIAC DEFIBRILLATOR PLACEMENT      PROSTATE BIOPSY  2019    PROSTATE BIOPSY  2023     Social History     Socioeconomic History    Marital status: Single   Tobacco Use    Smoking status: Never   Vaping Use    Vaping status: Never Used   Substance and Sexual Activity    Alcohol use: No    Drug use: No    Sexual activity: Defer     Family History   Family history unknown: Yes       Allergies:  No Known Allergies    Medications:    Current Facility-Administered Medications:     acetaminophen (TYLENOL) tablet 650 mg, 650 mg, Oral, Q4H PRN, 650 mg at 11/16/24 1355 **OR** acetaminophen (TYLENOL) 160 MG/5ML oral solution 650 mg, 650 mg, Oral, Q4H PRN **OR** acetaminophen (TYLENOL) suppository 650 mg, 650 mg, Rectal, Q4H PRN, Rangel Rob MD    amiodarone (PACERONE) tablet 200 mg, 200 mg, Oral, BID, Rangel Rob MD, 200 mg at 11/18/24 0551    amoxicillin-clavulanate (AUGMENTIN) 875-125 MG per tablet 1 tablet, 1 tablet, Oral, Q12H, Ian Rachel MD, 1 tablet at 11/18/24 1148    apixaban (ELIQUIS) tablet 5 mg, 5 mg, Oral, BID, Rangel Rob MD, 5 mg at 11/18/24 0565    aspirin tablet 325 mg, 325 mg, Oral, Daily, Rangel oRb MD, 325 mg at 11/18/24 0908     sennosides-docusate (PERICOLACE) 8.6-50 MG per tablet 2 tablet, 2 tablet, Oral, BID PRN, 2 tablet at 11/16/24 0910 **AND** polyethylene glycol (MIRALAX) packet 17 g, 17 g, Oral, Daily PRN, 17 g at 11/16/24 0910 **AND** bisacodyl (DULCOLAX) EC tablet 5 mg, 5 mg, Oral, Daily PRN **AND** bisacodyl (DULCOLAX) suppository 10 mg, 10 mg, Rectal, Daily PRN, Rangel Rob MD    carvedilol (COREG) tablet 25 mg, 25 mg, Oral, BID With Meals, Rangel Rob MD, 25 mg at 11/18/24 0927    doxycycline (ADOXA) tablet 100 mg, 100 mg, Oral, Q12H, Ian Rachel MD, 100 mg at 11/18/24 0927    empagliflozin (JARDIANCE) tablet 10 mg, 10 mg, Oral, Daily, Rangel Rob MD, 10 mg at 11/18/24 0927    furosemide (LASIX) injection 40 mg, 40 mg, Intravenous, Q8H, Ian Rachel MD, 40 mg at 11/18/24 0552    hydrALAZINE (APRESOLINE) injection 5 mg, 5 mg, Intravenous, Q4H PRN, Ian Rachel MD    losartan (COZAAR) tablet 25 mg, 25 mg, Oral, Daily, Rangel Rob MD, 25 mg at 11/18/24 0927    mexiletine (MEXITIL) capsule 150 mg, 150 mg, Oral, BID, Rangel Rob MD, 150 mg at 11/18/24 0927    nitroglycerin (NITROSTAT) SL tablet 0.4 mg, 0.4 mg, Sublingual, Q5 Min PRN, Rangel Rob MD    ondansetron (ZOFRAN) injection 4 mg, 4 mg, Intravenous, Q6H PRN, Rangel Rob MD    rosuvastatin (CRESTOR) tablet 10 mg, 10 mg, Oral, Nightly, Rangel Rob MD, 10 mg at 11/17/24 2004    sodium chloride 0.9 % flush 10 mL, 10 mL, Intravenous, Q12H, Rangel Rob MD, 10 mL at 11/18/24 0928    sodium chloride 0.9 % flush 10 mL, 10 mL, Intravenous, PRN, Rangel Rob MD    sodium chloride 0.9 % infusion 40 mL, 40 mL, Intravenous, PRN, Rangel Rob MD    spironolactone (ALDACTONE) tablet 25 mg, 25 mg, Oral, Daily, Ian Rachel MD, 25 mg at 11/18/24 0927    OBJECTIVE     Vitals:    11/18/24 1118   BP: 127/50   Pulse: 50   Resp: 16   Temp: 98 °F (36.7 °C)   SpO2: 96%       PHYSICAL EXAM:    Physical Exam  Vitals and nursing note reviewed.   Constitutional:       General: He is awake.      Appearance: He is obese.      Comments: Body mass index is 37.43 kg/m².    HENT:      Head: Normocephalic and atraumatic.   Eyes:      General: Lids are normal. Gaze aligned appropriately.   Cardiovascular:      Rate and Rhythm: Regular rhythm. Bradycardia present.   Pulmonary:      Effort: Pulmonary effort is normal. No respiratory distress.   Musculoskeletal:      Cervical back: Normal range of motion and neck supple.      Right lower le+ Pitting Edema present.      Left lower le+ Pitting Edema present.   Feet:      Right foot:      Skin integrity: No ulcer.      Left foot:      Skin integrity: No ulcer.   Skin:     General: Skin is warm and dry.      Findings: Wound present.      Comments: Multiple ulcerations of bilateral lower extremities with slough and subcutaneous tissue present.  Significant odor noted with removal of occlusive hydrocolloid dressing.  Even open edges attached wound bed.  No undermining or tunneling noted.  Moderate serous drainage.  Maceration noted to periwound areas.   Neurological:      Mental Status: He is alert and oriented to person, place, and time.   Psychiatric:         Attention and Perception: Attention normal.         Mood and Affect: Mood normal.         Speech: Speech normal.         Behavior: Behavior is cooperative.        Results Review:  Lab Results (last 48 hours)       Procedure Component Value Units Date/Time    Wound Culture - Wound, Leg, Left [[id number]]  (Abnormal) Collected: 24 1118    Specimen: Wound from Leg, Left Updated: 24 0732     Wound Culture Heavy growth (4+) Gram Negative Bacilli     Gram Stain Rare (1+) WBCs per low power field      Many (4+) Gram positive cocci      Many (4+) Gram negative bacilli    Basic Metabolic Panel [[id number]]  (Abnormal) Collected: 24 0504    Specimen: Blood Updated: 24 0618     Glucose 96 mg/dL       BUN 19 mg/dL      Creatinine 1.16 mg/dL      Sodium 137 mmol/L      Potassium 3.8 mmol/L      Chloride 102 mmol/L      CO2 24.0 mmol/L      Calcium 8.5 mg/dL      BUN/Creatinine Ratio 16.4     Anion Gap 11.0 mmol/L      eGFR 69.9 mL/min/1.73     Narrative:      GFR Normal >60  Chronic Kidney Disease <60  Kidney Failure <15      CBC & Differential [[id number]]  (Abnormal) Collected: 11/18/24 0504    Specimen: Blood Updated: 11/18/24 0607    Narrative:      The following orders were created for panel order CBC & Differential.  Procedure                               Abnormality         Status                     ---------                               -----------         ------                     CBC Auto Differential[[id number]]        Abnormal            Final result                 Please view results for these tests on the individual orders.    CBC Auto Differential [[id number]]  (Abnormal) Collected: 11/18/24 0504    Specimen: Blood Updated: 11/18/24 0607     WBC 3.34 10*3/mm3      RBC 4.08 10*6/mm3      Hemoglobin 12.8 g/dL      Hematocrit 39.6 %      MCV 97.1 fL      MCH 31.4 pg      MCHC 32.3 g/dL      RDW 16.7 %      RDW-SD 59.2 fl      MPV 10.8 fL      Platelets 224 10*3/mm3      Neutrophil % 70.9 %      Lymphocyte % 13.8 %      Monocyte % 12.3 %      Eosinophil % 2.1 %      Basophil % 0.3 %      Immature Grans % 0.6 %      Neutrophils, Absolute 2.37 10*3/mm3      Lymphocytes, Absolute 0.46 10*3/mm3      Monocytes, Absolute 0.41 10*3/mm3      Eosinophils, Absolute 0.07 10*3/mm3      Basophils, Absolute 0.01 10*3/mm3      Immature Grans, Absolute 0.02 10*3/mm3      nRBC 0.0 /100 WBC     Basic Metabolic Panel [[id number]]  (Abnormal) Collected: 11/17/24 0525    Specimen: Blood Updated: 11/17/24 0640     Glucose 102 mg/dL      BUN 16 mg/dL      Creatinine 1.28 mg/dL      Sodium 139 mmol/L      Potassium 4.1 mmol/L      Comment: Slight hemolysis detected by analyzer. Result may be falsely elevated.         Chloride 103 mmol/L      CO2 20.0 mmol/L      Calcium 8.5 mg/dL      BUN/Creatinine Ratio 12.5     Anion Gap 16.0 mmol/L      eGFR 62.1 mL/min/1.73     Narrative:      GFR Normal >60  Chronic Kidney Disease <60  Kidney Failure <15      CBC & Differential [[id number]]  (Abnormal) Collected: 11/17/24 0525    Specimen: Blood Updated: 11/17/24 0620    Narrative:      The following orders were created for panel order CBC & Differential.  Procedure                               Abnormality         Status                     ---------                               -----------         ------                     CBC Auto Differential[[id number]]        Abnormal            Final result                 Please view results for these tests on the individual orders.    CBC Auto Differential [[id number]]  (Abnormal) Collected: 11/17/24 0525    Specimen: Blood Updated: 11/17/24 0620     WBC 2.97 10*3/mm3      RBC 3.84 10*6/mm3      Hemoglobin 12.4 g/dL      Hematocrit 39.9 %      .9 fL      MCH 32.3 pg      MCHC 31.1 g/dL      RDW 17.5 %      RDW-SD 66.3 fl      MPV 10.8 fL      Platelets 172 10*3/mm3      Neutrophil % 65.3 %      Lymphocyte % 18.2 %      Monocyte % 10.8 %      Eosinophil % 2.4 %      Basophil % 1.3 %      Immature Grans % 2.0 %      Neutrophils, Absolute 1.94 10*3/mm3      Lymphocytes, Absolute 0.54 10*3/mm3      Monocytes, Absolute 0.32 10*3/mm3      Eosinophils, Absolute 0.07 10*3/mm3      Basophils, Absolute 0.04 10*3/mm3      Immature Grans, Absolute 0.06 10*3/mm3      nRBC 0.0 /100 WBC           Imaging Results (Last 72 Hours)       ** No results found for the last 72 hours. **               ASSESSMENT/PLAN       Examination and evaluation of wound(s) was performed.    DIAGNOSIS:   Nonpressure chronic ulcer of right lower leg with fat layer exposed  Nonpressure chronic ulcer of left lower leg with fat layer exposed  Nonpressure chronic ulcer of right calf with fat layer exposed  Nonpressure chronic ulcer  of left calf with fat layer exposed  Venous insufficiency      PLAN:   Orders placed for wound care as listed below.   Ambulatory referral to [facility] wound care center is in place.  PT to place Unna boots with Opticell Ag to open wounds prior to discharge.      Start     Ordered    11/18/24 1230  Application Unnaboot  Until Discontinued        Comments: BLE. Apply Opticell AG to any open wounds    11/18/24 1230    11/17/24 0000  Ambulatory Referral to Wound Clinic         11/17/24 0826          Discussed findings and treatment plan including risks, benefits, and treatment options with patient in detail. Patient agreed with treatment plan.    This document has been electronically signed by NORMA Duong on 11/18/2024 12:30 CST

## 2024-11-18 NOTE — PLAN OF CARE
Goal Outcome Evaluation:  Plan of Care Reviewed With: patient  Plan of Care Reviewed With: patient           Outcome Evaluation: PT eval complete for wound care and unna boot pressure. Pt in recliner and educated on benefits of unna boot and compression therapy and verbalized understanding. PT placed picture of B LE and wounds in chart. There were numerous wounds with scant drainage on B LE, L >R. The wounds were covered with Opticell Ag. The legs were then covered with Unna Z wrapping from metatarsal heads to tibial tuberosity on B LE, prior to being covered with coban providing compression to improve edema management and swelling. The capillary refill was checked and remained within 2 seconds B LE. PT educated pt on the dressings remaining intact for 5-7 days or changed if become soiled. PT educated pt to call wound care office if excessive drainage, new pain or symptoms arise or if the dressings need to be changed. Per RN, pt was scheduled for d/c this afternoon and will follow up with wound care in outpatient setting.    Anticipated Discharge Disposition (PT): home

## 2024-11-18 NOTE — DISCHARGE SUMMARY
[facility] Medicine Services  DISCHARGE SUMMARY       Date of Admission: 11/14/2024  Date of Discharge:  11/18/2024  Primary Care Physician: Conor Denny DO    Presenting Problem/History of Present Illness:  64-year-old male with history of hypertension, hyperlipidemia, chronic cardiomyopathy with systolic dysfunction, atrial fibrillation, prostate cancer, AICD in place, came to the hospital reporting worsening shortness of breath on exertion for the past 3 days. He states that he has not been taking Lasix as it makes him urinate too much. Associated edema of the lower extremities.     Final Discharge Diagnoses:  Active Hospital Problems    Diagnosis     **Acute on chronic HFrEF (heart failure with reduced ejection fraction)     CHF exacerbation     Atrial flutter     Type 2 diabetes mellitus without complication     Essential hypertension        Consults: Wound care    Procedures Performed: None    Pertinent Test Results:   Results for orders placed during the hospital encounter of 11/14/24    Adult Transthoracic Echo Complete W/ Cont if Necessary Per Protocol    Interpretation Summary    Left ventricular systolic function is moderately decreased. Left ventricular ejection fraction appears to be 36 - 40%.    The following left ventricular wall segments are hypokinetic: mid inferolateral, basal inferoseptal and basal inferoseptal. The following left ventricular wall segments are akinetic: mid anterolateral, apical lateral, apical inferior, mid inferior, apical septal and apex.    Moderate to severe aortic valve regurgitation is present.    Left ventricular wall thickness is consistent with moderate concentric hypertrophy.    The left atrial cavity is dilated.    Estimated right ventricular systolic pressure from tricuspid regurgitation is normal (<35 mmHg).    Normal size and function of the right ventricle.    There is a trivial pericardial effusion.    No previous  studies performed here available for direct comparison.      Imaging Results (All)       Procedure Component Value Units Date/Time    XR Chest 1 View [[id number]] Collected: 11/14/24 2155     Updated: 11/14/24 2159    Narrative:      XR CHEST 1 VW- 11/14/2024 8:52 PM     HISTORY: shortness of breath       COMPARISON: Chest x-ray dated 10/14/2024     FINDINGS:  Upright frontal radiograph of the chest was obtained        Cardiomegaly. Bilateral interstitial coarsening. No airspace opacities,  consolidation or pleural effusion. No pneumothorax. Left chest wall  AICD. No acute bony abnormality.       Impression:      1.  Cardiomegaly with mild interstitial edema. No consolidation or  obvious pleural effusions. Lungs are well expanded.     This report was signed and finalized on 11/14/2024 9:56 PM by Dr Rafat Thomas.             LAB RESULTS:      Lab 11/18/24  0504 11/17/24  0525 11/16/24  0527 11/14/24  2132   WBC 3.34* 2.97* 3.33* 3.63   HEMOGLOBIN 12.8* 12.4* 12.5* 12.8*   HEMATOCRIT 39.6 39.9 37.3* 39.6   PLATELETS 224 172 198 204   NEUTROS ABS 2.37 1.94 2.20 2.82   IMMATURE GRANS (ABS) 0.02 0.06* 0.01 0.02   LYMPHS ABS 0.46* 0.54* 0.62* 0.43*   MONOS ABS 0.41 0.32 0.40 0.29   EOS ABS 0.07 0.07 0.08 0.05   MCV 97.1* 103.9* 96.4 98.5*         Lab 11/18/24  0504 11/17/24  0525 11/16/24  0527 11/15/24  0602 11/14/24  2335 11/14/24  2132   SODIUM 137 139 139 142  --  140   POTASSIUM 3.8 4.1 3.9 4.1  --  4.2   CHLORIDE 102 103 104 104  --  105   CO2 24.0 20.0* 27.0 27.0  --  26.0   ANION GAP 11.0 16.0* 8.0 11.0  --  9.0   BUN 19 16 14 9  --  9   CREATININE 1.16 1.28* 1.18 1.07  --  1.06   EGFR 69.9 62.1 68.5 77.0  --  77.9   GLUCOSE 96 102* 104* 81  --  85   CALCIUM 8.5* 8.5* 8.9 9.2  --  9.0   MAGNESIUM  --   --   --   --  2.0  --          Lab 11/14/24  2132   TOTAL PROTEIN 7.1   ALBUMIN 4.0   GLOBULIN 3.1   ALT (SGPT) 13   AST (SGOT) 18   BILIRUBIN 1.1   ALK PHOS 75   LIPASE 12*         Lab 11/14/24  2397  11/14/24 2132   PROBNP  --  8,428.0*   HSTROP T 44* 48*                 Brief Urine Lab Results       None          Microbiology Results (last 10 days)       Procedure Component Value - Date/Time    Wound Culture - Wound, Leg, Left [[id number]]  (Abnormal) Collected: 11/16/24 1118    Lab Status: Preliminary result Specimen: Wound from Leg, Left Updated: 11/18/24 0732     Wound Culture Heavy growth (4+) Gram Negative Bacilli     Gram Stain Rare (1+) WBCs per low power field      Many (4+) Gram positive cocci      Many (4+) Gram negative bacilli            Hospital Course: Patient with history of poor medication compliance.  He was started on medical treatment with diuretics.  He achieved a negative balance, and weight loss.  Patient had chronic wounds to bilateral lower extremities and was started on wound care while admitted to the hospital.  For discharge, he was evaluated by wound care team, recommending Unna boots with Opticell to open wounds; Patient had referral to [facility] wound care center.  New echocardiogram showed left ventricular ejection fraction of 36 to 40%.  Moderate to severe aortic valve regurgitation.  Medical management was continued, with amiodarone, Eliquis, carvedilol, Jardiance, losartan.  New prescription for Aldactone was provided.  Lasix prescription was provided.  Provided education on importance of medical management for his chronic cardiomyopathy and other conditions.  Wound culture of lower extremities showed heavy growth of gram-negative bacilli.  Given infected lower extremity wounds, antibiotics were provided, doxycycline and Augmentin.  Electrolytes and renal function remained stable.  On discharge, recommended patient to follow with primary care provider in the following week and to make appointment with cardiologist/heart failure clinic in 1 to 2 weeks for adequate stabilization and to continue medical therapy.      Physical Exam on Discharge:  /76 (BP Location: Right  "arm, Patient Position: Sitting)   Pulse 86   Temp 97.7 °F (36.5 °C) (Oral)   Resp 18   Ht 183.6 cm (72.3\")   Wt 120 kg (264 lb 3.2 oz)   SpO2 97%   BMI 35.54 kg/m²   Physical Exam  Constitutional:       Appearance: Normal appearance.  Alert oriented x 3 no respiratory distress.  HENT:      Head: Normocephalic and atraumatic.      Nose: Nose normal.      Mouth/Throat:      Mouth: Mucous membranes are moist.   Eyes:      Extraocular Movements: Extraocular movements intact.      Conjunctiva/sclera: Conjunctivae normal.   Cardiovascular:      Rate and Rhythm: Normal rate and regular rhythm.      Pulses: Normal pulses.   Pulmonary:      Effort: No respiratory distress.      Breath sounds: Decreased breath sounds in bases.  No rales.  No wheezes  Abdominal:      General: Abdomen is flat. Bowel sounds are normal.      Palpations: Abdomen is soft.   Extremities: Lateral lower extremity edema 2+.  Both legs with dressings in place.    Skin: See extremity examination.     Capillary Refill: Capillary refill takes less than 2 seconds.      Coloration: Skin is not jaundiced.   Neurological:      General: No focal deficit present.      Mental Status: Patient is alert, oriented to place time and person.     Motor: No weakness.   Psychiatric:         Mood and Affect: Mood normal.         Behavior: Behavior normal.     Condition on Discharge: Stable    Discharge Disposition:  [place] [place] [facility]    Discharge Medications:     Discharge Medications        New Medications        Instructions Start Date   amoxicillin-clavulanate 875-125 MG per tablet  Commonly known as: AUGMENTIN   1 tablet, Oral, Every 12 Hours Scheduled      doxycycline 100 MG tablet  Commonly known as: ADOXA   100 mg, Oral, Every 12 Hours Scheduled      spironolactone 25 MG tablet  Commonly known as: ALDACTONE   12.5 mg, Oral, Daily   Start Date: November 19, 2024            Continue These Medications        Instructions Start Date   amiodarone 200 MG " tablet  Commonly known as: PACERONE   200 mg, Oral, 2 Times Daily      apixaban 5 MG tablet tablet  Commonly known as: ELIQUIS   5 mg, Oral, 2 Times Daily      aspirin 81 MG EC tablet   81 mg, Oral, Daily      carvedilol 3.125 MG tablet  Commonly known as: COREG   3.125 mg, Oral, 2 Times Daily With Meals      empagliflozin 10 MG tablet tablet  Commonly known as: JARDIANCE   10 mg, Oral, Daily      furosemide 40 MG tablet  Commonly known as: Lasix   40 mg, Oral, 2 Times Daily      losartan 25 MG tablet  Commonly known as: Cozaar   25 mg, Oral, Daily      rosuvastatin 10 MG tablet  Commonly known as: CRESTOR   10 mg, Oral, Nightly      sildenafil 100 MG tablet  Commonly known as: VIAGRA    mg, Oral, Daily PRN             ASK your doctor about these medications        Instructions Start Date   potassium chloride 20 MEQ CR tablet  Commonly known as: KLOR-CON M20  Ask about: Should I take this medication?   20 mEq, Oral, Daily               This patient has current or prior documentation of an left ventricular ejection fraction (LVEF) of less than or equal to 40%.    The patient was prescribed or already taking an ACE/ARB.    The patient was prescribed already taking bisoprolol, carvedilol, or sustained release metoprolol succinate.     Discharge Diet: Low-sodium diet    Activity at Discharge: As tolerated    Follow-up Appointments:   Future Appointments   Date Time Provider Department Center   12/3/2024  2:00 PM Elliott Cee APRN [facility]       Test Results Pending at Discharge: Final wound culture    Electronically signed by Ian Rachel MD, 11/18/24, 11:01 CST.    Time: 45 minutes.

## 2024-11-19 ENCOUNTER — TELEPHONE (OUTPATIENT)
Dept: CARDIOLOGY | Facility: HOSPITAL | Age: 64
End: 2024-11-19

## 2024-11-19 RX ORDER — CEFDINIR 300 MG/1
300 CAPSULE ORAL 2 TIMES DAILY
Qty: 10 CAPSULE | Refills: 0 | Status: SHIPPED | OUTPATIENT
Start: 2024-11-19 | End: 2024-11-24

## 2024-11-19 NOTE — PROGRESS NOTES
Patient was discharged 11/18/2024  Wound culture report received today  Positive for Serratia, resistant to tetracyclines and Augmentin.  Sensitive to cephalosporins.  For this reason, I sent prescription for cefdinir 300 mg p.o. every 12 hours for 5 days.  I called the patient's pharmacy; patient has not picked up medications so I asked them to cancel prescription for Augmentin and doxycycline.

## 2024-11-19 NOTE — TELEPHONE ENCOUNTER
Referral from Dr Rachel received for pt to establish care with the [facility]. Pt main number has calling restrictions, unable to lvm. [facility] # has been changed or disconnected. 11/19/24 12:46 LORI,MA

## 2024-11-19 NOTE — OUTREACH NOTE
Prep Survey      Flowsheet Row Responses   [facility] facility patient discharged from? [place]   Is LACE score < 7 ? No   Eligibility Readm Mgmt   Discharge diagnosis Acute on chronic HFrEF (heart failure with reduced ejection fraction)   Does the patient have one of the following disease processes/diagnoses(primary or secondary)? CHF   Does the patient have Home health ordered? Yes   What is the Home health agency?  [facility]   Prep survey completed? Yes            Kimberlyn TRUJILLO - Registered Nurse

## 2024-11-20 NOTE — THERAPY DISCHARGE NOTE
Acute Care - Physical Therapy Discharge Summary  [facility]       Patient Name: Jeanie Grossman  : 1960  MRN: [id number]    Today's Date: 2024                 Admit Date: 2024      PT Recommendation and Plan    Visit Dx:    ICD-10-CM ICD-9-CM   1. Acute on chronic congestive heart failure, unspecified heart failure type  I50.9 428.0   2. Acute on chronic HFrEF (heart failure with reduced ejection fraction)  I50.23 428.23   3. Multiple open wounds of lower leg, initial encounter  S81.809A 894.0                PT Rehab Goals       Row Name 24 0900             Wound Management Goal 1 (PT)    Wound Management Goal (Wound Goal 1, PT) Pt will demo decrease in edema in R LE as needed to improve edema management and return to PLOF.  -AB      Time Frame (Wound Goal 1, PT) long-term goal (LTG);other (see comments)  10 days  -AB      Progress/Outcomes (Wound Goal 1, PT) goal not met  -AB         Wound Management Goal 2 (PT)    Wound Management Goal (Wound Goal 2, PT) Pt will demo decrease in edema in L LE as needed to improve edema management and return to PLOF.  -AB      Time Frame (Wound Goal 2, PT) long-term goal (LTG);other (see comments)  10 days  -AB      Progress/Outcomes (Wound Goal 2, PT) goal not met  -AB         Patient Education Goal (PT)    Activity (Patient Education Goal, PT) Pt will verbalize and demo indep with calling for assist with change in symptoms as needed for unna boot change  -AB      [place]ams/Cues/Accuracy (Memory Goal 2, PT) demonstrates adequately;independent;verbalizes understanding  -AB      Time Frame (Patient Education Goal, PT) long term goal (LTG);10 days  -AB      Progress/Outcome (Patient Education Goal, PT) goal not met  -AB                User Key  (r) = Recorded By, (t) = Taken By, (c) = Cosigned By      Initials Name Provider Type Discipline    Rhonda Nuñez, PTA Physical Therapist Assistant PT                        PT Discharge Summary  Anticipated  Discharge Disposition (PT): home  Reason for Discharge: Discharge from facility  Outcomes Achieved: Refer to plan of care for updates on goals achieved  Discharge Destination: Home with home health      Rhonda Simms, PTA   11/20/2024

## 2024-11-21 ENCOUNTER — READMISSION MANAGEMENT (OUTPATIENT)
Dept: CALL CENTER | Facility: HOSPITAL | Age: 64
End: 2024-11-21
Payer: MEDICARE

## 2024-11-21 ENCOUNTER — DOCUMENTATION (OUTPATIENT)
Dept: HOME HEALTH SERVICES | Facility: HOME HEALTHCARE | Age: 64
End: 2024-11-21
Payer: MEDICARE

## 2024-11-21 LAB
BACTERIA SPEC AEROBE CULT: ABNORMAL
GRAM STN SPEC: ABNORMAL

## 2024-11-21 NOTE — OUTREACH NOTE
CHF Week 1 Survey      Flowsheet Row Responses   [facility] facility patient discharged from? [place]   Does the patient have one of the following disease processes/diagnoses(primary or secondary)? CHF   CHF Week 1 attempt successful? No   Unsuccessful attempts Attempt 3            Carolyn ARTEAGA - Registered Nurse

## 2024-11-21 NOTE — PROGRESS NOTES
Received HH referral from [facility]. Pt agreeable to services until he realized he did not have any minutes left on his phone. Pt wishes to get this fixed before admitting to HH. His only contact is his brother whose phone is also not working. Provided pt with my direct line and he will call me when his phone is fixed per his request. Will notify PCP, Dr. Denny of this.   
Never smoker

## 2024-12-02 ENCOUNTER — HOSPITAL ENCOUNTER (EMERGENCY)
Age: 64
Discharge: HOME | End: 2024-12-02
Payer: MEDICARE

## 2024-12-02 VITALS
HEART RATE: 67 BPM | TEMPERATURE: 98.1 F | SYSTOLIC BLOOD PRESSURE: 154 MMHG | RESPIRATION RATE: 16 BRPM | OXYGEN SATURATION: 97 % | BODY MASS INDEX: 35 KG/M2 | WEIGHT: 280 LBS | DIASTOLIC BLOOD PRESSURE: 87 MMHG

## 2024-12-02 DIAGNOSIS — L03.115 CELLULITIS OF RIGHT LOWER EXTREMITY: Primary | ICD-10-CM

## 2024-12-02 DIAGNOSIS — I50.9 CHRONIC CONGESTIVE HEART FAILURE, UNSPECIFIED HEART FAILURE TYPE (HCC): ICD-10-CM

## 2024-12-02 DIAGNOSIS — R60.9 EDEMA, UNSPECIFIED TYPE: ICD-10-CM

## 2024-12-02 LAB
ALBUMIN SERPL-MCNC: 3.7 G/DL (ref 3.5–5.2)
ALP SERPL-CCNC: 68 U/L (ref 40–129)
ALT SERPL-CCNC: 9 U/L (ref 5–41)
ANION GAP SERPL CALCULATED.3IONS-SCNC: 9 MMOL/L (ref 7–19)
AST SERPL-CCNC: 15 U/L (ref 5–40)
BASOPHILS # BLD: 0 K/UL (ref 0–0.2)
BASOPHILS NFR BLD: 0.5 % (ref 0–1)
BILIRUB SERPL-MCNC: 0.7 MG/DL (ref 0.2–1.2)
BNP BLD-MCNC: 1922 PG/ML (ref 0–124)
BUN SERPL-MCNC: 15 MG/DL (ref 8–23)
CALCIUM SERPL-MCNC: 9 MG/DL (ref 8.8–10.2)
CHLORIDE SERPL-SCNC: 103 MMOL/L (ref 98–111)
CO2 SERPL-SCNC: 25 MMOL/L (ref 22–29)
CREAT SERPL-MCNC: 1.1 MG/DL (ref 0.7–1.2)
EOSINOPHIL # BLD: 0.1 K/UL (ref 0–0.6)
EOSINOPHIL NFR BLD: 2.1 % (ref 0–5)
ERYTHROCYTE [DISTWIDTH] IN BLOOD BY AUTOMATED COUNT: 17.1 % (ref 11.5–14.5)
GLUCOSE SERPL-MCNC: 97 MG/DL (ref 70–99)
HCT VFR BLD AUTO: 39.1 % (ref 42–52)
HGB BLD-MCNC: 12.7 G/DL (ref 14–18)
IMM GRANULOCYTES # BLD: 0 K/UL
LACTATE BLDV-SCNC: 1.8 MMOL/L (ref 0.5–1.9)
LYMPHOCYTES # BLD: 0.5 K/UL (ref 1.1–4.5)
LYMPHOCYTES NFR BLD: 10.4 % (ref 20–40)
MCH RBC QN AUTO: 32 PG (ref 27–31)
MCHC RBC AUTO-ENTMCNC: 32.5 G/DL (ref 33–37)
MCV RBC AUTO: 98.5 FL (ref 80–94)
MONOCYTES # BLD: 0.5 K/UL (ref 0–0.9)
MONOCYTES NFR BLD: 10.4 % (ref 0–10)
NEUTROPHILS # BLD: 3.3 K/UL (ref 1.5–7.5)
NEUTS SEG NFR BLD: 76.1 % (ref 50–65)
PLATELET # BLD AUTO: 219 K/UL (ref 130–400)
PMV BLD AUTO: 9.5 FL (ref 9.4–12.4)
POTASSIUM SERPL-SCNC: 4.6 MMOL/L (ref 3.5–5)
PROCALCITONIN: 0.14 NG/ML (ref 0–0.09)
PROT SERPL-MCNC: 6.8 G/DL (ref 6.4–8.3)
RBC # BLD AUTO: 3.97 M/UL (ref 4.7–6.1)
SODIUM SERPL-SCNC: 137 MMOL/L (ref 136–145)
WBC # BLD AUTO: 4.3 K/UL (ref 4.8–10.8)

## 2024-12-02 PROCEDURE — 36415 COLL VENOUS BLD VENIPUNCTURE: CPT

## 2024-12-02 PROCEDURE — 84145 PROCALCITONIN (PCT): CPT

## 2024-12-02 PROCEDURE — 87186 SC STD MICRODIL/AGAR DIL: CPT

## 2024-12-02 PROCEDURE — 99284 EMERGENCY DEPT VISIT MOD MDM: CPT

## 2024-12-02 PROCEDURE — 87077 CULTURE AEROBIC IDENTIFY: CPT

## 2024-12-02 PROCEDURE — 87205 SMEAR GRAM STAIN: CPT

## 2024-12-02 PROCEDURE — 83605 ASSAY OF LACTIC ACID: CPT

## 2024-12-02 PROCEDURE — 85025 COMPLETE CBC W/AUTO DIFF WBC: CPT

## 2024-12-02 PROCEDURE — 96374 THER/PROPH/DIAG INJ IV PUSH: CPT

## 2024-12-02 PROCEDURE — 80053 COMPREHEN METABOLIC PANEL: CPT

## 2024-12-02 PROCEDURE — 6360000002 HC RX W HCPCS: Performed by: PHYSICIAN ASSISTANT

## 2024-12-02 PROCEDURE — 83880 ASSAY OF NATRIURETIC PEPTIDE: CPT

## 2024-12-02 PROCEDURE — 87070 CULTURE OTHR SPECIMN AEROBIC: CPT

## 2024-12-02 PROCEDURE — 6370000000 HC RX 637 (ALT 250 FOR IP): Performed by: PHYSICIAN ASSISTANT

## 2024-12-02 RX ORDER — CEFDINIR 300 MG/1
300 CAPSULE ORAL EVERY 12 HOURS SCHEDULED
Status: DISCONTINUED | OUTPATIENT
Start: 2024-12-02 | End: 2024-12-02

## 2024-12-02 RX ORDER — ACETAMINOPHEN 500 MG
1000 TABLET ORAL ONCE
Status: COMPLETED | OUTPATIENT
Start: 2024-12-02 | End: 2024-12-02

## 2024-12-02 RX ORDER — FUROSEMIDE 10 MG/ML
40 INJECTION INTRAMUSCULAR; INTRAVENOUS ONCE
Status: COMPLETED | OUTPATIENT
Start: 2024-12-02 | End: 2024-12-02

## 2024-12-02 RX ORDER — CEFDINIR 300 MG/1
300 CAPSULE ORAL EVERY 12 HOURS SCHEDULED
Status: DISCONTINUED | OUTPATIENT
Start: 2024-12-02 | End: 2024-12-03 | Stop reason: HOSPADM

## 2024-12-02 RX ADMIN — ACETAMINOPHEN 1000 MG: 500 TABLET ORAL at 19:50

## 2024-12-02 RX ADMIN — CEFDINIR 300 MG: 300 CAPSULE ORAL at 18:43

## 2024-12-02 RX ADMIN — FUROSEMIDE 40 MG: 10 INJECTION, SOLUTION INTRAMUSCULAR; INTRAVENOUS at 18:44

## 2024-12-02 ASSESSMENT — ENCOUNTER SYMPTOMS
EYE DISCHARGE: 0
SORE THROAT: 0
COUGH: 0
NAUSEA: 0
TROUBLE SWALLOWING: 0
CHEST TIGHTNESS: 0
SINUS PAIN: 0
DIARRHEA: 0
CONSTIPATION: 0
EYE PAIN: 0
SHORTNESS OF BREATH: 1
VOMITING: 0
COLOR CHANGE: 0
WHEEZING: 0
BLOOD IN STOOL: 0
ABDOMINAL PAIN: 0

## 2024-12-03 RX ORDER — CEFDINIR 300 MG/1
300 CAPSULE ORAL 2 TIMES DAILY
Qty: 20 CAPSULE | Refills: 0 | Status: SHIPPED | OUTPATIENT
Start: 2024-12-03 | End: 2024-12-13

## 2024-12-03 NOTE — RESULT ENCOUNTER NOTE
Rx for Cefdinir was suppose to be sent to pharmacy last night, but order was accidentally placed for inpatient stay and he only received one dose in ER.  Rx sent to pharmacy today.  Attempted to notify patient, but both phone numbers on chart are currently not working.

## 2024-12-05 LAB
BACTERIA SPEC ANAEROBE+AEROBE CULT: ABNORMAL
GRAM STN SPEC: ABNORMAL
ORGANISM: ABNORMAL

## 2024-12-22 NOTE — OP NOTE
"Brigette Romo is a 71 y.o. female on day 6 of admission presenting with Osteoradionecrosis of jaw. No acute events overnight. Flap checks stable.    Subjective   No acute events overnight.   Patient laying comfortably in bed with no complaints or concerns at this time.        Objective   Vitals reviewed in EMR  Gen: NAD, AOx3, resting comfortably in bed  Face/Neck: All incisions c/d/i, neck soft and flat without evidence of hematoma or fluid collection  -Skin paddle soft and light pink, good cap refill. Muscle paddle with STSG inferior to skin paddle healthy appearing  -Internal doppler with strong arterial signal   -Tracheostoma covered with dressing  Oral Cavity: All intraoral incisions c/d/i without evidence of dehiscence  -Flap soft and pink with good capillary refill  Extremities: right leg warm with intact movement and sensation  -right thigh skin graft donor site with tegaderm in place  Abdomen: PEG in place  Drains: All drains in place and holding suction with serosanguinous drainage (stripped)      Last Recorded Vitals  Blood pressure 115/58, pulse 68, temperature 36.3 °C (97.3 °F), temperature source Temporal, resp. rate 16, height 1.676 m (5' 6\"), weight 54.6 kg (120 lb 5.9 oz), SpO2 100%.  Intake/Output last 3 Shifts:  I/O last 3 completed shifts:  In: 2080 (38.1 mL/kg) [NG/GT:1825; IV Piggyback:255]  Out: 1351 (24.7 mL/kg) [Urine:1350 (0.7 mL/kg/hr); Drains:1]  Weight: 54.6 kg     Relevant Results        Scheduled medications  acetaminophen, 975 mg, oral, q8h HARRIETT   Or  acetaminophen, 1,000 mg, oral, q8h HARRIETT   Or  acetaminophen, 1,000 mg, g-tube, q8h HARRIETT  aspirin, 325 mg, g-tube, Daily  chlorhexidine, 15 mL, Mouth/Throat, TID after meals  enoxaparin, 40 mg, subcutaneous, q24h  esomeprazole, 40 mg, g-tube, Daily before breakfast  hydrogen peroxide, 1 Application, Topical, TID  levothyroxine, 25 mcg, g-tube, Daily before breakfast  magnesium hydroxide, 30 mL, g-tube, Daily  polyethylene glycol, 17 g, " Patient: Alyssia Garsia : 1960  Med Rec#: [id number] Acc#: [id number]   Primary Care Provider Declan Messer DO    Date of Procedure:  2021    Endoscopist: Angela Chang MD    Referring Provider: Declan Messer DO,     Operation Performed: Colonoscopy    Indications: screening, no previous exam    Anesthesia:  Sedation was administered by anesthesia who monitored the patient during the procedure. I met with Alyssia Garsia prior to procedure. We discussed the procedure itself, and I have discussed the risks of endoscopy (including-- but not limited to-- pain, discomfort, bleeding potentially requiring second endoscopic procedure and/or blood transfusion, organ perforation requiring operative repair, damage to organs near the colon, infection, aspiration, cardiopulmonary/allergic reaction), benefits, indications to endoscopy. Additionally, we discussed options other than colonoscopy. The patient expressed understanding. All questions answered. The patient decided to proceed with the procedure. Signed informed consent was placed on the chart. Blood Loss: minimal    Withdrawal time: > 6 minutes  Bowel Prep: adequate    Complications: no immediate complications    DESCRIPTION OF PROCEDURE:     A time out was performed. After written informed consent was obtained, the patient was placed in the left lateral position. The perianal area was inspected, and a digital rectal exam was performed. A rectal exam was performed: normal tone, no palpable lesions. At this point, a forward viewing Olympus colonoscope was inserted into the anus and carefully advanced to the cecum. The cecum was identified by the ileocecal valve and the appendiceal orifice. The colonoscope was then slowly withdrawn with careful inspection of the mucosa in a linear and circumferential fashion. The scope was retroflexed in the rectum. Suction was utilized during the procedure to remove as much air as possible from the bowel.  The colonoscope was g-tube, BID  senna, 10 mL, g-tube, BID  white petrolatum, 1 Application, Topical, TID      Continuous medications     PRN medications  PRN medications: hydrOXYzine HCL, naloxone, ondansetron **OR** ondansetron, oxyCODONE, oxyCODONE, oxygen                         Assessment/Plan   Assessment & Plan  Osteoradionecrosis of jaw    Exposed orthopaedic hardware (CMS-HCC)    Brigette Romo is a 71 y.o. female  with ORN with exposed hardware s/p triple endoscopy, tracheostomy, anterior/left oral cavity composite resection (skin, gingiva, and mandible), Right Selective Neck Dissection levels I-IV, reconstruction with Right Fibula Free Flap and Split thickness Skin Graft on 12/16/2024 by Dr. Magana and Dr. Christensen      Active Issues:  Osteoradionecrosis of Mandible  Acute on Chronic Osteomyelitis  Hardware Failure  Hypothyroidism  HLD  Anxiety  Acute Blood Loss Anemia  Urinary retention  Underweight per BMI  Hypokalemia  Hypophosphatemia  Hypotension     Plan:  -Flap Checks: q12hrs  -Drains: Monitor output  -Analgesia: Discontinued PCA and started Oxy 5 and 10 as needed, Scheduled Acetaminophen  -FEN: mIVFs disontinue & start FWF, NPO, TF advancing to goal rate; monitor and replete electrolytes as needed  -Pulm: s/p trach, humidified air at all times, standard trach care/suctioning/teaching; wean oxygen to room air,   -ID: Unasyn 3 g Q6hrs x 1 day, stop date 12/17  -Cardiac: Vitals per ENT free flap protocol then transition to Q4hr vitals; Aspirin 325mg daily  -Heme: Hgb 7.2 (12/22) continue to trend CBC daily  -Endo: Home Levothyroxine 25mcg  -GI: Bowel regimen PPI, and PRN anti-emetic  -: Brumfield catheter replaced after failing trial of void; Passed trial of void on 12/21  -Steroids/Special: Incision and oral care per ENT free flap protocol; Tolerating capping trial, will consider decannulation in PM   -Embolic PPx: SQH, SCDs while in bed  -Dispo: [facility]. PT/OT ordered; Discharge planning for POD 6 home with home care vs  removed from the patient, and the procedure was terminated. Findings are listed below. Findings: The mucosa appeared normal throughout the entire examined colon     On retroflexion, a large atypical appearing lesion was noted. This lesion appeared to be arising from the squamous tissue of the anus and no rectal in origin. No obvious rectal lesions seen. Recommendations:  1. Repeat colonoscopy - 10 yrs for screening  2. Refer to general surgery for consideration of EUS and resection of anal lesion. Findings and recommendations were discussed w/ the patient. A copy of the images was provided.     Gwen Blackman MD  7/16/2021  3:08 PM skilled nursing facility           Wilner Melo DDS

## 2025-01-15 ENCOUNTER — APPOINTMENT (OUTPATIENT)
Dept: GENERAL RADIOLOGY | Facility: HOSPITAL | Age: 65
End: 2025-01-15
Payer: MEDICARE

## 2025-01-15 ENCOUNTER — HOSPITAL ENCOUNTER (INPATIENT)
Facility: HOSPITAL | Age: 65
LOS: 7 days | Discharge: HOME | End: 2025-01-22
Attending: FAMILY MEDICINE | Admitting: FAMILY MEDICINE
Payer: MEDICARE

## 2025-01-15 DIAGNOSIS — Z74.09 IMPAIRED MOBILITY: ICD-10-CM

## 2025-01-15 DIAGNOSIS — L97.212 NON-PRESSURE CHRONIC ULCER OF RIGHT CALF WITH FAT LAYER EXPOSED: ICD-10-CM

## 2025-01-15 DIAGNOSIS — I50.9 ACUTE ON CHRONIC CONGESTIVE HEART FAILURE, UNSPECIFIED HEART FAILURE TYPE: Primary | ICD-10-CM

## 2025-01-15 DIAGNOSIS — L97.912 NON-PRESSURE CHRONIC ULCER OF RIGHT LOWER LEG WITH FAT LAYER EXPOSED: ICD-10-CM

## 2025-01-15 DIAGNOSIS — I87.2 VENOUS INSUFFICIENCY (CHRONIC) (PERIPHERAL): ICD-10-CM

## 2025-01-15 DIAGNOSIS — L97.922 NON-PRESSURE CHRONIC ULCER OF LEFT LOWER LEG WITH FAT LAYER EXPOSED: ICD-10-CM

## 2025-01-15 LAB
ALBUMIN SERPL-MCNC: 3.6 G/DL (ref 3.5–5.2)
ALBUMIN/GLOB SERPL: 1.2 G/DL
ALP SERPL-CCNC: 76 U/L (ref 39–117)
ALT SERPL W P-5'-P-CCNC: 13 U/L (ref 1–41)
ANION GAP SERPL CALCULATED.3IONS-SCNC: 10 MMOL/L (ref 5–15)
AST SERPL-CCNC: 16 U/L (ref 1–40)
B PARAPERT DNA SPEC QL NAA+PROBE: NOT DETECTED
B PERT DNA SPEC QL NAA+PROBE: NOT DETECTED
BASOPHILS # BLD AUTO: 0.02 10*3/MM3 (ref 0–0.2)
BASOPHILS NFR BLD AUTO: 0.4 % (ref 0–1.5)
BILIRUB SERPL-MCNC: 1.2 MG/DL (ref 0–1.2)
BUN SERPL-MCNC: 11 MG/DL (ref 8–23)
BUN/CREAT SERPL: 9.6 (ref 7–25)
C PNEUM DNA NPH QL NAA+NON-PROBE: NOT DETECTED
CALCIUM SPEC-SCNC: 9 MG/DL (ref 8.6–10.5)
CHLORIDE SERPL-SCNC: 107 MMOL/L (ref 98–107)
CO2 SERPL-SCNC: 22 MMOL/L (ref 22–29)
CREAT SERPL-MCNC: 1.14 MG/DL (ref 0.76–1.27)
DEPRECATED RDW RBC AUTO: 55.4 FL (ref 37–54)
EGFRCR SERPLBLD CKD-EPI 2021: 70.9 ML/MIN/1.73
EOSINOPHIL # BLD AUTO: 0.04 10*3/MM3 (ref 0–0.4)
EOSINOPHIL NFR BLD AUTO: 0.9 % (ref 0.3–6.2)
ERYTHROCYTE [DISTWIDTH] IN BLOOD BY AUTOMATED COUNT: 16.4 % (ref 12.3–15.4)
FLUAV SUBTYP SPEC NAA+PROBE: NOT DETECTED
FLUBV RNA ISLT QL NAA+PROBE: NOT DETECTED
GEN 5 1HR TROPONIN T REFLEX: 40 NG/L
GLOBULIN UR ELPH-MCNC: 3 GM/DL
GLUCOSE SERPL-MCNC: 103 MG/DL (ref 65–99)
HADV DNA SPEC NAA+PROBE: NOT DETECTED
HCOV 229E RNA SPEC QL NAA+PROBE: NOT DETECTED
HCOV HKU1 RNA SPEC QL NAA+PROBE: NOT DETECTED
HCOV NL63 RNA SPEC QL NAA+PROBE: NOT DETECTED
HCOV OC43 RNA SPEC QL NAA+PROBE: NOT DETECTED
HCT VFR BLD AUTO: 41 % (ref 37.5–51)
HGB BLD-MCNC: 12.9 G/DL (ref 13–17.7)
HMPV RNA NPH QL NAA+NON-PROBE: NOT DETECTED
HPIV1 RNA ISLT QL NAA+PROBE: NOT DETECTED
HPIV2 RNA SPEC QL NAA+PROBE: NOT DETECTED
HPIV3 RNA NPH QL NAA+PROBE: NOT DETECTED
HPIV4 P GENE NPH QL NAA+PROBE: NOT DETECTED
IMM GRANULOCYTES # BLD AUTO: 0.02 10*3/MM3 (ref 0–0.05)
IMM GRANULOCYTES NFR BLD AUTO: 0.4 % (ref 0–0.5)
LYMPHOCYTES # BLD AUTO: 0.39 10*3/MM3 (ref 0.7–3.1)
LYMPHOCYTES NFR BLD AUTO: 8.4 % (ref 19.6–45.3)
M PNEUMO IGG SER IA-ACNC: NOT DETECTED
MAGNESIUM SERPL-MCNC: 2 MG/DL (ref 1.6–2.4)
MCH RBC QN AUTO: 29.1 PG (ref 26.6–33)
MCHC RBC AUTO-ENTMCNC: 31.5 G/DL (ref 31.5–35.7)
MCV RBC AUTO: 92.3 FL (ref 79–97)
MONOCYTES # BLD AUTO: 0.49 10*3/MM3 (ref 0.1–0.9)
MONOCYTES NFR BLD AUTO: 10.6 % (ref 5–12)
NEUTROPHILS NFR BLD AUTO: 3.66 10*3/MM3 (ref 1.7–7)
NEUTROPHILS NFR BLD AUTO: 79.3 % (ref 42.7–76)
NRBC BLD AUTO-RTO: 0 /100 WBC (ref 0–0.2)
NT-PROBNP SERPL-MCNC: 6171 PG/ML (ref 0–900)
PLATELET # BLD AUTO: 211 10*3/MM3 (ref 140–450)
PMV BLD AUTO: 10 FL (ref 6–12)
POTASSIUM SERPL-SCNC: 4.2 MMOL/L (ref 3.5–5.2)
PROT SERPL-MCNC: 6.6 G/DL (ref 6–8.5)
RBC # BLD AUTO: 4.44 10*6/MM3 (ref 4.14–5.8)
RHINOVIRUS RNA SPEC NAA+PROBE: NOT DETECTED
RSV RNA NPH QL NAA+NON-PROBE: NOT DETECTED
SARS-COV-2 RNA RESP QL NAA+PROBE: NOT DETECTED
SODIUM SERPL-SCNC: 139 MMOL/L (ref 136–145)
TROPONIN T % DELTA: -9
TROPONIN T NUMERIC DELTA: -4 NG/L
TROPONIN T SERPL HS-MCNC: 44 NG/L
WBC NRBC COR # BLD AUTO: 4.62 10*3/MM3 (ref 3.4–10.8)

## 2025-01-15 PROCEDURE — 87077 CULTURE AEROBIC IDENTIFY: CPT

## 2025-01-15 PROCEDURE — 99285 EMERGENCY DEPT VISIT HI MDM: CPT

## 2025-01-15 PROCEDURE — 25010000002 FUROSEMIDE PER 20 MG

## 2025-01-15 PROCEDURE — 93010 ELECTROCARDIOGRAM REPORT: CPT | Performed by: HOSPITALIST

## 2025-01-15 PROCEDURE — 71045 X-RAY EXAM CHEST 1 VIEW: CPT

## 2025-01-15 PROCEDURE — 83880 ASSAY OF NATRIURETIC PEPTIDE: CPT

## 2025-01-15 PROCEDURE — 84484 ASSAY OF TROPONIN QUANT: CPT

## 2025-01-15 PROCEDURE — 87070 CULTURE OTHR SPECIMN AEROBIC: CPT

## 2025-01-15 PROCEDURE — 87186 SC STD MICRODIL/AGAR DIL: CPT

## 2025-01-15 PROCEDURE — 25010000002 HYDRALAZINE PER 20 MG: Performed by: FAMILY MEDICINE

## 2025-01-15 PROCEDURE — 36415 COLL VENOUS BLD VENIPUNCTURE: CPT

## 2025-01-15 PROCEDURE — 80053 COMPREHEN METABOLIC PANEL: CPT

## 2025-01-15 PROCEDURE — 93005 ELECTROCARDIOGRAM TRACING: CPT | Performed by: FAMILY MEDICINE

## 2025-01-15 PROCEDURE — 93005 ELECTROCARDIOGRAM TRACING: CPT | Performed by: EMERGENCY MEDICINE

## 2025-01-15 PROCEDURE — 0202U NFCT DS 22 TRGT SARS-COV-2: CPT

## 2025-01-15 PROCEDURE — 87205 SMEAR GRAM STAIN: CPT

## 2025-01-15 PROCEDURE — 85025 COMPLETE CBC W/AUTO DIFF WBC: CPT

## 2025-01-15 PROCEDURE — 83735 ASSAY OF MAGNESIUM: CPT

## 2025-01-15 RX ORDER — NITROGLYCERIN 0.4 MG/1
0.4 TABLET SUBLINGUAL
Status: DISCONTINUED | OUTPATIENT
Start: 2025-01-15 | End: 2025-01-22 | Stop reason: HOSPADM

## 2025-01-15 RX ORDER — FUROSEMIDE 10 MG/ML
40 INJECTION INTRAMUSCULAR; INTRAVENOUS
Status: DISCONTINUED | OUTPATIENT
Start: 2025-01-16 | End: 2025-01-16

## 2025-01-15 RX ORDER — ACETAMINOPHEN 160 MG/5ML
650 SOLUTION ORAL EVERY 4 HOURS PRN
Status: DISCONTINUED | OUTPATIENT
Start: 2025-01-15 | End: 2025-01-22 | Stop reason: HOSPADM

## 2025-01-15 RX ORDER — ONDANSETRON 4 MG/1
4 TABLET, ORALLY DISINTEGRATING ORAL EVERY 6 HOURS PRN
Status: DISCONTINUED | OUTPATIENT
Start: 2025-01-15 | End: 2025-01-22 | Stop reason: HOSPADM

## 2025-01-15 RX ORDER — BISACODYL 10 MG
10 SUPPOSITORY, RECTAL RECTAL DAILY PRN
Status: DISCONTINUED | OUTPATIENT
Start: 2025-01-15 | End: 2025-01-22 | Stop reason: HOSPADM

## 2025-01-15 RX ORDER — ROSUVASTATIN CALCIUM 10 MG/1
10 TABLET, COATED ORAL NIGHTLY
Status: DISCONTINUED | OUTPATIENT
Start: 2025-01-15 | End: 2025-01-22 | Stop reason: HOSPADM

## 2025-01-15 RX ORDER — ACETAMINOPHEN 325 MG/1
650 TABLET ORAL EVERY 4 HOURS PRN
Status: DISCONTINUED | OUTPATIENT
Start: 2025-01-15 | End: 2025-01-22 | Stop reason: HOSPADM

## 2025-01-15 RX ORDER — FUROSEMIDE 10 MG/ML
80 INJECTION INTRAMUSCULAR; INTRAVENOUS ONCE
Status: COMPLETED | OUTPATIENT
Start: 2025-01-15 | End: 2025-01-15

## 2025-01-15 RX ORDER — SODIUM CHLORIDE 0.9 % (FLUSH) 0.9 %
10 SYRINGE (ML) INJECTION AS NEEDED
Status: DISCONTINUED | OUTPATIENT
Start: 2025-01-15 | End: 2025-01-22 | Stop reason: HOSPADM

## 2025-01-15 RX ORDER — AMOXICILLIN 250 MG
2 CAPSULE ORAL 2 TIMES DAILY PRN
Status: DISCONTINUED | OUTPATIENT
Start: 2025-01-15 | End: 2025-01-22 | Stop reason: HOSPADM

## 2025-01-15 RX ORDER — LOSARTAN POTASSIUM 25 MG/1
25 TABLET ORAL DAILY
Status: DISCONTINUED | OUTPATIENT
Start: 2025-01-16 | End: 2025-01-22 | Stop reason: HOSPADM

## 2025-01-15 RX ORDER — HYDRALAZINE HYDROCHLORIDE 20 MG/ML
5 INJECTION INTRAMUSCULAR; INTRAVENOUS EVERY 4 HOURS PRN
Status: DISCONTINUED | OUTPATIENT
Start: 2025-01-15 | End: 2025-01-22 | Stop reason: HOSPADM

## 2025-01-15 RX ORDER — CARVEDILOL 3.12 MG/1
3.12 TABLET ORAL 2 TIMES DAILY WITH MEALS
Status: DISCONTINUED | OUTPATIENT
Start: 2025-01-15 | End: 2025-01-16

## 2025-01-15 RX ORDER — BISACODYL 5 MG/1
5 TABLET, DELAYED RELEASE ORAL DAILY PRN
Status: DISCONTINUED | OUTPATIENT
Start: 2025-01-15 | End: 2025-01-22 | Stop reason: HOSPADM

## 2025-01-15 RX ORDER — ACETAMINOPHEN 650 MG/1
650 SUPPOSITORY RECTAL EVERY 4 HOURS PRN
Status: DISCONTINUED | OUTPATIENT
Start: 2025-01-15 | End: 2025-01-22 | Stop reason: HOSPADM

## 2025-01-15 RX ORDER — POLYETHYLENE GLYCOL 3350 17 G/17G
17 POWDER, FOR SOLUTION ORAL DAILY PRN
Status: DISCONTINUED | OUTPATIENT
Start: 2025-01-15 | End: 2025-01-22 | Stop reason: HOSPADM

## 2025-01-15 RX ORDER — ONDANSETRON 2 MG/ML
4 INJECTION INTRAMUSCULAR; INTRAVENOUS EVERY 6 HOURS PRN
Status: DISCONTINUED | OUTPATIENT
Start: 2025-01-15 | End: 2025-01-22 | Stop reason: HOSPADM

## 2025-01-15 RX ADMIN — FUROSEMIDE 80 MG: 10 INJECTION, SOLUTION INTRAVENOUS at 18:28

## 2025-01-15 RX ADMIN — HYDRALAZINE HYDROCHLORIDE 5 MG: 20 INJECTION INTRAMUSCULAR; INTRAVENOUS at 20:27

## 2025-01-15 RX ADMIN — APIXABAN 5 MG: 5 TABLET, FILM COATED ORAL at 20:27

## 2025-01-15 NOTE — ED PROVIDER NOTES
Subjective   History of Present Illness  Patient is a 65-year-old male who presents to the ER with complaints of shortness of breath and leg swelling.  Patient reports that for the past 2 weeks, he has had increased shortness of breath and bilateral leg edema.  He reports dyspnea on exertion.  States that he can barely stand up without getting out of breath.  He denies any weight gain at home.  He does report that his legs have been swelling so much that he has wounds that are weeping and draining.  He also mentions a cough and states that he has been coughing up mucus.  No hemoptysis.  No fevers.  Patient does have history of CHF.  States that he has a cardiologist in [place] that he follows with.  He does take Lasix, however has not been taking as prescribed.  Patient also takes Eliquis for history of atrial flutter.  Patient was admitted to our facility in November 2024 for CHF exacerbation.  Echocardiogram obtained at that time which revealed an ejection fraction of 36 to 40%.  Patient was also started on Augmentin and doxycycline for infected leg wounds.  Patient then represented to [facility] last month for continued leg pain/cellulitis.  Patient was discharged from the ER at that time.  He then presented to another ER [facility] 5 days later for continued leg pain and leg swelling.  Ultrasound venous Doppler performed at that time which was negative for DVT.  He was started on Keflex for cellulitis per chart review.        Review of Systems   Respiratory:  Positive for cough and shortness of breath.    Cardiovascular:  Positive for leg swelling.   Skin:  Positive for wound.   All other systems reviewed and are negative.      Past Medical History:   Diagnosis Date    CHF (congestive heart failure)     Diabetes mellitus     Hyperlipidemia     Hypertension     Hypertrophic cardiomyopathy     s/p AICD    Migraine     Prostate cancer     Tachycardia     Ventricular tachycardia     Ventricular  tachycardia, sustained 10/14/2016       No Known Allergies    Past Surgical History:   Procedure Laterality Date    CARDIAC ABLATION  01/28/2016 2/2016, 10/18/2016 (Dr. Doss in [place], [place])    CARDIAC ABLATION      2018    CARDIAC CATHETERIZATION      CARDIAC DEFIBRILLATOR PLACEMENT      CARDIAC DEFIBRILLATOR PLACEMENT      PROSTATE BIOPSY  2019    PROSTATE BIOPSY  2023       Family History   Family history unknown: Yes       Social History     Socioeconomic History    Marital status: Single   Tobacco Use    Smoking status: Never   Vaping Use    Vaping status: Never Used   Substance and Sexual Activity    Alcohol use: No    Drug use: No    Sexual activity: Defer           Objective   Physical Exam  Vitals and nursing note reviewed.   Constitutional:       General: He is not in acute distress.     Appearance: Normal appearance. He is normal weight. He is ill-appearing. He is not toxic-appearing.   HENT:      Head: Normocephalic.   Cardiovascular:      Rate and Rhythm: Normal rate and regular rhythm.      Pulses: Normal pulses.      Heart sounds: Normal heart sounds.   Pulmonary:      Effort: Pulmonary effort is normal.      Breath sounds: Normal breath sounds.   Abdominal:      General: Abdomen is flat. Bowel sounds are normal.      Palpations: Abdomen is soft.   Musculoskeletal:         General: Normal range of motion.      Cervical back: Normal range of motion and neck supple.      Right lower leg: Edema present.      Left lower leg: Edema present.   Skin:     General: Skin is warm and dry.      Comments: Chronic appearing wounds to RLE with foul odor and purulent drainage. Chronic wounds to LLE.   Neurological:      General: No focal deficit present.      Mental Status: He is alert and oriented to person, place, and time. Mental status is at baseline.   Psychiatric:         Mood and Affect: Mood normal.         Behavior: Behavior normal.         Procedures       Labs Reviewed   COMPREHENSIVE METABOLIC PANEL  - Abnormal; Notable for the following components:       Result Value    Glucose 103 (*)     All other components within normal limits    Narrative:     GFR Categories in Chronic Kidney Disease (CKD)      GFR Category          GFR (mL/min/1.73)    Interpretation  G1                     90 or greater         Normal or high (1)  G2                      60-89                Mild decrease (1)  G3a                   45-59                Mild to moderate decrease  G3b                   30-44                Moderate to severe decrease  G4                    15-29                Severe decrease  G5                    14 or less           Kidney failure          (1)In the absence of evidence of kidney disease, neither GFR category G1 or G2 fulfill the criteria for CKD.    eGFR calculation 2021 CKD-EPI creatinine equation, which does not include race as a factor   BNP (IN-HOUSE) - Abnormal; Notable for the following components:    proBNP 6,171.0 (*)     All other components within normal limits    Narrative:     This assay is used as an aid in the diagnosis of individuals suspected of having heart failure. It can be used as an aid in the diagnosis of acute decompensated heart failure (ADHF) in patients presenting with signs and symptoms of ADHF to the emergency department (ED). In addition, NT-proBNP of <300 pg/mL indicates ADHF is not likely.    Age Range Result Interpretation  NT-proBNP Concentration (pg/mL:      <50             Positive            >450                   Gray                 300-450                    Negative             <300    50-75           Positive            >900                  Gray                300-900                  Negative            <300      >75             Positive            >1800                  Gray                300-1800                  Negative            <300   TROPONIN - Abnormal; Notable for the following components:    HS Troponin T 44 (*)     All other components within  normal limits    Narrative:     High Sensitive Troponin T Reference Range:  <14.0 ng/L- Negative Female for AMI  <22.0 ng/L- Negative Male for AMI  >=14 - Abnormal Female indicating possible myocardial injury.  >=22 - Abnormal Male indicating possible myocardial injury.   Clinicians would have to utilize clinical acumen, EKG, Troponin, and serial changes to determine if it is an Acute Myocardial Infarction or myocardial injury due to an underlying chronic condition.        CBC WITH AUTO DIFFERENTIAL - Abnormal; Notable for the following components:    Hemoglobin 12.9 (*)     RDW 16.4 (*)     RDW-SD 55.4 (*)     Neutrophil % 79.3 (*)     Lymphocyte % 8.4 (*)     Lymphocytes, Absolute 0.39 (*)     All other components within normal limits   RESPIRATORY PANEL PCR W/ COVID-19 (SARS-COV-2), NP SWAB IN UTM/VTP, 2 HR TAT - Normal    Narrative:     In the setting of a positive respiratory panel with a viral infection PLUS a negative procalcitonin without other underlying concern for bacterial infection, consider observing off antibiotics or discontinuation of antibiotics and continue supportive care. If the respiratory panel is positive for atypical bacterial infection (Bordetella pertussis, Chlamydophila pneumoniae, or Mycoplasma pneumoniae), consider antibiotic de-escalation to target atypical bacterial infection.   MAGNESIUM - Normal   WOUND CULTURE   HIGH SENSITIVITIY TROPONIN T 1HR   CBC AND DIFFERENTIAL    Narrative:     The following orders were created for panel order CBC & Differential.  Procedure                               Abnormality         Status                     ---------                               -----------         ------                     CBC Auto Differential[[id number]]        Abnormal            Final result                 Please view results for these tests on the individual orders.     XR Chest 1 View   Final Result   Mild CHF. Bibasilar atelectasis.       This report was signed and  finalized on 1/15/2025 1:35 PM by Dr. Heber Encarnacion MD.                  ED Course  ED Course as of 01/15/25 1653   Wed Faustino 15, 2025   1648 Case was discussed with Dr. Johnson.  He has accepted patient for further management for CHF exacerbation.  Patient agreeable. [KR]      ED Course User Index  [KR] Giselle Herring APRN                                                       Medical Decision Making      Final diagnoses:   Acute on chronic congestive heart failure, unspecified heart failure type       ED Disposition  ED Disposition       ED Disposition   Decision to Admit    Condition   --    Comment   Level of Care: Telemetry [5]   Diagnosis: CHF exacerbation [[id number]]   Admitting Physician: LA JOHNSON [[id number]]   Certification: I Certify That Inpatient Hospital Services Are Medically Necessary For Greater Than 2 Midnights                 No follow-up provider specified.       Medication List      No changes were made to your prescriptions during this visit.            Giselle Herring APRN  01/15/25 1652

## 2025-01-15 NOTE — H&P
[facility] Medicine Services  HISTORY AND PHYSICAL    Date of Admission: 1/15/2025  Primary Care Physician: Conor Denny DO Subjective   Primary Historian: Patient, records    Chief Complaint: Edema    History of Present Illness  64-year-old male with history of hypertension, hyperlipidemia, chronic cardiomyopathy with systolic dysfunction, atrial fibrillation, prostate cancer, AICD in place, came to the hospital reporting worsening edema of the lower extremities, with associated pain.  Also reports dyspnea, worse on exertion.  No fevers. The patient was admitted to the hospital November 2024 with exactly similar complaints.  He was discharged, to continue medical therapy and have cardiology and wound care outpatient appointments.  I see that the patient had appointments in November 22 2024 and December 5, 2024 at the heart failure clinic and with cardiologist and did not attend to follow-up visits.  The wound care clinic appointment November 19, 2024 was cancelled.      Review of Systems   Otherwise complete ROS reviewed and negative except as mentioned in the HPI.    Past Medical History:   Past Medical History:   Diagnosis Date    CHF (congestive heart failure)     Diabetes mellitus     Hyperlipidemia     Hypertension     Hypertrophic cardiomyopathy     s/p AICD    Migraine     Prostate cancer     Tachycardia     Ventricular tachycardia     Ventricular tachycardia, sustained 10/14/2016     Past Surgical History:  Past Surgical History:   Procedure Laterality Date    CARDIAC ABLATION  01/28/2016 2/2016, 10/18/2016 (Dr. Doss in [place], [place])    CARDIAC ABLATION      2018    CARDIAC CATHETERIZATION      CARDIAC DEFIBRILLATOR PLACEMENT      CARDIAC DEFIBRILLATOR PLACEMENT      PROSTATE BIOPSY  2019    PROSTATE BIOPSY  2023     Social History:  reports that he has never smoked. He does not have any smokeless tobacco history on file. He reports that he does not  drink alcohol and does not use drugs.    Family History: Family history is unknown by patient.   Reviewed    Allergies:  No Known Allergies    Medications:  Prior to Admission medications    Medication Sig Start Date End Date Taking? Authorizing Provider   carvedilol (COREG) 3.125 MG tablet Take 1 tablet by mouth 2 (Two) Times a Day With Meals for 30 days. 11/18/24 1/15/25 Yes Ian Rachel MD   furosemide (Lasix) 40 MG tablet Take 1 tablet by mouth 2 (Two) Times a Day for 30 days. 11/18/24 1/15/25 Yes Ian Rachel MD   rosuvastatin (CRESTOR) 10 MG tablet Take 1 tablet by mouth Every Night for 30 days. 10/16/24 1/15/25 Yes Ian Rachel MD   sildenafil (VIAGRA) 100 MG tablet Take 0.5-1 tablets by mouth Daily As Needed for Erectile Dysfunction.   Yes Provider, MD Fabiola   losartan (Cozaar) 25 MG tablet Take 1 tablet by mouth Daily for 30 days. 11/18/24 12/18/24  Ian Rachel MD   spironolactone (ALDACTONE) 25 MG tablet Take 0.5 tablets by mouth Daily for 30 days. 11/19/24 12/19/24  Ian Rachel MD     I have utilized all available immediate resources to obtain, update, or review the patient's current medications (including all prescriptions, over-the-counter products, herbals, cannabis/cannabidiol products, and vitamin/mineral/dietary (nutritional) supplements).    Objective     Vital Signs: BP (!) 181/87 (BP Location: Left arm, Patient Position: Sitting)   Pulse 63   Temp 98.4 °F (36.9 °C) (Oral)   Resp 20   SpO2 99%   Physical Exam   Constitutional:       Appearance: Normal appearance.  Alert oriented x 3.  No significant dyspnea on examination.  No retractions, no tachypnea.  HENT:      Head: Normocephalic and atraumatic.      Nose: Nose normal.      Mouth/Throat:      Mouth: Mucous membranes are moist.   Eyes:      Extraocular Movements: Extraocular movements intact.      Conjunctiva/sclera: Conjunctivae normal.      Pupils: Pupils are equal, round, and reactive to light.    Cardiovascular:      Rate and Rhythm: Irregular rhythm   pulses: Normal pulses.   Pulmonary:      Effort: No respiratory distress.      Breath sounds: Normal breath sounds. No wheezing, rhonchi or rales.   Abdominal:      General: Abdomen is flat. Bowel sounds are normal.      Palpations: Abdomen is soft.      Tenderness: There is no guarding or rebound.   Musculoskeletal:         General: Normal range of motion.      Cervical back: Normal range of motion and neck supple.   Extremities: There is edema 3+ lower extremities, difficult to evaluate properly due to long pants/jeans  Skin:     Capillary Refill: Capillary refill takes less than 2 seconds.      Coloration: Skin is not jaundiced.      Findings: No rash.   Neurological:      General: No focal deficit present.      Mental Status: Patient is alert, oriented to place time and person.          Results Reviewed:  Lab Results (last 24 hours)       Procedure Component Value Units Date/Time    Wound Culture - Wound, Leg, Right [[id number]] Collected: 01/15/25 1352    Specimen: Wound from Leg, Right Updated: 01/15/25 1524     Gram Stain No WBCs seen      Many (4+) Gram positive cocci      Moderate (3+) Gram negative bacilli    Respiratory Panel PCR w/COVID-19(SARS-CoV-2) ALEXIS/DONIS/NOÉ/PAD/COR/EAMON In-House, NP Swab in UTM/VTM, 2 HR TAT - Swab, Nasopharynx [[id number]]  (Normal) Collected: 01/15/25 1339    Specimen: Swab from Nasopharynx Updated: 01/15/25 1449     ADENOVIRUS, PCR Not Detected     Coronavirus 229E Not Detected     Coronavirus HKU1 Not Detected     Coronavirus NL63 Not Detected     Coronavirus OC43 Not Detected     COVID19 Not Detected     Human Metapneumovirus Not Detected     Human Rhinovirus/Enterovirus Not Detected     Influenza A PCR Not Detected     Influenza B PCR Not Detected     Parainfluenza Virus 1 Not Detected     Parainfluenza Virus 2 Not Detected     Parainfluenza Virus 3 Not Detected     Parainfluenza Virus 4 Not Detected     RSV, PCR Not  Detected     Bordetella pertussis pcr Not Detected     Bordetella parapertussis PCR Not Detected     Chlamydophila pneumoniae PCR Not Detected     Mycoplasma pneumo by PCR Not Detected    Narrative:      In the setting of a positive respiratory panel with a viral infection PLUS a negative procalcitonin without other underlying concern for bacterial infection, consider observing off antibiotics or discontinuation of antibiotics and continue supportive care. If the respiratory panel is positive for atypical bacterial infection (Bordetella pertussis, Chlamydophila pneumoniae, or Mycoplasma pneumoniae), consider antibiotic de-escalation to target atypical bacterial infection.    Comprehensive Metabolic Panel [[id number]]  (Abnormal) Collected: 01/15/25 1353    Specimen: Blood Updated: 01/15/25 1420     Glucose 103 mg/dL      BUN 11 mg/dL      Creatinine 1.14 mg/dL      Sodium 139 mmol/L      Potassium 4.2 mmol/L      Chloride 107 mmol/L      CO2 22.0 mmol/L      Calcium 9.0 mg/dL      Total Protein 6.6 g/dL      Albumin 3.6 g/dL      ALT (SGPT) 13 U/L      AST (SGOT) 16 U/L      Alkaline Phosphatase 76 U/L      Total Bilirubin 1.2 mg/dL      Globulin 3.0 gm/dL      A/G Ratio 1.2 g/dL      BUN/Creatinine Ratio 9.6     Anion Gap 10.0 mmol/L      eGFR 70.9 mL/min/1.73     Narrative:      GFR Categories in Chronic Kidney Disease (CKD)      GFR Category          GFR (mL/min/1.73)    Interpretation  G1                     90 or greater         Normal or high (1)  G2                      60-89                Mild decrease (1)  G3a                   45-59                Mild to moderate decrease  G3b                   30-44                Moderate to severe decrease  G4                    15-29                Severe decrease  G5                    14 or less           Kidney failure          (1)In the absence of evidence of kidney disease, neither GFR category G1 or G2 fulfill the criteria for CKD.    eGFR calculation 2021  CKD-EPI creatinine equation, which does not include race as a factor    Magnesium [[id number]]  (Normal) Collected: 01/15/25 1353    Specimen: Blood Updated: 01/15/25 1420     Magnesium 2.0 mg/dL     BNP [[id number]]  (Abnormal) Collected: 01/15/25 1353    Specimen: Blood Updated: 01/15/25 1417     proBNP 6,171.0 pg/mL     Narrative:      This assay is used as an aid in the diagnosis of individuals suspected of having heart failure. It can be used as an aid in the diagnosis of acute decompensated heart failure (ADHF) in patients presenting with signs and symptoms of ADHF to the emergency department (ED). In addition, NT-proBNP of <300 pg/mL indicates ADHF is not likely.    Age Range Result Interpretation  NT-proBNP Concentration (pg/mL:      <50             Positive            >450                   Gray                 300-450                    Negative             <300    50-75           Positive            >900                  Gray                300-900                  Negative            <300      >75             Positive            >1800                  Gray                300-1800                  Negative            <300    High Sensitivity Troponin T [[id number]]  (Abnormal) Collected: 01/15/25 1353    Specimen: Blood Updated: 01/15/25 1416     HS Troponin T 44 ng/L     Narrative:      High Sensitive Troponin T Reference Range:  <14.0 ng/L- Negative Female for AMI  <22.0 ng/L- Negative Male for AMI  >=14 - Abnormal Female indicating possible myocardial injury.  >=22 - Abnormal Male indicating possible myocardial injury.   Clinicians would have to utilize clinical acumen, EKG, Troponin, and serial changes to determine if it is an Acute Myocardial Infarction or myocardial injury due to an underlying chronic condition.         CBC & Differential [[id number]]  (Abnormal) Collected: 01/15/25 1353    Specimen: Blood Updated: 01/15/25 1359    Narrative:      The following orders were created for panel order CBC &  Differential.  Procedure                               Abnormality         Status                     ---------                               -----------         ------                     CBC Auto Differential[[id number]]        Abnormal            Final result                 Please view results for these tests on the individual orders.    CBC Auto Differential [[id number]]  (Abnormal) Collected: 01/15/25 1353    Specimen: Blood Updated: 01/15/25 1359     WBC 4.62 10*3/mm3      RBC 4.44 10*6/mm3      Hemoglobin 12.9 g/dL      Hematocrit 41.0 %      MCV 92.3 fL      MCH 29.1 pg      MCHC 31.5 g/dL      RDW 16.4 %      RDW-SD 55.4 fl      MPV 10.0 fL      Platelets 211 10*3/mm3      Neutrophil % 79.3 %      Lymphocyte % 8.4 %      Monocyte % 10.6 %      Eosinophil % 0.9 %      Basophil % 0.4 %      Immature Grans % 0.4 %      Neutrophils, Absolute 3.66 10*3/mm3      Lymphocytes, Absolute 0.39 10*3/mm3      Monocytes, Absolute 0.49 10*3/mm3      Eosinophils, Absolute 0.04 10*3/mm3      Basophils, Absolute 0.02 10*3/mm3      Immature Grans, Absolute 0.02 10*3/mm3      nRBC 0.0 /100 WBC           Imaging Results (Last 24 Hours)       Procedure Component Value Units Date/Time    XR Chest 1 View [[id number]] Collected: 01/15/25 1334     Updated: 01/15/25 1338    Narrative:      EXAMINATION: XR CHEST 1 VW- 1/15/2025 1:34 PM     HISTORY: soa.     REPORT: A frontal view of the chest was obtained.     COMPARISON: Chest x-ray 11/14/2024.     The lungs are hypoaerated, bibasilar opacities probably represent  atelectasis and there is no lung consolidation. Mild cardiomegaly is  present and there is central and basilar vascular congestion. No  pneumothorax or definite pleural effusion is identified. The left  subclavian AICD appears satisfied position as before. No acute osseous  abnormality is identified.       Impression:      Mild CHF. Bibasilar atelectasis.     This report was signed and finalized on 1/15/2025 1:35 PM by   Heber Encarnacion MD.             I have personally reviewed and interpreted the radiology studies and ECG obtained at time of admission.     Assessment / Plan   Assessment:   Active Hospital Problems    Diagnosis     **CHF exacerbation        Acute on chronic heart failure  Chronic cardiomyopathy, systolic dysfunction, EF 36 to 40%  Moderate to severe aortic valve regurgitation  Chronic bilateral lower extremity lymphedema/edema with multiple wounds  AICD in place  History of ventricular tachycardia  Atrial flutter, permanent  Hypertension, not controlled  Poor medical compliance  History of prostatic adenocarcinoma      Echocardiogram 11/15/2024    Left ventricular systolic function is moderately decreased. Left ventricular ejection fraction appears to be 36 - 40%.    The following left ventricular wall segments are hypokinetic: mid inferolateral, basal inferoseptal and basal inferoseptal. The following left ventricular wall segments are akinetic: mid anterolateral, apical lateral, apical inferior, mid inferior, apical septal and apex.    Moderate to severe aortic valve regurgitation is present.    Left ventricular wall thickness is consistent with moderate concentric hypertrophy.    The left atrial cavity is dilated.    Estimated right ventricular systolic pressure from tricuspid regurgitation is normal (<35 mmHg).    Normal size and function of the right ventricle.    There is a trivial pericardial effusion.    Had workup for amyloid November 2023.  Was negative      Today, proBNP 6000.  Troponin 44 as per prior values.  Sodium 139, potassium 4.2.  Creatinine 1.14.  Glucose 103.  Liver function test within normal limits.  Hemoglobin 12.9, hematocrit 41, platelet count 211,000.      Treatment Plan  The patient will be admitted to my service here at [facility].      Received Lasix 80 mg IV in the emergency department.  Will continue Lasix 40 mg IV every 12 hours.  Weigh patient daily, strict intake and  output.  Continue losartan, carvedilol, Aldactone.  Follow renal function electrolytes.    Will consult cardiology EP, to evaluate possibility of definitive flutter treatment, and improve patient's heart failure.    Regarding wounds in lower extremities, the patient did not follow appointment with wound care clinic last November.  I will consult wound care here in the hospital for additional recommendations.  With his chronic edema lower extremities is difficult for these wounds to heal.      Medical Decision Making  Number and Complexity of problems: 10, moderate to high complexity  Differential Diagnosis: See above    Conditions and Status        Condition is unchanged.     MDM Data  External documents reviewed: None  Cardiac tracing (EKG, telemetry) interpretation: Atrial flutter with variable A-V conduction  Radiology interpretation: Radiology reports reviewed  Labs reviewed: Yes  Any tests that were considered but not ordered: No     Decision rules/scores evaluated (example [id number], Wells, etc): See chart     Discussed with: Patient     Care Planning  Shared decision making: With patient  Code status and discussions: Full code    Disposition  Social Determinants of Health that impact treatment or disposition: Poor compliance, no transportation as per notes  Estimated length of stay is 2 days.     I confirmed that the patient's advanced care plan is present, code status is documented, and a surrogate decision maker is listed in the patient's medical record.     The patient's surrogate decision maker is family.     Electronically signed by Ian Rachel MD, 01/15/25, 16:49 CST.

## 2025-01-16 ENCOUNTER — PREP FOR SURGERY (OUTPATIENT)
Dept: OTHER | Facility: HOSPITAL | Age: 65
End: 2025-01-16
Payer: MEDICARE

## 2025-01-16 DIAGNOSIS — I48.92 ATRIAL FLUTTER, UNSPECIFIED TYPE: Primary | ICD-10-CM

## 2025-01-16 PROBLEM — I50.23 ACUTE ON CHRONIC SYSTOLIC CHF (CONGESTIVE HEART FAILURE): Status: ACTIVE | Noted: 2024-11-15

## 2025-01-16 PROBLEM — J81.0 ACUTE PULMONARY EDEMA: Status: ACTIVE | Noted: 2025-01-16

## 2025-01-16 PROBLEM — J81.0 ACUTE PULMONARY EDEMA: Status: RESOLVED | Noted: 2025-01-16 | Resolved: 2025-01-16

## 2025-01-16 LAB
ANION GAP SERPL CALCULATED.3IONS-SCNC: 11 MMOL/L (ref 5–15)
BUN SERPL-MCNC: 14 MG/DL (ref 8–23)
BUN/CREAT SERPL: 12.8 (ref 7–25)
CALCIUM SPEC-SCNC: 8.9 MG/DL (ref 8.6–10.5)
CHLORIDE SERPL-SCNC: 102 MMOL/L (ref 98–107)
CO2 SERPL-SCNC: 24 MMOL/L (ref 22–29)
CREAT SERPL-MCNC: 1.09 MG/DL (ref 0.76–1.27)
DEPRECATED RDW RBC AUTO: 54.3 FL (ref 37–54)
EGFRCR SERPLBLD CKD-EPI 2021: 74.9 ML/MIN/1.73
ERYTHROCYTE [DISTWIDTH] IN BLOOD BY AUTOMATED COUNT: 16.4 % (ref 12.3–15.4)
GLUCOSE SERPL-MCNC: 96 MG/DL (ref 65–99)
HCT VFR BLD AUTO: 40.3 % (ref 37.5–51)
HGB BLD-MCNC: 12.9 G/DL (ref 13–17.7)
MAGNESIUM SERPL-MCNC: 2.1 MG/DL (ref 1.6–2.4)
MCH RBC QN AUTO: 29.3 PG (ref 26.6–33)
MCHC RBC AUTO-ENTMCNC: 32 G/DL (ref 31.5–35.7)
MCV RBC AUTO: 91.6 FL (ref 79–97)
PLATELET # BLD AUTO: 216 10*3/MM3 (ref 140–450)
PMV BLD AUTO: 10.6 FL (ref 6–12)
POTASSIUM SERPL-SCNC: 4 MMOL/L (ref 3.5–5.2)
RBC # BLD AUTO: 4.4 10*6/MM3 (ref 4.14–5.8)
SODIUM SERPL-SCNC: 137 MMOL/L (ref 136–145)
WBC NRBC COR # BLD AUTO: 4.06 10*3/MM3 (ref 3.4–10.8)

## 2025-01-16 PROCEDURE — 36415 COLL VENOUS BLD VENIPUNCTURE: CPT | Performed by: FAMILY MEDICINE

## 2025-01-16 PROCEDURE — 85027 COMPLETE CBC AUTOMATED: CPT | Performed by: FAMILY MEDICINE

## 2025-01-16 PROCEDURE — 99222 1ST HOSP IP/OBS MODERATE 55: CPT | Performed by: STUDENT IN AN ORGANIZED HEALTH CARE EDUCATION/TRAINING PROGRAM

## 2025-01-16 PROCEDURE — 99222 1ST HOSP IP/OBS MODERATE 55: CPT | Performed by: NURSE PRACTITIONER

## 2025-01-16 PROCEDURE — 80048 BASIC METABOLIC PNL TOTAL CA: CPT | Performed by: FAMILY MEDICINE

## 2025-01-16 PROCEDURE — 11042 DBRDMT SUBQ TIS 1ST 20SQCM/<: CPT | Performed by: NURSE PRACTITIONER

## 2025-01-16 PROCEDURE — 83735 ASSAY OF MAGNESIUM: CPT | Performed by: FAMILY MEDICINE

## 2025-01-16 PROCEDURE — 25010000002 FUROSEMIDE PER 20 MG: Performed by: FAMILY MEDICINE

## 2025-01-16 PROCEDURE — 11045 DBRDMT SUBQ TISS EACH ADDL: CPT | Performed by: NURSE PRACTITIONER

## 2025-01-16 RX ORDER — SODIUM CHLORIDE 9 MG/ML
40 INJECTION, SOLUTION INTRAVENOUS AS NEEDED
OUTPATIENT
Start: 2025-01-16

## 2025-01-16 RX ORDER — CARVEDILOL 6.25 MG/1
6.25 TABLET ORAL 2 TIMES DAILY WITH MEALS
Status: DISCONTINUED | OUTPATIENT
Start: 2025-01-16 | End: 2025-01-22 | Stop reason: HOSPADM

## 2025-01-16 RX ORDER — ROSUVASTATIN CALCIUM 10 MG/1
10 TABLET, COATED ORAL DAILY
Status: ON HOLD | COMMUNITY
End: 2025-01-30

## 2025-01-16 RX ORDER — COLCHICINE 0.6 MG/1
0.3 TABLET ORAL DAILY
COMMUNITY
End: 2025-01-22 | Stop reason: HOSPADM

## 2025-01-16 RX ORDER — ERGOCALCIFEROL 1.25 MG/1
50000 CAPSULE, LIQUID FILLED ORAL WEEKLY
COMMUNITY
End: 2025-01-22 | Stop reason: HOSPADM

## 2025-01-16 RX ORDER — FUROSEMIDE 10 MG/ML
80 INJECTION INTRAMUSCULAR; INTRAVENOUS
Status: DISCONTINUED | OUTPATIENT
Start: 2025-01-17 | End: 2025-01-19

## 2025-01-16 RX ORDER — SODIUM CHLORIDE 0.9 % (FLUSH) 0.9 %
10 SYRINGE (ML) INJECTION EVERY 12 HOURS SCHEDULED
OUTPATIENT
Start: 2025-01-16

## 2025-01-16 RX ORDER — TOPIRAMATE 25 MG/1
25-50 TABLET, FILM COATED ORAL NIGHTLY
COMMUNITY
End: 2025-01-22 | Stop reason: HOSPADM

## 2025-01-16 RX ORDER — SODIUM CHLORIDE 0.9 % (FLUSH) 0.9 %
10 SYRINGE (ML) INJECTION AS NEEDED
OUTPATIENT
Start: 2025-01-16

## 2025-01-16 RX ORDER — FINASTERIDE 5 MG/1
5 TABLET, FILM COATED ORAL DAILY
Status: ON HOLD | COMMUNITY
End: 2025-01-30

## 2025-01-16 RX ORDER — CARVEDILOL 12.5 MG/1
12.5 TABLET ORAL 2 TIMES DAILY WITH MEALS
COMMUNITY
End: 2025-01-22 | Stop reason: HOSPADM

## 2025-01-16 RX ORDER — AMIODARONE HYDROCHLORIDE 200 MG/1
200 TABLET ORAL 2 TIMES DAILY
Status: ON HOLD | COMMUNITY
End: 2025-01-16

## 2025-01-16 RX ORDER — FLUTICASONE PROPIONATE 50 MCG
2 SPRAY, SUSPENSION (ML) NASAL DAILY PRN
Status: ON HOLD | COMMUNITY
End: 2025-01-30

## 2025-01-16 RX ORDER — AMIODARONE HYDROCHLORIDE 200 MG/1
200 TABLET ORAL 2 TIMES DAILY
COMMUNITY
End: 2025-01-22 | Stop reason: HOSPADM

## 2025-01-16 RX ORDER — INDOMETHACIN 50 MG/1
50 CAPSULE ORAL 3 TIMES DAILY PRN
COMMUNITY
End: 2025-01-22 | Stop reason: HOSPADM

## 2025-01-16 RX ORDER — NITROGLYCERIN 0.4 MG/1
0.4 TABLET SUBLINGUAL
COMMUNITY

## 2025-01-16 RX ORDER — LOSARTAN POTASSIUM 25 MG/1
25 TABLET ORAL DAILY
Status: ON HOLD | COMMUNITY
End: 2025-01-22

## 2025-01-16 RX ORDER — ASPIRIN 81 MG/1
81 TABLET ORAL DAILY
COMMUNITY

## 2025-01-16 RX ORDER — LISINOPRIL 20 MG/1
20 TABLET ORAL NIGHTLY
Status: ON HOLD | COMMUNITY
End: 2025-01-16

## 2025-01-16 RX ORDER — LIDOCAINE 40 MG/G
1 CREAM TOPICAL ONCE
Status: COMPLETED | OUTPATIENT
Start: 2025-01-16 | End: 2025-01-16

## 2025-01-16 RX ORDER — LISINOPRIL 20 MG/1
20 TABLET ORAL DAILY
COMMUNITY
End: 2025-01-22 | Stop reason: HOSPADM

## 2025-01-16 RX ADMIN — FUROSEMIDE 40 MG: 10 INJECTION, SOLUTION INTRAVENOUS at 08:29

## 2025-01-16 RX ADMIN — EMPAGLIFLOZIN 10 MG: 10 TABLET, FILM COATED ORAL at 10:19

## 2025-01-16 RX ADMIN — ACETAMINOPHEN 650 MG: 325 TABLET ORAL at 22:08

## 2025-01-16 RX ADMIN — ROSUVASTATIN 10 MG: 10 TABLET, FILM COATED ORAL at 20:09

## 2025-01-16 RX ADMIN — Medication 10 ML: at 08:29

## 2025-01-16 RX ADMIN — APIXABAN 5 MG: 5 TABLET, FILM COATED ORAL at 20:09

## 2025-01-16 RX ADMIN — CARVEDILOL 3.12 MG: 3.12 TABLET, FILM COATED ORAL at 00:27

## 2025-01-16 RX ADMIN — CARVEDILOL 6.25 MG: 6.25 TABLET, FILM COATED ORAL at 17:54

## 2025-01-16 RX ADMIN — ROSUVASTATIN 10 MG: 10 TABLET, FILM COATED ORAL at 00:28

## 2025-01-16 RX ADMIN — ACETAMINOPHEN 650 MG: 325 TABLET ORAL at 10:19

## 2025-01-16 RX ADMIN — CARVEDILOL 3.12 MG: 3.12 TABLET, FILM COATED ORAL at 08:29

## 2025-01-16 RX ADMIN — LOSARTAN POTASSIUM 25 MG: 25 TABLET, FILM COATED ORAL at 08:29

## 2025-01-16 RX ADMIN — Medication 12.5 MG: at 08:29

## 2025-01-16 RX ADMIN — LIDOCAINE 4% 1 APPLICATION: 4 CREAM TOPICAL at 13:43

## 2025-01-16 RX ADMIN — APIXABAN 5 MG: 5 TABLET, FILM COATED ORAL at 08:29

## 2025-01-16 NOTE — ED NOTES
"Nursing report ED to floor  Jeanie Grossman  65 y.o.  male    HPI:   Chief Complaint   Patient presents with    Shortness of Breath    Leg Swelling       Admitting doctor:   Ian Rachel MD    Consulting provider(s):  Consults       Date and Time Order Name Status Description    1/15/2025  5:13 PM Inpatient Cardiac Electrophysiology Consult      1/15/2025  4:59 PM Inpatient Wound Care MD Consult               Admitting diagnosis:   The encounter diagnosis was Acute on chronic congestive heart failure, unspecified heart failure type.    Code status:   Current Code Status       Date Active Code Status Order ID Comments User Context       1/15/2025 1648 CPR (Attempt to Resuscitate) [id number]  Ian Rachel MD ED        Question Answer    Code Status (Patient has no pulse and is not breathing) CPR (Attempt to Resuscitate)    Medical Interventions (Patient has pulse or is breathing) Full Support    Level Of Support Discussed With Patient                    Allergies:   Patient has no known allergies.    Intake and Output  No intake or output data in the 24 hours ending 01/15/25 1846    Weight:       01/15/25  1816   Weight: 136 kg (299 lb)       Most recent vitals:   Vitals:    01/15/25 1217 01/15/25 1218 01/15/25 1816 01/15/25 1828   BP:  (!) 181/87 (!) 187/104 (!) 187/104   BP Location:  Left arm Left arm    Patient Position:  Sitting Sitting    Pulse: 63  95 117   Resp:  20 18    Temp: 98.4 °F (36.9 °C)      TempSrc: Oral      SpO2: 99%  97%    Weight:   136 kg (299 lb)    Height:   190.5 cm (75\")      Oxygen Therapy: .    Active LDAs/IV Access:   Lines, Drains & Airways       Active LDAs       Name Placement date Placement time Site Days    Peripheral IV 01/15/25 1712 Right Antecubital 01/15/25  1712  Antecubital  less than 1                    Labs (abnormal labs have a star):   Labs Reviewed   COMPREHENSIVE METABOLIC PANEL - Abnormal; Notable for the following components:       Result Value    Glucose 103 (*) "     All other components within normal limits    Narrative:     GFR Categories in Chronic Kidney Disease (CKD)      GFR Category          GFR (mL/min/1.73)    Interpretation  G1                     90 or greater         Normal or high (1)  G2                      60-89                Mild decrease (1)  G3a                   45-59                Mild to moderate decrease  G3b                   30-44                Moderate to severe decrease  G4                    15-29                Severe decrease  G5                    14 or less           Kidney failure          (1)In the absence of evidence of kidney disease, neither GFR category G1 or G2 fulfill the criteria for CKD.    eGFR calculation 2021 CKD-EPI creatinine equation, which does not include race as a factor   BNP (IN-HOUSE) - Abnormal; Notable for the following components:    proBNP 6,171.0 (*)     All other components within normal limits    Narrative:     This assay is used as an aid in the diagnosis of individuals suspected of having heart failure. It can be used as an aid in the diagnosis of acute decompensated heart failure (ADHF) in patients presenting with signs and symptoms of ADHF to the emergency department (ED). In addition, NT-proBNP of <300 pg/mL indicates ADHF is not likely.    Age Range Result Interpretation  NT-proBNP Concentration (pg/mL:      <50             Positive            >450                   Gray                 300-450                    Negative             <300    50-75           Positive            >900                  Gray                300-900                  Negative            <300      >75             Positive            >1800                  Gray                300-1800                  Negative            <300   TROPONIN - Abnormal; Notable for the following components:    HS Troponin T 44 (*)     All other components within normal limits    Narrative:     High Sensitive Troponin T Reference Range:  <14.0 ng/L-  Negative Female for AMI  <22.0 ng/L- Negative Male for AMI  >=14 - Abnormal Female indicating possible myocardial injury.  >=22 - Abnormal Male indicating possible myocardial injury.   Clinicians would have to utilize clinical acumen, EKG, Troponin, and serial changes to determine if it is an Acute Myocardial Infarction or myocardial injury due to an underlying chronic condition.        CBC WITH AUTO DIFFERENTIAL - Abnormal; Notable for the following components:    Hemoglobin 12.9 (*)     RDW 16.4 (*)     RDW-SD 55.4 (*)     Neutrophil % 79.3 (*)     Lymphocyte % 8.4 (*)     Lymphocytes, Absolute 0.39 (*)     All other components within normal limits   HIGH SENSITIVITIY TROPONIN T 1HR - Abnormal; Notable for the following components:    HS Troponin T 40 (*)     All other components within normal limits    Narrative:     High Sensitive Troponin T Reference Range:  <14.0 ng/L- Negative Female for AMI  <22.0 ng/L- Negative Male for AMI  >=14 - Abnormal Female indicating possible myocardial injury.  >=22 - Abnormal Male indicating possible myocardial injury.   Clinicians would have to utilize clinical acumen, EKG, Troponin, and serial changes to determine if it is an Acute Myocardial Infarction or myocardial injury due to an underlying chronic condition.        RESPIRATORY PANEL PCR W/ COVID-19 (SARS-COV-2), NP SWAB IN UTM/VTP, 2 HR TAT - Normal    Narrative:     In the setting of a positive respiratory panel with a viral infection PLUS a negative procalcitonin without other underlying concern for bacterial infection, consider observing off antibiotics or discontinuation of antibiotics and continue supportive care. If the respiratory panel is positive for atypical bacterial infection (Bordetella pertussis, Chlamydophila pneumoniae, or Mycoplasma pneumoniae), consider antibiotic de-escalation to target atypical bacterial infection.   MAGNESIUM - Normal   WOUND CULTURE   CBC AND DIFFERENTIAL    Narrative:     The  following orders were created for panel order CBC & Differential.  Procedure                               Abnormality         Status                     ---------                               -----------         ------                     CBC Auto Differential[[id number]]        Abnormal            Final result                 Please view results for these tests on the individual orders.       Meds given in ED:   Medications   sodium chloride 0.9 % flush 10 mL (has no administration in time range)   nitroglycerin (NITROSTAT) SL tablet 0.4 mg (has no administration in time range)   furosemide (LASIX) injection 40 mg (has no administration in time range)   sennosides-docusate (PERICOLACE) 8.6-50 MG per tablet 2 tablet (has no administration in time range)     And   polyethylene glycol (MIRALAX) packet 17 g (has no administration in time range)     And   bisacodyl (DULCOLAX) EC tablet 5 mg (has no administration in time range)     And   bisacodyl (DULCOLAX) suppository 10 mg (has no administration in time range)   ondansetron ODT (ZOFRAN-ODT) disintegrating tablet 4 mg (has no administration in time range)     Or   ondansetron (ZOFRAN) injection 4 mg (has no administration in time range)   acetaminophen (TYLENOL) tablet 650 mg (has no administration in time range)     Or   acetaminophen (TYLENOL) 160 MG/5ML oral solution 650 mg (has no administration in time range)     Or   acetaminophen (TYLENOL) suppository 650 mg (has no administration in time range)   apixaban (ELIQUIS) tablet 5 mg (has no administration in time range)   hydrALAZINE (APRESOLINE) injection 5 mg (has no administration in time range)   furosemide (LASIX) injection 80 mg (80 mg Intravenous Given 1/15/25 7781)           NIH Stroke Scale:       Isolation/Infection(s):  No active isolations   No active infections     COVID Testing  Collected .  Resulted .    Nursing report ED to floor:  Mental status: .  Ambulatory status: .  Precautions: .    ED nurse  phone extentsion- ..   [id number]

## 2025-01-16 NOTE — CONSULTS
[facility]  INPATIENT WOUND & OSTOMY CONSULTATION    Today's Date: 01/16/25    Patient Name: Jeanie Grossman  MRN: [id number]  CSN: [id number]  PCP: Conor Denny DO  Referring Provider:   Consulting Provider (From admission, onward)      Start Ordered     Status Ordering Provider    01/15/25 1659 01/15/25 1659  Inpatient Wound Care MD Consult  Once        Specialty:  Wound Care  Provider:  Petra Zamorano APRN Acknowledged SORZANO, CARLOS F           Attending Provider: Ian Rachel MD  Length of Stay: 1    SUBJECTIVE   Chief Complaint: Bilateral lower legs    HPI: Jeanie Grossman, a 65 y.o.male, presents with a past medical history of bradycardia, tachycardia, hyperlipidemia, hypertrophic cardiomyopathy, prostate cancer, type 2 diabetes, congestive heart failure.  A full past medical history is listed below.  Inpatient wound care consulted due to wounds of bilateral lower legs.  Upon questioning patient about length of time with wounds, he states he has had them for approximately 2 months.  Patient was last seen during inpatient stay on 11/18/2024 and at that time he stated he had had wounds for 2 to 3 weeks but they appeared more chronic.  Patient was referred to the wound care center after discharge but it appears he did not go to his appointment.  When asked about this patient states he has had issues with his memory and is forgot about following up.  He states he has been caring for wound bed washing with soap and water.  He does not cover wounds.  He does have complaints of drainage from wounds.  There is significant odor noted upon entering the room which are from wounds.  Patient reports pain at site of wounds with palpation.  Patient is receiving diuretic and has to pause to urinate multiple times during assessment and procedure.      Visit Dx:    ICD-10-CM ICD-9-CM   1. Acute on chronic congestive heart failure, unspecified heart failure type  I50.9 428.0       Hospital Problem  List:     CHF exacerbation      History:   Past Medical History:   Diagnosis Date    CHF (congestive heart failure)     Diabetes mellitus     Hyperlipidemia     Hypertension     Hypertrophic cardiomyopathy     s/p AICD    Migraine     Prostate cancer     Tachycardia     Ventricular tachycardia     Ventricular tachycardia, sustained 10/14/2016     Past Surgical History:   Procedure Laterality Date    CARDIAC ABLATION  01/28/2016 2/2016, 10/18/2016 (Dr. Doss in [place], [place])    CARDIAC ABLATION      2018    CARDIAC CATHETERIZATION      CARDIAC DEFIBRILLATOR PLACEMENT      CARDIAC DEFIBRILLATOR PLACEMENT      PROSTATE BIOPSY  2019    PROSTATE BIOPSY  2023     Social History     Socioeconomic History    Marital status: Single   Tobacco Use    Smoking status: Never   Vaping Use    Vaping status: Never Used   Substance and Sexual Activity    Alcohol use: No    Drug use: No    Sexual activity: Defer     Family History   Family history unknown: Yes       Allergies:  No Known Allergies    Medications:    Current Facility-Administered Medications:     acetaminophen (TYLENOL) tablet 650 mg, 650 mg, Oral, Q4H PRN **OR** acetaminophen (TYLENOL) 160 MG/5ML oral solution 650 mg, 650 mg, Oral, Q4H PRN **OR** acetaminophen (TYLENOL) suppository 650 mg, 650 mg, Rectal, Q4H PRN, Ian Rachel MD    apixaban (ELIQUIS) tablet 5 mg, 5 mg, Oral, Q12H, Ian Rachel MD, 5 mg at 01/16/25 0829    sennosides-docusate (PERICOLACE) 8.6-50 MG per tablet 2 tablet, 2 tablet, Oral, BID PRN **AND** polyethylene glycol (MIRALAX) packet 17 g, 17 g, Oral, Daily PRN **AND** bisacodyl (DULCOLAX) EC tablet 5 mg, 5 mg, Oral, Daily PRN **AND** bisacodyl (DULCOLAX) suppository 10 mg, 10 mg, Rectal, Daily PRN, Ian Rachel MD    carvedilol (COREG) tablet 3.125 mg, 3.125 mg, Oral, BID With Meals, Ian Rachel MD, 3.125 mg at 01/16/25 0829    empagliflozin (JARDIANCE) tablet 10 mg, 10 mg, Oral, Daily, Ian Rachel MD     furosemide (LASIX) injection 40 mg, 40 mg, Intravenous, BID Diuretics, Ian Rachel MD, 40 mg at 25    hydrALAZINE (APRESOLINE) injection 5 mg, 5 mg, Intravenous, Q4H PRN, Ian Rachel MD, 5 mg at 01/15/25 2027    influenza vac split high-dose (FLUZONE HIGH DOSE) injection 0.5 mL, 0.5 mL, Intramuscular, During Hospitalization, Ian Rachel MD    losartan (COZAAR) tablet 25 mg, 25 mg, Oral, Daily, Ian Rachel MD, 25 mg at 25    nitroglycerin (NITROSTAT) SL tablet 0.4 mg, 0.4 mg, Sublingual, Q5 Min PRN, Ian Rachel MD    ondansetron ODT (ZOFRAN-ODT) disintegrating tablet 4 mg, 4 mg, Oral, Q6H PRN **OR** ondansetron (ZOFRAN) injection 4 mg, 4 mg, Intravenous, Q6H PRN, Ian Rachel MD    rosuvastatin (CRESTOR) tablet 10 mg, 10 mg, Oral, Nightly, Ian Rachel MD, 10 mg at 25 0028    [COMPLETED] Insert Peripheral IV, , , Once **AND** sodium chloride 0.9 % flush 10 mL, 10 mL, Intravenous, PRN, Giselle Herring, APRN, 10 mL at 25    spironolactone (ALDACTONE) half tablet 12.5 mg, 12.5 mg, Oral, Daily, Ian Rachel MD, 12.5 mg at 25 08    OBJECTIVE     Vitals:    25 0803   BP: 159/88   Pulse: 65   Resp: 18   Temp: 98.4 °F (36.9 °C)   SpO2: 98%       PHYSICAL EXAM:   Physical Exam  Vitals and nursing note reviewed.   Constitutional:       General: He is awake.      Appearance: He is obese.      Comments: Body mass index is 34.05 kg/m².    HENT:      Head: Normocephalic and atraumatic.   Eyes:      General: Lids are normal. Gaze aligned appropriately.   Cardiovascular:      Rate and Rhythm: Normal rate and regular rhythm.   Pulmonary:      Effort: Pulmonary effort is normal. No respiratory distress.   Abdominal:      General: There is no distension.      Palpations: Abdomen is soft.   Musculoskeletal:      Cervical back: Normal range of motion and neck supple.      Right lower le+ Pitting Edema present.      Left lower leg:  2+ Pitting Edema present.   Feet:      Right foot:      Skin integrity: Dry skin present. No ulcer.      Left foot:      Skin integrity: Dry skin present. No ulcer.   Skin:     General: Skin is warm and dry.      Findings: Wound present.      Comments: Patient presents to the multiple wounds of bilateral lower legs.  Wounds have slough and subcutaneous tissue present wound bed.  Some callus formation of periwound area.  There is also noted to be debris adhered to periwound areas likely from clothing.  Edema and erythema present from knee down to toes bilaterally. Significant odor present in room from wounds. Moderate purulent drainage present on dressings. Pain reported when touching any part of lower leg.   There is serous filled blisters also noted to bilateral lower legs   Neurological:      Mental Status: He is alert and oriented to person, place, and time.      Motor: Weakness present.   Psychiatric:         Attention and Perception: Attention normal.         Mood and Affect: Mood normal.         Speech: Speech normal.         Behavior: Behavior is cooperative.         Cognition and Memory: Memory is impaired.       Pictures taken today by Veronica Malik RN after debridement:   Right lateral lower leg:      Right calf      Left lateral lower leg      Left lateral superior lower leg         Results Review:  Lab Results (last 48 hours)       Procedure Component Value Units Date/Time    Basic Metabolic Panel [[id number]]  (Normal) Collected: 01/16/25 0808    Specimen: Blood Updated: 01/16/25 0924     Glucose 96 mg/dL      BUN 14 mg/dL      Creatinine 1.09 mg/dL      Sodium 137 mmol/L      Potassium 4.0 mmol/L      Chloride 102 mmol/L      CO2 24.0 mmol/L      Calcium 8.9 mg/dL      BUN/Creatinine Ratio 12.8     Anion Gap 11.0 mmol/L      eGFR 74.9 mL/min/1.73     Narrative:      GFR Categories in Chronic Kidney Disease (CKD)      GFR Category          GFR (mL/min/1.73)    Interpretation  G1                     90  or greater         Normal or high (1)  G2                      60-89                Mild decrease (1)  G3a                   45-59                Mild to moderate decrease  G3b                   30-44                Moderate to severe decrease  G4                    15-29                Severe decrease  G5                    14 or less           Kidney failure          (1)In the absence of evidence of kidney disease, neither GFR category G1 or G2 fulfill the criteria for CKD.    eGFR calculation 2021 CKD-EPI creatinine equation, which does not include race as a factor    Magnesium [[id number]]  (Normal) Collected: 01/16/25 0808    Specimen: Blood Updated: 01/16/25 0924     Magnesium 2.1 mg/dL     CBC (No Diff) [[id number]]  (Abnormal) Collected: 01/16/25 0808    Specimen: Blood Updated: 01/16/25 0855     WBC 4.06 10*3/mm3      RBC 4.40 10*6/mm3      Hemoglobin 12.9 g/dL      Hematocrit 40.3 %      MCV 91.6 fL      MCH 29.3 pg      MCHC 32.0 g/dL      RDW 16.4 %      RDW-SD 54.3 fl      MPV 10.6 fL      Platelets 216 10*3/mm3     Wound Culture - Wound, Leg, Right [[id number]]  (Abnormal) Collected: 01/15/25 1352    Specimen: Wound from Leg, Right Updated: 01/16/25 0850     Wound Culture Moderate growth (3+) Proteus species     Gram Stain No WBCs seen      Many (4+) Gram positive cocci      Moderate (3+) Gram negative bacilli    Narrative:      Organism under investigation.      High Sensitivity Troponin T 1Hr [[id number]]  (Abnormal) Collected: 01/15/25 1707    Specimen: Blood Updated: 01/15/25 1737     HS Troponin T 40 ng/L      Troponin T Numeric Delta -4 ng/L      Troponin T % Delta -9    Narrative:      High Sensitive Troponin T Reference Range:  <14.0 ng/L- Negative Female for AMI  <22.0 ng/L- Negative Male for AMI  >=14 - Abnormal Female indicating possible myocardial injury.  >=22 - Abnormal Male indicating possible myocardial injury.   Clinicians would have to utilize clinical acumen, EKG, Troponin, and serial  changes to determine if it is an Acute Myocardial Infarction or myocardial injury due to an underlying chronic condition.         Respiratory Panel PCR w/COVID-19(SARS-CoV-2) ALEXIS/DONIS/NOÉ/PAD/COR/EAMON In-House, NP Swab in UTM/VTM, 2 HR TAT - Swab, Nasopharynx [[id number]]  (Normal) Collected: 01/15/25 1339    Specimen: Swab from Nasopharynx Updated: 01/15/25 1449     ADENOVIRUS, PCR Not Detected     Coronavirus 229E Not Detected     Coronavirus HKU1 Not Detected     Coronavirus NL63 Not Detected     Coronavirus OC43 Not Detected     COVID19 Not Detected     Human Metapneumovirus Not Detected     Human Rhinovirus/Enterovirus Not Detected     Influenza A PCR Not Detected     Influenza B PCR Not Detected     Parainfluenza Virus 1 Not Detected     Parainfluenza Virus 2 Not Detected     Parainfluenza Virus 3 Not Detected     Parainfluenza Virus 4 Not Detected     RSV, PCR Not Detected     Bordetella pertussis pcr Not Detected     Bordetella parapertussis PCR Not Detected     Chlamydophila pneumoniae PCR Not Detected     Mycoplasma pneumo by PCR Not Detected    Narrative:      In the setting of a positive respiratory panel with a viral infection PLUS a negative procalcitonin without other underlying concern for bacterial infection, consider observing off antibiotics or discontinuation of antibiotics and continue supportive care. If the respiratory panel is positive for atypical bacterial infection (Bordetella pertussis, Chlamydophila pneumoniae, or Mycoplasma pneumoniae), consider antibiotic de-escalation to target atypical bacterial infection.    Comprehensive Metabolic Panel [[id number]]  (Abnormal) Collected: 01/15/25 1353    Specimen: Blood Updated: 01/15/25 1420     Glucose 103 mg/dL      BUN 11 mg/dL      Creatinine 1.14 mg/dL      Sodium 139 mmol/L      Potassium 4.2 mmol/L      Chloride 107 mmol/L      CO2 22.0 mmol/L      Calcium 9.0 mg/dL      Total Protein 6.6 g/dL      Albumin 3.6 g/dL      ALT (SGPT) 13 U/L       AST (SGOT) 16 U/L      Alkaline Phosphatase 76 U/L      Total Bilirubin 1.2 mg/dL      Globulin 3.0 gm/dL      A/G Ratio 1.2 g/dL      BUN/Creatinine Ratio 9.6     Anion Gap 10.0 mmol/L      eGFR 70.9 mL/min/1.73     Narrative:      GFR Categories in Chronic Kidney Disease (CKD)      GFR Category          GFR (mL/min/1.73)    Interpretation  G1                     90 or greater         Normal or high (1)  G2                      60-89                Mild decrease (1)  G3a                   45-59                Mild to moderate decrease  G3b                   30-44                Moderate to severe decrease  G4                    15-29                Severe decrease  G5                    14 or less           Kidney failure          (1)In the absence of evidence of kidney disease, neither GFR category G1 or G2 fulfill the criteria for CKD.    eGFR calculation 2021 CKD-EPI creatinine equation, which does not include race as a factor    Magnesium [[id number]]  (Normal) Collected: 01/15/25 1353    Specimen: Blood Updated: 01/15/25 1420     Magnesium 2.0 mg/dL     BNP [[id number]]  (Abnormal) Collected: 01/15/25 1353    Specimen: Blood Updated: 01/15/25 1417     proBNP 6,171.0 pg/mL     Narrative:      This assay is used as an aid in the diagnosis of individuals suspected of having heart failure. It can be used as an aid in the diagnosis of acute decompensated heart failure (ADHF) in patients presenting with signs and symptoms of ADHF to the emergency department (ED). In addition, NT-proBNP of <300 pg/mL indicates ADHF is not likely.    Age Range Result Interpretation  NT-proBNP Concentration (pg/mL:      <50             Positive            >450                   Gray                 300-450                    Negative             <300    50-75           Positive            >900                  Gray                300-900                  Negative            <300      >75             Positive            >1800                   Mahoney                300-1800                  Negative            <300    High Sensitivity Troponin T [[id number]]  (Abnormal) Collected: 01/15/25 1353    Specimen: Blood Updated: 01/15/25 1416     HS Troponin T 44 ng/L     Narrative:      High Sensitive Troponin T Reference Range:  <14.0 ng/L- Negative Female for AMI  <22.0 ng/L- Negative Male for AMI  >=14 - Abnormal Female indicating possible myocardial injury.  >=22 - Abnormal Male indicating possible myocardial injury.   Clinicians would have to utilize clinical acumen, EKG, Troponin, and serial changes to determine if it is an Acute Myocardial Infarction or myocardial injury due to an underlying chronic condition.         CBC & Differential [[id number]]  (Abnormal) Collected: 01/15/25 1353    Specimen: Blood Updated: 01/15/25 1359    Narrative:      The following orders were created for panel order CBC & Differential.  Procedure                               Abnormality         Status                     ---------                               -----------         ------                     CBC Auto Differential[[id number]]        Abnormal            Final result                 Please view results for these tests on the individual orders.    CBC Auto Differential [[id number]]  (Abnormal) Collected: 01/15/25 1353    Specimen: Blood Updated: 01/15/25 1359     WBC 4.62 10*3/mm3      RBC 4.44 10*6/mm3      Hemoglobin 12.9 g/dL      Hematocrit 41.0 %      MCV 92.3 fL      MCH 29.1 pg      MCHC 31.5 g/dL      RDW 16.4 %      RDW-SD 55.4 fl      MPV 10.0 fL      Platelets 211 10*3/mm3      Neutrophil % 79.3 %      Lymphocyte % 8.4 %      Monocyte % 10.6 %      Eosinophil % 0.9 %      Basophil % 0.4 %      Immature Grans % 0.4 %      Neutrophils, Absolute 3.66 10*3/mm3      Lymphocytes, Absolute 0.39 10*3/mm3      Monocytes, Absolute 0.49 10*3/mm3      Eosinophils, Absolute 0.04 10*3/mm3      Basophils, Absolute 0.02 10*3/mm3      Immature Grans, Absolute 0.02  10*3/mm3      nRBC 0.0 /100 WBC           Imaging Results (Last 72 Hours)       Procedure Component Value Units Date/Time    XR Chest 1 View [[id number]] Collected: 01/15/25 1334     Updated: 01/15/25 1338    Narrative:      EXAMINATION: XR CHEST 1 VW- 1/15/2025 1:34 PM     HISTORY: soa.     REPORT: A frontal view of the chest was obtained.     COMPARISON: Chest x-ray 11/14/2024.     The lungs are hypoaerated, bibasilar opacities probably represent  atelectasis and there is no lung consolidation. Mild cardiomegaly is  present and there is central and basilar vascular congestion. No  pneumothorax or definite pleural effusion is identified. The left  subclavian AICD appears satisfied position as before. No acute osseous  abnormality is identified.       Impression:      Mild CHF. Bibasilar atelectasis.     This report was signed and finalized on 1/15/2025 1:35 PM by Dr. Heber Encarnacion MD.                  ASSESSMENT/PLAN       Examination and evaluation of wound(s) was performed.    An excisional tissue debridement was completed of the wounds located on the bilateral lower legs with a total area of 25.85 cm². The following instruments were used: Curette. Pain control was achieved using lidocaine 4% topical solution. Material removed includes slough, subcutaneous tissue, callus.  No specimens were taken. A minimal amount of bleeding was controlled with pressure. The procedure was tolerated well with a pain level of 0 prior to procedure and a pain level of 0 following the procedure. Pre debridement measurements: Left lateral superior lower leg wound measures 2.6 cm x 2.4 cm; left lateral lower leg wound measures 1.4 cm x 1 cm; right superior calf measures 4.8 cm x 1.3 cm; right medial calf wound measures 2.2 cm x 4.6 cm; right inferior calf wound measures 1.3 cm x 1.1 cm; right lateral lower leg wound measures 0.7 cm x 0.6 cm. Post debridement measurements: Left lateral superior lower leg wound measures 2.6 cm x 2.4 cm  x 0.1 cm; left lateral lower leg wound measures 1.4 cm x 1 cm x 0.3 cm; right superior calf measures 4.8 cm x 1.3 cm x 0.1 cm; right medial calf wound measures 2.2 cm x 4.6 cm x 0.2 cm; right inferior calf wound measures 1.3 cm x 1.1 cm x 0.4 cm; right lateral lower leg wound measures 0.7 cm x 0.6 cm x 0.2 cm. Tissue exposed post debridement is subcutaneous tissue.     DIAGNOSIS:   Nonpressure chronic ulcer of left lower leg with fat layer exposed  Nonpressure chronic ulcer of right lower leg with fat layer exposed  Nonpressure chronic ulcer of right calf with fat layer exposed  Venous insufficiency of bilateral lower extremities  Wound infection    PLAN:   Reviewed labs which shows to WBC elevation at this time.   Preliminary reports of wound culture collected yesterday show Moderate growth (3+) Proteus species, gram stain 4+gram positive cocci and 3+ gram negative bacilli.   Hospitalist to manage antibiotic therapy.   Debrided wounds of bilateral lower legs as documented above.   Pictures taken to update chart post debridement.   Orders placed for wound care as listed below.   Patient to follow up at [facility] after discharge.   Patient states he will be returning home after discharge. Discussed the possibility of home health to assist with wound care needs as he did not follow up with care after discharge in which he stated was due to memory issues.       Start     Ordered    01/16/25 2000  Wound Care  Every 12 Hours        Question Answer Comment   Wound Locations Wounds of right calf, right lateral lower leg, and left lateral lower legs    Wound Care Instructions Clean with NS. Apply Opticell Ag to wound beds. May cover with ABD pad if needed. Secure with Kerlix. Change every 12 hours.    Cleanse Normal Saline    Intervention Other    Other Opticell Ag    Dressing: Gauze Roll        01/16/25 1017    01/16/25 1018  Elevate Extremity  Continuous        Comments: Elevate BLE above heart level when  possible.   Question:  Elevate Extremity  Answer:  BLE    01/16/25 1017    01/16/25 0000  Ambulatory Referral to Wound Clinic         01/16/25 1023            Discussed findings and treatment plan including risks, benefits, and treatment options with patient in detail. Patient agreed with treatment plan.      This document has been electronically signed by NORMA Duong on 1/16/2025 09:54 CST

## 2025-01-16 NOTE — PLAN OF CARE
Problem: Adult Inpatient Plan of Care   Goal Outcome Evaluation:              Outcome Evaluation: Pt requested a double portion of protein. Dietitian reviewed medical notes and will approve double portions of protein for adequate wound healing. Will monitor per protocol.

## 2025-01-16 NOTE — CASE MANAGEMENT/SOCIAL WORK
Continued Stay Note  [facility]     Patient Name: Jeanie Grossman  MRN: [id number]  Today's Date: 1/16/2025    Admit Date: 1/15/2025    Plan: Home   Discharge Plan       Row Name 01/16/25 1449       Plan    Plan Home    Patient/Family in Agreement with Plan yes    Plan Comments Referral that pt is possibly homeless.  SW spoke to pt and he states he has a home but will need a ride to his address at [address] [place] [address] [place] [address].  A cab voucher will be given to pt upon dc.  Pt does have a cane for home usage and denies any other dc needs.    Final Discharge Disposition Code 01 - home or self-care                   Discharge Codes    No documentation.                 Expected Discharge Date and Time       Expected Discharge Date Expected Discharge Time    Jan 19, 2025               MARIO Barclay

## 2025-01-16 NOTE — CONSULTS
"EP CONSULT NOTE    Subjective        History of Present Illness    EP Problems:   Ventricular tachycardia  - 1/2016:  VT ablation, [place]  - 10/2017:  VT ablation, [facility]  - 1/2019:  VT ablation, [facility]  2.  Presence of an ICD  - 2008:  DOI  -2/2022:  Gen change, [employer]  3.  Atrial flutter, uncertain type     Cardiology problems:   HTN  Chronic systolic heart failure   HOCM  Stroke  LV thrombus     Medical problems:  1.  Medication nonadherence  2.  Obesity  3.  Prostate cancer  4.  Type II DM  5.  Lower extremity venous stasis ulcerations    Jeanie Grossman is a 65 y.o. male with problem list as above for whom EP is consulted regarding atrial flutter, acute on chronic systolic heart failure.  He presents to the hospital with lower extremity pain and ulceration.  He is found to be in acute on chronic heart failure exacerbation at the time of presentation to the hospital with a BNP of 6171 and evidence of venous congestion with mild pulmonary edema by chest x-ray.  He has been treated with diuretics.  He has a history of atrial flutter and has been out of rhythm for approximately 1 year.  He was previously scheduled for the outpatient setting to follow-up on this however did not come for his clinic follow-up.  Given these findings, EP was consulted for further evaluation    Objective   Vital Signs:  /78 (BP Location: Right arm, Patient Position: Sitting)   Pulse 65   Temp 98.1 °F (36.7 °C) (Oral)   Resp 18   Ht 190.5 cm (75\")   Wt 124 kg (272 lb 6.4 oz)   SpO2 97%   BMI 34.05 kg/m²   Estimated body mass index is 34.05 kg/m² as calculated from the following:    Height as of this encounter: 190.5 cm (75\").    Weight as of this encounter: 124 kg (272 lb 6.4 oz).      Physical Exam  Vitals reviewed.   Constitutional:       Appearance: Normal appearance.   Cardiovascular:      Rate and Rhythm: Normal rate. Rhythm irregular.      Pulses: Normal pulses.      Heart sounds: Normal heart sounds.      " Comments: Pulse generator site is clean, dry, intact  Pulmonary:      Effort: Pulmonary effort is normal.      Breath sounds: Normal breath sounds.   Musculoskeletal:         General: Swelling present.      Comments: Bilateral lower extremity ulcerations, lower extremity edema, purulent and foul odor present   Neurological:      Mental Status: He is alert.      Comments: Slowed cognition   Psychiatric:         Mood and Affect: Mood normal.          Result Review :  The following data was reviewed by: Chalo Rodas MD on 01/15/2025:  CMP          12/2/2024    16:41 1/15/2025    13:53 1/16/2025    08:08   CMP   Glucose 97     103  96    BUN 15     11  14    Creatinine 1.1     1.14  1.09    EGFR  70.9  74.9    Sodium 137     139  137    Potassium 4.6     4.2  4.0    Chloride 103     107  102    Calcium 9.0     9.0  8.9    Total Protein 6.8     6.6     Albumin 3.7     3.6     Globulin  3.0     Total Bilirubin 0.7     1.2     Alkaline Phosphatase 68     76     AST (SGOT) 15     16     ALT (SGPT) 9     13     Albumin/Globulin Ratio  1.2     BUN/Creatinine Ratio  9.6  12.8    Anion Gap 9     10.0  11.0       Details          This result is from an external source.             CBC          12/7/2024    17:03 1/15/2025    13:53 1/16/2025    08:08   CBC   WBC 3.9     4.62  4.06    RBC 4.11     4.44  4.40    Hemoglobin 13.2     12.9  12.9    Hematocrit 39.9     41.0  40.3    MCV 97.1     92.3  91.6    MCH 32.1     29.1  29.3    MCHC 33.1     31.5  32.0    RDW 17.1     16.4  16.4    Platelets 239     211  216       Details          This result is from an external source.             TSH          10/15/2024    07:08   TSH   TSH 0.949        Chest x-ray from yesterday directly visualized independently reviewed: ICD is in place, cardiomegaly, bilateral pulmonary infiltrates with mild pulmonary edema    [id number] SCORE   [id number] Score: 4 (1/16/2025  2:29 PM)             Assessment and Plan   Problems:  Atrial flutter,  uncertain type  Acute on chronic systolic heart failure  Medication nonadherence  Hypertrophic cardiomyopathy    Jeanie Grossman is a 65 y.o. male with problem list as above for whom EP is consulted regarding acute on chronic systolic heart failure, atrial flutter, medication nonadherence.    Unfortunately, I do think that medication nonadherence is a large part of why he is not doing well.  In addition, it is likely that the atrial flutter is playing at least some role in his overall likelihood of decompensation.  Given that he has been in atrial flutter and is rate controlled for over a year, I see no emergency for an inpatient atrial flutter ablation.  We will instead schedule patient for an expedited outpatient ablation in the next couple weeks.  This should allow enough time to recover from his lower extremity ulcerations as well as improve his filling pressures from a heart failure standpoint.    I have discussed risks, benefits, and alternatives of an electrophysiology study and ablation for atrial flutter with the patient.  Alternatives discussed include continued observation and medical management.  An electrophysiology study with ablation is an inherently high risk procedure with possible complications that include but are not limited to vascular access complications, internal bleeding, tamponade requiring pericardiocentesis or cardiac surgery, stroke, MI, embolism, myocardial injury, injury to the normal conduction system requiring a pacemaker, and ultimately death.  We also discussed that given the nature of the procedure, therapeutic efficacy can be variable.  Possibilities of recurrent arrhythmias and the possible need for additional procedures and/or medical therapy was discussed. Questions asked were appropriately answered.  No guarantees were made or implied.   Despite this, they would still like to proceed.    Plan:  -Schedule for outpatient atrial flutter ablation  -Agree with IV Lasix, will continue  twice daily 80 mg IV daily Lasix dosing for now  -Increase carvedilol to 6.25 mg twice daily  -No additional medication change for now               Part of this note may be an electronic transcription/translation of spoken language to printed text using the Dragon Dictation System.

## 2025-01-16 NOTE — PROGRESS NOTES
[facility] Medicine Services  INPATIENT PROGRESS NOTE    Patient Name: Jeanie Grossman  Date of Admission: 1/15/2025  Today's Date: 01/16/25  Length of Stay: 1  Primary Care Physician: Conor Denny DO    Subjective   Chief Complaint: Shortness of breath and edema  HPI     64-year-old male with history of hypertension, hyperlipidemia, chronic cardiomyopathy with systolic dysfunction, atrial fibrillation, prostate cancer, AICD in place, came to the hospital reporting worsening edema of the lower extremities, with associated pain.  Also reports dyspnea, worse on exertion.  No fevers. The patient was admitted to the hospital November 2024 with exactly similar complaints.  He was discharged, to continue medical therapy and have cardiology and wound care outpatient appointments.  I see that the patient had appointments in November 22 2024 and December 5, 2024 at the heart failure clinic and with cardiologist and did showup.  The wound care clinic appointment November 19, 2024 was cancelled.     Negative fluid balance reported 5.9 L  Weight loss reported from 299 to 272 pounds  Better today.  No chest pain.        Review of Systems   All pertinent negatives and positives are as above. All other systems have been reviewed and are negative unless otherwise stated.     Objective    Temp:  [98.2 °F (36.8 °C)-98.6 °F (37 °C)] 98.4 °F (36.9 °C)  Heart Rate:  [] 65  Resp:  [18-20] 18  BP: (150-187)/() 159/88  Physical Exam  Constitutional:       Appearance: Normal appearance.  Alert oriented x 3.  No retractions, no tachypnea.  HENT:      Head: Normocephalic and atraumatic.      Nose: Nose normal.      Mouth/Throat:      Mouth: Mucous membranes are moist.   Eyes:      Extraocular Movements: Extraocular movements intact.      Conjunctiva/sclera: Conjunctivae normal.      Pupils: Pupils are equal, round  Cardiovascular:      Rate and Rhythm: Irregular rhythm   pulses: Normal  pulses.   Pulmonary:      Effort: No respiratory distress.      Breath sounds: Decreased breath sounds in bases.  No wheezing.  Abdominal:      General: Abdomen is obese.  Bowel sounds are normal.      Palpations: Abdomen is soft.      Tenderness: There is no guarding or rebound.   Musculoskeletal:         General: Normal range of motion.      Cervical back: Normal range of motion and neck supple.   Extremities: There is edema 3+ lower extremities, there are dressings in place.    Skin:  Multiple weeping wounds lower extremities.  Neurological:      General: No focal deficit present.      Mental Status: Patient is alert, oriented to place time and person.      Results Review:  I have reviewed the labs, radiology results, and diagnostic studies.    Laboratory Data:   Results from last 7 days   Lab Units 01/16/25  0808 01/15/25  1353   WBC 10*3/mm3 4.06 4.62   HEMOGLOBIN g/dL 12.9* 12.9*   HEMATOCRIT % 40.3 41.0   PLATELETS 10*3/mm3 216 211        Results from last 7 days   Lab Units 01/16/25  0808 01/15/25  1353   SODIUM mmol/L 137 139   POTASSIUM mmol/L 4.0 4.2   CHLORIDE mmol/L 102 107   CO2 mmol/L 24.0 22.0   BUN mg/dL 14 11   CREATININE mg/dL 1.09 1.14   CALCIUM mg/dL 8.9 9.0   BILIRUBIN mg/dL  --  1.2   ALK PHOS U/L  --  76   ALT (SGPT) U/L  --  13   AST (SGOT) U/L  --  16   GLUCOSE mg/dL 96 103*       Culture Data:   Wound Culture   Date Value Ref Range Status   01/15/2025 Moderate growth (3+) Proteus species (A)  Preliminary       Radiology Data:   Imaging Results (Last 24 Hours)       Procedure Component Value Units Date/Time    XR Chest 1 View [[id number]] Collected: 01/15/25 1334     Updated: 01/15/25 1338    Narrative:      EXAMINATION: XR CHEST 1 VW- 1/15/2025 1:34 PM     HISTORY: soa.     REPORT: A frontal view of the chest was obtained.     COMPARISON: Chest x-ray 11/14/2024.     The lungs are hypoaerated, bibasilar opacities probably represent  atelectasis and there is no lung consolidation. Mild  cardiomegaly is  present and there is central and basilar vascular congestion. No  pneumothorax or definite pleural effusion is identified. The left  subclavian AICD appears satisfied position as before. No acute osseous  abnormality is identified.       Impression:      Mild CHF. Bibasilar atelectasis.     This report was signed and finalized on 1/15/2025 1:35 PM by Dr. Heber Encarnacion MD.               I have reviewed the patient's current medications.     Assessment/Plan   Assessment  Active Hospital Problems    Diagnosis     **CHF exacerbation      Acute on chronic heart failure  Chronic cardiomyopathy, systolic dysfunction, EF 36 to 40%  Moderate to severe aortic valve regurgitation  Chronic bilateral lower extremity lymphedema/edema with multiple wounds  AICD in place  History of ventricular tachycardia  Atrial flutter, permanent  Hypertension, not controlled  Poor medical compliance  History of prostatic adenocarcinoma        Echocardiogram 11/15/2024    Left ventricular systolic function is moderately decreased. Left ventricular ejection fraction appears to be 36 - 40%.    The following left ventricular wall segments are hypokinetic: mid inferolateral, basal inferoseptal and basal inferoseptal. The following left ventricular wall segments are akinetic: mid anterolateral, apical lateral, apical inferior, mid inferior, apical septal and apex.    Moderate to severe aortic valve regurgitation is present.    Left ventricular wall thickness is consistent with moderate concentric hypertrophy.    The left atrial cavity is dilated.    Estimated right ventricular systolic pressure from tricuspid regurgitation is normal (<35 mmHg).    Normal size and function of the right ventricle.    There is a trivial pericardial effusion.     Had workup for amyloid November 2023.  Was negative    Patient had recommendations for follow-up after last admission, but per records he did not know show up for appointments.      Treatment  Plan  Continue Lasix 40 mg IV every 12 hours  Low-dose beta-blocker due to bradycardia  Continue losartan 25 p.o. daily, Aldactone 12.5 p.o. daily  Add Jardiance 10 mg p.o. daily.    Monitor electrolytes and renal function    Weigh patient daily, strict intake and output.     Will follow EP cardiology evaluation.  AICD interrogation requested.    Wound care  Regarding wounds in lower extremities, the patient did not follow appointment with wound care clinic last November. I will consult wound care here in the hospital for additional recommendations. With his chronic edema lower extremities is difficult for these wounds to heal.       Medical Decision Making  Number and Complexity of problems: 10, moderate to high complexity  Differential Diagnosis: See above    Conditions and Status        Condition is improving.     MDM Data  External documents reviewed: None  Cardiac tracing (EKG, telemetry) interpretation: Atrial flutter  Radiology interpretation: Radiology reports reviewed  Labs reviewed: Yes  Any tests that were considered but not ordered: No     Decision rules/scores evaluated (example [id number], Wells, etc): See chart     Discussed with: Patient     Care Planning  Shared decision making: With patient  Code status and discussions: Full code    Disposition  Social Determinants of Health that impact treatment or disposition: Poor compliance and no show up to appointments  I expect the patient to be discharged to home in 1 days.         Electronically signed by Ian Rachel MD, 01/16/25, 09:29 CST.

## 2025-01-17 LAB
ABSOLUTE LUNG FLUID CONTENT: 54 % (ref 20–35)
ANION GAP SERPL CALCULATED.3IONS-SCNC: 11 MMOL/L (ref 5–15)
BUN SERPL-MCNC: 19 MG/DL (ref 8–23)
BUN/CREAT SERPL: 16.1 (ref 7–25)
CALCIUM SPEC-SCNC: 8.7 MG/DL (ref 8.6–10.5)
CHLORIDE SERPL-SCNC: 102 MMOL/L (ref 98–107)
CO2 SERPL-SCNC: 24 MMOL/L (ref 22–29)
CREAT SERPL-MCNC: 1.18 MG/DL (ref 0.76–1.27)
EGFRCR SERPLBLD CKD-EPI 2021: 68.1 ML/MIN/1.73
GLUCOSE SERPL-MCNC: 123 MG/DL (ref 65–99)
MAGNESIUM SERPL-MCNC: 2 MG/DL (ref 1.6–2.4)
POTASSIUM SERPL-SCNC: 4.1 MMOL/L (ref 3.5–5.2)
QT INTERVAL: 512 MS
QTC INTERVAL: 471 MS
SODIUM SERPL-SCNC: 137 MMOL/L (ref 136–145)

## 2025-01-17 PROCEDURE — 94726 PLETHYSMOGRAPHY LUNG VOLUMES: CPT

## 2025-01-17 PROCEDURE — 25010000002 HYDRALAZINE PER 20 MG: Performed by: INTERNAL MEDICINE

## 2025-01-17 PROCEDURE — 80048 BASIC METABOLIC PNL TOTAL CA: CPT | Performed by: FAMILY MEDICINE

## 2025-01-17 PROCEDURE — 99232 SBSQ HOSP IP/OBS MODERATE 35: CPT | Performed by: STUDENT IN AN ORGANIZED HEALTH CARE EDUCATION/TRAINING PROGRAM

## 2025-01-17 PROCEDURE — 25010000002 FUROSEMIDE PER 20 MG: Performed by: STUDENT IN AN ORGANIZED HEALTH CARE EDUCATION/TRAINING PROGRAM

## 2025-01-17 PROCEDURE — 83735 ASSAY OF MAGNESIUM: CPT | Performed by: FAMILY MEDICINE

## 2025-01-17 PROCEDURE — 94726 PLETHYSMOGRAPHY LUNG VOLUMES: CPT | Performed by: HOSPITALIST

## 2025-01-17 RX ADMIN — ROSUVASTATIN 10 MG: 10 TABLET, FILM COATED ORAL at 20:41

## 2025-01-17 RX ADMIN — EMPAGLIFLOZIN 10 MG: 10 TABLET, FILM COATED ORAL at 08:33

## 2025-01-17 RX ADMIN — APIXABAN 5 MG: 5 TABLET, FILM COATED ORAL at 20:41

## 2025-01-17 RX ADMIN — CARVEDILOL 6.25 MG: 6.25 TABLET, FILM COATED ORAL at 08:33

## 2025-01-17 RX ADMIN — FUROSEMIDE 80 MG: 10 INJECTION, SOLUTION INTRAVENOUS at 06:10

## 2025-01-17 RX ADMIN — Medication 10 ML: at 08:33

## 2025-01-17 RX ADMIN — FUROSEMIDE 80 MG: 10 INJECTION, SOLUTION INTRAVENOUS at 13:07

## 2025-01-17 RX ADMIN — HYDRALAZINE HYDROCHLORIDE 5 MG: 20 INJECTION INTRAMUSCULAR; INTRAVENOUS at 13:07

## 2025-01-17 RX ADMIN — CARVEDILOL 6.25 MG: 6.25 TABLET, FILM COATED ORAL at 18:10

## 2025-01-17 RX ADMIN — LOSARTAN POTASSIUM 25 MG: 25 TABLET, FILM COATED ORAL at 08:33

## 2025-01-17 RX ADMIN — APIXABAN 5 MG: 5 TABLET, FILM COATED ORAL at 08:33

## 2025-01-17 RX ADMIN — Medication 12.5 MG: at 08:33

## 2025-01-17 NOTE — PROGRESS NOTES
Heart Failure Clinic    Date:  01/17/25     Vitals:    01/17/25 0426   BP: 150/70   Pulse: 87   Resp: 18   Temp: 98 °F (36.7 °C)   SpO2: 97%        Indication:  Heart Failure    Procedure:  ReDS device sensor unit applied to right side of chest and right side of back.  Appropriate positioning confirmed based off of the unit's calculation.  Chest measurement obtained with the chest size ruler.  Measurement session performed over 45 seconds.      Results:  ReDS Value=54    Interpretation:  >46% - markedly elevated lung fluid content    Sharyn Marin, CRT 01/17/25 11:20 CST

## 2025-01-17 NOTE — CASE MANAGEMENT/SOCIAL WORK
Continued Stay Note   [place]     Patient Name: Jeanie Grossman  MRN: [id number]  Today's Date: 1/17/2025    Admit Date: 1/15/2025    Plan: Home   Discharge Plan       Row Name 01/17/25 1321       Plan    Plan Home    Patient/Family in Agreement with Plan yes    Plan Comments Cab voucher in hard chart in case pt dc at night or over the weekend.                   Discharge Codes    No documentation.                 Expected Discharge Date and Time       Expected Discharge Date Expected Discharge Time    Jan 19, 2025               MARIO Barclay

## 2025-01-17 NOTE — PROGRESS NOTES
[facility] Medicine Services  INPATIENT PROGRESS NOTE    Patient Name: Jeanie Grossman  Date of Admission: 1/15/2025  Today's Date: 01/17/25  Length of Stay: 2  Primary Care Physician: Conor Denny DO    Subjective   Chief Complaint: f/u sob/chf    HPI   Patient seen and examined sitting on the edge of bed.  On room air.  Patient tells me before he came to the hospital he could not walk a step or 2 without being short of breath.  Previously he is able to have decent exercise tolerance.  He has been diuresing well here.  Good urine output the last few days.  States he is starting to improve.  He been able to get up and ambulate around his room better.  BACH is improving.  No chest pain.  No dizziness.  No nausea.  Does not yet feel back near baseline.      Also of note patient had a water pitcher on his tray as well as 5 empty Sprite Zero cans.  Discussed with him the importance of decreasing significant mount of fluid intake.  He was unaware of this.        Review of Systems   All pertinent negatives and positives are as above. All other systems have been reviewed and are negative unless otherwise stated.     Objective    Temp:  [97.6 °F (36.4 °C)-98.3 °F (36.8 °C)] 98.2 °F (36.8 °C)  Heart Rate:  [51-97] 52  Resp:  [18] 18  BP: (138-171)/(64-86) 171/83  Physical Exam  GEN: Awake, alert, interactive, in NAD  HEENT:  PERRLA, EOMI, Anicteric, Trachea midline  Lungs: bibasilar crackles  Heart: Regular, +S1/s2, no rub  ABD: obese, soft, nt, +BS, no guarding/rebound  Extremities: atraumatic, no cyanosis, b/l calf wraps, +pitting edema  Skin: chronic stasis changes b/l, wounds POA wrapped  Neuro: AAOx3, no focal deficits  Psych: normal mood & affect        Results Review:  I have reviewed the labs, radiology results, and diagnostic studies.    Laboratory Data:   Results from last 7 days   Lab Units 01/16/25  0808 01/15/25  1353   WBC 10*3/mm3 4.06 4.62   HEMOGLOBIN g/dL 12.9* 12.9*    HEMATOCRIT % 40.3 41.0   PLATELETS 10*3/mm3 216 211        Results from last 7 days   Lab Units 01/17/25  0340 01/16/25  0808 01/15/25  1353   SODIUM mmol/L 137 137 139   POTASSIUM mmol/L 4.1 4.0 4.2   CHLORIDE mmol/L 102 102 107   CO2 mmol/L 24.0 24.0 22.0   BUN mg/dL 19 14 11   CREATININE mg/dL 1.18 1.09 1.14   CALCIUM mg/dL 8.7 8.9 9.0   BILIRUBIN mg/dL  --   --  1.2   ALK PHOS U/L  --   --  76   ALT (SGPT) U/L  --   --  13   AST (SGOT) U/L  --   --  16   GLUCOSE mg/dL 123* 96 103*       Culture Data:   Wound Culture   Date Value Ref Range Status   01/15/2025 Moderate growth (3+) Proteus mirabilis (A)  Preliminary   01/15/2025 Moderate growth (3+) Pseudomonas species (A)  Preliminary   01/15/2025 Moderate growth (3+) Gram Positive Cocci (A)  Preliminary       Radiology Data:   Imaging Results (Last 24 Hours)       ** No results found for the last 24 hours. **            I have reviewed the patient's current medications.     Assessment/Plan   Assessment  Active Hospital Problems    Diagnosis     **Acute on chronic systolic CHF (congestive heart failure)     Unspecified atrial flutter     Hypertrophic cardiomyopathy     Essential hypertension     AICD (automatic cardioverter/defibrillator) present        Treatment Plan  #1 acute on chronic systolic heart failure -with known EF of 36 to 40%.  Overloaded.  Has been getting IV diuresis 80 twice daily.  Patient is having good urine output and does seem to making some clinical improvement.  However he is also drinking a lot of fluid.  Will put a fluid restriction in place.  Still has crackles.  Reds vest 54%.  Continue Lasix, Jardiance, Coreg, losartan, Aldactone.    #2 a flutter -rate controlled.  Seen by EP.  Currently on Eliquis and Coreg.  Plans for outpatient ablation.    #3 essential hypertension -BP tolerating current meds.  Monitor.    #4 hyperlipidemia -statin    Medical Decision Making  Number and Complexity of problems: 1 acute on chronic, 1 acute, multiple  chronic  Differential Diagnosis: as above    Conditions and Status        New to me, non-toxic, seems to be slowly improving     [facility] Data  External documents reviewed: none  Cardiac tracing (EKG, telemetry) interpretation: a flutter  Radiology interpretation: reports reviewed   Labs reviewed: as above  Any tests that were considered but not ordered: none     Decision rules/scores evaluated (example [id number], Wells, etc): none     Discussed with: patient, nursing     Care Planning  Shared decision making: Patient apprised of current labs, vitals, imaging and treatment plan.  They are agreeable with proceeding with plans as discussed.    Code status and discussions: full code    Disposition  Social Determinants of Health that impact treatment or disposition: none  I expect the patient to be discharged to home in several days when more euvolemic         Electronically signed by Martin Chao DO, 01/17/25, 12:20 CST.

## 2025-01-17 NOTE — PLAN OF CARE
Problem: Adult Inpatient Plan of Care  Goal: Plan of Care Review  Outcome: Progressing  Flowsheets (Taken 1/17/2025 0640)  Progress: improving  Plan of Care Reviewed With: patient  Goal: Patient-Specific Goal (Individualized)  Outcome: Progressing  Goal: Absence of Hospital-Acquired Illness or Injury  Outcome: Progressing  Intervention: Identify and Manage Fall Risk  Recent Flowsheet Documentation  Taken 1/17/2025 0600 by Brenda Maldonado RN  Safety Promotion/Fall Prevention: safety round/check completed  Taken 1/17/2025 0500 by Brenda Maldonado RN  Safety Promotion/Fall Prevention: safety round/check completed  Taken 1/17/2025 0400 by Brenda Maldonado RN  Safety Promotion/Fall Prevention: safety round/check completed  Taken 1/17/2025 0300 by Brenda Maldonado RN  Safety Promotion/Fall Prevention: safety round/check completed  Taken 1/17/2025 0200 by Brenda Maldonado RN  Safety Promotion/Fall Prevention: safety round/check completed  Taken 1/17/2025 0100 by Brenda Maldonado RN  Safety Promotion/Fall Prevention: safety round/check completed  Taken 1/17/2025 0000 by Brenda Maldonado RN  Safety Promotion/Fall Prevention: safety round/check completed  Taken 1/16/2025 2300 by Brenda Maldonado RN  Safety Promotion/Fall Prevention: safety round/check completed  Taken 1/16/2025 2200 by Brenda Maldonado RN  Safety Promotion/Fall Prevention: safety round/check completed  Taken 1/16/2025 2100 by Brenda Maldonado RN  Safety Promotion/Fall Prevention: safety round/check completed  Taken 1/16/2025 2000 by Brenda Maldonado RN  Safety Promotion/Fall Prevention: safety round/check completed  Taken 1/16/2025 1900 by Brenda Maldonado RN  Safety Promotion/Fall Prevention: safety round/check completed  Intervention: Prevent Skin Injury  Recent Flowsheet Documentation  Taken 1/17/2025 0500 by Brenda Maldonado RN  Body Position: position changed independently  Taken 1/17/2025 0300 by Brenda Maldonado RN  Body Position:  position changed independently  Taken 1/17/2025 0100 by Brenda Maldonado RN  Body Position: position changed independently  Taken 1/16/2025 2300 by Brenda Maldonado RN  Body Position: position changed independently  Taken 1/16/2025 2100 by Brenda Maldonado RN  Body Position: position changed independently  Taken 1/16/2025 2000 by Brenda Maldonado RN  Skin Protection: silicone foam dressing in place  Taken 1/16/2025 1900 by Brenda Maldonado RN  Body Position: position changed independently  Intervention: Prevent Infection  Recent Flowsheet Documentation  Taken 1/16/2025 2000 by Brenda Maldonado RN  Infection Prevention: hand hygiene promoted  Goal: Optimal Comfort and Wellbeing  Outcome: Progressing  Intervention: Monitor Pain and Promote Comfort  Recent Flowsheet Documentation  Taken 1/16/2025 2208 by Brenda Maldonado RN  Pain Management Interventions: pain medication given  Intervention: Provide Person-Centered Care  Recent Flowsheet Documentation  Taken 1/16/2025 2000 by Brenda Maldonado RN  Trust Relationship/Rapport: care explained  Goal: Readiness for Transition of Care  Outcome: Progressing   Goal Outcome Evaluation:  Plan of Care Reviewed With: patient        Progress: improving

## 2025-01-18 LAB
ANION GAP SERPL CALCULATED.3IONS-SCNC: 12 MMOL/L (ref 5–15)
BUN SERPL-MCNC: 20 MG/DL (ref 8–23)
BUN/CREAT SERPL: 14.7 (ref 7–25)
CALCIUM SPEC-SCNC: 8.8 MG/DL (ref 8.6–10.5)
CHLORIDE SERPL-SCNC: 99 MMOL/L (ref 98–107)
CO2 SERPL-SCNC: 25 MMOL/L (ref 22–29)
CREAT SERPL-MCNC: 1.36 MG/DL (ref 0.76–1.27)
EGFRCR SERPLBLD CKD-EPI 2021: 57.4 ML/MIN/1.73
GLUCOSE SERPL-MCNC: 101 MG/DL (ref 65–99)
MAGNESIUM SERPL-MCNC: 2.1 MG/DL (ref 1.6–2.4)
NT-PROBNP SERPL-MCNC: 2627 PG/ML (ref 0–900)
POTASSIUM SERPL-SCNC: 4.2 MMOL/L (ref 3.5–5.2)
SODIUM SERPL-SCNC: 136 MMOL/L (ref 136–145)

## 2025-01-18 PROCEDURE — 80048 BASIC METABOLIC PNL TOTAL CA: CPT | Performed by: INTERNAL MEDICINE

## 2025-01-18 PROCEDURE — 83880 ASSAY OF NATRIURETIC PEPTIDE: CPT | Performed by: STUDENT IN AN ORGANIZED HEALTH CARE EDUCATION/TRAINING PROGRAM

## 2025-01-18 PROCEDURE — 25010000002 FUROSEMIDE PER 20 MG: Performed by: STUDENT IN AN ORGANIZED HEALTH CARE EDUCATION/TRAINING PROGRAM

## 2025-01-18 PROCEDURE — 83735 ASSAY OF MAGNESIUM: CPT | Performed by: INTERNAL MEDICINE

## 2025-01-18 PROCEDURE — 99232 SBSQ HOSP IP/OBS MODERATE 35: CPT | Performed by: STUDENT IN AN ORGANIZED HEALTH CARE EDUCATION/TRAINING PROGRAM

## 2025-01-18 RX ADMIN — EMPAGLIFLOZIN 10 MG: 10 TABLET, FILM COATED ORAL at 08:50

## 2025-01-18 RX ADMIN — Medication 12.5 MG: at 08:51

## 2025-01-18 RX ADMIN — CARVEDILOL 6.25 MG: 6.25 TABLET, FILM COATED ORAL at 08:51

## 2025-01-18 RX ADMIN — APIXABAN 5 MG: 5 TABLET, FILM COATED ORAL at 08:50

## 2025-01-18 RX ADMIN — APIXABAN 5 MG: 5 TABLET, FILM COATED ORAL at 20:42

## 2025-01-18 RX ADMIN — CARVEDILOL 6.25 MG: 6.25 TABLET, FILM COATED ORAL at 18:54

## 2025-01-18 RX ADMIN — LOSARTAN POTASSIUM 25 MG: 25 TABLET, FILM COATED ORAL at 08:51

## 2025-01-18 RX ADMIN — ROSUVASTATIN 10 MG: 10 TABLET, FILM COATED ORAL at 20:43

## 2025-01-18 RX ADMIN — FUROSEMIDE 80 MG: 10 INJECTION, SOLUTION INTRAVENOUS at 06:47

## 2025-01-18 NOTE — PLAN OF CARE
Admitted 1/15 CHF exacerbation  EP consult; acute on chronic systolic heart failure, atrial flutter, medication nonadherence; ef 36-40 Nov '24; lasix 80 mg bid; plans for outpatient atrial flutter ablation   1500 ml/day fluid restriction; pt has voracious appetite  Eliquis 5 mg bid  Problem: Adult Inpatient Plan of Care  Goal: Absence of Hospital-Acquired Illness or Injury  Intervention: Identify and Manage Fall Risk  Recent Flowsheet Documentation  Taken 1/18/2025 0300 by Dav Balderrama RN  Safety Promotion/Fall Prevention: safety round/check completed  Taken 1/18/2025 0200 by Dav Balderrama RN  Safety Promotion/Fall Prevention: safety round/check completed  Taken 1/18/2025 0100 by Dav Balderrama RN  Safety Promotion/Fall Prevention: safety round/check completed  Intervention: Prevent Skin Injury  Recent Flowsheet Documentation  Taken 1/18/2025 0200 by Dav Balderrama RN  Body Position: position changed independently     Problem: Heart Failure  Goal: Optimal Functional Ability  Intervention: Optimize Functional Ability  Recent Flowsheet Documentation  Taken 1/18/2025 0300 by Dav Balderrama RN  Activity Management: sitting, edge of bed  Taken 1/18/2025 0100 by Dav Balderrama RN  Activity Management: sitting, edge of bed  Goal: Effective Oxygenation and Ventilation  Intervention: Promote Airway Secretion Clearance  Recent Flowsheet Documentation  Taken 1/18/2025 0300 by Dav Balderrama RN  Activity Management: sitting, edge of bed  Taken 1/18/2025 0100 by Dav Balderrama RN  Activity Management: sitting, edge of bed   Goal Outcome Evaluation:  Plan of Care Reviewed With: patient

## 2025-01-18 NOTE — PROGRESS NOTES
"EP Problems:   Ventricular tachycardia  - 1/2016:  VT ablation, [place]  - 10/2017:  VT ablation, [facility]  - 1/2019:  VT ablation, [facility]  2.  Presence of an ICD  - 2008:  DOI  -2/2022:  Gen change, [employer]  3.  Atrial flutter, uncertain type     Cardiology problems:   HTN  Chronic systolic heart failure   HOCM  Stroke  LV thrombus     Medical problems:  1.  Medication nonadherence  2.  Obesity  3.  Prostate cancer  4.  Type II DM  5.  Lower extremity venous stasis ulcerations    Patient ID:  Jeanie Grossman is a 65 y.o. male with problem list as above as above who EP is following for acute on chronic systolic heart failure, atrial flutter.    Subjective:  Shortness of breath continues to improve.  Still states that he is having sores on his legs that are painful.  Objective:  /82 (BP Location: Right arm, Patient Position: Sitting)   Pulse 55   Temp 97.5 °F (36.4 °C) (Oral)   Resp 18   Ht 190.5 cm (75\")   Wt 124 kg (274 lb)   SpO2 97%   BMI 34.25 kg/m²     Obese, chronically ill-appearing  Wearing oxygen  Normal work of breathing  Regular rate and rhythm  1+ lower extremity edema, legs are wrapped in dressing    Labs today:  Lab Results   Component Value Date    GLUCOSE 101 (H) 01/18/2025    CALCIUM 8.8 01/18/2025     01/18/2025    K 4.2 01/18/2025    CO2 25.0 01/18/2025    BUN 20 01/18/2025    CREATININE 1.36 (H) 01/18/2025    EGFR 57.4 (L) 01/18/2025     Lab Results   Component Value Date    MG 2.1 01/18/2025       Telemetry directly visualized and independently reviewed: Atrial flutter, predominantly with normal rate, intermittent ventricular pacing    Assessment:  Atrial flutter, uncertain type  Acute on chronic systolic heart failure  Medication nonadherence  Hypertrophic cardiomyopathy    Plan:  -Renal function slightly worsening today; continue IV diuresis for now, may be approaching euvolemia  -Repeat BNP today  -Continue carvedilol 6.25 mg twice daily  -Continue plans for outpatient " atrial flutter ablation  -Continue anticoagulation given elevated [id number] and history of LV thrombus    Part of this note may be an electronic transcription/translation of spoken language to printed text using the Dragon Dictation System.

## 2025-01-18 NOTE — PROGRESS NOTES
[facility] Medicine Services  INPATIENT PROGRESS NOTE    Patient Name: Jeanie Grossman  Date of Admission: 1/15/2025  Today's Date: 01/18/25  Length of Stay: 3  Primary Care Physician: Conor Denny DO    Subjective   Chief Complaint: f/u sob/chf    HPI   Patient seen resting in bed.  Continues to feel little bit better than prior.  Continues on room air.  Still with good urinary output.  No chest pain.  No dizziness.  No nausea.  No adverse events noted overnight.        Review of Systems   All pertinent negatives and positives are as above. All other systems have been reviewed and are negative unless otherwise stated.     Objective    Temp:  [97.5 °F (36.4 °C)-98.5 °F (36.9 °C)] 98.1 °F (36.7 °C)  Heart Rate:  [50-55] 50  Resp:  [18] 18  BP: (127-172)/(61-83) 137/62  Physical Exam  GEN: Awake, alert, interactive, in NAD  HEENT:  PERRLA, EOMI, Anicteric, Trachea midline  Lungs: Still with some faint bibasilar crackles, no wheezing  Heart: Regular, +S1/s2, no rub  ABD: obese, soft, nt, +BS, no guarding/rebound  Extremities: atraumatic, no cyanosis, b/l calf wraps, +pitting edema  Skin: chronic stasis changes b/l, wounds POA wrapped  Neuro: AAOx3, no focal deficits  Psych: normal mood & affect        Results Review:  I have reviewed the labs, radiology results, and diagnostic studies.    Laboratory Data:   Results from last 7 days   Lab Units 01/16/25  0808 01/15/25  1353   WBC 10*3/mm3 4.06 4.62   HEMOGLOBIN g/dL 12.9* 12.9*   HEMATOCRIT % 40.3 41.0   PLATELETS 10*3/mm3 216 211        Results from last 7 days   Lab Units 01/18/25  0352 01/17/25  0340 01/16/25  0808 01/15/25  1353   SODIUM mmol/L 136 137 137 139   POTASSIUM mmol/L 4.2 4.1 4.0 4.2   CHLORIDE mmol/L 99 102 102 107   CO2 mmol/L 25.0 24.0 24.0 22.0   BUN mg/dL 20 19 14 11   CREATININE mg/dL 1.36* 1.18 1.09 1.14   CALCIUM mg/dL 8.8 8.7 8.9 9.0   BILIRUBIN mg/dL  --   --   --  1.2   ALK PHOS U/L  --   --   --  76    ALT (SGPT) U/L  --   --   --  13   AST (SGOT) U/L  --   --   --  16   GLUCOSE mg/dL 101* 123* 96 103*       Culture Data:   Wound Culture   Date Value Ref Range Status   01/15/2025 Moderate growth (3+) Proteus mirabilis (A)  Preliminary   01/15/2025 Moderate growth (3+) Pseudomonas species (A)  Preliminary   01/15/2025 Moderate growth (3+) Gram Positive Cocci (A)  Preliminary       Radiology Data:   Imaging Results (Last 24 Hours)       ** No results found for the last 24 hours. **            I have reviewed the patient's current medications.     Assessment/Plan   Assessment  Active Hospital Problems    Diagnosis     **Acute on chronic systolic CHF (congestive heart failure)     Unspecified atrial flutter     Hypertrophic cardiomyopathy     Essential hypertension     AICD (automatic cardioverter/defibrillator) present        Treatment Plan  #1 acute on chronic systolic heart failure -with known EF of 36 to 40%.  Overloaded.  Has been getting IV diuresis 80 twice daily.  Patient is having good urine output and does seem to making some clinical improvement.  Fluid restriction added yesterday.  Reds vest 54%, will potentially recheck tomorrow.  Slight bump in renal function this morning.  He has had outputs of 6 L, 6 L, 4 L last 3 days.  Will give him his a.m. dose of Lasix and then a break afterwards and reassess in the morning.  Continue Jardiance, Coreg, losartan, Aldactone.    #2 a flutter -rate controlled.  Seen by EP.  Currently on Eliquis and Coreg.  Plans for outpatient ablation.    #3 essential hypertension -BP tolerating current meds.  Monitor.    #4 hyperlipidemia -statin    Medical Decision Making  Number and Complexity of problems: 1 acute on chronic, 1 acute, multiple chronic  Differential Diagnosis: as above    Conditions and Status        About the same, non-toxic, seems to be slowly improving, not at goal     MDM Data  External documents reviewed: none  Cardiac tracing (EKG, telemetry)  interpretation: a flutter  Radiology interpretation: reports reviewed   Labs reviewed: as above  Any tests that were considered but not ordered: none     Decision rules/scores evaluated (example [id number], Wells, etc): none     Discussed with: patient, nursing     Care Planning  Shared decision making: Patient apprised of current labs, vitals, imaging and treatment plan.  They are agreeable with proceeding with plans as discussed.    Code status and discussions: full code    Disposition  Social Determinants of Health that impact treatment or disposition: none  I expect the patient to be discharged to home in several days when more euvolemic         Electronically signed by Martin Chao DO, 01/18/25, 10:56 CST.

## 2025-01-18 NOTE — PROGRESS NOTES
"EP Problems:   Ventricular tachycardia  - 1/2016:  VT ablation, [place]  - 10/2017:  VT ablation, [facility]  - 1/2019:  VT ablation, [facility]  2.  Presence of an ICD  - 2008:  DOI  -2/2022:  Gen change, [employer]  3.  Atrial flutter, uncertain type     Cardiology problems:   HTN  Chronic systolic heart failure   HOCM  Stroke  LV thrombus     Medical problems:  1.  Medication nonadherence  2.  Obesity  3.  Prostate cancer  4.  Type II DM  5.  Lower extremity venous stasis ulcerations    Patient ID:  Jeanie Grossman is a 65 y.o. male with problem list as above as above who EP is following for acute on chronic systolic heart failure, atrial flutter.    Subjective:  Feels like his breathing is somewhat better today compared to yesterday.    Objective:  /63 (BP Location: Left arm, Patient Position: Lying)   Pulse 51   Temp 98.1 °F (36.7 °C) (Oral)   Resp 18   Ht 190.5 cm (75\")   Wt 123 kg (270 lb 3.2 oz)   SpO2 98%   BMI 33.77 kg/m²     Obese, chronically ill-appearing  Wearing oxygen  Mild bibasilar crackles  Regular rate and rhythm  1+ lower extremity edema, legs are wrapped in dressing    Labs today:  Lab Results   Component Value Date    GLUCOSE 123 (H) 01/17/2025    CALCIUM 8.7 01/17/2025     01/17/2025    K 4.1 01/17/2025    CO2 24.0 01/17/2025    BUN 19 01/17/2025    CREATININE 1.18 01/17/2025    EGFR 68.1 01/17/2025     Lab Results   Component Value Date    MG 2.0 01/17/2025       Telemetry directly visualized and independently reviewed: Atrial flutter, predominantly with normal rate, intermittent ventricular pacing    Assessment:  Atrial flutter, uncertain type  Acute on chronic systolic heart failure  Medication nonadherence  Hypertrophic cardiomyopathy    Plan:  -Continue diuresis with IV Lasix, can switch to IV Lasix infusion if volume status is not improving  -Continue carvedilol 6.25 mg twice daily  -Continue plans for outpatient atrial flutter ablation  -Continue anticoagulation given " elevated [id number] and history of LV thrombus    Part of this note may be an electronic transcription/translation of spoken language to printed text using the Dragon Dictation System.

## 2025-01-19 LAB
ABSOLUTE LUNG FLUID CONTENT: 44 % (ref 20–35)
ANION GAP SERPL CALCULATED.3IONS-SCNC: 8 MMOL/L (ref 5–15)
BACTERIA SPEC AEROBE CULT: ABNORMAL
BUN SERPL-MCNC: 22 MG/DL (ref 8–23)
BUN/CREAT SERPL: 18.5 (ref 7–25)
CALCIUM SPEC-SCNC: 8.8 MG/DL (ref 8.6–10.5)
CHLORIDE SERPL-SCNC: 104 MMOL/L (ref 98–107)
CO2 SERPL-SCNC: 25 MMOL/L (ref 22–29)
CREAT SERPL-MCNC: 1.19 MG/DL (ref 0.76–1.27)
EGFRCR SERPLBLD CKD-EPI 2021: 67.4 ML/MIN/1.73
GLUCOSE SERPL-MCNC: 84 MG/DL (ref 65–99)
GRAM STN SPEC: ABNORMAL
MAGNESIUM SERPL-MCNC: 2.3 MG/DL (ref 1.6–2.4)
POTASSIUM SERPL-SCNC: 4.5 MMOL/L (ref 3.5–5.2)
SODIUM SERPL-SCNC: 137 MMOL/L (ref 136–145)

## 2025-01-19 PROCEDURE — 80048 BASIC METABOLIC PNL TOTAL CA: CPT | Performed by: INTERNAL MEDICINE

## 2025-01-19 PROCEDURE — 94726 PLETHYSMOGRAPHY LUNG VOLUMES: CPT

## 2025-01-19 PROCEDURE — 25010000002 FUROSEMIDE PER 20 MG: Performed by: INTERNAL MEDICINE

## 2025-01-19 PROCEDURE — 83735 ASSAY OF MAGNESIUM: CPT | Performed by: INTERNAL MEDICINE

## 2025-01-19 RX ORDER — FUROSEMIDE 10 MG/ML
80 INJECTION INTRAMUSCULAR; INTRAVENOUS DAILY
Status: DISCONTINUED | OUTPATIENT
Start: 2025-01-19 | End: 2025-01-20

## 2025-01-19 RX ADMIN — EMPAGLIFLOZIN 10 MG: 10 TABLET, FILM COATED ORAL at 09:12

## 2025-01-19 RX ADMIN — APIXABAN 5 MG: 5 TABLET, FILM COATED ORAL at 09:12

## 2025-01-19 RX ADMIN — Medication 12.5 MG: at 09:12

## 2025-01-19 RX ADMIN — APIXABAN 5 MG: 5 TABLET, FILM COATED ORAL at 20:14

## 2025-01-19 RX ADMIN — ROSUVASTATIN 10 MG: 10 TABLET, FILM COATED ORAL at 20:14

## 2025-01-19 RX ADMIN — FUROSEMIDE 80 MG: 10 INJECTION, SOLUTION INTRAVENOUS at 13:17

## 2025-01-19 RX ADMIN — CARVEDILOL 6.25 MG: 6.25 TABLET, FILM COATED ORAL at 18:32

## 2025-01-19 RX ADMIN — CARVEDILOL 6.25 MG: 6.25 TABLET, FILM COATED ORAL at 09:12

## 2025-01-19 RX ADMIN — LOSARTAN POTASSIUM 25 MG: 25 TABLET, FILM COATED ORAL at 09:12

## 2025-01-19 NOTE — PLAN OF CARE
Problem: Heart Failure  Goal: Optimal Coping  Outcome: Progressing   Goal Outcome Evaluation:      Pt remained AAOx4 this shift. No complaints of pain or discomfort this shift. Pt was encouraged to walk with nurse but refused. He has slept most of the shift. Refused wound care this shift. VSS. Pt received new IV from VAT team. Bed remained in low locked position with side rails up X2 and call light in reach.

## 2025-01-19 NOTE — PROGRESS NOTES
[facility] Medicine Services  INPATIENT PROGRESS NOTE    Patient Name: Jeanie Grossman  Date of Admission: 1/15/2025  Today's Date: 01/19/25  Length of Stay: 4  Primary Care Physician: Conor Denny DO    Subjective   Chief Complaint: f/u sob/chf    HPI   Patient again seen resting on the side of his bed.  Sitting up.  Continues to state he feels a little better.  No chest pain.  No dizziness.  No nausea.  He has not been up walking much outside the room.  Discussed need for him to ambulate today and walking the halls.  Improve his functional status.  He had good urine output yesterday even with 1 dose of diuretic.  No other adverse events noted.        Review of Systems   All pertinent negatives and positives are as above. All other systems have been reviewed and are negative unless otherwise stated.     Objective    Temp:  [97.5 °F (36.4 °C)-98.5 °F (36.9 °C)] 98.4 °F (36.9 °C)  Heart Rate:  [] 60  Resp:  [16-18] 16  BP: (103-173)/(49-82) 162/82  Physical Exam  GEN: Awake, alert, interactive, in NAD  HEENT:  PERRLA, EOMI, Anicteric, Trachea midline  Lungs: breath sounds improved, no overt crackles heard today, no wheezing  Heart: Regular, +S1/s2, no rub  ABD: obese, soft, nt, +BS, no guarding/rebound  Extremities: atraumatic, no cyanosis, b/l calf wraps, +pitting edema  Skin: chronic stasis changes b/l, wounds POA wrapped  Neuro: AAOx3, no focal deficits  Psych: normal mood & affect        Results Review:  I have reviewed the labs, radiology results, and diagnostic studies.    Laboratory Data:   Results from last 7 days   Lab Units 01/16/25  0808 01/15/25  1353   WBC 10*3/mm3 4.06 4.62   HEMOGLOBIN g/dL 12.9* 12.9*   HEMATOCRIT % 40.3 41.0   PLATELETS 10*3/mm3 216 211        Results from last 7 days   Lab Units 01/19/25  0415 01/18/25  0352 01/17/25  0340 01/16/25  0808 01/15/25  1353   SODIUM mmol/L 137 136 137   < > 139   POTASSIUM mmol/L 4.5 4.2 4.1   < > 4.2    CHLORIDE mmol/L 104 99 102   < > 107   CO2 mmol/L 25.0 25.0 24.0   < > 22.0   BUN mg/dL 22 20 19   < > 11   CREATININE mg/dL 1.19 1.36* 1.18   < > 1.14   CALCIUM mg/dL 8.8 8.8 8.7   < > 9.0   BILIRUBIN mg/dL  --   --   --   --  1.2   ALK PHOS U/L  --   --   --   --  76   ALT (SGPT) U/L  --   --   --   --  13   AST (SGOT) U/L  --   --   --   --  16   GLUCOSE mg/dL 84 101* 123*   < > 103*    < > = values in this interval not displayed.       Culture Data:   Wound Culture   Date Value Ref Range Status   01/15/2025 Moderate growth (3+) Proteus mirabilis (A)  Preliminary   01/15/2025 Moderate growth (3+) Pseudomonas species (A)  Preliminary   01/15/2025 Moderate growth (3+) Gram Positive Cocci (A)  Preliminary       Radiology Data:   Imaging Results (Last 24 Hours)       ** No results found for the last 24 hours. **            I have reviewed the patient's current medications.     Assessment/Plan   Assessment  Active Hospital Problems    Diagnosis     **Acute on chronic systolic CHF (congestive heart failure)     Unspecified atrial flutter     Hypertrophic cardiomyopathy     Essential hypertension     AICD (automatic cardioverter/defibrillator) present        Treatment Plan  #1 acute on chronic systolic heart failure -with known EF of 36 to 40%.  Overloaded.  Has been getting IV diuresis 80 twice daily but only received 1 dose yesterday due to slight bump in creatinine with overall good output days prior.  May potentially be heading towards his dry weight.  With 1 dose yesterday still put out 4 L urine.  Kidney function better today.  He received no dose of Lasix.  Will follow outputs and do Reds vest this afternoon to see for potential second dose.  Potentially can transition to oral dosing soon for discharge.  Increase ambulation today.  Discussed with nursing.    #2 a flutter -rate controlled.  Seen by EP.  Currently on Eliquis and Coreg.  Plans for outpatient ablation.    #3 essential hypertension -BP tolerating  current meds.  Monitor.    #4 hyperlipidemia -statin    #5 lower extreme wounds -with multiple lower extremity superficial ulcers and wounds in the setting of his venous stasis and edema.  He was seen by wound care earlier in the stay.  Legs are wrapped.  He has not had any fevers, white count, drainage.  No obvious signs of infection.  Apparently wound cultures were taken in the ER and they have grown back polymicrobial.  Likely superficial sampling.  Again there is no overt signs of active infection.  Will continue to monitor closely for any antibiotic needs.    Medical Decision Making  Number and Complexity of problems: 1 acute on chronic, 1 acute, multiple chronic  Differential Diagnosis: as above    Conditions and Status        About the same, non-toxic, seems to be slowly improving, not at goal     MDM Data  External documents reviewed: none  Cardiac tracing (EKG, telemetry) interpretation: a flutter  Radiology interpretation: reports reviewed   Labs reviewed: as above  Any tests that were considered but not ordered: none     Decision rules/scores evaluated (example [id number], Wells, etc): none     Discussed with: patient, nursing     Care Planning  Shared decision making: Patient apprised of current labs, vitals, imaging and treatment plan.  They are agreeable with proceeding with plans as discussed.    Code status and discussions: full code    Disposition  Social Determinants of Health that impact treatment or disposition: none  I expect the patient to be discharged to home in 1-3 days        Electronically signed by Martin Chao DO, 01/19/25, 11:03 CST.

## 2025-01-19 NOTE — PLAN OF CARE
Goal Outcome Evaluation:  Plan of Care Reviewed With: patient        Progress: no change  Outcome Evaluation: Pt A&OX4. No c/o pain so far this shift. VSS / AFL [id number] per tele. Wound care completed as ordered. Fluid restriction maintained. See flowsheet. Pt requested IV be removed this shift d/t pain, this nurse removed. Pt educated on the need for IV access. Pt is willing to get an IV reinserted when needed. Safety maintained.

## 2025-01-19 NOTE — PROCEDURES
[facility] Failure [facility]    Date:  01/19/25     Vitals:    01/19/25 1103   BP: 138/78   Pulse: 61   Resp: 16   Temp: 98 °F (36.7 °C)   SpO2: 99%        Indication:  Heart Failure    Procedure:  ReDS device sensor unit applied to right side of chest and right side of back.  Appropriate positioning confirmed based off of the unit's calculation.  Chest measurement obtained with the chest size ruler.  Measurement session performed over 45 seconds.      Results:  ReDS Value=44    Interpretation:  39-46% - elevated lung fluid content    Sarah Painter, RRT 01/19/25 15:09 CST

## 2025-01-20 LAB
ANION GAP SERPL CALCULATED.3IONS-SCNC: 11 MMOL/L (ref 5–15)
BASOPHILS # BLD AUTO: 0.02 10*3/MM3 (ref 0–0.2)
BASOPHILS NFR BLD AUTO: 0.3 % (ref 0–1.5)
BUN SERPL-MCNC: 22 MG/DL (ref 8–23)
BUN/CREAT SERPL: 15.4 (ref 7–25)
CALCIUM SPEC-SCNC: 9.1 MG/DL (ref 8.6–10.5)
CHLORIDE SERPL-SCNC: 100 MMOL/L (ref 98–107)
CO2 SERPL-SCNC: 25 MMOL/L (ref 22–29)
CREAT SERPL-MCNC: 1.43 MG/DL (ref 0.76–1.27)
DEPRECATED RDW RBC AUTO: 53.9 FL (ref 37–54)
EGFRCR SERPLBLD CKD-EPI 2021: 54 ML/MIN/1.73
EOSINOPHIL # BLD AUTO: 0.09 10*3/MM3 (ref 0–0.4)
EOSINOPHIL NFR BLD AUTO: 1.5 % (ref 0.3–6.2)
ERYTHROCYTE [DISTWIDTH] IN BLOOD BY AUTOMATED COUNT: 15.9 % (ref 12.3–15.4)
GLUCOSE SERPL-MCNC: 88 MG/DL (ref 65–99)
HCT VFR BLD AUTO: 40.5 % (ref 37.5–51)
HGB BLD-MCNC: 12.8 G/DL (ref 13–17.7)
IMM GRANULOCYTES # BLD AUTO: 0.03 10*3/MM3 (ref 0–0.05)
IMM GRANULOCYTES NFR BLD AUTO: 0.5 % (ref 0–0.5)
LYMPHOCYTES # BLD AUTO: 0.63 10*3/MM3 (ref 0.7–3.1)
LYMPHOCYTES NFR BLD AUTO: 10.3 % (ref 19.6–45.3)
MCH RBC QN AUTO: 28.9 PG (ref 26.6–33)
MCHC RBC AUTO-ENTMCNC: 31.6 G/DL (ref 31.5–35.7)
MCV RBC AUTO: 91.4 FL (ref 79–97)
MONOCYTES # BLD AUTO: 0.83 10*3/MM3 (ref 0.1–0.9)
MONOCYTES NFR BLD AUTO: 13.5 % (ref 5–12)
NEUTROPHILS NFR BLD AUTO: 4.53 10*3/MM3 (ref 1.7–7)
NEUTROPHILS NFR BLD AUTO: 73.9 % (ref 42.7–76)
NRBC BLD AUTO-RTO: 0 /100 WBC (ref 0–0.2)
PLATELET # BLD AUTO: 230 10*3/MM3 (ref 140–450)
PMV BLD AUTO: 10.4 FL (ref 6–12)
POTASSIUM SERPL-SCNC: 4.6 MMOL/L (ref 3.5–5.2)
RBC # BLD AUTO: 4.43 10*6/MM3 (ref 4.14–5.8)
SODIUM SERPL-SCNC: 136 MMOL/L (ref 136–145)
WBC NRBC COR # BLD AUTO: 6.13 10*3/MM3 (ref 3.4–10.8)

## 2025-01-20 PROCEDURE — 85025 COMPLETE CBC W/AUTO DIFF WBC: CPT | Performed by: INTERNAL MEDICINE

## 2025-01-20 PROCEDURE — 80048 BASIC METABOLIC PNL TOTAL CA: CPT | Performed by: INTERNAL MEDICINE

## 2025-01-20 PROCEDURE — 25010000002 FUROSEMIDE PER 20 MG: Performed by: INTERNAL MEDICINE

## 2025-01-20 PROCEDURE — 99232 SBSQ HOSP IP/OBS MODERATE 35: CPT | Performed by: STUDENT IN AN ORGANIZED HEALTH CARE EDUCATION/TRAINING PROGRAM

## 2025-01-20 RX ORDER — FUROSEMIDE 10 MG/ML
40 INJECTION INTRAMUSCULAR; INTRAVENOUS DAILY
Status: DISCONTINUED | OUTPATIENT
Start: 2025-01-21 | End: 2025-01-22 | Stop reason: HOSPADM

## 2025-01-20 RX ADMIN — APIXABAN 5 MG: 5 TABLET, FILM COATED ORAL at 21:06

## 2025-01-20 RX ADMIN — CARVEDILOL 6.25 MG: 6.25 TABLET, FILM COATED ORAL at 09:15

## 2025-01-20 RX ADMIN — Medication 12.5 MG: at 09:15

## 2025-01-20 RX ADMIN — EMPAGLIFLOZIN 10 MG: 10 TABLET, FILM COATED ORAL at 09:15

## 2025-01-20 RX ADMIN — LOSARTAN POTASSIUM 25 MG: 25 TABLET, FILM COATED ORAL at 09:15

## 2025-01-20 RX ADMIN — CARVEDILOL 6.25 MG: 6.25 TABLET, FILM COATED ORAL at 17:38

## 2025-01-20 RX ADMIN — ROSUVASTATIN 10 MG: 10 TABLET, FILM COATED ORAL at 21:06

## 2025-01-20 RX ADMIN — FUROSEMIDE 80 MG: 10 INJECTION, SOLUTION INTRAVENOUS at 09:15

## 2025-01-20 RX ADMIN — APIXABAN 5 MG: 5 TABLET, FILM COATED ORAL at 09:15

## 2025-01-20 NOTE — CASE MANAGEMENT/SOCIAL WORK
Continued Stay Note   [place]     Patient Name: Jeanie Grossman  MRN: [id number]  Today's Date: 1/20/2025    Admit Date: 1/15/2025    Plan: Home   Discharge Plan       Row Name 01/20/25 0953       Plan    Plan Comments Cab voucher in place for ride home at d/c. Please inform if other SW assist needed. Will follow.                   Discharge Codes    No documentation.                 Expected Discharge Date and Time       Expected Discharge Date Expected Discharge Time    Jan 19, 2025               KIRSTIN BrookeW

## 2025-01-20 NOTE — PLAN OF CARE
Goal Outcome Evaluation:  Plan of Care Reviewed With: patient        Progress: no change  Outcome Evaluation: Pt A&OX4. No c/o pain sp far this shift.. VSS AFL/ 62-86 per tele. Wound care completed as ordered. Safety maintained. Encouraged pt to ambulate in the halls. Pt declined stating that he is tired and will do it during the day. Educated on the importance of mobility.

## 2025-01-20 NOTE — PROGRESS NOTES
[facility] Medicine Services  INPATIENT PROGRESS NOTE    Patient Name: Jeanie Grossman  Date of Admission: 1/15/2025  Today's Date: 01/20/25  Length of Stay: 5  Primary Care Physician: Conor Denny DO    Subjective   Chief Complaint: f/u sob/chf    HPI   Patient seen sitting on the edge of bed.  No new complaints.  Did not get out and walk yesterday.  Stated he felt uncomfortable doing it with the female staff.  He wanted to get a male to help him.  He was asking about PT.  Denies chest pain.  No nausea.  Still making a lot of urine.  Made almost 9 L of urine yesterday with 8 L negative with just 1 dose of Lasix.  Unfortunately he received the same Lasix today before this was noted given administration times.        Review of Systems   All pertinent negatives and positives are as above. All other systems have been reviewed and are negative unless otherwise stated.     Objective    Temp:  [98 °F (36.7 °C)-99 °F (37.2 °C)] 98.7 °F (37.1 °C)  Heart Rate:  [61-63] 63  Resp:  [16-18] 18  BP: (128-160)/(63-85) 141/75  Physical Exam  GEN: Awake, alert, interactive, in NAD  HEENT:  PERRLA, EOMI, Anicteric, Trachea midline  Lungs: breath sounds improved, no overt crackles heard today, no wheezing  Heart: Regular, +S1/s2, no rub  ABD: obese, soft, nt, +BS, no guarding/rebound  Extremities: atraumatic, no cyanosis, b/l calf wraps, improving edema b/l LE  Skin: chronic stasis changes b/l, wounds POA wrapped  Neuro: AAOx3, no focal deficits  Psych: normal mood & affect        Results Review:  I have reviewed the labs, radiology results, and diagnostic studies.    Laboratory Data:   Results from last 7 days   Lab Units 01/20/25  0357 01/16/25  0808 01/15/25  1353   WBC 10*3/mm3 6.13 4.06 4.62   HEMOGLOBIN g/dL 12.8* 12.9* 12.9*   HEMATOCRIT % 40.5 40.3 41.0   PLATELETS 10*3/mm3 230 216 211        Results from last 7 days   Lab Units 01/20/25  0357 01/19/25  0415 01/18/25  0352  01/16/25  0808 01/15/25  1353   SODIUM mmol/L 136 137 136   < > 139   POTASSIUM mmol/L 4.6 4.5 4.2   < > 4.2   CHLORIDE mmol/L 100 104 99   < > 107   CO2 mmol/L 25.0 25.0 25.0   < > 22.0   BUN mg/dL 22 22 20   < > 11   CREATININE mg/dL 1.43* 1.19 1.36*   < > 1.14   CALCIUM mg/dL 9.1 8.8 8.8   < > 9.0   BILIRUBIN mg/dL  --   --   --   --  1.2   ALK PHOS U/L  --   --   --   --  76   ALT (SGPT) U/L  --   --   --   --  13   AST (SGOT) U/L  --   --   --   --  16   GLUCOSE mg/dL 88 84 101*   < > 103*    < > = values in this interval not displayed.       Culture Data:   Wound Culture   Date Value Ref Range Status   01/15/2025 Moderate growth (3+) Proteus mirabilis (A)  Preliminary   01/15/2025 Moderate growth (3+) Pseudomonas species (A)  Preliminary   01/15/2025 Moderate growth (3+) Gram Positive Cocci (A)  Preliminary       Radiology Data:   Imaging Results (Last 24 Hours)       ** No results found for the last 24 hours. **            I have reviewed the patient's current medications.     Assessment/Plan   Assessment  Active Hospital Problems    Diagnosis     **Acute on chronic systolic CHF (congestive heart failure)     Unspecified atrial flutter     Hypertrophic cardiomyopathy     Essential hypertension     AICD (automatic cardioverter/defibrillator) present        Treatment Plan  #1 acute on chronic systolic heart failure -with known EF of 36 to 40%.  Overloaded.  Has been getting IV diuresis twice daily.  Went to once a day last 2 days just due to some renal bump and patient still put out almost 9 L of urine yesterday.  Today again slight bump in creatinine.  Probably overstressed his kidney is low.  Already received Lasix this morning.  Will monitor output and labs in the morning before next dose.  Plan for 40 mg instead of 80.  Lung exam has improved.  Reds vest score has improved.  Still has some volume.    #2 a flutter -rate controlled.  Seen by EP.  Currently on Eliquis and Coreg.  Plans for outpatient  ablation.    #3 essential hypertension -BP tolerating current meds.  Monitor.    #4 hyperlipidemia -statin    #5 lower extremity wounds -with multiple lower extremity superficial ulcers and wounds in the setting of his venous stasis and edema.  He was seen by wound care earlier in the stay.  Legs are wrapped.  He has not had any fevers, white count, drainage.  No obvious signs of infection.  Apparently wound cultures were taken in the ER and they have grown back polymicrobial.  Likely superficial sampling.  Again there is no overt signs of active infection.  Will continue to monitor closely for any antibiotic needs.      Of note patient did not ambulate like I asked him to yesterday.  He still ambulating below baseline.  Will ask PT specifically mL PT to evaluate given patient's wishes/request.  Will follow-up on therapy eval.    Medical Decision Making  Number and Complexity of problems: 1 acute on chronic, 1 acute, multiple chronic  Differential Diagnosis: as above    Conditions and Status        About the same, non-toxic, seems to be slowly improving, not at goal     MDM Data  External documents reviewed: none  Cardiac tracing (EKG, telemetry) interpretation: a flutter  Radiology interpretation: reports reviewed   Labs reviewed: as above  Any tests that were considered but not ordered: none     Decision rules/scores evaluated (example [id number], Wells, etc): none     Discussed with: patient, nursing     Care Planning  Shared decision making: Patient apprised of current labs, vitals, imaging and treatment plan.  They are agreeable with proceeding with plans as discussed.    Code status and discussions: full code    Disposition  Social Determinants of Health that impact treatment or disposition: none  I expect the patient to be discharged to home 2-3 days        Electronically signed by Martin Chao DO, 01/20/25, 10:14 CST.

## 2025-01-20 NOTE — PLAN OF CARE
Problem: Heart Failure  Goal: Stable Heart Rate and Rhythm  Outcome: Progressing   Goal Outcome Evaluation:      No significant changes to patient status this shift. Pt remained AAOx4 this shift. Pt up to shower and cleaned self. Wound care done after shower. Pt remains on fluid restrictions and has reached his limit for a 24 hour period. Pt is aware. VSS. Bed remained in low locked position with side rails up X2 and call light in reach.

## 2025-01-21 LAB
ANION GAP SERPL CALCULATED.3IONS-SCNC: 10 MMOL/L (ref 5–15)
BUN SERPL-MCNC: 30 MG/DL (ref 8–23)
BUN/CREAT SERPL: 20.3 (ref 7–25)
CALCIUM SPEC-SCNC: 9 MG/DL (ref 8.6–10.5)
CHLORIDE SERPL-SCNC: 101 MMOL/L (ref 98–107)
CO2 SERPL-SCNC: 25 MMOL/L (ref 22–29)
CREAT SERPL-MCNC: 1.48 MG/DL (ref 0.76–1.27)
EGFRCR SERPLBLD CKD-EPI 2021: 51.9 ML/MIN/1.73
GLUCOSE SERPL-MCNC: 116 MG/DL (ref 65–99)
MAGNESIUM SERPL-MCNC: 2.5 MG/DL (ref 1.6–2.4)
POTASSIUM SERPL-SCNC: 4.2 MMOL/L (ref 3.5–5.2)
SODIUM SERPL-SCNC: 136 MMOL/L (ref 136–145)

## 2025-01-21 PROCEDURE — 97161 PT EVAL LOW COMPLEX 20 MIN: CPT

## 2025-01-21 PROCEDURE — 80048 BASIC METABOLIC PNL TOTAL CA: CPT | Performed by: INTERNAL MEDICINE

## 2025-01-21 PROCEDURE — 99232 SBSQ HOSP IP/OBS MODERATE 35: CPT | Performed by: STUDENT IN AN ORGANIZED HEALTH CARE EDUCATION/TRAINING PROGRAM

## 2025-01-21 PROCEDURE — 83735 ASSAY OF MAGNESIUM: CPT | Performed by: INTERNAL MEDICINE

## 2025-01-21 RX ADMIN — CARVEDILOL 6.25 MG: 6.25 TABLET, FILM COATED ORAL at 08:52

## 2025-01-21 RX ADMIN — LOSARTAN POTASSIUM 25 MG: 25 TABLET, FILM COATED ORAL at 08:52

## 2025-01-21 RX ADMIN — APIXABAN 5 MG: 5 TABLET, FILM COATED ORAL at 20:57

## 2025-01-21 RX ADMIN — APIXABAN 5 MG: 5 TABLET, FILM COATED ORAL at 08:52

## 2025-01-21 RX ADMIN — Medication 12.5 MG: at 10:25

## 2025-01-21 RX ADMIN — ROSUVASTATIN 10 MG: 10 TABLET, FILM COATED ORAL at 20:57

## 2025-01-21 RX ADMIN — EMPAGLIFLOZIN 10 MG: 10 TABLET, FILM COATED ORAL at 08:52

## 2025-01-21 RX ADMIN — CARVEDILOL 6.25 MG: 6.25 TABLET, FILM COATED ORAL at 18:20

## 2025-01-21 NOTE — PLAN OF CARE
Goal Outcome Evaluation:  Plan of Care Reviewed With: patient           Outcome Evaluation: PT eval complete. Pt sitting EOB with complaints of generalized soreness and pain in knee and LE but feels improvement with SOA. Pt AOx4 while sitting EOB, kne year of 2025 but stated April instead of January and corrected with verbal cues. While sitting EOB, pt demo functional AROM and strength globally to 4+/5 or greater and reports he feels improvement. Pt performed sit<>stand and ambulated in truong 180' with SBA, rated at 5/10 on Rate of Percieved exertion (RPE) scale. pt required sitting rest break and once recoevered eager to walk again and tolerated 320' but reported increase in RPE to 7/10 and left sitting EOB. Pt left with needs in reach and educated on POC to reach goals and return to PLOF. Anticipate d/c home when medically stable.    Anticipated Discharge Disposition (PT): home

## 2025-01-21 NOTE — PLAN OF CARE
"Goal Outcome Evaluation:  Plan of Care Reviewed With: patient        Progress: no change  Outcome Evaluation: VSS. Afl/ 46-68 on tele. Pt states he will \"ambulate later\". No complaints this shift. Call light within reach. Safety maintained.                             "

## 2025-01-21 NOTE — PLAN OF CARE
Problem: Heart Failure  Goal: Optimal Functional Ability  Outcome: Progressing  Intervention: Optimize Functional Ability  Recent Flowsheet Documentation  Taken 1/21/2025 0718 by Edwige Riley RN  Activity Management: sitting, edge of bed     Problem: Comorbidity Management  Goal: Maintenance of Heart Failure Symptom Control  Outcome: Progressing  Intervention: Maintain Heart Failure Management  Recent Flowsheet Documentation  Taken 1/21/2025 0718 by Edwige Riley RN  Medication Review/Management: medications reviewed  Goal: Blood Pressure in Desired Range  Outcome: Progressing  Intervention: Maintain Blood Pressure Management  Recent Flowsheet Documentation  Taken 1/21/2025 0718 by Edwige Riley RN  Medication Review/Management: medications reviewed   Goal Outcome Evaluation:      Pt had no significant changes to status this shift. Pt slept while sitting on the side of the bed, which is the foot of the bed. Pt continued to take off his gown, and only wear a coat, after being ask numerous times to please cover himself. Pt up with PT this shift. Pt up ad homero in room. Bed remained in low locked position with side rails up X2 and call light in reach.

## 2025-01-21 NOTE — PROGRESS NOTES
[facility] Medicine Services  INPATIENT PROGRESS NOTE    Patient Name: Jeanie Grossman  Date of Admission: 1/15/2025  Today's Date: 01/21/25  Length of Stay: 6  Primary Care Physician: Conor Denny DO    Subjective   Chief Complaint: f/u sob/chf    HPI   Patient seen and examined.  States he feels really tired today.  As good as yesterday.  Explained send they continue to diurese well yesterday and I feel like he is getting close to baseline.  He had a slight bump in his kidney function so I told him I was can give him a break from diuretic today.  He was happy to hear that.  He denies chest pain.  Denies shortness of breath at rest.  States he still getting short of breath with exertion.  He still not been up and out of bed walking down the truong yet like I have asked him to.  PT is post see him today.  No other adverse events overnight.        Review of Systems   All pertinent negatives and positives are as above. All other systems have been reviewed and are negative unless otherwise stated.     Objective    Temp:  [97.9 °F (36.6 °C)-98.5 °F (36.9 °C)] 98.3 °F (36.8 °C)  Heart Rate:  [51-64] 64  Resp:  [16-18] 16  BP: (105-134)/(47-64) 127/47  Physical Exam  GEN: Awake, alert, interactive, in NAD  HEENT:  PERRLA, EOMI, Anicteric, Trachea midline  Lungs: breath sounds improved, no overt crackles heard today, no wheezing  Heart: Regular, +S1/s2, no rub  ABD: obese, soft, nt, +BS, no guarding/rebound  Extremities: atraumatic, no cyanosis, b/l calf wraps, improving edema b/l LE  Skin: chronic stasis changes b/l, wounds POA wrapped  Neuro: AAOx3, no focal deficits  Psych: normal mood & affect        Results Review:  I have reviewed the labs, radiology results, and diagnostic studies.    Laboratory Data:   Results from last 7 days   Lab Units 01/20/25  0357 01/16/25  0808 01/15/25  1353   WBC 10*3/mm3 6.13 4.06 4.62   HEMOGLOBIN g/dL 12.8* 12.9* 12.9*   HEMATOCRIT % 40.5 40.3 41.0    PLATELETS 10*3/mm3 230 216 211        Results from last 7 days   Lab Units 01/21/25  0314 01/20/25  0357 01/19/25  0415 01/16/25  0808 01/15/25  1353   SODIUM mmol/L 136 136 137   < > 139   POTASSIUM mmol/L 4.2 4.6 4.5   < > 4.2   CHLORIDE mmol/L 101 100 104   < > 107   CO2 mmol/L 25.0 25.0 25.0   < > 22.0   BUN mg/dL 30* 22 22   < > 11   CREATININE mg/dL 1.48* 1.43* 1.19   < > 1.14   CALCIUM mg/dL 9.0 9.1 8.8   < > 9.0   BILIRUBIN mg/dL  --   --   --   --  1.2   ALK PHOS U/L  --   --   --   --  76   ALT (SGPT) U/L  --   --   --   --  13   AST (SGOT) U/L  --   --   --   --  16   GLUCOSE mg/dL 116* 88 84   < > 103*    < > = values in this interval not displayed.       Culture Data:   Wound Culture   Date Value Ref Range Status   01/15/2025 Moderate growth (3+) Proteus mirabilis (A)  Preliminary   01/15/2025 Moderate growth (3+) Pseudomonas species (A)  Preliminary   01/15/2025 Moderate growth (3+) Gram Positive Cocci (A)  Preliminary       Radiology Data:   Imaging Results (Last 24 Hours)       ** No results found for the last 24 hours. **            I have reviewed the patient's current medications.     Assessment/Plan   Assessment  Active Hospital Problems    Diagnosis     **Acute on chronic systolic CHF (congestive heart failure)     Unspecified atrial flutter     Hypertrophic cardiomyopathy     Essential hypertension     AICD (automatic cardioverter/defibrillator) present        Treatment Plan  #1 acute on chronic systolic heart failure -with known EF of 36 to 40%.  Overloaded.  He has had good response in general to diuretics.  He is at a large volume out.  He appears to be approaching euvolemia.  Reds vest is improved.  Lung exam is improved.  Given ongoing bump in renal function gave a break from diuretic today.  Discussed going home but he does not feel ready.  Wants to walk the halls with therapy.  Awaiting eval.  Again hold diuretic.  Will recheck chemistry in the morning.  Explained that if he has  improvement and does well with therapy we will plan for discharge tomorrow.    #2 a flutter -rate controlled.  Seen by EP.  Currently on Eliquis and Coreg.  Plans for outpatient ablation.    #3 essential hypertension -BP tolerating current meds.  Monitor.    #4 hyperlipidemia -statin    #5 lower extremity wounds -with multiple lower extremity superficial ulcers and wounds in the setting of his venous stasis and edema.  He was seen by wound care earlier in the stay.  Legs are wrapped.  He has not had any fevers, white count, drainage.  No obvious signs of infection.  Apparently wound cultures were taken in the ER and they have grown back polymicrobial.  Likely superficial sampling.  Again there is no overt signs of active infection.  Will continue to monitor closely for any antibiotic needs.      Medical Decision Making  Number and Complexity of problems: 1 acute on chronic, 1 acute, multiple chronic  Differential Diagnosis: as above    Conditions and Status        About the same, non-toxic, seems to be slowly improving, nearing goal     MDM Data  External documents reviewed: none  Cardiac tracing (EKG, telemetry) interpretation: a flutter  Radiology interpretation: reports reviewed   Labs reviewed: as above  Any tests that were considered but not ordered: none     Decision rules/scores evaluated (example [id number], Wells, etc): none     Discussed with: patient, nursing     Care Planning  Shared decision making: Patient apprised of current labs, vitals, imaging and treatment plan.  They are agreeable with proceeding with plans as discussed.    Code status and discussions: full code    Disposition  Social Determinants of Health that impact treatment or disposition: none  I expect the patient to be discharged to home likely tomorrow.  Will follow-up on therapy evaluation.        Electronically signed by Martin Chao DO, 01/21/25, 10:47 CST.

## 2025-01-21 NOTE — PROGRESS NOTES
"EP Problems:   Ventricular tachycardia  - 1/2016:  VT ablation, [place]  - 10/2017:  VT ablation, [facility]  - 1/2019:  VT ablation, [facility]  2.  Presence of an ICD  - 2008:  DOI  -2/2022:  Gen change, [employer]  3.  Atrial flutter, uncertain type     Cardiology problems:   HTN  Chronic systolic heart failure   HOCM  Stroke  LV thrombus     Medical problems:  1.  Medication nonadherence  2.  Obesity  3.  Prostate cancer  4.  Type II DM  5.  Lower extremity venous stasis ulcerations    Patient ID:  Jeanie Grossman is a 65 y.o. male with problem list as above as above who EP is following for acute on chronic systolic heart failure, atrial flutter.    Subjective:  No significant events.  Has had marked volume output with IV furosemide.    Objective:  /64 (BP Location: Left arm, Patient Position: Sitting)   Pulse 60   Temp 98.5 °F (36.9 °C) (Oral)   Resp 16   Ht 190.5 cm (75\")   Wt 119 kg (262 lb 3.2 oz)   SpO2 98%   BMI 32.77 kg/m²     Obese, chronically ill-appearing  Wearing oxygen  Normal work of breathing  Regular rate and rhythm  1+ lower extremity edema, legs are wrapped in dressing    Labs today:  Lab Results   Component Value Date    GLUCOSE 88 01/20/2025    CALCIUM 9.1 01/20/2025     01/20/2025    K 4.6 01/20/2025    CO2 25.0 01/20/2025    BUN 22 01/20/2025    CREATININE 1.43 (H) 01/20/2025    EGFR 54.0 (L) 01/20/2025     Lab Results   Component Value Date    MG 2.3 01/19/2025       Telemetry directly visualized and independently reviewed: Atrial flutter, predominantly with normal rate, intermittent ventricular pacing    Assessment:  Atrial flutter, uncertain type  Acute on chronic systolic heart failure  Medication nonadherence  Hypertrophic cardiomyopathy    Plan:  -Agree with stopping IV furosemide given mild rise in creatinine  -Continue spironolactone  -Continue carvedilol current dose  -Continue apixaban 5 mg twice daily  -Tentative plans for outpatient ablation on 2/6/2025    Part of " this note may be an electronic transcription/translation of spoken language to printed text using the Dragon Dictation System.

## 2025-01-21 NOTE — THERAPY EVALUATION
Patient Name: Jeanie Grossman  : 1960    MRN: [id number]                              Today's Date: 2025       Admit Date: 1/15/2025    Visit Dx:     ICD-10-CM ICD-9-CM   1. Acute on chronic congestive heart failure, unspecified heart failure type  I50.9 428.0   2. Non-pressure chronic ulcer of right lower leg with fat layer exposed  L97.912 707.10   3. Non-pressure chronic ulcer of left lower leg with fat layer exposed  L97.922 707.10   4. Non-pressure chronic ulcer of right calf with fat layer exposed  L97.212 707.12   5. Venous insufficiency (chronic) (peripheral)  I87.2 459.81   6. Impaired mobility [Z74.09]  Z74.09 799.89     Patient Active Problem List   Diagnosis    Ventricular tachycardia    Hypertrophic cardiomyopathy    Essential hypertension    AICD (automatic cardioverter/defibrillator) present    Ventricular tachycardia, sustained    Abdominal pain    UTI (urinary tract infection)    Type 2 diabetes mellitus without complication    Pancytopenia    Chest pain in adult    Prostate cancer, finished radiation on 2024    Non-smoker    VT (ventricular tachycardia)    Ventricular tachycardia (paroxysmal)    Acute on chronic congestive heart failure    Dermatitis neglecta    Unspecified atrial flutter    Acute on chronic HFrEF (heart failure with reduced ejection fraction)    Acute on chronic systolic CHF (congestive heart failure)     Past Medical History:   Diagnosis Date    CHF (congestive heart failure)     Diabetes mellitus     Hyperlipidemia     Hypertension     Hypertrophic cardiomyopathy     s/p AICD    Migraine     Prostate cancer     Tachycardia     Ventricular tachycardia     Ventricular tachycardia, sustained 10/14/2016     Past Surgical History:   Procedure Laterality Date    CARDIAC ABLATION  2016, 10/18/2016 (Dr. Doss in [place], [place])    CARDIAC ABLATION      2018    CARDIAC CATHETERIZATION      CARDIAC DEFIBRILLATOR PLACEMENT      CARDIAC DEFIBRILLATOR PLACEMENT       PROSTATE BIOPSY  2019    PROSTATE BIOPSY  2023      General Information       Row Name 01/21/25 1344          Physical Therapy Time and Intention    Document Type evaluation  presents with BACH and increased swelling Dx; CHF exacerbation  -     Mode of Treatment physical therapy  -       Row Name 01/21/25 1344          General Information    Patient Profile Reviewed yes  -AJ     Prior Level of Function independent:;all household mobility;community mobility;gait;home management;bed mobility;ADL's  -AJ     Barriers to Rehab medically complex  -       Row Name 01/21/25 1344          Living Environment    People in Home alone  -       Row Name 01/21/25 1344          Home Main Entrance    Number of Stairs, Main Entrance two  -AJ     Stair Railings, Main Entrance railing on left side (ascending)  -       Row Name 01/21/25 1344          Stairs Within Home, Primary    Number of Stairs, Within Home, Primary none  -       Row Name 01/21/25 1344          Cognition    Orientation Status (Cognition) oriented x 4  -       Row Name 01/21/25 1344          Safety Issues/Impairments Affecting Functional Mobility    Impairments Affecting Function (Mobility) endurance/activity tolerance;shortness of breath  -               User Key  (r) = Recorded By, (t) = Taken By, (c) = Cosigned By      Initials Name Provider Type    Eris Hoffmann, MAYRA DPT Physical Therapist                   Mobility       Row Name 01/21/25 1344          Bed Mobility    Comment, (Bed Mobility) sitting EOB upon arrival  -       Row Name 01/21/25 1344          Bed-Chair Transfer    Bed-Chair [place] (Transfers) standby assist  -       Row Name 01/21/25 1344          Sit-Stand Transfer    Sit-Stand [place] (Transfers) independent  -       Row Name 01/21/25 1344          Gait/Stairs (Locomotion)    [place] Level (Gait) standby assist  -     Distance in Feet (Gait) 400  180 + 320  -     Deviations/Abnormal Patterns (Gait)  bilateral deviations;base of support, wide;stride length decreased;gait speed decreased  -               User Key  (r) = Recorded By, (t) = Taken By, (c) = Cosigned By      Initials Name Provider Type    Eris Hoffmann PT DPALEXIS Physical Therapist                   Obj/Interventions       Row Name 01/21/25 1344          Range of Motion Comprehensive    General Range of Motion no range of motion deficits identified  -AJ       Row Name 01/21/25 1344          Strength Comprehensive (MMT)    General Manual Muscle Testing (MMT) Assessment no strength deficits identified  -     Comment, General Manual Muscle Testing (MMT) Assessment remained 4+/5 or greater globally  -[facility] Name 01/21/25 1344          Balance    Balance Assessment sitting static balance;sitting dynamic balance;standing static balance;standing dynamic balance  -     Static Sitting Balance independent  -     Dynamic Sitting Balance independent  -     Position, Sitting Balance unsupported;sitting edge of bed  -     Static Standing Balance standby assist  -     Dynamic Standing Balance contact guard  -     Position/Device Used, Standing Balance supported  -     Balance Interventions sitting;standing;sit to stand;supported;static;dynamic  -AJ       Row Name 01/21/25 1344          Sensory Assessment (Somatosensory)    Sensory Assessment (Somatosensory) sensation intact  -               User Key  (r) = Recorded By, (t) = Taken By, (c) = Cosigned By      Initials Name Provider Type    Eris Hoffmann PT DPT Physical Therapist                   Goals/Plan       Row Name 01/21/25 1344          Transfer Goal 1 (PT)    Activity/Assistive Device (Transfer Goal 1, PT) sit-to-stand/stand-to-sit;bed-to-chair/chair-to-bed;toilet;walk-in shower  -     [place] Level/Cues Needed (Transfer Goal 1, PT) independent  -AJ     Time Frame (Transfer Goal 1, PT) long term goal (LTG);10 days  -     Progress/Outcome (Transfer Goal 1, PT) new goal   -       Row Name 01/21/25 1344          Gait Training Goal 1 (PT)    Activity/Assistive Device (Gait Training Goal 1, PT) gait (walking locomotion);decrease asymmetrical patterns;decrease fall risk;diminish gait deviation;forward stepping;improve balance and speed;increase endurance/gait distance;increase energy conservation  -AJ     [place] Level (Gait Training Goal 1, PT) independent  -AJ     Distance (Gait Training Goal 1, PT) 500' with RPE 4/10 or less  -AJ     Time Frame (Gait Training Goal 1, PT) long term goal (LTG);10 days  -AJ     Progress/Outcome (Gait Training Goal 1, PT) new goal  -       Row Name 01/21/25 1344          Stairs Goal 1 (PT)    Activity/Assistive Device (Stairs Goal 1, PT) stairs, all skills;ascending stairs;descending stairs;step-to-step;decrease fall risk;improve balance and speed  -AJ     [place] Level/Cues Needed (Stairs Goal 1, PT) independent  -AJ     Number of Stairs (Stairs Goal 1, PT) 2 as needed for home safety  -AJ     Time Frame (Stairs Goal 1, PT) long term goal (LTG);10 days  -AJ     Progress/Outcome (Stairs Goal 1, PT) new goal  -       Row Name 01/21/25 1344          Problem Specific Goal 1 (PT)    Problem Specific Goal 1 (PT) Pt will demo indep with HEP as needed to improve functional, activity tolerance and return to PLOF  -AJ     Time Frame (Problem Specific Goal 1, PT) long-term goal (LTG);by discharge  -AJ     Progress/Outcome (Problem Specific Goal 1, PT) new goal  -       Row Name 01/21/25 1344          Therapy Assessment/Plan (PT)    Planned Therapy Interventions (PT) balance training;bed mobility training;gait training;home exercise program;postural re-education;patient/family education;transfer training;ROM (range of motion);stair training;strengthening;stretching  -AJ               User Key  (r) = Recorded By, (t) = Taken By, (c) = Cosigned By      Initials Name Provider Type    Eris Hoffmann, PT DPT Physical Therapist                    Clinical Impression       Row Name 01/21/25 1344          Pain    Pretreatment Pain Rating 6/10  -     Posttreatment Pain Rating 6/10  -     Pain Location back;knee  -     Pain Side/Orientation bilateral;lower  -AJ     Pain Management Interventions nursing notified;exercise or physical activity utilized  -     Response to Pain Interventions activity participation with tolerable pain  -       Row Name 01/21/25 1344          Plan of Care Review    Plan of Care Reviewed With patient  -     Outcome Evaluation PT eval complete. Pt sitting EOB with complaints of generalized soreness and pain in knee and LE but feels improvement with SOA. Pt AOx4 while sitting EOB, kne year of 2025 but stated April instead of January and corrected with verbal cues. While sitting EOB, pt demo functional AROM and strength globally to 4+/5 or greater and reports he feels improvement. Pt performed sit<>stand and ambulated in truong 180' with SBA, rated at 5/10 on Rate of Percieved exertion (RPE) scale. pt required sitting rest break and once recoevered eager to walk again and tolerated 320' but reported increase in RPE to 7/10 and left sitting EOB. Pt left with needs in reach and educated on POC to reach goals and return to PLOF. Anticipate d/c home when medically stable.  -       Row Name 01/21/25 1344          Therapy Assessment/Plan (PT)    Patient/Family Therapy Goals Statement (PT) get better and go home  -     Rehab Potential (PT) good  -     Criteria for Skilled Interventions Met (PT) yes;meets criteria;skilled treatment is necessary  -     Therapy Frequency (PT) 2 times/day  -     Predicted Duration of Therapy Intervention (PT) until d/c  -       Row Name 01/21/25 1344          Vital Signs    Pre SpO2 (%) 97  -AJ     O2 Delivery Pre Treatment room air  -AJ     Intra SpO2 (%) 92  -AJ     O2 Delivery Intra Treatment room air  -AJ     Post SpO2 (%) 94  -AJ     O2 Delivery Post Treatment room air  -AJ     Pre Patient  Position Sitting  -AJ     Intra Patient Position Standing  -AJ     Post Patient Position Sitting  -AJ       Row Name 01/21/25 1344          Positioning and Restraints    Pre-Treatment Position in bed  -AJ     Post Treatment Position bed  -AJ     In Bed notified nsg;call light within reach;sitting EOB;encouraged to call for assist;side rails up x2  -AJ               User Key  (r) = Recorded By, (t) = Taken By, (c) = Cosigned By      Initials Name Provider Type    Eris Hoffmann PT DPT Physical Therapist                   Outcome Measures       Row Name 01/21/25 1344 01/21/25 0718       How much help from another person do you currently need...    Turning from your back to your side while in flat bed without using bedrails? 4  -AJ 4  -TV    Moving from lying on back to sitting on the side of a flat bed without bedrails? 4  -AJ 4  -TV    Moving to and from a bed to a chair (including a wheelchair)? 4  -AJ 4  -TV    Standing up from a chair using your arms (e.g., wheelchair, bedside chair)? 3  -AJ 4  -TV    Climbing 3-5 steps with a railing? 3  -AJ 3  -TV    To walk in hospital room? 4  -AJ 4  -TV    AM-PAC 6 Clicks Score (PT) 22  -AJ 23  -TV    Highest Level of Mobility Goal 7 --> Walk 25 feet or more  -AJ 7 --> Walk 25 feet or more  -TV      Row Name 01/21/25 1344          Functional Assessment    Outcome Measure Options AM-PAC 6 Clicks Basic Mobility (PT)  -               User Key  (r) = Recorded By, (t) = Taken By, (c) = Cosigned By      Initials Name Provider Type    Eirs Hoffmann, MAYRA DPT Physical Therapist    TV Edwige Riley RN Registered Nurse                                 Physical Therapy Education       Title: PT OT SLP Therapies (Done)       Topic: Physical Therapy (Done)       Point: Mobility training (Done)       Learning Progress Summary            Patient Acceptance, E, VU by RUIZ at 1/21/2025 5897    Comment: role of PT, d/c planning, RPE scale, fall risk                      Point: Home  exercise program (Done)       Learning Progress Summary            Patient Acceptance, E, VU by AJ at 1/21/2025 1444    Comment: role of PT, d/c planning, RPE scale, fall risk                      Point: Body mechanics (Done)       Learning Progress Summary            Patient Acceptance, E, VU by AJ at 1/21/2025 1444    Comment: role of PT, d/c planning, RPE scale, fall risk                      Point: Precautions (Done)       Learning Progress Summary            Patient Acceptance, E, VU by AJ at 1/21/2025 1444    Comment: role of PT, d/c planning, RPE scale, fall risk                                      User Key       Initials Effective Dates Name Provider Type Discipline     08/15/24 -  Eris Montgomery, PT DPT Physical Therapist PT                  PT Recommendation and Plan  Planned Therapy Interventions (PT): balance training, bed mobility training, gait training, home exercise program, postural re-education, patient/family education, transfer training, ROM (range of motion), stair training, strengthening, stretching  Outcome Evaluation: PT eval complete. Pt sitting EOB with complaints of generalized soreness and pain in knee and LE but feels improvement with SOA. Pt AOx4 while sitting EOB, kne year of 2025 but stated April instead of January and corrected with verbal cues. While sitting EOB, pt demo functional AROM and strength globally to 4+/5 or greater and reports he feels improvement. Pt performed sit<>stand and ambulated in truong 180' with SBA, rated at 5/10 on Rate of Percieved exertion (RPE) scale. pt required sitting rest break and once recoevered eager to walk again and tolerated 320' but reported increase in RPE to 7/10 and left sitting EOB. Pt left with needs in reach and educated on POC to reach goals and return to PLOF. Anticipate d/c home when medically stable.     Time Calculation:         PT Charges       Row Name 01/21/25 0541             Time Calculation    Start Time 1344  -      Stop Time  1426  -AJ      Time Calculation (min) 42 min  -AJ      PT Received On 01/21/25  -AJ      PT Goal Re-Cert Due Date 01/31/25  -AJ         Untimed Charges    PT Eval/Re-eval Minutes 42  -AJ         Total Minutes    Untimed Charges Total Minutes 42  -AJ       Total Minutes 42  -AJ                User Key  (r) = Recorded By, (t) = Taken By, (c) = Cosigned By      Initials Name Provider Type    AJ Eris Montgomery, PT DPT Physical Therapist                  Therapy Charges for Today       Code Description Service Date Service Provider Modifiers Qty    [id number] HC PT EVAL LOW COMPLEXITY 3 1/21/2025 Eris Montgomery, PT DPT GP 1            PT G-Codes  Outcome Measure Options: AM-PAC 6 Clicks Basic Mobility (PT)  AM-PAC 6 Clicks Score (PT): 22  PT Discharge Summary  Anticipated Discharge Disposition (PT): home    Eris Montgomery PT DPT  1/21/2025

## 2025-01-22 ENCOUNTER — READMISSION MANAGEMENT (OUTPATIENT)
Dept: CALL CENTER | Facility: HOSPITAL | Age: 65
End: 2025-01-22
Payer: MEDICARE

## 2025-01-22 VITALS
RESPIRATION RATE: 16 BRPM | HEART RATE: 58 BPM | WEIGHT: 262.2 LBS | BODY MASS INDEX: 32.6 KG/M2 | HEIGHT: 75 IN | OXYGEN SATURATION: 98 % | DIASTOLIC BLOOD PRESSURE: 63 MMHG | TEMPERATURE: 98.9 F | SYSTOLIC BLOOD PRESSURE: 132 MMHG

## 2025-01-22 PROBLEM — I50.23 ACUTE ON CHRONIC SYSTOLIC CHF (CONGESTIVE HEART FAILURE): Status: RESOLVED | Noted: 2024-11-15 | Resolved: 2025-01-22

## 2025-01-22 LAB
ANION GAP SERPL CALCULATED.3IONS-SCNC: 8 MMOL/L (ref 5–15)
BUN SERPL-MCNC: 31 MG/DL (ref 8–23)
BUN/CREAT SERPL: 22.5 (ref 7–25)
CALCIUM SPEC-SCNC: 8.9 MG/DL (ref 8.6–10.5)
CHLORIDE SERPL-SCNC: 100 MMOL/L (ref 98–107)
CO2 SERPL-SCNC: 28 MMOL/L (ref 22–29)
CREAT SERPL-MCNC: 1.38 MG/DL (ref 0.76–1.27)
EGFRCR SERPLBLD CKD-EPI 2021: 56.4 ML/MIN/1.73
GLUCOSE SERPL-MCNC: 95 MG/DL (ref 65–99)
POTASSIUM SERPL-SCNC: 4.2 MMOL/L (ref 3.5–5.2)
SODIUM SERPL-SCNC: 136 MMOL/L (ref 136–145)

## 2025-01-22 PROCEDURE — 97116 GAIT TRAINING THERAPY: CPT

## 2025-01-22 PROCEDURE — 97110 THERAPEUTIC EXERCISES: CPT

## 2025-01-22 PROCEDURE — 99232 SBSQ HOSP IP/OBS MODERATE 35: CPT | Performed by: NURSE PRACTITIONER

## 2025-01-22 PROCEDURE — 80048 BASIC METABOLIC PNL TOTAL CA: CPT | Performed by: INTERNAL MEDICINE

## 2025-01-22 RX ORDER — CARVEDILOL 6.25 MG/1
6.25 TABLET ORAL 2 TIMES DAILY WITH MEALS
Qty: 60 TABLET | Refills: 0 | Status: SHIPPED | OUTPATIENT
Start: 2025-01-22 | End: 2025-02-21

## 2025-01-22 RX ORDER — SPIRONOLACTONE 25 MG/1
12.5 TABLET ORAL DAILY
Qty: 15 TABLET | Refills: 0 | Status: SHIPPED | OUTPATIENT
Start: 2025-01-23 | End: 2025-02-22

## 2025-01-22 RX ORDER — FUROSEMIDE 40 MG/1
40 TABLET ORAL DAILY
Qty: 30 TABLET | Refills: 0 | Status: SHIPPED | OUTPATIENT
Start: 2025-01-22 | End: 2025-02-21

## 2025-01-22 RX ORDER — LOSARTAN POTASSIUM 25 MG/1
25 TABLET ORAL DAILY
Qty: 30 TABLET | Refills: 0 | Status: SHIPPED | OUTPATIENT
Start: 2025-01-22 | End: 2025-02-21

## 2025-01-22 RX ADMIN — LOSARTAN POTASSIUM 25 MG: 25 TABLET, FILM COATED ORAL at 08:40

## 2025-01-22 RX ADMIN — EMPAGLIFLOZIN 10 MG: 10 TABLET, FILM COATED ORAL at 08:39

## 2025-01-22 RX ADMIN — APIXABAN 5 MG: 5 TABLET, FILM COATED ORAL at 08:39

## 2025-01-22 RX ADMIN — CARVEDILOL 6.25 MG: 6.25 TABLET, FILM COATED ORAL at 08:41

## 2025-01-22 RX ADMIN — Medication 12.5 MG: at 08:39

## 2025-01-22 RX ADMIN — BENZOCAINE 6 MG-MENTHOL 10 MG LOZENGES 1 EACH: at 03:13

## 2025-01-22 NOTE — DISCHARGE SUMMARY
[facility]  DISCHARGE SUMMARY       Date of Admission: 1/15/2025  Date of Discharge:  1/22/2025  Primary Care Physician: Conor Denny DO    Presenting Problem/History of Present Illness:  64-year-old male with history of hypertension, hyperlipidemia, chronic cardiomyopathy with systolic dysfunction, atrial fibrillation, prostate cancer, AICD in place, came to the hospital reporting worsening edema of the lower extremities, with associated pain.  Also reports dyspnea, worse on exertion.  No fevers. The patient was admitted to the hospital November 2024 with exactly similar complaints.  He was discharged, to continue medical therapy and have cardiology and wound care outpatient appointments.  I see that the patient had appointments in November 22 2024 and December 5, 2024 at the heart failure clinic and with cardiologist and did not attend to follow-up visits.  The wound care clinic appointment November 19, 2024 was cancelled.     Final Discharge Diagnoses:  Active Hospital Problems    Diagnosis     Acute on chronic HFrEF (heart failure with reduced ejection fraction)     Unspecified atrial flutter     Hypertrophic cardiomyopathy     Essential hypertension     AICD (automatic cardioverter/defibrillator) present        Consults: Cardiology    Procedures Performed: None    Pertinent Test Results:   Results for orders placed during the hospital encounter of 11/14/24    Adult Transthoracic Echo Complete W/ Cont if Necessary Per Protocol    Interpretation Summary    Left ventricular systolic function is moderately decreased. Left ventricular ejection fraction appears to be 36 - 40%.    The following left ventricular wall segments are hypokinetic: mid inferolateral, basal inferoseptal and basal inferoseptal. The following left ventricular wall segments are akinetic: mid anterolateral, apical lateral, apical inferior, mid inferior, apical septal and apex.     Moderate to severe aortic valve regurgitation is present.    Left ventricular wall thickness is consistent with moderate concentric hypertrophy.    The left atrial cavity is dilated.    Estimated right ventricular systolic pressure from tricuspid regurgitation is normal (<35 mmHg).    Normal size and function of the right ventricle.    There is a trivial pericardial effusion.    No previous studies performed here available for direct comparison.      Imaging Results (All)       Procedure Component Value Units Date/Time    XR Chest 1 View [[id number]] Collected: 01/15/25 1334     Updated: 01/15/25 1338    Narrative:      EXAMINATION: XR CHEST 1 VW- 1/15/2025 1:34 PM     HISTORY: soa.     REPORT: A frontal view of the chest was obtained.     COMPARISON: Chest x-ray 11/14/2024.     The lungs are hypoaerated, bibasilar opacities probably represent  atelectasis and there is no lung consolidation. Mild cardiomegaly is  present and there is central and basilar vascular congestion. No  pneumothorax or definite pleural effusion is identified. The left  subclavian AICD appears satisfied position as before. No acute osseous  abnormality is identified.       Impression:      Mild CHF. Bibasilar atelectasis.     This report was signed and finalized on 1/15/2025 1:35 PM by Dr. Heber Encarnacion MD.             LAB RESULTS:      Lab 01/20/25  0357 01/16/25  0808 01/15/25  1353   WBC 6.13 4.06 4.62   HEMOGLOBIN 12.8* 12.9* 12.9*   HEMATOCRIT 40.5 40.3 41.0   PLATELETS 230 216 211   NEUTROS ABS 4.53  --  3.66   IMMATURE GRANS (ABS) 0.03  --  0.02   LYMPHS ABS 0.63*  --  0.39*   MONOS ABS 0.83  --  0.49   EOS ABS 0.09  --  0.04   MCV 91.4 91.6 92.3         Lab 01/22/25  0248 01/21/25  0314 01/20/25  0357 01/19/25  0415 01/18/25  0352 01/17/25  0340 01/16/25  0808   SODIUM 136 136 136 137 136 137 137   POTASSIUM 4.2 4.2 4.6 4.5 4.2 4.1 4.0   CHLORIDE 100 101 100 104 99 102 102   CO2 28.0 25.0 25.0 25.0 25.0 24.0 24.0   ANION GAP 8.0  10.0 11.0 8.0 12.0 11.0 11.0   BUN 31* 30* 22 22 20 19 14   CREATININE 1.38* 1.48* 1.43* 1.19 1.36* 1.18 1.09   EGFR 56.4* 51.9* 54.0* 67.4 57.4* 68.1 74.9   GLUCOSE 95 116* 88 84 101* 123* 96   CALCIUM 8.9 9.0 9.1 8.8 8.8 8.7 8.9   MAGNESIUM  --  2.5*  --  2.3 2.1 2.0 2.1         Lab 01/15/25  1353   TOTAL PROTEIN 6.6   ALBUMIN 3.6   GLOBULIN 3.0   ALT (SGPT) 13   AST (SGOT) 16   BILIRUBIN 1.2   ALK PHOS 76         Lab 01/18/25  0352 01/15/25  1707 01/15/25  1353   PROBNP 2,627.0*  --  6,171.0*   HSTROP T  --  40* 44*                 Brief Urine Lab Results       None          Microbiology Results (last 10 days)       Procedure Component Value - Date/Time    Wound Culture - Wound, Leg, Right [[id number]]  (Abnormal)  (Susceptibility) Collected: 01/15/25 1352    Lab Status: Final result Specimen: Wound from Leg, Right Updated: 01/19/25 0838     Wound Culture Moderate growth (3+) Proteus mirabilis      Moderate growth (3+) Pseudomonas aeruginosa      Moderate growth (3+) Enterococcus faecalis     Gram Stain No WBCs seen      Many (4+) Gram positive cocci      Moderate (3+) Gram negative bacilli    Narrative:            Susceptibility        Proteus mirabilis      AMEE      Amoxicillin + Clavulanate Susceptible      Ampicillin Susceptible      Ampicillin + Sulbactam Susceptible      Cefazolin (Non Urine) Intermediate      Cefepime Susceptible      Ceftazidime Susceptible      Ceftriaxone Susceptible      Gentamicin Susceptible      Levofloxacin Susceptible      Piperacillin + Tazobactam Susceptible      Tetracycline Resistant      Trimethoprim + Sulfamethoxazole Susceptible                       Susceptibility        Pseudomonas aeruginosa      AMEE      Cefepime Susceptible      Ceftazidime Susceptible      Ciprofloxacin Susceptible      Levofloxacin Susceptible      Piperacillin + Tazobactam Susceptible      Tobramycin Susceptible                       Susceptibility        Enterococcus faecalis      AMEE       Ampicillin Susceptible      Vancomycin Susceptible                       Susceptibility Comments       Proteus mirabilis    With the exception of urinary-sourced infections, aminoglycosides should not be used as monotherapy.      Pseudomonas aeruginosa    With the exception of urinary-sourced infections, aminoglycosides should not be used as monotherapy.               Respiratory Panel PCR w/COVID-19(SARS-CoV-2) ALEXIS/DONIS/NOÉ/PAD/COR/EAMON In-House, NP Swab in UTM/VTM, 2 HR TAT - Swab, Nasopharynx [[id number]]  (Normal) Collected: 01/15/25 1339    Lab Status: Final result Specimen: Swab from Nasopharynx Updated: 01/15/25 1449     ADENOVIRUS, PCR Not Detected     Coronavirus 229E Not Detected     Coronavirus HKU1 Not Detected     Coronavirus NL63 Not Detected     Coronavirus OC43 Not Detected     COVID19 Not Detected     Human Metapneumovirus Not Detected     Human Rhinovirus/Enterovirus Not Detected     Influenza A PCR Not Detected     Influenza B PCR Not Detected     Parainfluenza Virus 1 Not Detected     Parainfluenza Virus 2 Not Detected     Parainfluenza Virus 3 Not Detected     Parainfluenza Virus 4 Not Detected     RSV, PCR Not Detected     Bordetella pertussis pcr Not Detected     Bordetella parapertussis PCR Not Detected     Chlamydophila pneumoniae PCR Not Detected     Mycoplasma pneumo by PCR Not Detected    Narrative:      In the setting of a positive respiratory panel with a viral infection PLUS a negative procalcitonin without other underlying concern for bacterial infection, consider observing off antibiotics or discontinuation of antibiotics and continue supportive care. If the respiratory panel is positive for atypical bacterial infection (Bordetella pertussis, Chlamydophila pneumoniae, or Mycoplasma pneumoniae), consider antibiotic de-escalation to target atypical bacterial infection.            Hospital Course: Patient was admitted to the hospital and started on medical management.  His last  "echocardiogram had shown EF of 36 to 40% and moderate to severe aortic valve regurgitation.  Patient not taking medications as prescribed and not following appointments.  He received diuresis IV with Lasix.  Restarted treatment with beta-blocker, losartan, Aldactone.  Due to atrial flutter, history of ventricular tachycardia, EP cardiology was consulted.  Patient with an AICD in place.  EP cardiology recommended patient to have outpatient follow-up for possible ablation.  Restarted anticoagulation with Eliquis.  Patient understood plan.  He achieved a negative balance with diuretics.  His blood pressure was better controlled with medical management.  Regarding lower extremity wounds, superficial cultures were collected, and resulted polymicrobial as expected.  He had local wound care during the hospital stay.  He had physical and Occupational Therapy evaluations.  Given adequate progress, patient was discharged home to continue current medical management.  Extensively explained importance of follow-up appointments with cardiology and wound care.  Also recommended to follow-up with primary care provider.  Prescriptions were sent to pharmacy.      Physical Exam on Discharge:  /63 (BP Location: Left arm, Patient Position: Lying)   Pulse 58   Temp 98.9 °F (37.2 °C) (Oral)   Resp 16   Ht 190.5 cm (75\")   Wt 119 kg (262 lb 3.2 oz)   SpO2 98%   BMI 32.77 kg/m²   Physical Exam  Constitutional:       Appearance: Alert, oriented, no respiratory distress.  HENT:      Head: Normocephalic and atraumatic.      Nose: Nose normal.      Mouth/Throat:      Mouth: Mucous membranes are moist.   Eyes:      Extraocular Movements: Extraocular movements intact.      Conjunctiva/sclera: Conjunctivae normal.      Pupils: Pupils are equal, round   Cardiovascular:      Rate and Rhythm: Irregular rhythm     Pulses: Normal pulses.   Pulmonary:      Effort: No respiratory distress.      Breath sounds: Slightly decreased in bases.  No " Rales  Abdominal:      General: Abdomen is flat. Bowel sounds are normal.   Extremities: Trace edema lower extremities.  Wounds covered with dressings  Skin:     Capillary Refill: Capillary refill takes less than 2 seconds.      Coloration: Skin is not jaundiced.   Neurological:      General: No focal deficit present.      Mental Status: Patient is alert, oriented to place time and person.        Condition on Discharge: Stable    Discharge Disposition:  Home or Self Care    Discharge Medications:     Discharge Medications        New Medications        Instructions Start Date   apixaban 5 MG tablet tablet  Commonly known as: ELIQUIS   5 mg, Oral, Every 12 Hours Scheduled      empagliflozin 10 MG tablet tablet  Commonly known as: JARDIANCE   10 mg, Oral, Daily   Start Date: January 23, 2025     furosemide 40 MG tablet  Commonly known as: Lasix   40 mg, Oral, Daily             Changes to Medications        Instructions Start Date   carvedilol 6.25 MG tablet  Commonly known as: COREG  What changed:   medication strength  how much to take  Another medication with the same name was removed. Continue taking this medication, and follow the directions you see here.   6.25 mg, Oral, 2 Times Daily With Meals             Continue These Medications        Instructions Start Date   aspirin 81 MG EC tablet   81 mg, Oral, Daily      finasteride 5 MG tablet  Commonly known as: PROSCAR   5 mg, Oral, Daily      fluticasone 50 MCG/ACT nasal spray  Commonly known as: FLONASE   2 sprays, Nasal, Daily PRN      losartan 25 MG tablet  Commonly known as: COZAAR   25 mg, Oral, Daily      nitroglycerin 0.4 MG SL tablet  Commonly known as: NITROSTAT   0.4 mg, Sublingual, Every 5 Minutes PRN, Take no more than 3 doses in 15 minutes.      rosuvastatin 10 MG tablet  Commonly known as: CRESTOR   10 mg, Oral, Daily      spironolactone 25 MG tablet  Commonly known as: ALDACTONE   12.5 mg, Oral, Daily   Start Date: January 23, 2025            Stop  These Medications      amiodarone 200 MG tablet  Commonly known as: PACERONE     colchicine 0.6 MG tablet     indomethacin 50 MG capsule  Commonly known as: INDOCIN     lisinopril 20 MG tablet  Commonly known as: PRINIVIL,ZESTRIL     sildenafil 100 MG tablet  Commonly known as: VIAGRA     topiramate 25 MG tablet  Commonly known as: TOPAMAX     vitamin D 1.25 MG (60325 UT) capsule capsule  Commonly known as: ERGOCALCIFEROL              This patient has current or prior documentation of an left ventricular ejection fraction (LVEF) of less than or equal to 40%.    The patient was prescribed or already taking an ACE/ARB.    The patient was prescribed already taking bisoprolol, carvedilol, or sustained release metoprolol succinate.     Discharge Diet:   Diet Instructions       Diet: Cardiac Diets, Fluid Restriction (240 mL/tray) Diets, Diabetic Diets; Healthy Heart (2-3 Na+); Regular (IDDSI 7); Thin (IDDSI 0); Consistent Carbohydrate; 1500 mL/day      Discharge Diet:  Cardiac Diets  Fluid Restriction (240 mL/tray) Diets  Diabetic Diets       Cardiac Diet: Healthy Heart (2-3 Na+)    Texture: Regular (IDDSI 7)    Fluid Consistency: Thin (IDDSI 0)    Diabetic Diet: Consistent Carbohydrate    Fluid Restriction Diet (240 mL/tray): 1500 mL/day            Activity at Discharge: As tolerated    Follow-up Appointments:   Future Appointments   Date Time Provider Department Center   2/26/2025  2:30 PM Elliott Cee APRN [facility]       Test Results Pending at Discharge: None    Electronically signed by Ian Rachel MD, 01/22/25, 09:34 CST.    Time: 45 minutes.

## 2025-01-22 NOTE — PROGRESS NOTES
Nutrition Services    Patient Name:  Jeanie Grossman  YOB: 1960  MRN: [id number]  Admit Date:  1/15/2025    Nutrition screening and chart review completed. Pt remains at low risk/no risk for malnutrition. Continue with daily menu selections and observe food preferences. Will monitor while inpatient and follow per protocol.      Electronically signed by:  Leonie Mehta RDN, LD  01/22/25 08:53 CST

## 2025-01-22 NOTE — PLAN OF CARE
Goal Outcome Evaluation:   Vital signs stable.  Pt received a PRN throat lozenge for c/o sore throat.  Appetite excellent.  Fluid restriction maintained.  Tele reading aflutter/v paced 53-70 bpm.  Safety maintained.

## 2025-01-22 NOTE — OUTREACH NOTE
Prep Survey      Flowsheet Row Responses   [facility] facility patient discharged from? [facility]   Is LACE score < 7 ? No   Eligibility Readm Mgmt   Discharge diagnosis Acute on chronic HFrEF (heart failure with reduced ejection fraction)   Does the patient have one of the following disease processes/diagnoses(primary or secondary)? CHF   Does the patient have Home health ordered? No   Is there a DME ordered? No   Prep survey completed? Yes            NIDA ESPINOZA - Registered Nurse

## 2025-01-22 NOTE — THERAPY DISCHARGE NOTE
Acute Care - Physical Therapy Discharge Summary  [facility]       Patient Name: Jeanie Grossman  : 1960  MRN: [id number]    Today's Date: 2025                 Admit Date: 1/15/2025      PT Recommendation and Plan    Visit Dx:    ICD-10-CM ICD-9-CM   1. Acute on chronic congestive heart failure, unspecified heart failure type  I50.9 428.0   2. Non-pressure chronic ulcer of right lower leg with fat layer exposed  L97.912 707.10   3. Non-pressure chronic ulcer of left lower leg with fat layer exposed  L97.922 707.10   4. Non-pressure chronic ulcer of right calf with fat layer exposed  L97.212 707.12   5. Venous insufficiency (chronic) (peripheral)  I87.2 459.81   6. Impaired mobility [Z74.09]  Z74.09 799.89            PT Charges       Row Name 25 0915             Time Calculation    Start Time 0915  -AJ      Stop Time 0955  -AJ      Time Calculation (min) 40 min  -AJ      PT Received On 25  -AJ         Timed Charges    74972 - PT Therapeutic Exercise Minutes 15  -AJ      02446 - Gait Training Minutes  25  -AJ         Total Minutes    Timed Charges Total Minutes 40  -AJ       Total Minutes 40  -AJ                User Key  (r) = Recorded By, (t) = Taken By, (c) = Cosigned By      Initials Name Provider Type    Eris Hoffmann, PT DPT Physical Therapist                     PT Rehab Goals       Row Name 25 1400             Transfer Goal 1 (PT)    Activity/Assistive Device (Transfer Goal 1, PT) sit-to-stand/stand-to-sit;bed-to-chair/chair-to-bed;toilet;walk-in shower  -AB      [place] Level/Cues Needed (Transfer Goal 1, PT) independent  -AB      Time Frame (Transfer Goal 1, PT) long term goal (LTG);10 days  -AB      Progress/Outcome (Transfer Goal 1, PT) goal not met  -AB         Gait Training Goal 1 (PT)    Activity/Assistive Device (Gait Training Goal 1, PT) gait (walking locomotion);decrease asymmetrical patterns;decrease fall risk;diminish gait deviation;forward stepping;improve balance  and speed;increase endurance/gait distance;increase energy conservation  -AB      [place] Level (Gait Training Goal 1, PT) independent  -AB      Distance (Gait Training Goal 1, PT) 500' with RPE 4/10 or less  -AB      Time Frame (Gait Training Goal 1, PT) long term goal (LTG);10 days  -AB      Progress/Outcome (Gait Training Goal 1, PT) goal not met  -AB         Stairs Goal 1 (PT)    Activity/Assistive Device (Stairs Goal 1, PT) stairs, all skills;ascending stairs;descending stairs;step-to-step;decrease fall risk;improve balance and speed  -AB      [place] Level/Cues Needed (Stairs Goal 1, PT) independent  -AB      Number of Stairs (Stairs Goal 1, PT) 2 as needed for home safety  -AB      Time Frame (Stairs Goal 1, PT) long term goal (LTG);10 days  -AB      Progress/Outcome (Stairs Goal 1, PT) goal not met  -AB         Problem Specific Goal 1 (PT)    Problem Specific Goal 1 (PT) Pt will demo indep with HEP as needed to improve functional, activity tolerance and return to PLOF  -AB      Time Frame (Problem Specific Goal 1, PT) long-term goal (LTG);by discharge  -AB      Progress/Outcome (Problem Specific Goal 1, PT) goal not met  -AB                User Key  (r) = Recorded By, (t) = Taken By, (c) = Cosigned By      Initials Name Provider Type Discipline    Rhonda Nuñez, PTA Physical Therapist Assistant PT                        PT Discharge Summary  Anticipated Discharge Disposition (PT): home  Reason for Discharge: Discharge from facility  Outcomes Achieved: Refer to plan of care for updates on goals achieved, Discharge from facility occurred on same date as evluation  Discharge Destination: Home      Rhonda Simms PTA   1/22/2025

## 2025-01-22 NOTE — PLAN OF CARE
Goal Outcome Evaluation:  Plan of Care Reviewed With: patient        Progress: improving  Outcome Evaluation: PT treatment complete. Pt sitting EOB and agreeable to session but does report L knee pain increase this am. Pt performed sit<>stand and ambulated with CGA but did require HHA due to unsteadiness. pt tolerated 100' before pain increased and pt required sitting rest break on EOB. Pt eager to perform ther ex in hopes to decrease knee pain and tolerated LAQ, mini squats and ankle pumps 1x10 and feels improvement. Pt ambulated post ther ex for total of 175' prior to returning to [facility] and left with needs in reach. HEP handout provided for continue ther ex. POC ongoing and continue progress as tolerated.    Anticipated Discharge Disposition (PT): home

## 2025-01-22 NOTE — PROGRESS NOTES
[facility]  INPATIENT WOUND & OSTOMY CARE    PROGRESS NOTE    Today's Date: 01/22/25    Patient Name: Jeanie Grossman  MRN: [id number]  CSN: [id number]  PCP: Conor Denny DO  Referring Provider: LA JOHNSON   Attending Provider: No att. providers found  Length of Stay: 7    SUBJECTIVE   Chief Complaint: Bilateral lower legs    HPI: Jeanie Grossman is currently sitting on the bedside with RN completing discharge instruction. Patient is dressed and ready to leave hospital. He has appointment scheduled for outpatient wound care on 1/29. Culture results are listed below. This was a wound culture collected in ED and attending felt this was superficial sampling. No antibiotic treatment was completed during stay.       Visit Dx:    ICD-10-CM ICD-9-CM   1. Acute on chronic congestive heart failure, unspecified heart failure type  I50.9 428.0   2. Non-pressure chronic ulcer of right lower leg with fat layer exposed  L97.912 707.10   3. Non-pressure chronic ulcer of left lower leg with fat layer exposed  L97.922 707.10   4. Non-pressure chronic ulcer of right calf with fat layer exposed  L97.212 707.12   5. Venous insufficiency (chronic) (peripheral)  I87.2 459.81   6. Impaired mobility [Z74.09]  Z74.09 799.89       Hospital Problem List:     Hypertrophic cardiomyopathy    Essential hypertension    AICD (automatic cardioverter/defibrillator) present    Unspecified atrial flutter    Acute on chronic HFrEF (heart failure with reduced ejection fraction)      History:   Past Medical History:   Diagnosis Date    CHF (congestive heart failure)     Diabetes mellitus     Hyperlipidemia     Hypertension     Hypertrophic cardiomyopathy     s/p AICD    Migraine     Prostate cancer     Tachycardia     Ventricular tachycardia     Ventricular tachycardia, sustained 10/14/2016     Past Surgical History:   Procedure Laterality Date    CARDIAC ABLATION  01/28/2016 2/2016, 10/18/2016 (Dr. Doss in [place], [place])     CARDIAC ABLATION      2018    CARDIAC CATHETERIZATION      CARDIAC DEFIBRILLATOR PLACEMENT      CARDIAC DEFIBRILLATOR PLACEMENT      PROSTATE BIOPSY  2019    PROSTATE BIOPSY  2023     Social History     Socioeconomic History    Marital status: Single   Tobacco Use    Smoking status: Never   Vaping Use    Vaping status: Never Used   Substance and Sexual Activity    Alcohol use: No    Drug use: No    Sexual activity: Defer       Allergies:  No Known Allergies    Medications:    Current Facility-Administered Medications:     acetaminophen (TYLENOL) tablet 650 mg, 650 mg, Oral, Q4H PRN, 650 mg at 01/16/25 2208 **OR** acetaminophen (TYLENOL) 160 MG/5ML oral solution 650 mg, 650 mg, Oral, Q4H PRN **OR** acetaminophen (TYLENOL) suppository 650 mg, 650 mg, Rectal, Q4H PRN, Martin Chao DO    apixaban (ELIQUIS) tablet 5 mg, 5 mg, Oral, Q12H, Martin Chao DO, 5 mg at 01/22/25 0839    benzocaine-menthol (CHLORASEPTIC) lozenge 1 each, 1 lozenge, Mouth/Throat, Q4H PRN, Xiomy Du MD, 1 each at 01/22/25 0313    sennosides-docusate (PERICOLACE) 8.6-50 MG per tablet 2 tablet, 2 tablet, Oral, BID PRN **AND** polyethylene glycol (MIRALAX) packet 17 g, 17 g, Oral, Daily PRN **AND** bisacodyl (DULCOLAX) EC tablet 5 mg, 5 mg, Oral, Daily PRN **AND** bisacodyl (DULCOLAX) suppository 10 mg, 10 mg, Rectal, Daily PRN, Martin Chao DO    carvedilol (COREG) tablet 6.25 mg, 6.25 mg, Oral, BID With Meals, Chalo Rodas MD, 6.25 mg at 01/22/25 0841    empagliflozin (JARDIANCE) tablet 10 mg, 10 mg, Oral, Daily, Ian Rachel MD, 10 mg at 01/22/25 0839    [Held by provider] furosemide (LASIX) injection 40 mg, 40 mg, Intravenous, Daily, Martin Chao DO    hydrALAZINE (APRESOLINE) injection 5 mg, 5 mg, Intravenous, Q4H PRN, Martin Chao, DO, 5 mg at 01/17/25 1307    influenza vac split high-dose (FLUZONE HIGH DOSE) injection 0.5 mL, 0.5 mL, Intramuscular, During Hospitalization, Ian Rachel MD    losartan  (COZAAR) tablet 25 mg, 25 mg, Oral, Daily, Ian Rachel MD, 25 mg at 01/22/25 0840    nitroglycerin (NITROSTAT) SL tablet 0.4 mg, 0.4 mg, Sublingual, Q5 Min PRN, Martin Chao DO    ondansetron ODT (ZOFRAN-ODT) disintegrating tablet 4 mg, 4 mg, Oral, Q6H PRN **OR** ondansetron (ZOFRAN) injection 4 mg, 4 mg, Intravenous, Q6H PRN, Martin Chao DO    rosuvastatin (CRESTOR) tablet 10 mg, 10 mg, Oral, Nightly, Ian Rachel MD, 10 mg at 01/21/25 2057    [COMPLETED] Insert Peripheral IV, , , Once **AND** sodium chloride 0.9 % flush 10 mL, 10 mL, Intravenous, PRN, Martin Chao DO, 10 mL at 01/17/25 0833    spironolactone (ALDACTONE) half tablet 12.5 mg, 12.5 mg, Oral, Daily, Ian Rachel MD, 12.5 mg at 01/22/25 0839    Current Outpatient Medications:     apixaban (ELIQUIS) 5 MG tablet tablet, Take 1 tablet by mouth Every 12 (Twelve) Hours for 30 days. Indications: Atrial Fibrillation, Disp: 60 tablet, Rfl: 0    aspirin 81 MG EC tablet, Take 1 tablet by mouth Daily., Disp: , Rfl:     carvedilol (COREG) 6.25 MG tablet, Take 1 tablet by mouth 2 (Two) Times a Day With Meals for 30 days., Disp: 60 tablet, Rfl: 0    [START ON 1/23/2025] empagliflozin (JARDIANCE) 10 MG tablet tablet, Take 1 tablet by mouth Daily for 30 days., Disp: 30 tablet, Rfl: 0    finasteride (PROSCAR) 5 MG tablet, Take 1 tablet by mouth Daily., Disp: , Rfl:     losartan (COZAAR) 25 MG tablet, Take 1 tablet by mouth Daily for 30 days., Disp: 30 tablet, Rfl: 0    rosuvastatin (CRESTOR) 10 MG tablet, Take 1 tablet by mouth Daily., Disp: , Rfl:     [START ON 1/23/2025] spironolactone (ALDACTONE) 25 MG tablet, Take 0.5 tablets by mouth Daily for 30 days., Disp: 15 tablet, Rfl: 0    fluticasone (FLONASE) 50 MCG/ACT nasal spray, Administer 2 sprays into the nostril(s) as directed by provider Daily As Needed for Rhinitis., Disp: , Rfl:     furosemide (Lasix) 40 MG tablet, Take 1 tablet by mouth Daily for 30 days., Disp: 30 tablet,  Rfl: 0    nitroglycerin (NITROSTAT) 0.4 MG SL tablet, Place 1 tablet under the tongue Every 5 (Five) Minutes As Needed for Chest Pain. Take no more than 3 doses in 15 minutes., Disp: , Rfl:       OBJECTIVE     Vitals:    25 0730   BP: 132/63   Pulse: 58   Resp: 16   Temp: 98.9 °F (37.2 °C)   SpO2: 98%       PHYSICAL EXAM   Physical Exam  Vitals and nursing note reviewed.   Constitutional:       General: He is awake.      Appearance: He is obese.      Comments: Body mass index is 32.77 kg/m².   HENT:      Head: Normocephalic and atraumatic.   Eyes:      General: Lids are normal. Gaze aligned appropriately.   Cardiovascular:      Rate and Rhythm: Normal rate and regular rhythm.   Pulmonary:      Effort: Pulmonary effort is normal. No respiratory distress.   Abdominal:      General: There is no distension.      Palpations: Abdomen is soft.   Musculoskeletal:      Cervical back: Normal range of motion and neck supple.      Right lower le+ Pitting Edema present.      Left lower le+ Pitting Edema present.   Feet:      Right foot:      Skin integrity: Dry skin present. No ulcer.      Left foot:      Skin integrity: Dry skin present. No ulcer.   Skin:     General: Skin is warm and dry.      Findings: Wound present.      Comments: Wound dressings in place. No significant drainage noted to dressings. Patient reports improvement. He is dressed and ready to leave for discharge from hospital.   Previous wound care assessment on :   Patient presents to the multiple wounds of bilateral lower legs.  Wounds have slough and subcutaneous tissue present wound bed.  Some callus formation of periwound area.  There is also noted to be debris adhered to periwound areas likely from clothing.  Edema and erythema present from knee down to toes bilaterally. Significant odor present in room from wounds. Moderate purulent drainage present on dressings. Pain reported when touching any part of lower leg.   There is serous filled  blisters also noted to bilateral lower legs   Neurological:      Mental Status: He is alert and oriented to person, place, and time.      Motor: Weakness present.   Psychiatric:         Attention and Perception: Attention normal.         Mood and Affect: Mood normal.         Speech: Speech normal.         Behavior: Behavior is cooperative.         Cognition and Memory: Memory is impaired.      Results Review:  Lab Results (last 48 hours)       Procedure Component Value Units Date/Time    Basic Metabolic Panel [[id number]]  (Abnormal) Collected: 01/22/25 0248    Specimen: Blood Updated: 01/22/25 0348     Glucose 95 mg/dL      BUN 31 mg/dL      Creatinine 1.38 mg/dL      Sodium 136 mmol/L      Potassium 4.2 mmol/L      Comment: Slight hemolysis detected by analyzer. Result may be falsely elevated.        Chloride 100 mmol/L      CO2 28.0 mmol/L      Calcium 8.9 mg/dL      BUN/Creatinine Ratio 22.5     Anion Gap 8.0 mmol/L      eGFR 56.4 mL/min/1.73     Narrative:      GFR Categories in Chronic Kidney Disease (CKD)      GFR Category          GFR (mL/min/1.73)    Interpretation  G1                     90 or greater         Normal or high (1)  G2                      60-89                Mild decrease (1)  G3a                   45-59                Mild to moderate decrease  G3b                   30-44                Moderate to severe decrease  G4                    15-29                Severe decrease  G5                    14 or less           Kidney failure          (1)In the absence of evidence of kidney disease, neither GFR category G1 or G2 fulfill the criteria for CKD.    eGFR calculation 2021 CKD-EPI creatinine equation, which does not include race as a factor    Basic Metabolic Panel [[id number]]  (Abnormal) Collected: 01/21/25 0314    Specimen: Blood Updated: 01/21/25 0411     Glucose 116 mg/dL      BUN 30 mg/dL      Creatinine 1.48 mg/dL      Sodium 136 mmol/L      Potassium 4.2 mmol/L      Chloride 101 mmol/L       CO2 25.0 mmol/L      Calcium 9.0 mg/dL      BUN/Creatinine Ratio 20.3     Anion Gap 10.0 mmol/L      eGFR 51.9 mL/min/1.73     Narrative:      GFR Categories in Chronic Kidney Disease (CKD)      GFR Category          GFR (mL/min/1.73)    Interpretation  G1                     90 or greater         Normal or high (1)  G2                      60-89                Mild decrease (1)  G3a                   45-59                Mild to moderate decrease  G3b                   30-44                Moderate to severe decrease  G4                    15-29                Severe decrease  G5                    14 or less           Kidney failure          (1)In the absence of evidence of kidney disease, neither GFR category G1 or G2 fulfill the criteria for CKD.    eGFR calculation 2021 CKD-EPI creatinine equation, which does not include race as a factor    Magnesium [[id number]]  (Abnormal) Collected: 01/21/25 0314    Specimen: Blood Updated: 01/21/25 0411     Magnesium 2.5 mg/dL           Imaging Results (Last 72 Hours)       ** No results found for the last 72 hours. **               ASSESSMENT/PLAN       Examination and evaluation of wound(s) was performed.    DIAGNOSIS:   Nonpressure chronic ulcer of left lower leg with fat layer exposed  Nonpressure chronic ulcer of right lower leg with fat layer exposed  Nonpressure chronic ulcer of right calf with fat layer exposed  Venous insufficiency of bilateral lower extremities  Wound infection    PLAN:   Discussed importance of patient keeping outpatient wound care appointment to assess progress of wounds and get setup for wound care supplies.   Patient agrees to go to wound care for follow up.   He is currently receiving discharge instruction.     Discussed findings and treatment plan including risks, benefits, and treatment options with patient in detail. Patient agreed with treatment plan.      This document has been electronically signed by NORMA Duong on  1/22/2025 13:01 CST     Time spent in face-to-face evaluation, chart review, planning and education totaled 35 minutes with greater than 50% of time spent with patient and/or family and in coordination of care.  Counseling of patient and/or family includes discussing wound diagnosis and etiology , giving instructions including follow-up management, Importance of compliance with chosen management options, and patient/family education. No procedures were completed during this visit.

## 2025-01-22 NOTE — THERAPY TREATMENT NOTE
Patient Name: Jeanie Grossman  : 1960    MRN: [id number]                              Today's Date: 2025       Admit Date: 1/15/2025    Visit Dx:     ICD-10-CM ICD-9-CM   1. Acute on chronic congestive heart failure, unspecified heart failure type  I50.9 428.0   2. Non-pressure chronic ulcer of right lower leg with fat layer exposed  L97.912 707.10   3. Non-pressure chronic ulcer of left lower leg with fat layer exposed  L97.922 707.10   4. Non-pressure chronic ulcer of right calf with fat layer exposed  L97.212 707.12   5. Venous insufficiency (chronic) (peripheral)  I87.2 459.81   6. Impaired mobility [Z74.09]  Z74.09 799.89     Patient Active Problem List   Diagnosis    Ventricular tachycardia    Hypertrophic cardiomyopathy    Essential hypertension    AICD (automatic cardioverter/defibrillator) present    Ventricular tachycardia, sustained    Abdominal pain    UTI (urinary tract infection)    Type 2 diabetes mellitus without complication    Pancytopenia    Chest pain in adult    Prostate cancer, finished radiation on 2024    Non-smoker    VT (ventricular tachycardia)    Ventricular tachycardia (paroxysmal)    Acute on chronic congestive heart failure    Dermatitis neglecta    Unspecified atrial flutter    Acute on chronic HFrEF (heart failure with reduced ejection fraction)     Past Medical History:   Diagnosis Date    CHF (congestive heart failure)     Diabetes mellitus     Hyperlipidemia     Hypertension     Hypertrophic cardiomyopathy     s/p AICD    Migraine     Prostate cancer     Tachycardia     Ventricular tachycardia     Ventricular tachycardia, sustained 10/14/2016     Past Surgical History:   Procedure Laterality Date    CARDIAC ABLATION  2016, 10/18/2016 (Dr. Doss in [place], [place])    CARDIAC ABLATION      2018    CARDIAC CATHETERIZATION      CARDIAC DEFIBRILLATOR PLACEMENT      CARDIAC DEFIBRILLATOR PLACEMENT      PROSTATE BIOPSY  2019    PROSTATE BIOPSY         General Information       Row Name 01/22/25 0915          Physical Therapy Time and Intention    Document Type therapy note (daily note)  -     Mode of Treatment physical therapy  -       Row Name 01/22/25 0915          General Information    Patient Profile Reviewed yes  -       Row Name 01/22/25 0915          Cognition    Orientation Status (Cognition) oriented x 4  -       Row Name 01/22/25 0915          Safety Issues/Impairments Affecting Functional Mobility    Impairments Affecting Function (Mobility) endurance/activity tolerance;shortness of breath;pain;balance;range of motion (ROM)  -               User Key  (r) = Recorded By, (t) = Taken By, (c) = Cosigned By      Initials Name Provider Type    Eris Hoffmann PT DPT Physical Therapist                   Mobility       Row Name 01/22/25 0915          Bed Mobility    Comment, (Bed Mobility) sitting EOB  -       Row Name 01/22/25 0915          Bed-Chair Transfer    Bed-Chair Bond (Transfers) contact guard  -       Row Name 01/22/25 0915          Sit-Stand Transfer    Sit-Stand Bond (Transfers) supervision  -       Row Name 01/22/25 0915          Gait/Stairs (Locomotion)    Bond Level (Gait) contact guard  -     Distance in Feet (Gait) 275  100 + 175  -     Deviations/Abnormal Patterns (Gait) bilateral deviations;base of support, wide;stride length decreased;gait speed decreased  -     Bilateral Gait Deviations forward flexed posture  -               User Key  (r) = Recorded By, (t) = Taken By, (c) = Cosigned By      Initials Name Provider Type    Eris Hoffmann PT DPT Physical Therapist                   Obj/Interventions       Row Name 01/22/25 0915          Motor Skills    Therapeutic Exercise hip;knee;ankle  -       Row Name 01/22/25 0915          Hip (Therapeutic Exercise)    Hip (Therapeutic Exercise) strengthening exercise  -     Hip Strengthening (Therapeutic Exercise) marching while standing;mini  squats;bilateral;10 repetitions  -       Row Name 01/22/25 0915          Knee (Therapeutic Exercise)    Knee (Therapeutic Exercise) strengthening exercise  -     Knee Strengthening (Therapeutic Exercise) LAQ (long arc quad);10 repetitions;other (see comments)  LAQ up to pain  -       Row Name 01/22/25 0915          Ankle (Therapeutic Exercise)    Ankle (Therapeutic Exercise) strengthening exercise  -     Ankle Strengthening (Therapeutic Exercise) dorsiflexion;plantarflexion;standing;10 repetitions  -       Row Name 01/22/25 0915          Balance    Balance Assessment sitting static balance;sitting dynamic balance;standing static balance;standing dynamic balance  -     Static Sitting Balance independent  -     Dynamic Sitting Balance independent  -AJ     Position, Sitting Balance unsupported;sitting edge of bed  -AJ     Static Standing Balance contact guard  -AJ     Dynamic Standing Balance contact guard  -     Position/Device Used, Standing Balance supported;unsupported  -AJ     Balance Interventions sitting;standing;sit to stand;supported;static;dynamic  -       Row Name 01/22/25 0915          Sensory Assessment (Somatosensory)    Sensory Assessment (Somatosensory) sensation intact  -               User Key  (r) = Recorded By, (t) = Taken By, (c) = Cosigned By      Initials Name Provider Type    AJ Eris Montgomery, PT DPT Physical Therapist                   Goals/Plan    No documentation.                  Clinical Impression       Row Name 01/22/25 0915          Pain    Pretreatment Pain Rating 8/10  -AJ     Posttreatment Pain Rating 10/10  -AJ     Pain Location knee  -AJ     Pain Side/Orientation left  -AJ     Pain Management Interventions nursing notified;exercise or physical activity utilized  -AJ     Response to Pain Interventions activity participation with increased pain  -       Row Name 01/22/25 0915          Plan of Care Review    Plan of Care Reviewed With patient  -AJ     Progress  improving  -     Outcome Evaluation PT treatment complete. Pt sitting EOB and agreeable to session but does report L knee pain increase this am. Pt performed sit<>stand and ambulated with CGA but did require HHA due to unsteadiness. pt tolerated 100' before pain increased and pt required sitting rest break on EOB. Pt eager to perform ther ex in hopes to decrease knee pain and tolerated LAQ, mini squats and ankle pumps 1x10 and feels improvement. Pt ambulated post ther ex for total of 175' prior to returning to [facility] and left with needs in reach. HEP handout provided for continue ther ex. POC ongoing and continue progress as tolerated.  -AJ       Row Name 01/22/25 0915          Vital Signs    Pre Patient Position Sitting  -AJ     Intra Patient Position Standing  -AJ     Post Patient Position Sitting  -AJ       Row Name 01/22/25 0915          Positioning and Restraints    Pre-Treatment Position in bed  -AJ     Post Treatment Position bed  -AJ     In Bed notified nsg;sitting EOB;call light within reach;encouraged to call for assist  -AJ               User Key  (r) = Recorded By, (t) = Taken By, (c) = Cosigned By      Initials Name Provider Type    Eris Hoffmann, PT DPT Physical Therapist                   Outcome Measures       Row Name 01/22/25 0915 01/22/25 0800       How much help from another person do you currently need...    Turning from your back to your side while in flat bed without using bedrails? 4  -AJ 4  -TV    Moving from lying on back to sitting on the side of a flat bed without bedrails? 4  -AJ 4  -TV    Moving to and from a bed to a chair (including a wheelchair)? 4  -AJ 4  -TV    Standing up from a chair using your arms (e.g., wheelchair, bedside chair)? 3  -AJ 4  -TV    Climbing 3-5 steps with a railing? 3  -AJ 3  -TV    To walk in hospital room? 4  -AJ 4  -TV    AM-PAC 6 Clicks Score (PT) 22  -AJ 23  -TV    Highest Level of Mobility Goal 7 --> Walk 25 feet or more  -AJ 7 --> Walk 25 feet or more   -TV      Row Name 01/22/25 0915          Functional Assessment    Outcome Measure Options AM-PAC 6 Clicks Basic Mobility (PT)  -               User Key  (r) = Recorded By, (t) = Taken By, (c) = Cosigned By      Initials Name Provider Type     Eris Montgomery, PT DPT Physical Therapist    Edwige Suggs, RN Registered Nurse                                 Physical Therapy Education       Title: PT OT SLP Therapies (Done)       Topic: Physical Therapy (Done)       Point: Mobility training (Done)       Learning Progress Summary            Patient Acceptance, E, VU by  at 1/22/2025 1104    Comment: continued progress towards goals, HEP handout, mobility to decrease pain    Acceptance, E, VU by  at 1/21/2025 1444    Comment: role of PT, d/c planning, RPE scale, fall risk                      Point: Home exercise program (Done)       Learning Progress Summary            Patient Acceptance, E, VU by  at 1/22/2025 1104    Comment: continued progress towards goals, HEP handout, mobility to decrease pain    Acceptance, E, VU by  at 1/21/2025 1444    Comment: role of PT, d/c planning, RPE scale, fall risk                      Point: Body mechanics (Done)       Learning Progress Summary            Patient Acceptance, E, VU by  at 1/22/2025 1104    Comment: continued progress towards goals, HEP handout, mobility to decrease pain    Acceptance, E, VU by  at 1/21/2025 1444    Comment: role of PT, d/c planning, RPE scale, fall risk                      Point: Precautions (Done)       Learning Progress Summary            Patient Acceptance, E, VU by  at 1/22/2025 1104    Comment: continued progress towards goals, HEP handout, mobility to decrease pain    Acceptance, E, VU by  at 1/21/2025 1444    Comment: role of PT, d/c planning, RPE scale, fall risk                                      User Key       Initials Effective Dates Name Provider Type [facility] 08/15/24 -  Eris Montgomery, MAYRA DPT Physical  Therapist PT                  PT Recommendation and Plan  Planned Therapy Interventions (PT): balance training, bed mobility training, gait training, home exercise program, postural re-education, patient/family education, transfer training, ROM (range of motion), stair training, strengthening, stretching  Progress: improving  Outcome Evaluation: PT treatment complete. Pt sitting EOB and agreeable to session but does report L knee pain increase this am. Pt performed sit<>stand and ambulated with CGA but did require HHA due to unsteadiness. pt tolerated 100' before pain increased and pt required sitting rest break on EOB. Pt eager to perform ther ex in hopes to decrease knee pain and tolerated LAQ, mini squats and ankle pumps 1x10 and feels improvement. Pt ambulated post ther ex for total of 175' prior to returning to [facility] and left with needs in reach. HEP handout provided for continue ther ex. POC ongoing and continue progress as tolerated.     Time Calculation:         PT Charges       Row Name 01/22/25 0915             Time Calculation    Start Time 0915  -AJ      Stop Time 0955  -AJ      Time Calculation (min) 40 min  -AJ      PT Received On 01/22/25  -AJ         Timed Charges    24630 - PT Therapeutic Exercise Minutes 15  -AJ      02923 - Gait Training Minutes  25  -AJ         Total Minutes    Timed Charges Total Minutes 40  -AJ       Total Minutes 40  -AJ                User Key  (r) = Recorded By, (t) = Taken By, (c) = Cosigned By      Initials Name Provider Type    Eris Hoffmann PT DPT Physical Therapist                  Therapy Charges for Today       Code Description Service Date Service Provider Modifiers Qty    99361730889 HC PT EVAL LOW COMPLEXITY 3 1/21/2025 Eris Montgomery PT DPT GP 1    77780576122 HC PT THER PROC EA 15 MIN 1/22/2025 Eris Montgomery, PT DPT GP 1    90653506761 HC GAIT TRAINING EA 15 MIN 1/22/2025 Eris Montgomery, PT DPT GP 2            PT G-Codes  Outcome Measure Options: AM-PAC 6  Clicks Basic Mobility (PT)  AM-PAC 6 Clicks Score (PT): 22  PT Discharge Summary  Anticipated Discharge Disposition (PT): home    Eris Montgomery PT DPT  1/22/2025

## 2025-01-28 ENCOUNTER — READMISSION MANAGEMENT (OUTPATIENT)
Dept: CALL CENTER | Facility: HOSPITAL | Age: 65
End: 2025-01-28
Payer: MEDICARE

## 2025-01-28 NOTE — OUTREACH NOTE
CHF Week 1 Survey      Flowsheet Row Responses   Religious facility patient discharged from? [facility]   Does the patient have one of the following disease processes/diagnoses(primary or secondary)? CHF   CHF Week 1 attempt successful? No   Unsuccessful attempts Attempt 1            Kimberlyn TRUJILLO - Registered Nurse

## 2025-01-29 ENCOUNTER — HOSPITAL ENCOUNTER (OUTPATIENT)
Facility: HOSPITAL | Age: 65
Discharge: HOME | End: 2025-02-01
Attending: FAMILY MEDICINE | Admitting: FAMILY MEDICINE
Payer: MEDICARE

## 2025-01-29 ENCOUNTER — APPOINTMENT (OUTPATIENT)
Dept: CT IMAGING | Facility: HOSPITAL | Age: 65
End: 2025-01-29
Payer: MEDICARE

## 2025-01-29 ENCOUNTER — NURSE TRIAGE (OUTPATIENT)
Dept: CALL CENTER | Facility: HOSPITAL | Age: 65
End: 2025-01-29
Payer: MEDICARE

## 2025-01-29 ENCOUNTER — APPOINTMENT (OUTPATIENT)
Dept: GENERAL RADIOLOGY | Facility: HOSPITAL | Age: 65
End: 2025-01-29
Payer: MEDICARE

## 2025-01-29 DIAGNOSIS — Z95.810 AICD (AUTOMATIC CARDIOVERTER/DEFIBRILLATOR) PRESENT: ICD-10-CM

## 2025-01-29 DIAGNOSIS — C61 PROSTATE CANCER: ICD-10-CM

## 2025-01-29 DIAGNOSIS — I10 ESSENTIAL HYPERTENSION: ICD-10-CM

## 2025-01-29 DIAGNOSIS — I47.20 VENTRICULAR TACHYCARDIA, SUSTAINED: ICD-10-CM

## 2025-01-29 DIAGNOSIS — R07.9 CHEST PAIN IN ADULT: ICD-10-CM

## 2025-01-29 DIAGNOSIS — I50.22 CHRONIC SYSTOLIC CONGESTIVE HEART FAILURE: ICD-10-CM

## 2025-01-29 DIAGNOSIS — L30.9 DERMATITIS NEGLECTA: ICD-10-CM
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
## 2025-01-29 DIAGNOSIS — N39.0 URINARY TRACT INFECTION WITHOUT HEMATURIA, SITE UNSPECIFIED: ICD-10-CM

## 2025-01-29 DIAGNOSIS — J32.0 LEFT MAXILLARY SINUSITIS: ICD-10-CM

## 2025-01-29 DIAGNOSIS — I42.2 HYPERTROPHIC CARDIOMYOPATHY: ICD-10-CM

## 2025-01-29 DIAGNOSIS — E11.9 TYPE 2 DIABETES MELLITUS WITHOUT COMPLICATION, WITHOUT LONG-TERM CURRENT USE OF INSULIN: ICD-10-CM

## 2025-01-29 DIAGNOSIS — I47.20 VT (VENTRICULAR TACHYCARDIA): ICD-10-CM

## 2025-01-29 DIAGNOSIS — Z78.9 NON-SMOKER: ICD-10-CM

## 2025-01-29 DIAGNOSIS — I48.92 ATRIAL FLUTTER, UNSPECIFIED TYPE: ICD-10-CM

## 2025-01-29 DIAGNOSIS — S09.8XXA BLUNT HEAD TRAUMA, INITIAL ENCOUNTER: ICD-10-CM

## 2025-01-29 DIAGNOSIS — L97.922 NON-PRESSURE CHRONIC ULCER OF LEFT LOWER LEG WITH FAT LAYER EXPOSED: ICD-10-CM

## 2025-01-29 DIAGNOSIS — D61.818 PANCYTOPENIA: ICD-10-CM

## 2025-01-29 DIAGNOSIS — S01.81XA FACIAL LACERATION, INITIAL ENCOUNTER: ICD-10-CM

## 2025-01-29 DIAGNOSIS — I50.23 ACUTE ON CHRONIC HFREF (HEART FAILURE WITH REDUCED EJECTION FRACTION): ICD-10-CM

## 2025-01-29 DIAGNOSIS — I50.9 OTHER CONGESTIVE HEART FAILURE: ICD-10-CM

## 2025-01-29 DIAGNOSIS — I49.9 CARDIAC ARRHYTHMIA, UNSPECIFIED CARDIAC ARRHYTHMIA TYPE: ICD-10-CM

## 2025-01-29 DIAGNOSIS — I47.20 VENTRICULAR TACHYCARDIA: Chronic | ICD-10-CM

## 2025-01-29 DIAGNOSIS — I47.29 VENTRICULAR TACHYCARDIA (PAROXYSMAL): ICD-10-CM

## 2025-01-29 DIAGNOSIS — R55 NEAR SYNCOPE: ICD-10-CM

## 2025-01-29 DIAGNOSIS — Z74.09 IMPAIRED FUNCTIONAL MOBILITY AND ACTIVITY TOLERANCE: ICD-10-CM

## 2025-01-29 DIAGNOSIS — L89.90 PRESSURE INJURY OF SKIN, UNSPECIFIED INJURY STAGE, UNSPECIFIED LOCATION: ICD-10-CM

## 2025-01-29 DIAGNOSIS — R10.9 ABDOMINAL PAIN, UNSPECIFIED ABDOMINAL LOCATION: ICD-10-CM

## 2025-01-29 DIAGNOSIS — N18.30 CHRONIC KIDNEY FAILURE, STAGE 3 (MODERATE): ICD-10-CM

## 2025-01-29 DIAGNOSIS — R07.89 OTHER CHEST PAIN: Primary | ICD-10-CM

## 2025-01-29 LAB
ALBUMIN SERPL-MCNC: 3.6 G/DL (ref 3.5–5.2)
ALBUMIN/GLOB SERPL: 1.1 G/DL
ALP SERPL-CCNC: 72 U/L (ref 39–117)
ALT SERPL W P-5'-P-CCNC: 12 U/L (ref 1–41)
AMPHET+METHAMPHET UR QL: NEGATIVE
AMPHETAMINES UR QL: NEGATIVE
ANION GAP SERPL CALCULATED.3IONS-SCNC: 9 MMOL/L (ref 5–15)
APTT PPP: 28 SECONDS (ref 24.5–36)
AST SERPL-CCNC: 16 U/L (ref 1–40)
BARBITURATES UR QL SCN: NEGATIVE
BASOPHILS # BLD AUTO: 0.02 10*3/MM3 (ref 0–0.2)
BASOPHILS NFR BLD AUTO: 0.4 % (ref 0–1.5)
BENZODIAZ UR QL SCN: NEGATIVE
BILIRUB SERPL-MCNC: 0.4 MG/DL (ref 0–1.2)
BUN SERPL-MCNC: 19 MG/DL (ref 8–23)
BUN/CREAT SERPL: 12.3 (ref 7–25)
BUPRENORPHINE SERPL-MCNC: NEGATIVE NG/ML
CALCIUM SPEC-SCNC: 8.9 MG/DL (ref 8.6–10.5)
CANNABINOIDS SERPL QL: NEGATIVE
CHLORIDE SERPL-SCNC: 104 MMOL/L (ref 98–107)
CK SERPL-CCNC: 80 U/L (ref 20–200)
CO2 SERPL-SCNC: 24 MMOL/L (ref 22–29)
COCAINE UR QL: NEGATIVE
CREAT SERPL-MCNC: 1.54 MG/DL (ref 0.76–1.27)
D-LACTATE SERPL-SCNC: 2.4 MMOL/L (ref 0.5–2)
DEPRECATED RDW RBC AUTO: 55.2 FL (ref 37–54)
EGFRCR SERPLBLD CKD-EPI 2021: 49.4 ML/MIN/1.73
EOSINOPHIL # BLD AUTO: 0.04 10*3/MM3 (ref 0–0.4)
EOSINOPHIL NFR BLD AUTO: 0.9 % (ref 0.3–6.2)
ERYTHROCYTE [DISTWIDTH] IN BLOOD BY AUTOMATED COUNT: 16.3 % (ref 12.3–15.4)
FENTANYL UR-MCNC: NEGATIVE NG/ML
GEN 5 1HR TROPONIN T REFLEX: 48 NG/L
GLOBULIN UR ELPH-MCNC: 3.2 GM/DL
GLUCOSE SERPL-MCNC: 105 MG/DL (ref 65–99)
HCT VFR BLD AUTO: 39 % (ref 37.5–51)
HGB BLD-MCNC: 12.3 G/DL (ref 13–17.7)
IMM GRANULOCYTES # BLD AUTO: 0.02 10*3/MM3 (ref 0–0.05)
IMM GRANULOCYTES NFR BLD AUTO: 0.4 % (ref 0–0.5)
INR PPP: 1.16 (ref 0.91–1.09)
LYMPHOCYTES # BLD AUTO: 0.44 10*3/MM3 (ref 0.7–3.1)
LYMPHOCYTES NFR BLD AUTO: 9.8 % (ref 19.6–45.3)
MAGNESIUM SERPL-MCNC: 2.2 MG/DL (ref 1.6–2.4)
MCH RBC QN AUTO: 29.1 PG (ref 26.6–33)
MCHC RBC AUTO-ENTMCNC: 31.5 G/DL (ref 31.5–35.7)
MCV RBC AUTO: 92.4 FL (ref 79–97)
METHADONE UR QL SCN: NEGATIVE
MONOCYTES # BLD AUTO: 0.36 10*3/MM3 (ref 0.1–0.9)
MONOCYTES NFR BLD AUTO: 8 % (ref 5–12)
NEUTROPHILS NFR BLD AUTO: 3.62 10*3/MM3 (ref 1.7–7)
NEUTROPHILS NFR BLD AUTO: 80.5 % (ref 42.7–76)
NRBC BLD AUTO-RTO: 0 /100 WBC (ref 0–0.2)
NT-PROBNP SERPL-MCNC: 2792 PG/ML (ref 0–900)
OPIATES UR QL: NEGATIVE
OXYCODONE UR QL SCN: NEGATIVE
PCP UR QL SCN: NEGATIVE
PLATELET # BLD AUTO: 255 10*3/MM3 (ref 140–450)
PMV BLD AUTO: 9.7 FL (ref 6–12)
POTASSIUM SERPL-SCNC: 4.8 MMOL/L (ref 3.5–5.2)
PROT SERPL-MCNC: 6.8 G/DL (ref 6–8.5)
PROTHROMBIN TIME: 15.4 SECONDS (ref 11.8–14.8)
RBC # BLD AUTO: 4.22 10*6/MM3 (ref 4.14–5.8)
SODIUM SERPL-SCNC: 137 MMOL/L (ref 136–145)
TRICYCLICS UR QL SCN: NEGATIVE
TROPONIN T % DELTA: 0
TROPONIN T NUMERIC DELTA: 0 NG/L
TROPONIN T SERPL HS-MCNC: 48 NG/L
WBC NRBC COR # BLD AUTO: 4.5 10*3/MM3 (ref 3.4–10.8)

## 2025-01-29 PROCEDURE — 83605 ASSAY OF LACTIC ACID: CPT | Performed by: FAMILY MEDICINE

## 2025-01-29 PROCEDURE — 93010 ELECTROCARDIOGRAM REPORT: CPT | Performed by: EMERGENCY MEDICINE

## 2025-01-29 PROCEDURE — 83735 ASSAY OF MAGNESIUM: CPT | Performed by: FAMILY MEDICINE

## 2025-01-29 PROCEDURE — G0378 HOSPITAL OBSERVATION PER HR: HCPCS

## 2025-01-29 PROCEDURE — 85610 PROTHROMBIN TIME: CPT | Performed by: FAMILY MEDICINE

## 2025-01-29 PROCEDURE — 80053 COMPREHEN METABOLIC PANEL: CPT | Performed by: FAMILY MEDICINE

## 2025-01-29 PROCEDURE — 25010000002 TETANUS-DIPHTH-ACELL PERTUSSIS 5-2.5-18.5 LF-MCG/0.5 SUSPENSION PREFILLED SYRINGE: Performed by: FAMILY MEDICINE

## 2025-01-29 PROCEDURE — 70450 CT HEAD/BRAIN W/O DYE: CPT

## 2025-01-29 PROCEDURE — 85730 THROMBOPLASTIN TIME PARTIAL: CPT | Performed by: FAMILY MEDICINE

## 2025-01-29 PROCEDURE — 90715 TDAP VACCINE 7 YRS/> IM: CPT | Performed by: FAMILY MEDICINE

## 2025-01-29 PROCEDURE — 83880 ASSAY OF NATRIURETIC PEPTIDE: CPT | Performed by: FAMILY MEDICINE

## 2025-01-29 PROCEDURE — 36415 COLL VENOUS BLD VENIPUNCTURE: CPT

## 2025-01-29 PROCEDURE — 71045 X-RAY EXAM CHEST 1 VIEW: CPT

## 2025-01-29 PROCEDURE — 90471 IMMUNIZATION ADMIN: CPT | Performed by: FAMILY MEDICINE

## 2025-01-29 PROCEDURE — 99285 EMERGENCY DEPT VISIT HI MDM: CPT

## 2025-01-29 PROCEDURE — 85025 COMPLETE CBC W/AUTO DIFF WBC: CPT | Performed by: FAMILY MEDICINE

## 2025-01-29 PROCEDURE — 84484 ASSAY OF TROPONIN QUANT: CPT | Performed by: FAMILY MEDICINE

## 2025-01-29 PROCEDURE — 82550 ASSAY OF CK (CPK): CPT | Performed by: FAMILY MEDICINE

## 2025-01-29 PROCEDURE — 93005 ELECTROCARDIOGRAM TRACING: CPT

## 2025-01-29 PROCEDURE — 80307 DRUG TEST PRSMV CHEM ANLYZR: CPT | Performed by: FAMILY MEDICINE

## 2025-01-29 RX ORDER — SODIUM CHLORIDE 0.9 % (FLUSH) 0.9 %
10 SYRINGE (ML) INJECTION AS NEEDED
Status: DISCONTINUED | OUTPATIENT
Start: 2025-01-29 | End: 2025-02-01 | Stop reason: HOSPADM

## 2025-01-29 RX ADMIN — TETANUS TOXOID, REDUCED DIPHTHERIA TOXOID AND ACELLULAR PERTUSSIS VACCINE, ADSORBED 0.5 ML: 5; 2.5; 8; 8; 2.5 SUSPENSION INTRAMUSCULAR at 19:35

## 2025-01-29 NOTE — TELEPHONE ENCOUNTER
"I was in [place], d/c 01/22, I spoke to the Heart DrCristina while I was there, I cannot remember who and what time I was to be there tomorrow for ablation. Told him the DrCristina Is Dr. Rodas, told him scheduled for 1600  tomorrow, 01/30/2025, will try to see what time he is supposed to be to Hospital . [facility] was called, Aleida in the registration there, spoke to the Patient, told him to be there to register at 2PM at the [facility], he asked if he could drive there, she told him yes but cannot drive home. Soft transfer complete, she will continue to answer his questions, she states.   Reason for Disposition   Question about upcoming scheduled test, no triage required and triager able to answer question    Additional Information   Negative: [1] Caller is not with the adult (patient) AND [2] reporting urgent symptoms   Negative: Lab result questions   Negative: Medication questions   Negative: Caller can't be reached by phone   Negative: Caller has already spoken to PCP or another triager   Negative: RN needs further essential information from caller in order to complete triage   Negative: Requesting regular office appointment   Negative: [1] Caller requesting NON-URGENT health information AND [2] PCP's office is the best resource   Negative: Health Information question, no triage required and triager able to answer question   Negative: General information question, no triage required and triager able to answer question   Negative: [1] Caller is not with the adult (patient) AND [2] probable NON-URGENT symptoms   Negative: [1] Follow-up call to recent contact AND [2] information only call, no triage required    Answer Assessment - Initial Assessment Questions  1. REASON FOR CALL or QUESTION: \"What is your reason for calling today?\" or \"How can I best help you?\" or \"What question do you have that I can help answer?\"      Time of procedure and the DrCristina Name    Protocols used: Information Only Call - No Triage-ADULT-    "

## 2025-01-30 ENCOUNTER — ANESTHESIA (OUTPATIENT)
Dept: CARDIOLOGY | Facility: HOSPITAL | Age: 65
End: 2025-01-30
Payer: MEDICARE

## 2025-01-30 ENCOUNTER — ANESTHESIA EVENT (OUTPATIENT)
Dept: CARDIOLOGY | Facility: HOSPITAL | Age: 65
End: 2025-01-30
Payer: MEDICARE

## 2025-01-30 ENCOUNTER — READMISSION MANAGEMENT (OUTPATIENT)
Dept: CALL CENTER | Facility: HOSPITAL | Age: 65
End: 2025-01-30
Payer: MEDICARE

## 2025-01-30 PROBLEM — L89.90 PRESSURE ULCER: Status: ACTIVE | Noted: 2025-01-30

## 2025-01-30 PROBLEM — I50.22 CHRONIC SYSTOLIC CONGESTIVE HEART FAILURE: Status: ACTIVE | Noted: 2024-10-14

## 2025-01-30 PROBLEM — R55 NEAR SYNCOPE: Status: ACTIVE | Noted: 2025-01-29
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
## 2025-01-30 LAB
ANION GAP SERPL CALCULATED.3IONS-SCNC: 10 MMOL/L (ref 5–15)
ANION GAP SERPL CALCULATED.3IONS-SCNC: 10 MMOL/L (ref 5–15)
BASOPHILS # BLD AUTO: 0.02 10*3/MM3 (ref 0–0.2)
BASOPHILS NFR BLD AUTO: 0.5 % (ref 0–1.5)
BUN SERPL-MCNC: 15 MG/DL (ref 8–23)
BUN SERPL-MCNC: 17 MG/DL (ref 8–23)
BUN/CREAT SERPL: 11.8 (ref 7–25)
BUN/CREAT SERPL: 13 (ref 7–25)
CALCIUM SPEC-SCNC: 8.7 MG/DL (ref 8.6–10.5)
CALCIUM SPEC-SCNC: 8.7 MG/DL (ref 8.6–10.5)
CHLORIDE SERPL-SCNC: 102 MMOL/L (ref 98–107)
CHLORIDE SERPL-SCNC: 104 MMOL/L (ref 98–107)
CO2 SERPL-SCNC: 25 MMOL/L (ref 22–29)
CO2 SERPL-SCNC: 26 MMOL/L (ref 22–29)
CREAT SERPL-MCNC: 1.27 MG/DL (ref 0.76–1.27)
CREAT SERPL-MCNC: 1.31 MG/DL (ref 0.76–1.27)
DEPRECATED RDW RBC AUTO: 54.3 FL (ref 37–54)
EGFRCR SERPLBLD CKD-EPI 2021: 60 ML/MIN/1.73
EGFRCR SERPLBLD CKD-EPI 2021: 62.3 ML/MIN/1.73
EOSINOPHIL # BLD AUTO: 0.08 10*3/MM3 (ref 0–0.4)
EOSINOPHIL NFR BLD AUTO: 2 % (ref 0.3–6.2)
ERYTHROCYTE [DISTWIDTH] IN BLOOD BY AUTOMATED COUNT: 16.1 % (ref 12.3–15.4)
GLUCOSE SERPL-MCNC: 78 MG/DL (ref 65–99)
GLUCOSE SERPL-MCNC: 90 MG/DL (ref 65–99)
HCT VFR BLD AUTO: 38.2 % (ref 37.5–51)
HGB BLD-MCNC: 11.9 G/DL (ref 13–17.7)
IMM GRANULOCYTES # BLD AUTO: 0.01 10*3/MM3 (ref 0–0.05)
IMM GRANULOCYTES NFR BLD AUTO: 0.2 % (ref 0–0.5)
INR PPP: 1.18 (ref 0.91–1.09)
LYMPHOCYTES # BLD AUTO: 0.82 10*3/MM3 (ref 0.7–3.1)
LYMPHOCYTES NFR BLD AUTO: 20.4 % (ref 19.6–45.3)
MCH RBC QN AUTO: 28.7 PG (ref 26.6–33)
MCHC RBC AUTO-ENTMCNC: 31.2 G/DL (ref 31.5–35.7)
MCV RBC AUTO: 92.3 FL (ref 79–97)
MONOCYTES # BLD AUTO: 0.59 10*3/MM3 (ref 0.1–0.9)
MONOCYTES NFR BLD AUTO: 14.7 % (ref 5–12)
NEUTROPHILS NFR BLD AUTO: 2.49 10*3/MM3 (ref 1.7–7)
NEUTROPHILS NFR BLD AUTO: 62.2 % (ref 42.7–76)
NRBC BLD AUTO-RTO: 0 /100 WBC (ref 0–0.2)
PLATELET # BLD AUTO: 256 10*3/MM3 (ref 140–450)
PMV BLD AUTO: 10.3 FL (ref 6–12)
POTASSIUM SERPL-SCNC: 4.5 MMOL/L (ref 3.5–5.2)
POTASSIUM SERPL-SCNC: 4.6 MMOL/L (ref 3.5–5.2)
PROTHROMBIN TIME: 15.6 SECONDS (ref 11.8–14.8)
QT INTERVAL: 602 MS
QTC INTERVAL: 606 MS
RBC # BLD AUTO: 4.14 10*6/MM3 (ref 4.14–5.8)
SODIUM SERPL-SCNC: 137 MMOL/L (ref 136–145)
SODIUM SERPL-SCNC: 140 MMOL/L (ref 136–145)
WBC NRBC COR # BLD AUTO: 4.01 10*3/MM3 (ref 3.4–10.8)

## 2025-01-30 PROCEDURE — 93653 COMPRE EP EVAL TX SVT: CPT | Performed by: STUDENT IN AN ORGANIZED HEALTH CARE EDUCATION/TRAINING PROGRAM

## 2025-01-30 PROCEDURE — C1759 CATH, INTRA ECHOCARDIOGRAPHY: HCPCS | Performed by: STUDENT IN AN ORGANIZED HEALTH CARE EDUCATION/TRAINING PROGRAM

## 2025-01-30 PROCEDURE — 93010 ELECTROCARDIOGRAM REPORT: CPT | Performed by: HOSPITALIST

## 2025-01-30 PROCEDURE — 25010000002 LIDOCAINE PF 2% 2 % SOLUTION: Performed by: NURSE ANESTHETIST, CERTIFIED REGISTERED

## 2025-01-30 PROCEDURE — C1894 INTRO/SHEATH, NON-LASER: HCPCS | Performed by: STUDENT IN AN ORGANIZED HEALTH CARE EDUCATION/TRAINING PROGRAM

## 2025-01-30 PROCEDURE — 99214 OFFICE O/P EST MOD 30 MIN: CPT | Performed by: STUDENT IN AN ORGANIZED HEALTH CARE EDUCATION/TRAINING PROGRAM

## 2025-01-30 PROCEDURE — C1732 CATH, EP, DIAG/ABL, 3D/VECT: HCPCS | Performed by: STUDENT IN AN ORGANIZED HEALTH CARE EDUCATION/TRAINING PROGRAM

## 2025-01-30 PROCEDURE — 80048 BASIC METABOLIC PNL TOTAL CA: CPT | Performed by: FAMILY MEDICINE

## 2025-01-30 PROCEDURE — 93662 INTRACARDIAC ECG (ICE): CPT | Performed by: STUDENT IN AN ORGANIZED HEALTH CARE EDUCATION/TRAINING PROGRAM

## 2025-01-30 PROCEDURE — 25010000002 FENTANYL CITRATE (PF) 100 MCG/2ML SOLUTION: Performed by: NURSE ANESTHETIST, CERTIFIED REGISTERED

## 2025-01-30 PROCEDURE — 25010000002 ONDANSETRON PER 1 MG: Performed by: NURSE ANESTHETIST, CERTIFIED REGISTERED

## 2025-01-30 PROCEDURE — C1730 CATH, EP, 19 OR FEW ELECT: HCPCS | Performed by: STUDENT IN AN ORGANIZED HEALTH CARE EDUCATION/TRAINING PROGRAM

## 2025-01-30 PROCEDURE — 85025 COMPLETE CBC W/AUTO DIFF WBC: CPT | Performed by: FAMILY MEDICINE

## 2025-01-30 PROCEDURE — G0378 HOSPITAL OBSERVATION PER HR: HCPCS

## 2025-01-30 PROCEDURE — C1766 INTRO/SHEATH,STRBLE,NON-PEEL: HCPCS | Performed by: STUDENT IN AN ORGANIZED HEALTH CARE EDUCATION/TRAINING PROGRAM

## 2025-01-30 PROCEDURE — 93005 ELECTROCARDIOGRAM TRACING: CPT | Performed by: STUDENT IN AN ORGANIZED HEALTH CARE EDUCATION/TRAINING PROGRAM

## 2025-01-30 PROCEDURE — 85610 PROTHROMBIN TIME: CPT | Performed by: STUDENT IN AN ORGANIZED HEALTH CARE EDUCATION/TRAINING PROGRAM

## 2025-01-30 PROCEDURE — 25810000003 SODIUM CHLORIDE 0.9 % SOLUTION 250 ML FLEX CONT: Performed by: NURSE ANESTHETIST, CERTIFIED REGISTERED

## 2025-01-30 PROCEDURE — 25010000002 SUGAMMADEX 200 MG/2ML SOLUTION: Performed by: NURSE ANESTHETIST, CERTIFIED REGISTERED

## 2025-01-30 PROCEDURE — 25010000002 HEPARIN (PORCINE) PER 1000 UNITS: Performed by: NURSE ANESTHETIST, CERTIFIED REGISTERED

## 2025-01-30 PROCEDURE — 25010000002 PROPOFOL 10 MG/ML EMULSION: Performed by: NURSE ANESTHETIST, CERTIFIED REGISTERED

## 2025-01-30 PROCEDURE — 80048 BASIC METABOLIC PNL TOTAL CA: CPT | Performed by: STUDENT IN AN ORGANIZED HEALTH CARE EDUCATION/TRAINING PROGRAM

## 2025-01-30 PROCEDURE — 25010000002 PHENYLEPHRINE 10 MG/ML SOLUTION 1 ML VIAL: Performed by: NURSE ANESTHETIST, CERTIFIED REGISTERED

## 2025-01-30 RX ORDER — FINASTERIDE 5 MG/1
5 TABLET, FILM COATED ORAL DAILY
Status: DISCONTINUED | OUTPATIENT
Start: 2025-01-30 | End: 2025-02-01 | Stop reason: HOSPADM

## 2025-01-30 RX ORDER — SODIUM CHLORIDE 0.9 % (FLUSH) 0.9 %
10 SYRINGE (ML) INJECTION EVERY 12 HOURS SCHEDULED
Status: DISCONTINUED | OUTPATIENT
Start: 2025-01-30 | End: 2025-01-30

## 2025-01-30 RX ORDER — ACETAMINOPHEN 160 MG/5ML
650 SOLUTION ORAL EVERY 4 HOURS PRN
Status: DISCONTINUED | OUTPATIENT
Start: 2025-01-30 | End: 2025-02-01 | Stop reason: HOSPADM

## 2025-01-30 RX ORDER — ASPIRIN 81 MG/1
81 TABLET ORAL DAILY
Status: DISCONTINUED | OUTPATIENT
Start: 2025-01-30 | End: 2025-02-01 | Stop reason: HOSPADM

## 2025-01-30 RX ORDER — FLUTICASONE PROPIONATE 50 MCG
2 SPRAY, SUSPENSION (ML) NASAL DAILY PRN
Status: DISCONTINUED | OUTPATIENT
Start: 2025-01-30 | End: 2025-02-01 | Stop reason: HOSPADM

## 2025-01-30 RX ORDER — AMOXICILLIN 250 MG
2 CAPSULE ORAL 2 TIMES DAILY PRN
Status: DISCONTINUED | OUTPATIENT
Start: 2025-01-30 | End: 2025-02-01 | Stop reason: HOSPADM

## 2025-01-30 RX ORDER — SODIUM CHLORIDE 0.9 % (FLUSH) 0.9 %
10 SYRINGE (ML) INJECTION AS NEEDED
Status: DISCONTINUED | OUTPATIENT
Start: 2025-01-30 | End: 2025-02-01 | Stop reason: HOSPADM

## 2025-01-30 RX ORDER — SODIUM CHLORIDE 0.9 % (FLUSH) 0.9 %
10 SYRINGE (ML) INJECTION AS NEEDED
Status: DISCONTINUED | OUTPATIENT
Start: 2025-01-30 | End: 2025-01-30

## 2025-01-30 RX ORDER — BISACODYL 5 MG/1
5 TABLET, DELAYED RELEASE ORAL DAILY PRN
Status: DISCONTINUED | OUTPATIENT
Start: 2025-01-30 | End: 2025-02-01 | Stop reason: HOSPADM

## 2025-01-30 RX ORDER — LIDOCAINE HYDROCHLORIDE 20 MG/ML
INJECTION, SOLUTION EPIDURAL; INFILTRATION; INTRACAUDAL; PERINEURAL AS NEEDED
Status: DISCONTINUED | OUTPATIENT
Start: 2025-01-30 | End: 2025-01-30 | Stop reason: SURG

## 2025-01-30 RX ORDER — HEPARIN SODIUM 1000 [USP'U]/ML
INJECTION, SOLUTION INTRAVENOUS; SUBCUTANEOUS AS NEEDED
Status: DISCONTINUED | OUTPATIENT
Start: 2025-01-30 | End: 2025-01-30 | Stop reason: SURG

## 2025-01-30 RX ORDER — FENTANYL CITRATE 50 UG/ML
INJECTION, SOLUTION INTRAMUSCULAR; INTRAVENOUS AS NEEDED
Status: DISCONTINUED | OUTPATIENT
Start: 2025-01-30 | End: 2025-01-30 | Stop reason: SURG

## 2025-01-30 RX ORDER — CARVEDILOL 6.25 MG/1
6.25 TABLET ORAL 2 TIMES DAILY WITH MEALS
Status: DISCONTINUED | OUTPATIENT
Start: 2025-01-30 | End: 2025-02-01 | Stop reason: HOSPADM

## 2025-01-30 RX ORDER — ACETAMINOPHEN 325 MG/1
650 TABLET ORAL EVERY 4 HOURS PRN
Status: DISCONTINUED | OUTPATIENT
Start: 2025-01-30 | End: 2025-02-01 | Stop reason: HOSPADM

## 2025-01-30 RX ORDER — POLYETHYLENE GLYCOL 3350 17 G/17G
17 POWDER, FOR SOLUTION ORAL DAILY PRN
Status: DISCONTINUED | OUTPATIENT
Start: 2025-01-30 | End: 2025-02-01 | Stop reason: HOSPADM

## 2025-01-30 RX ORDER — ACETAMINOPHEN 325 MG/1
650 TABLET ORAL EVERY 4 HOURS PRN
Status: DISCONTINUED | OUTPATIENT
Start: 2025-01-30 | End: 2025-01-30 | Stop reason: SDUPTHER

## 2025-01-30 RX ORDER — SODIUM CHLORIDE 9 MG/ML
40 INJECTION, SOLUTION INTRAVENOUS AS NEEDED
Status: DISCONTINUED | OUTPATIENT
Start: 2025-01-30 | End: 2025-02-01 | Stop reason: HOSPADM

## 2025-01-30 RX ORDER — ACETAMINOPHEN 650 MG/1
650 SUPPOSITORY RECTAL EVERY 4 HOURS PRN
Status: DISCONTINUED | OUTPATIENT
Start: 2025-01-30 | End: 2025-02-01 | Stop reason: HOSPADM

## 2025-01-30 RX ORDER — ACETAMINOPHEN 160 MG/5ML
650 SOLUTION ORAL EVERY 4 HOURS PRN
Status: DISCONTINUED | OUTPATIENT
Start: 2025-01-30 | End: 2025-01-30 | Stop reason: SDUPTHER

## 2025-01-30 RX ORDER — BISACODYL 10 MG
10 SUPPOSITORY, RECTAL RECTAL DAILY PRN
Status: DISCONTINUED | OUTPATIENT
Start: 2025-01-30 | End: 2025-02-01 | Stop reason: HOSPADM

## 2025-01-30 RX ORDER — ROCURONIUM BROMIDE 10 MG/ML
INJECTION, SOLUTION INTRAVENOUS AS NEEDED
Status: DISCONTINUED | OUTPATIENT
Start: 2025-01-30 | End: 2025-01-30 | Stop reason: SURG

## 2025-01-30 RX ORDER — SODIUM CHLORIDE 9 MG/ML
40 INJECTION, SOLUTION INTRAVENOUS AS NEEDED
Status: DISCONTINUED | OUTPATIENT
Start: 2025-01-30 | End: 2025-01-30

## 2025-01-30 RX ORDER — SPIRONOLACTONE 25 MG/1
12.5 TABLET ORAL DAILY
Status: DISCONTINUED | OUTPATIENT
Start: 2025-01-30 | End: 2025-02-01 | Stop reason: HOSPADM

## 2025-01-30 RX ORDER — SUCCINYLCHOLINE/SOD CL,ISO/PF 200MG/10ML
SYRINGE (ML) INTRAVENOUS AS NEEDED
Status: DISCONTINUED | OUTPATIENT
Start: 2025-01-30 | End: 2025-01-30 | Stop reason: SURG

## 2025-01-30 RX ORDER — NITROGLYCERIN 0.4 MG/1
0.4 TABLET SUBLINGUAL
Status: DISCONTINUED | OUTPATIENT
Start: 2025-01-30 | End: 2025-02-01 | Stop reason: HOSPADM

## 2025-01-30 RX ORDER — ONDANSETRON 2 MG/ML
INJECTION INTRAMUSCULAR; INTRAVENOUS AS NEEDED
Status: DISCONTINUED | OUTPATIENT
Start: 2025-01-30 | End: 2025-01-30 | Stop reason: SURG

## 2025-01-30 RX ORDER — PROPOFOL 10 MG/ML
VIAL (ML) INTRAVENOUS AS NEEDED
Status: DISCONTINUED | OUTPATIENT
Start: 2025-01-30 | End: 2025-01-30 | Stop reason: SURG

## 2025-01-30 RX ORDER — NITROGLYCERIN 0.4 MG/1
0.4 TABLET SUBLINGUAL
Status: DISCONTINUED | OUTPATIENT
Start: 2025-01-30 | End: 2025-01-30 | Stop reason: SDUPTHER

## 2025-01-30 RX ORDER — FUROSEMIDE 40 MG/1
40 TABLET ORAL DAILY
Status: DISCONTINUED | OUTPATIENT
Start: 2025-01-30 | End: 2025-02-01 | Stop reason: HOSPADM

## 2025-01-30 RX ORDER — ROSUVASTATIN CALCIUM 10 MG/1
10 TABLET, COATED ORAL DAILY
Status: DISCONTINUED | OUTPATIENT
Start: 2025-01-30 | End: 2025-02-01 | Stop reason: HOSPADM

## 2025-01-30 RX ORDER — ACETAMINOPHEN 650 MG/1
650 SUPPOSITORY RECTAL EVERY 4 HOURS PRN
Status: DISCONTINUED | OUTPATIENT
Start: 2025-01-30 | End: 2025-01-30 | Stop reason: SDUPTHER

## 2025-01-30 RX ORDER — SODIUM CHLORIDE 0.9 % (FLUSH) 0.9 %
10 SYRINGE (ML) INJECTION EVERY 12 HOURS SCHEDULED
Status: DISCONTINUED | OUTPATIENT
Start: 2025-01-30 | End: 2025-02-01 | Stop reason: HOSPADM

## 2025-01-30 RX ORDER — LOSARTAN POTASSIUM 25 MG/1
25 TABLET ORAL DAILY
Status: DISCONTINUED | OUTPATIENT
Start: 2025-01-30 | End: 2025-02-01 | Stop reason: HOSPADM

## 2025-01-30 RX ADMIN — SUGAMMADEX 200 MG: 100 INJECTION, SOLUTION INTRAVENOUS at 12:20

## 2025-01-30 RX ADMIN — PROPOFOL 80 MCG/KG/MIN: 10 INJECTION, EMULSION INTRAVENOUS at 11:15

## 2025-01-30 RX ADMIN — Medication 10 ML: at 22:05

## 2025-01-30 RX ADMIN — APIXABAN 5 MG: 5 TABLET, FILM COATED ORAL at 00:30

## 2025-01-30 RX ADMIN — FENTANYL CITRATE 50 MCG: 50 INJECTION, SOLUTION INTRAMUSCULAR; INTRAVENOUS at 11:15

## 2025-01-30 RX ADMIN — CARVEDILOL 6.25 MG: 6.25 TABLET, FILM COATED ORAL at 17:16

## 2025-01-30 RX ADMIN — ACETAMINOPHEN 650 MG: 325 TABLET ORAL at 17:16

## 2025-01-30 RX ADMIN — Medication 10 ML: at 16:28

## 2025-01-30 RX ADMIN — ASPIRIN 81 MG: 81 TABLET, COATED ORAL at 14:43

## 2025-01-30 RX ADMIN — Medication 160 MG: at 11:15

## 2025-01-30 RX ADMIN — ROCURONIUM BROMIDE 10 MG: 10 INJECTION, SOLUTION INTRAVENOUS at 11:15

## 2025-01-30 RX ADMIN — FUROSEMIDE 40 MG: 40 TABLET ORAL at 14:43

## 2025-01-30 RX ADMIN — APIXABAN 5 MG: 5 TABLET, FILM COATED ORAL at 22:05

## 2025-01-30 RX ADMIN — FINASTERIDE 5 MG: 5 TABLET, FILM COATED ORAL at 14:00

## 2025-01-30 RX ADMIN — ROSUVASTATIN 10 MG: 10 TABLET, FILM COATED ORAL at 14:43

## 2025-01-30 RX ADMIN — Medication 10 ML: at 00:31

## 2025-01-30 RX ADMIN — HEPARIN SODIUM 5000 UNITS: 1000 INJECTION, SOLUTION INTRAVENOUS; SUBCUTANEOUS at 11:36

## 2025-01-30 RX ADMIN — BENZOCAINE 6 MG-MENTHOL 10 MG LOZENGES 1 EACH: at 04:41

## 2025-01-30 RX ADMIN — ROCURONIUM BROMIDE 20 MG: 10 INJECTION, SOLUTION INTRAVENOUS at 12:00

## 2025-01-30 RX ADMIN — APIXABAN 5 MG: 5 TABLET, FILM COATED ORAL at 14:43

## 2025-01-30 RX ADMIN — LIDOCAINE HYDROCHLORIDE 20 MG: 20 INJECTION, SOLUTION EPIDURAL; INFILTRATION; INTRACAUDAL; PERINEURAL at 11:15

## 2025-01-30 RX ADMIN — EMPAGLIFLOZIN 10 MG: 10 TABLET, FILM COATED ORAL at 14:43

## 2025-01-30 RX ADMIN — ROCURONIUM BROMIDE 30 MG: 10 INJECTION, SOLUTION INTRAVENOUS at 11:21

## 2025-01-30 RX ADMIN — ONDANSETRON 4 MG: 2 INJECTION INTRAMUSCULAR; INTRAVENOUS at 12:13

## 2025-01-30 RX ADMIN — PHENYLEPHRINE HYDROCHLORIDE 1 MCG/KG/MIN: 10 INJECTION INTRAVENOUS at 11:31

## 2025-01-30 RX ADMIN — Medication 10 ML: at 14:22

## 2025-01-30 NOTE — ANESTHESIA PROCEDURE NOTES
Airway  Urgency: elective    Date/Time: 1/30/2025 11:15 AM    General Information and Staff    Patient location during procedure: OR  CRNA/CAA: Martin Harris CRNA    Indications and Patient Condition  Indications for airway management: airway protection    Preoxygenated: yes  MILS maintained throughout  Mask difficulty assessment: 1 - vent by mask    Final Airway Details  Final airway type: endotracheal airway      Successful airway: ETT  Cuffed: yes   Successful intubation technique: direct laryngoscopy  Endotracheal tube insertion site: oral  Blade: Suero  Blade size: 4  ETT size (mm): 7.5  Cormack-Lehane Classification: grade I - full view of glottis  Placement verified by: chest auscultation and capnometry   Cuff volume (mL): 5  Measured from: lips  ETT/EBT  to lips (cm): 22  Number of attempts at approach: 1  Assessment: lips, teeth, and gum same as pre-op and atraumatic intubation
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
## 2025-01-30 NOTE — ED NOTES
This nurse spoke with dwayne at [facility] requesting evaluation.   All information faxed to them at this time.
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
## 2025-01-30 NOTE — PLAN OF CARE
Goal Outcome Evaluation:    Problem: Adult Inpatient Plan of Care  Goal: Plan of Care Review  Outcome: Not Progressing  Flowsheets (Taken 1/30/2025 3285)  Progress: no change  Outcome Evaluation: Patient admitted to 4B this shift. No c/o pain as of this time. Patient evaluated by [facility]. NPO @ 0000 for scheduled ablation today. VSS. /AFL 45-58 per tele. Plan of care ongoing.  Plan of Care Reviewed With: patient
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
## 2025-01-30 NOTE — NURSING NOTE
Since return from [facility] pt has continued to be noncompliant with instruction to not get up, not bend leg. COU stated his bed could be 30 degrees, but he still sits all the way up in bed with legs over the side. He is completely alert and oriented and able to understand instruction. Complications have been explained to him multiple times.

## 2025-01-30 NOTE — ANESTHESIA PREPROCEDURE EVALUATION
Anesthesia Evaluation     NPO Solid Status: > 8 hours  NPO Liquid Status: > 8 hours           Airway   Mallampati: II  TM distance: >3 FB  Neck ROM: full  No difficulty expected  Dental    (+) poor dentition    Pulmonary    Cardiovascular   Exercise tolerance: poor (<4 METS)    (+) hypertension, valvular problems/murmurs AI, dysrhythmias Atrial Flutter, Atrial Fib, Tachycardia, CHF Systolic <55%, hyperlipidemia      Neuro/Psych  (+) headaches, syncope  GI/Hepatic/Renal/Endo    (+) obesity, diabetes mellitus type 2    Musculoskeletal     (+) chronic pain  Abdominal    Substance History      OB/GYN          Other      history of cancer                  Anesthesia Plan    ASA 3     general       Anesthetic plan, risks, benefits, and alternatives have been provided, discussed and informed consent has been obtained with: patient.      CODE STATUS:    Code Status (Patient has no pulse and is not breathing): CPR (Attempt to Resuscitate)  Medical Interventions (Patient has pulse or is breathing): Full Support

## 2025-01-30 NOTE — PLAN OF CARE
Problem: Adult Inpatient Plan of Care  Goal: Plan of Care Review  Outcome: Progressing  Goal: Patient-Specific Goal (Individualized)  Outcome: Progressing  Goal: Absence of Hospital-Acquired Illness or Injury  Outcome: Progressing  Intervention: Identify and Manage Fall Risk  Recent Flowsheet Documentation  Taken 1/30/2025 0700 by Ana Rogers RN  Safety Promotion/Fall Prevention: safety round/check completed  Intervention: Prevent Skin Injury  Recent Flowsheet Documentation  Taken 1/30/2025 0700 by Ana Rogers RN  Skin Protection: incontinence pads utilized  Intervention: Prevent and Manage VTE (Venous Thromboembolism) Risk  Recent Flowsheet Documentation  Taken 1/30/2025 0700 by Ana Rogers RN  VTE Prevention/Management: (ELAQUIS) other (see comments)  Intervention: Prevent Infection  Recent Flowsheet Documentation  Taken 1/30/2025 0700 by Ana Rogers RN  Infection Prevention: hand hygiene promoted  Goal: Optimal Comfort and Wellbeing  Outcome: Progressing  Intervention: Provide Person-Centered Care  Recent Flowsheet Documentation  Taken 1/30/2025 0700 by Ana Rogers RN  Trust Relationship/Rapport:   care explained   questions encouraged   questions answered  Goal: Readiness for Transition of Care  Outcome: Progressing     Problem: Violence Risk or Actual  Goal: Anger and Impulse Control  Outcome: Progressing  Intervention: Minimize Safety Risk  Recent Flowsheet Documentation  Taken 1/30/2025 0700 by Ana Rogers RN  Sensory Stimulation Regulation: quiet environment promoted     Problem: Comorbidity Management  Goal: Maintenance of Heart Failure Symptom Control  Outcome: Progressing  Goal: Blood Pressure in Desired Range  Outcome: Progressing     Problem: Heart Failure  Goal: Optimal Coping  Outcome: Progressing  Intervention: Support Psychosocial Response  Recent Flowsheet Documentation  Taken 1/30/2025 0700 by Ana Rogers RN  Family/Support System Care: self-care encouraged  Goal: Optimal Cardiac  Output and Blood Flow  Outcome: Progressing  Goal: Stable Heart Rate and Rhythm  Outcome: Progressing  Goal: Fluid and Electrolyte Balance  Outcome: Progressing  Goal: Optimal Functional Ability  Outcome: Progressing  Goal: Improved Oral Intake  Outcome: Progressing  Goal: Effective Oxygenation and Ventilation  Outcome: Progressing  Goal: Effective Breathing Pattern During Sleep  Outcome: Progressing   Goal Outcome Evaluation:

## 2025-01-30 NOTE — PLAN OF CARE
Goal Outcome Evaluation:              Outcome Evaluation: Pt in room alone. Pt familiar to this RDN. PMH: DM and CHF. Noted wt loss of 15-20 lb and a UBW of 300 lb. Per pt's recorded wt, fluctuations are due to fluid retention. 4+ edema on ankles and feet. Appetite has been poor for the past couple of weeks, with pt eating only 2 meals a day instead of his usual five. Denies any N/V, food allergies, or problems chewing/swallowing. NPO today for ablation. Wounds noted on lower legs; wound care consulted. Pt requests a double portion of protein, which the dietitian will allow for additional nutrition. Will monitor.
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
## 2025-01-30 NOTE — ANESTHESIA POSTPROCEDURE EVALUATION
Patient: Jeanie Grossman    Procedure Summary       Date: 01/30/25 Room / Location: [facility]ATH [facility] 4 /  [facility] CATH INVASIVE LOCATION    Anesthesia Start: 1102 Anesthesia Stop: 1228    Procedure: Ablation atrial flutter Diagnosis:       Atrial flutter, unspecified type      (Typical atrial flutter (61630))    Providers: Chalo Rodas MD Provider: Martin Harris CRNA    Anesthesia Type: MAC, general ASA Status: 3            Anesthesia Type: MAC, general    Vitals  Vitals Value Taken Time   /49 01/30/25 1406   Temp     Pulse 50 01/30/25 1350   Resp 17 01/30/25 1330   SpO2 99 % 01/30/25 1350           Post Anesthesia Care and Evaluation    Patient location during evaluation: PHASE II  Level of consciousness: awake  Pain management: adequate    Airway patency: patent  Anesthetic complications: No anesthetic complications  PONV Status: none  Cardiovascular status: acceptable  Respiratory status: acceptable  Hydration status: acceptable
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
## 2025-01-30 NOTE — OUTREACH NOTE
CHF Week 1 Survey      Flowsheet Row Responses   [facility] facility patient discharged from? [place]   Does the patient have one of the following disease processes/diagnoses(primary or secondary)? CHF   CHF Week 1 attempt successful? No   Unsuccessful attempts Attempt 2   Revoke Readmitted            Caitlyn H - Registered Nurse

## 2025-01-31 LAB
ANION GAP SERPL CALCULATED.3IONS-SCNC: 12 MMOL/L (ref 5–15)
BASOPHILS # BLD AUTO: 0.02 10*3/MM3 (ref 0–0.2)
BASOPHILS NFR BLD AUTO: 0.5 % (ref 0–1.5)
BUN SERPL-MCNC: 18 MG/DL (ref 8–23)
BUN/CREAT SERPL: 12 (ref 7–25)
CALCIUM SPEC-SCNC: 8.8 MG/DL (ref 8.6–10.5)
CHLORIDE SERPL-SCNC: 101 MMOL/L (ref 98–107)
CO2 SERPL-SCNC: 23 MMOL/L (ref 22–29)
CREAT SERPL-MCNC: 1.5 MG/DL (ref 0.76–1.27)
DEPRECATED RDW RBC AUTO: 55 FL (ref 37–54)
EGFRCR SERPLBLD CKD-EPI 2021: 51 ML/MIN/1.73
EOSINOPHIL # BLD AUTO: 0.08 10*3/MM3 (ref 0–0.4)
EOSINOPHIL NFR BLD AUTO: 1.8 % (ref 0.3–6.2)
ERYTHROCYTE [DISTWIDTH] IN BLOOD BY AUTOMATED COUNT: 16.2 % (ref 12.3–15.4)
GEN 5 1HR TROPONIN T REFLEX: 614 NG/L
GLUCOSE SERPL-MCNC: 94 MG/DL (ref 65–99)
HCT VFR BLD AUTO: 37.8 % (ref 37.5–51)
HGB BLD-MCNC: 11.6 G/DL (ref 13–17.7)
IMM GRANULOCYTES # BLD AUTO: 0.02 10*3/MM3 (ref 0–0.05)
IMM GRANULOCYTES NFR BLD AUTO: 0.5 % (ref 0–0.5)
LYMPHOCYTES # BLD AUTO: 0.65 10*3/MM3 (ref 0.7–3.1)
LYMPHOCYTES NFR BLD AUTO: 14.8 % (ref 19.6–45.3)
MCH RBC QN AUTO: 28.4 PG (ref 26.6–33)
MCHC RBC AUTO-ENTMCNC: 30.7 G/DL (ref 31.5–35.7)
MCV RBC AUTO: 92.6 FL (ref 79–97)
MONOCYTES # BLD AUTO: 0.48 10*3/MM3 (ref 0.1–0.9)
MONOCYTES NFR BLD AUTO: 10.9 % (ref 5–12)
NEUTROPHILS NFR BLD AUTO: 3.14 10*3/MM3 (ref 1.7–7)
NEUTROPHILS NFR BLD AUTO: 71.5 % (ref 42.7–76)
NRBC BLD AUTO-RTO: 0 /100 WBC (ref 0–0.2)
PLATELET # BLD AUTO: 241 10*3/MM3 (ref 140–450)
PMV BLD AUTO: 10.4 FL (ref 6–12)
POTASSIUM SERPL-SCNC: 4.7 MMOL/L (ref 3.5–5.2)
QT INTERVAL: 480 MS
QT INTERVAL: 582 MS
QT INTERVAL: 590 MS
QTC INTERVAL: 491 MS
QTC INTERVAL: 530 MS
QTC INTERVAL: 543 MS
RBC # BLD AUTO: 4.08 10*6/MM3 (ref 4.14–5.8)
SODIUM SERPL-SCNC: 136 MMOL/L (ref 136–145)
TROPONIN T % DELTA: -2
TROPONIN T NUMERIC DELTA: -12 NG/L
TROPONIN T SERPL HS-MCNC: 626 NG/L
WBC NRBC COR # BLD AUTO: 4.39 10*3/MM3 (ref 3.4–10.8)

## 2025-01-31 PROCEDURE — G0378 HOSPITAL OBSERVATION PER HR: HCPCS

## 2025-01-31 PROCEDURE — 99214 OFFICE O/P EST MOD 30 MIN: CPT | Performed by: STUDENT IN AN ORGANIZED HEALTH CARE EDUCATION/TRAINING PROGRAM

## 2025-01-31 PROCEDURE — 99222 1ST HOSP IP/OBS MODERATE 55: CPT | Performed by: NURSE PRACTITIONER

## 2025-01-31 PROCEDURE — 11045 DBRDMT SUBQ TISS EACH ADDL: CPT | Performed by: NURSE PRACTITIONER

## 2025-01-31 PROCEDURE — 80048 BASIC METABOLIC PNL TOTAL CA: CPT | Performed by: STUDENT IN AN ORGANIZED HEALTH CARE EDUCATION/TRAINING PROGRAM

## 2025-01-31 PROCEDURE — 29580 STRAPPING UNNA BOOT: CPT

## 2025-01-31 PROCEDURE — 11042 DBRDMT SUBQ TIS 1ST 20SQCM/<: CPT | Performed by: NURSE PRACTITIONER

## 2025-01-31 PROCEDURE — 85025 COMPLETE CBC W/AUTO DIFF WBC: CPT | Performed by: STUDENT IN AN ORGANIZED HEALTH CARE EDUCATION/TRAINING PROGRAM

## 2025-01-31 PROCEDURE — 97116 GAIT TRAINING THERAPY: CPT

## 2025-01-31 PROCEDURE — 84484 ASSAY OF TROPONIN QUANT: CPT | Performed by: FAMILY MEDICINE

## 2025-01-31 PROCEDURE — 97165 OT EVAL LOW COMPLEX 30 MIN: CPT

## 2025-01-31 PROCEDURE — 93010 ELECTROCARDIOGRAM REPORT: CPT | Performed by: HOSPITALIST

## 2025-01-31 PROCEDURE — 93005 ELECTROCARDIOGRAM TRACING: CPT | Performed by: FAMILY MEDICINE

## 2025-01-31 PROCEDURE — 97162 PT EVAL MOD COMPLEX 30 MIN: CPT

## 2025-01-31 RX ADMIN — CARVEDILOL 6.25 MG: 6.25 TABLET, FILM COATED ORAL at 08:03

## 2025-01-31 RX ADMIN — Medication 10 ML: at 08:03

## 2025-01-31 RX ADMIN — ROSUVASTATIN 10 MG: 10 TABLET, FILM COATED ORAL at 08:03

## 2025-01-31 RX ADMIN — APIXABAN 5 MG: 5 TABLET, FILM COATED ORAL at 21:14

## 2025-01-31 RX ADMIN — FINASTERIDE 5 MG: 5 TABLET, FILM COATED ORAL at 08:03

## 2025-01-31 RX ADMIN — SPIRONOLACTONE 12.5 MG: 25 TABLET ORAL at 08:03

## 2025-01-31 RX ADMIN — ASPIRIN 81 MG: 81 TABLET, COATED ORAL at 08:03

## 2025-01-31 RX ADMIN — EMPAGLIFLOZIN 10 MG: 10 TABLET, FILM COATED ORAL at 08:03

## 2025-01-31 RX ADMIN — BENZOCAINE 6 MG-MENTHOL 10 MG LOZENGES 1 EACH: at 00:27

## 2025-01-31 RX ADMIN — ACETAMINOPHEN 650 MG: 325 TABLET ORAL at 03:59

## 2025-01-31 RX ADMIN — CARVEDILOL 6.25 MG: 6.25 TABLET, FILM COATED ORAL at 17:28

## 2025-01-31 RX ADMIN — NITROGLYCERIN 0.4 MG: 0.4 TABLET SUBLINGUAL at 03:59

## 2025-01-31 RX ADMIN — APIXABAN 5 MG: 5 TABLET, FILM COATED ORAL at 08:03

## 2025-01-31 RX ADMIN — FUROSEMIDE 40 MG: 40 TABLET ORAL at 08:03

## 2025-01-31 RX ADMIN — LOSARTAN POTASSIUM 25 MG: 25 TABLET, FILM COATED ORAL at 08:03

## 2025-01-31 RX ADMIN — BENZOCAINE 6 MG-MENTHOL 10 MG LOZENGES 1 EACH: at 03:59

## 2025-01-31 RX ADMIN — Medication 10 ML: at 21:14
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
## 2025-01-31 NOTE — PROGRESS NOTES
"EP Problems:   Ventricular tachycardia  - 1/2016:  VT ablation, [facility]  - 10/2017:  VT ablation, Romario SOLORIO  - 1/2019:  VT ablation, Romario SOLORIO  2.  Presence of an ICD  - 2008:  DOI  -2/2022:  Gen change, [employer]  3.  Atrial flutter, uncertain type     Cardiology problems:   HTN  Chronic systolic heart failure   HOCM  Stroke  LV thrombus     Medical problems:  1.  Medication nonadherence  2.  Obesity  3.  Prostate cancer  4.  Type II DM  5.  Lower extremity venous stasis ulcerations    Patient ID:  Jeanie Grossman is a 65 y.o. male with problem list as above as above who EP is following for atrial flutter, ventricular pacing burden, ventricular tachycardia, hypertrophic cardiomyopathy, chronic systolic heart failure.    Subjective:  He underwent EP study and ablation for typical atrial flutter yesterday.  He did well with this procedure.  He was monitored overnight and has remained in a paced and intermittently V paced rhythm.    Objective:  /54 (BP Location: Right arm, Patient Position: Sitting)   Pulse 50   Temp 98.8 °F (37.1 °C) (Oral)   Resp 16   Ht 190.5 cm (75\")   Wt 123 kg (271 lb 9.6 oz)   SpO2 97%   BMI 33.95 kg/m²     Chronically ill-appearing, no acute distress lungs are clear to auscultation bilaterally  Heart rhythm is regular with a normal rate  Pulse generator is clean dry and intact  There is trace lower extremity edema with chronic lower extremity wounds present  Suture was removed from the femoral access site without evidence of bruising, bleeding, hematoma    Labs today:  Lab Results   Component Value Date    GLUCOSE 94 01/31/2025    CALCIUM 8.8 01/31/2025     01/31/2025    K 4.7 01/31/2025    CO2 23.0 01/31/2025    BUN 18 01/31/2025    CREATININE 1.50 (H) 01/31/2025    EGFR 51.0 (L) 01/31/2025     Lab Results   Component Value Date    WBC 4.39 01/31/2025    HGB 11.6 (L) 01/31/2025    HCT 37.8 01/31/2025     01/31/2025     Lab Results   Component Value Date    MG 2.2 " 01/29/2025       Assessment:  Atrial flutter, typical  Chronic systolic heart failure  Lactic acidosis  Phantom ICD shock  LV thrombus    Plan:  -Doing well from atrial flutter standpoint  -Continue apixaban given his history of atrial flutter and LV thrombus  -Continue GDMT for chronic systolic heart failure with carvedilol, losartan, spironolactone, furosemide  -Will need outpatient follow-up to reassess his overall pacing burden now that he is back in sinus rhythm; if pacing burden remains elevated we will need to consider CRT-D upgrade  -Okay for discharge home from EP standpoint this afternoon; EP to sign off at this time, please call if additional questions arise    Part of this note may be an electronic transcription/translation of spoken language to printed text using the Dragon Dictation System.

## 2025-01-31 NOTE — PLAN OF CARE
Goal Outcome Evaluation:  Plan of Care Reviewed With: patient        Progress: improving  Outcome Evaluation: OT eval completed. Pt presents A&Ox4 sitting EOB with PT upon therapist arrival. Pt reports indep at baseline with ADLs, fxn mob, driving, & home mgmt. Pt lives with his brother but is working to have his brother moved elsewhere. He was indep with LBD with good balance noted. BUE ROM, strength, sensation WNL. Pt requests no use of the gait belt and performs all fxnal transfers and mobility w SBA/S. Pt is at his baseline therefore skilled OT not warranted at this time. Recommend discharge home when medically stable.    Anticipated Discharge Disposition (OT): home

## 2025-01-31 NOTE — CONSULTS
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  maintained. No midline shift or mass effect. Lateral ventricles are  nondilated. Basilar cisterns are patent. No acute orbital finding.  Mastoid air cells are clear. Mucosal thickening in the left maxillary  sinus. Remainder of the visualized paranasal sinuses are clear. No acute  osseous finding.       Impression:         1. No acute intracranial findings.  2. Mucosal thickening in the left maxillary sinus.        This report was signed and finalized on 1/29/2025 8:22 PM by Dr. Ebenezer James MD.       XR Chest 1 View [[id number]] Collected: 01/29/25 1935     Updated: 01/29/25 1939    Narrative:      EXAM/TECHNIQUE: XR CHEST 1 VW-     INDICATION: Chest pain     COMPARISON: 1/15/2025     FINDINGS:     Enlarged but stable cardiac silhouette. Left chest wall cardiac  pacing/ICD device.     No pleural effusion or pneumothorax. Central vascular congestion and  mild diffuse interstitial opacity. Mild atelectasis at the right lung  base. No definite consolidation.     No acute osseous finding.       Impression:      Central vascular congestion with mild diffuse interstitial opacity.  Correlate for mild pulmonary edema.     This report was signed and finalized on 1/29/2025 7:36 PM by Dr. Ebenezer James MD.                  ASSESSMENT/PLAN       Examination  and evaluation of wound(s) was performed.    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   Discussed with PT.  In abuse to be placed by PT.  Patient plans to follow-up with wound care after discharge.      Start     Ordered    01/31/25 1443  Application Unnaboot  Until Discontinued        Comments: BLE - Opticell AG and ABD to open areas    01/31/25 1443    01/31/25 1443  Elevate Extremity  Continuous        Comments: Elevate BLE above heart level when possible   Question:  Elevate Extremity  Answer:  BLE    01/31/25 1443    01/31/25 0000  Ambulatory Referral to Wound Clinic         01/31/25 1444           Discussed findings and treatment plan including risks, benefits, and treatment options with patient in detail. Patient agreed with treatment plan.      This document has been electronically signed by NORMA Duong on 1/31/2025 14:41 CST

## 2025-01-31 NOTE — PLAN OF CARE
"Goal Outcome Evaluation:  Plan of Care Reviewed With: patient        Progress: no change  Outcome Evaluation: Pt c/o \"chest pressure.\" Checked BP, administered PRN nitro, completed ekg. Pt stated he \"felt better.\" Has c/o back pain and cough. Ordered stat troponin, resulted as 626. Informed Dr. Alan and he responded, \"Cardiology will continue to manage.\" Call light within reach. Safety maintained.                             "

## 2025-01-31 NOTE — PLAN OF CARE
Goal Outcome Evaluation:  Plan of Care Reviewed With: patient        Progress: no change  Outcome Evaluation: PT eval completed.  Pt pleasant and agreeable to therapy.  Pt sitting edge of bed upon therapist entering room.  Pt performed sit to stand w/ SBA to I.  Gait ~ 45 ft requiring standing rest w/ noted shortness of breath.  Pt reports shortness of breath started since ablation yesterday.  HR was 86 bpm.  HR decreased w/ rest.  Performed gait ~ 45 ft more w/ SBA to I.  Pt w/ noted flexed posture, flexed knees, increase JONAS, decrease step length, heel strike, and foot clearance.  Pt w/ continued shortness of breath, again HR no greater than mid 80s.  Pt also reported chest tightness new since ablation.  HR improved to 60's w/ rest in sitting.  Notified pt's nurse who reached out to Dr. Rodas re: c/os of chest tightness and shortness of breath w/ activity.  Pt w/ significant swelling in B LEs.  NORMA Baltazar arrived to further assess LEs and advised ok to place unna boots w/ opticel Ag over open draining wounds and ok to use ABD pad where needed due to increase drainage.  Circumferential measurements taken B LEs.  R LE at met heads 30.5 cm and 26 cm superior from lateral malleolus 40.75 cm.  L LE at met heads 28.75 cm, and 26 cm superior from lateral malleolus 38.25 cm.  R LE w/ open wound at posterior leg, scant drainage 40% pink/red, 60%% yellow.  irregular edges measuring 2.5 cm X 1.25 cm.  Open wound at R anterior lower leg 100% yellow, dry, 1.0 cm X 0.7 cm.  L LE w/ 6 open wounds: 1: anterior superior wound 100% pink red base, irregular edges 4.0 X 1.8 cm superficial, 2:  lateral superior, 100% yellow slough, moist3.5  X 4.0 cm, 3: lateral inferior 1.5 cm X 1.0 cm X 0.4 depth 100% yellow, 4: anterior inferior 100% yellow0.5 X 0.5  X 0.2 cm .  5:  anterior medial superior 100% redg 1.0 X 0.5 cm.   6:  anterior medial lower leg 0.5 cm X 0.5 cm 100% red.  Images obtained of B LEs.  Doppler used to assess distal  pulses B LEs intact.  Cleansed wounds w/ normal saline.  Opticel Ag placed over open draining wound.  Abd pad placed over opticel on L LE and unna boots placed B LEs w/ coban as outer dressing.  Education re: importance of LE elevation and for aps every hour awake.  Pt education for pacing activity while up moving.  Pt will benefit from continued PT services to improve activity tolerance, knowledge of energy conservation tech, breathing ex, postural ex and activities to assist w/ edema mgmt.  Unna boots to be changed in 4 to 7 days.  Anticipate pt to return home w/ follow up in Op wound care at discharge.  Will follow for progress and needs.
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
## 2025-02-01 ENCOUNTER — READMISSION MANAGEMENT (OUTPATIENT)
Dept: CALL CENTER | Facility: HOSPITAL | Age: 65
End: 2025-02-01
Payer: MEDICARE

## 2025-02-01 VITALS
WEIGHT: 271 LBS | HEIGHT: 75 IN | BODY MASS INDEX: 33.69 KG/M2 | HEART RATE: 50 BPM | OXYGEN SATURATION: 99 % | RESPIRATION RATE: 16 BRPM | DIASTOLIC BLOOD PRESSURE: 64 MMHG | SYSTOLIC BLOOD PRESSURE: 144 MMHG | TEMPERATURE: 98.9 F

## 2025-02-01 PROBLEM — I50.22 CHRONIC HFREF (HEART FAILURE WITH REDUCED EJECTION FRACTION): Status: ACTIVE | Noted: 2025-02-01

## 2025-02-01 LAB
ALBUMIN SERPL-MCNC: 3.5 G/DL (ref 3.5–5.2)
ALBUMIN/GLOB SERPL: 1.1 G/DL
ALP SERPL-CCNC: 65 U/L (ref 39–117)
ALT SERPL W P-5'-P-CCNC: 12 U/L (ref 1–41)
ANION GAP SERPL CALCULATED.3IONS-SCNC: 8 MMOL/L (ref 5–15)
AST SERPL-CCNC: 17 U/L (ref 1–40)
BASOPHILS # BLD AUTO: 0.02 10*3/MM3 (ref 0–0.2)
BASOPHILS NFR BLD AUTO: 0.4 % (ref 0–1.5)
BILIRUB SERPL-MCNC: 0.5 MG/DL (ref 0–1.2)
BUN SERPL-MCNC: 21 MG/DL (ref 8–23)
BUN/CREAT SERPL: 14.8 (ref 7–25)
CALCIUM SPEC-SCNC: 8.7 MG/DL (ref 8.6–10.5)
CHLORIDE SERPL-SCNC: 102 MMOL/L (ref 98–107)
CHOLEST SERPL-MCNC: 120 MG/DL (ref 0–200)
CO2 SERPL-SCNC: 26 MMOL/L (ref 22–29)
CREAT SERPL-MCNC: 1.42 MG/DL (ref 0.76–1.27)
DEPRECATED RDW RBC AUTO: 54.3 FL (ref 37–54)
EGFRCR SERPLBLD CKD-EPI 2021: 54.5 ML/MIN/1.73
EOSINOPHIL # BLD AUTO: 0.07 10*3/MM3 (ref 0–0.4)
EOSINOPHIL NFR BLD AUTO: 1.5 % (ref 0.3–6.2)
ERYTHROCYTE [DISTWIDTH] IN BLOOD BY AUTOMATED COUNT: 16.1 % (ref 12.3–15.4)
GLOBULIN UR ELPH-MCNC: 3.1 GM/DL
GLUCOSE SERPL-MCNC: 93 MG/DL (ref 65–99)
HBA1C MFR BLD: 5.7 % (ref 4.8–5.6)
HCT VFR BLD AUTO: 36.8 % (ref 37.5–51)
HDLC SERPL-MCNC: 42 MG/DL (ref 40–60)
HGB BLD-MCNC: 11.5 G/DL (ref 13–17.7)
IMM GRANULOCYTES # BLD AUTO: 0.02 10*3/MM3 (ref 0–0.05)
IMM GRANULOCYTES NFR BLD AUTO: 0.4 % (ref 0–0.5)
LDLC SERPL CALC-MCNC: 65 MG/DL (ref 0–100)
LDLC/HDLC SERPL: 1.59 {RATIO}
LYMPHOCYTES # BLD AUTO: 0.65 10*3/MM3 (ref 0.7–3.1)
LYMPHOCYTES NFR BLD AUTO: 14.1 % (ref 19.6–45.3)
MCH RBC QN AUTO: 29 PG (ref 26.6–33)
MCHC RBC AUTO-ENTMCNC: 31.3 G/DL (ref 31.5–35.7)
MCV RBC AUTO: 92.9 FL (ref 79–97)
MONOCYTES # BLD AUTO: 0.5 10*3/MM3 (ref 0.1–0.9)
MONOCYTES NFR BLD AUTO: 10.8 % (ref 5–12)
NEUTROPHILS NFR BLD AUTO: 3.36 10*3/MM3 (ref 1.7–7)
NEUTROPHILS NFR BLD AUTO: 72.8 % (ref 42.7–76)
NRBC BLD AUTO-RTO: 0 /100 WBC (ref 0–0.2)
PLATELET # BLD AUTO: 189 10*3/MM3 (ref 140–450)
PMV BLD AUTO: 10.1 FL (ref 6–12)
POTASSIUM SERPL-SCNC: 4.4 MMOL/L (ref 3.5–5.2)
PROT SERPL-MCNC: 6.6 G/DL (ref 6–8.5)
RBC # BLD AUTO: 3.96 10*6/MM3 (ref 4.14–5.8)
SODIUM SERPL-SCNC: 136 MMOL/L (ref 136–145)
TRIGL SERPL-MCNC: 56 MG/DL (ref 0–150)
TSH SERPL DL<=0.05 MIU/L-ACNC: 0.79 UIU/ML (ref 0.27–4.2)
VLDLC SERPL-MCNC: 13 MG/DL (ref 5–40)
WBC NRBC COR # BLD AUTO: 4.62 10*3/MM3 (ref 3.4–10.8)

## 2025-02-01 PROCEDURE — 80053 COMPREHEN METABOLIC PANEL: CPT | Performed by: FAMILY MEDICINE

## 2025-02-01 PROCEDURE — G0378 HOSPITAL OBSERVATION PER HR: HCPCS

## 2025-02-01 PROCEDURE — 83036 HEMOGLOBIN GLYCOSYLATED A1C: CPT | Performed by: FAMILY MEDICINE

## 2025-02-01 PROCEDURE — 80061 LIPID PANEL: CPT | Performed by: FAMILY MEDICINE

## 2025-02-01 PROCEDURE — 84443 ASSAY THYROID STIM HORMONE: CPT | Performed by: FAMILY MEDICINE

## 2025-02-01 PROCEDURE — 85025 COMPLETE CBC W/AUTO DIFF WBC: CPT | Performed by: STUDENT IN AN ORGANIZED HEALTH CARE EDUCATION/TRAINING PROGRAM

## 2025-02-01 PROCEDURE — 29580 STRAPPING UNNA BOOT: CPT

## 2025-02-01 RX ORDER — ROSUVASTATIN CALCIUM 10 MG/1
10 TABLET, COATED ORAL DAILY
Qty: 90 TABLET | Refills: 0 | Status: SHIPPED | OUTPATIENT
Start: 2025-02-01

## 2025-02-01 RX ADMIN — SPIRONOLACTONE 12.5 MG: 25 TABLET ORAL at 08:11

## 2025-02-01 RX ADMIN — APIXABAN 5 MG: 5 TABLET, FILM COATED ORAL at 08:12

## 2025-02-01 RX ADMIN — EMPAGLIFLOZIN 10 MG: 10 TABLET, FILM COATED ORAL at 08:12

## 2025-02-01 RX ADMIN — FUROSEMIDE 40 MG: 40 TABLET ORAL at 08:11

## 2025-02-01 RX ADMIN — Medication 10 ML: at 08:12

## 2025-02-01 RX ADMIN — FINASTERIDE 5 MG: 5 TABLET, FILM COATED ORAL at 08:12

## 2025-02-01 RX ADMIN — LOSARTAN POTASSIUM 25 MG: 25 TABLET, FILM COATED ORAL at 08:12

## 2025-02-01 RX ADMIN — ROSUVASTATIN 10 MG: 10 TABLET, FILM COATED ORAL at 08:12

## 2025-02-01 RX ADMIN — ASPIRIN 81 MG: 81 TABLET, COATED ORAL at 08:12

## 2025-02-01 RX ADMIN — CARVEDILOL 6.25 MG: 6.25 TABLET, FILM COATED ORAL at 08:12

## 2025-02-01 NOTE — OUTREACH NOTE
Prep Survey      Flowsheet Row Responses   [facility] [place], [facility] by [facility] facility patient discharged from? [facility]   Is LACE score < 7 ? No   Eligibility Readm Mgmt   Discharge diagnosis Near syncope  [Ablation atrial flutter]   Does the patient have one of the following disease processes/diagnoses(primary or secondary)? Other   Does the patient have Home health ordered? No   Is there a DME ordered? No   Comments regarding appointments Ambulatory Referral to Physical Therapy,  Ambulatory Referral to Wound Clinic,Provider: Follow-up with the EP outpatient,   Follow-up with wound care outpatient   Prep survey completed? Yes            Leatha DICKERSON - Registered Nurse

## 2025-02-01 NOTE — NURSING NOTE
Last office visit faxed to [facility] [phone]    Pt removed unna boot wrap from left leg notified Pt, will be up to re-wrap before discharge.
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
## 2025-02-01 NOTE — CASE MANAGEMENT/SOCIAL WORK
Case Management Discharge Note                Selected Continued Care - Admitted Since 1/29/2025       Destination    No services have been selected for the patient.                Durable Medical Equipment    No services have been selected for the patient.                Dialysis/Infusion    No services have been selected for the patient.                Home Medical Care    No services have been selected for the patient.                Therapy    No services have been selected for the patient.                Community Resources    No services have been selected for the patient.                Community & DME    No services have been selected for the patient.                    Selected Continued Care - Prior Encounters Includes continued care and service providers with selected services from prior encounters from 10/31/2024 to 2/1/2025      Discharged on 11/18/2024 Admission date: 11/14/2024 - Discharge disposition: Home-Health Care Svc      Home Medical Care       Service Provider Services Address Phone Fax Patient Preferred     [facility] [address], [facility] [id number] [phone] --                               Final Discharge Disposition Code: 01 - home or self-care   PT resting in cart, another warm blanket given. PT stating that she is having pain relief s/p 4mg morphine IV push and 4mg ondansetron IV push. PT updated on the plan of care and that we still need a urine sample.

## 2025-02-01 NOTE — PLAN OF CARE
Goal Outcome Evaluation:  Plan of Care Reviewed With: patient        Progress: no change  Outcome Evaluation: Pt has removed L LE unna boot due to itching.PTA re-applied L unna boot.Opticel cut and place over 5 small wound on L LE with abd pad over opticel for drainage.PTA placed coban as outer dressing. Pt stated that B unna boots felt comfortable and pt had good cappilary refill.
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
## 2025-02-01 NOTE — PLAN OF CARE
Goal Outcome Evaluation:  Plan of Care Reviewed With: patient        Progress: no change  Outcome Evaluation: VSS. AP 49-54 on tele. Pt repeatedly asking for food, even after food has been provided multiple times. Pt barely slept. Call light within reach. Safety maintained.
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
## 2025-02-04 ENCOUNTER — READMISSION MANAGEMENT (OUTPATIENT)
Dept: CALL CENTER | Facility: HOSPITAL | Age: 65
End: 2025-02-04
Payer: MEDICARE

## 2025-02-04 ENCOUNTER — OFFICE VISIT (OUTPATIENT)
Dept: WOUND CARE | Facility: HOSPITAL | Age: 65
End: 2025-02-04
Payer: MEDICARE

## 2025-02-04 PROCEDURE — G0463 HOSPITAL OUTPT CLINIC VISIT: HCPCS

## 2025-02-04 NOTE — OUTREACH NOTE
Medical Week 1 Survey      Flowsheet Row Responses   [facility], [place], operated by [facility] patient discharged from? [place]   Does the patient have one of the following disease processes/diagnoses(primary or secondary)? Other   Week 1 attempt successful? No   Unsuccessful attempts Attempt 1  [Patient presently at appt, will reschedule call]            Naman ANGEL - Registered Nurse

## 2025-02-06 ENCOUNTER — TRANSCRIBE ORDERS (OUTPATIENT)
Dept: ADMINISTRATIVE | Facility: HOSPITAL | Age: 65
End: 2025-02-06
Payer: MEDICARE

## 2025-02-06 DIAGNOSIS — R60.0 LOCALIZED EDEMA: Primary | ICD-10-CM

## 2025-02-11 ENCOUNTER — READMISSION MANAGEMENT (OUTPATIENT)
Dept: CALL CENTER | Facility: HOSPITAL | Age: 65
End: 2025-02-11
Payer: MEDICARE

## 2025-02-11 NOTE — OUTREACH NOTE
Medical Week 2 Survey      Flowsheet Row Responses   [facility] facility patient discharged from? [place]   Does the patient have one of the following disease processes/diagnoses(primary or secondary)? Other   Week 2 attempt successful? No   Unsuccessful attempts Attempt 1            Meredith Denton Registered Nurse

## 2025-02-18 ENCOUNTER — OFFICE VISIT (OUTPATIENT)
Dept: WOUND CARE | Facility: HOSPITAL | Age: 65
End: 2025-02-18
Payer: MEDICARE

## 2025-02-18 ENCOUNTER — READMISSION MANAGEMENT (OUTPATIENT)
Dept: CALL CENTER | Facility: HOSPITAL | Age: 65
End: 2025-02-18
Payer: MEDICARE

## 2025-02-18 NOTE — OUTREACH NOTE
Medical Week 3 Survey      Flowsheet Row Responses   [facility] patient discharged from? [place]   Does the patient have one of the following disease processes/diagnoses(primary or secondary)? Other   Week 3 attempt successful? No   Unsuccessful attempts Attempt 1            Carolyn Denton Registered Nurse

## 2025-02-25 ENCOUNTER — HOSPITAL ENCOUNTER (OUTPATIENT)
Dept: ULTRASOUND IMAGING | Facility: HOSPITAL | Age: 65
Discharge: HOME | End: 2025-02-25
Payer: MEDICARE

## 2025-02-25 DIAGNOSIS — R60.0 LOCALIZED EDEMA: ICD-10-CM

## 2025-02-25 PROCEDURE — 93970 EXTREMITY STUDY: CPT

## 2025-02-26 ENCOUNTER — OFFICE VISIT (OUTPATIENT)
Dept: WOUND CARE | Facility: HOSPITAL | Age: 65
End: 2025-02-26
Payer: MEDICARE

## 2025-03-05 ENCOUNTER — OFFICE VISIT (OUTPATIENT)
Dept: WOUND CARE | Facility: HOSPITAL | Age: 65
End: 2025-03-05
Payer: MEDICARE

## 2025-03-13 ENCOUNTER — OFFICE VISIT (OUTPATIENT)
Dept: WOUND CARE | Facility: HOSPITAL | Age: 65
End: 2025-03-13
Payer: MEDICARE

## 2025-03-20 ENCOUNTER — OFFICE VISIT (OUTPATIENT)
Dept: WOUND CARE | Facility: HOSPITAL | Age: 65
End: 2025-03-20
Payer: MEDICARE

## 2025-03-20 DIAGNOSIS — I87.2 VENOUS INSUFFICIENCY (CHRONIC) (PERIPHERAL): ICD-10-CM

## 2025-03-20 DIAGNOSIS — E11.622 TYPE 2 DIABETES MELLITUS WITH OTHER SKIN ULCER, UNSPECIFIED WHETHER LONG TERM INSULIN USE: ICD-10-CM

## 2025-03-20 DIAGNOSIS — L97.822 NON-PRESSURE CHRONIC ULCER OF OTHER PART OF LEFT LOWER LEG WITH FAT LAYER EXPOSED: Primary | ICD-10-CM

## 2025-03-20 DIAGNOSIS — L98.499 TYPE 2 DIABETES MELLITUS WITH OTHER SKIN ULCER, UNSPECIFIED WHETHER LONG TERM INSULIN USE: ICD-10-CM

## 2025-03-20 DIAGNOSIS — R60.0 LOCALIZED EDEMA: ICD-10-CM

## 2025-03-31 ENCOUNTER — OFFICE VISIT (OUTPATIENT)
Dept: WOUND CARE | Facility: HOSPITAL | Age: 65
End: 2025-03-31
Payer: MEDICARE

## 2025-03-31 PROCEDURE — G0463 HOSPITAL OUTPT CLINIC VISIT: HCPCS

## 2025-04-14 ENCOUNTER — OFFICE VISIT (OUTPATIENT)
Dept: WOUND CARE | Facility: HOSPITAL | Age: 65
End: 2025-04-14
Payer: MEDICARE

## 2025-04-21 ENCOUNTER — OFFICE VISIT (OUTPATIENT)
Dept: WOUND CARE | Facility: HOSPITAL | Age: 65
End: 2025-04-21
Payer: MEDICARE

## 2025-04-29 ENCOUNTER — OFFICE VISIT (OUTPATIENT)
Dept: WOUND CARE | Facility: HOSPITAL | Age: 65
End: 2025-04-29
Payer: MEDICARE

## 2025-05-01 ENCOUNTER — TELEPHONE (OUTPATIENT)
Dept: VASCULAR SURGERY | Facility: CLINIC | Age: 65
End: 2025-05-01
Payer: MEDICARE

## 2025-05-01 NOTE — TELEPHONE ENCOUNTER
Left message for a return call -    Trying to reschedule cancelled appointment with Dr. Grande.      If patient calls back please reschedule .        HUB TO RELAY

## 2025-05-06 ENCOUNTER — OFFICE VISIT (OUTPATIENT)
Dept: WOUND CARE | Facility: HOSPITAL | Age: 65
End: 2025-05-06
Payer: MEDICARE

## 2025-05-21 ENCOUNTER — TELEPHONE (OUTPATIENT)
Dept: UROLOGY | Facility: CLINIC | Age: 65
End: 2025-05-21
Payer: MEDICARE

## 2025-05-27 ENCOUNTER — TELEPHONE (OUTPATIENT)
Dept: UROLOGY | Facility: CLINIC | Age: 65
End: 2025-05-27
Payer: MEDICARE

## 2025-05-27 NOTE — TELEPHONE ENCOUNTER
I called patient since he missed appointment with Tom for today. I left voicemail about this and asked for patient to call back to reschedule.    OK for HUB to confirm message and/or reschedule.

## 2025-05-27 NOTE — TELEPHONE ENCOUNTER
Called patient, no answer, left voicemail     Called the patient to see if he would like to come in just a little bit earlier today. His appt is scheduled for 2:30 PM but we have a few openings before then.    IF THE PATIENT CALLS BACK, IT IS OKAY FOR THE HUB TO TRANSFER CALL TO OFFICE.

## 2025-05-30 ENCOUNTER — OFFICE VISIT (OUTPATIENT)
Dept: WOUND CARE | Facility: HOSPITAL | Age: 65
End: 2025-05-30
Payer: MEDICARE

## 2025-05-30 DIAGNOSIS — R60.0 LOCALIZED EDEMA: ICD-10-CM

## 2025-05-30 DIAGNOSIS — I87.2 VENOUS INSUFFICIENCY (CHRONIC) (PERIPHERAL): ICD-10-CM

## 2025-05-30 DIAGNOSIS — E11.622 TYPE 2 DIABETES MELLITUS WITH OTHER SKIN ULCER, UNSPECIFIED WHETHER LONG TERM INSULIN USE: Primary | ICD-10-CM

## 2025-05-30 DIAGNOSIS — L98.499 TYPE 2 DIABETES MELLITUS WITH OTHER SKIN ULCER, UNSPECIFIED WHETHER LONG TERM INSULIN USE: Primary | ICD-10-CM

## 2025-05-30 DIAGNOSIS — L97.812 NON-PRESSURE CHRONIC ULCER OF OTHER PART OF RIGHT LOWER LEG WITH FAT LAYER EXPOSED: ICD-10-CM

## 2025-06-04 ENCOUNTER — TELEPHONE (OUTPATIENT)
Dept: CARDIOLOGY | Facility: CLINIC | Age: 65
End: 2025-06-04

## 2025-06-04 NOTE — TELEPHONE ENCOUNTER
----- Message from Chalo Rodas sent at 1/31/2025  9:08 AM CST -----  Regarding: Device transfer  Could we transfer this patient's remote monitoring to me?

## 2025-06-06 NOTE — TELEPHONE ENCOUNTER
Patient transferred in MDT [employer] but monitor has been disconnected since September 2024.  Spoke with patient and advised him to reset monitor and to call [employer] Stay Connected ([phone]) if an error code appears.  Understanding verbalized.     Per patient, he has been doing well since his AFL ablation with Dr. Rodas on 1.30.2025.  Patient has missed EP follow up in our clinic and will need to be rescheduled.  He prefers Friday afternoons after 13:00.  First available is Friday, 8.15.2025, at 14:30.   Will see if that works for Dr. Rodas or if he prefers patient to see EP APRN before that time.

## 2025-06-11 ENCOUNTER — OFFICE VISIT (OUTPATIENT)
Dept: WOUND CARE | Facility: HOSPITAL | Age: 65
End: 2025-06-11
Payer: MEDICARE

## 2025-06-20 NOTE — TELEPHONE ENCOUNTER
No transmission has been received and carelink monitor is still disconnected.  Patient has had his current monitor since 2018.  Attempted to contact patient and left voicemail requesting a call back to discuss.

## 2025-06-23 ENCOUNTER — TELEPHONE (OUTPATIENT)
Dept: UROLOGY | Facility: CLINIC | Age: 65
End: 2025-06-23

## 2025-06-23 NOTE — TELEPHONE ENCOUNTER
Hub staff attempted to follow warm transfer process and was unsuccessful     Caller: KRISTOFER GREGORY    Relationship to patient: SELF    Best call back number: [phone]    Patient is needing: PT IS NEEDING INSTRUCTIONS ON HOW TO GET FROM BUILDING 1 TO BUILDING 3. HE WOULD LIKE TO KNOW ONCE ENTERING BUILDING 3 HOW FAR IT IS TO THE ELEVATOR AND THE LOCATION OF THE ELEVATOR. PLEASE LEAVE MESSAGE TO CALL BACK  WITH SPECIFIC NUMBER TO RETURN CALL. PLEASE CALL PT BACK TO GIVE DIRECTIONS.

## 2025-06-24 ENCOUNTER — OFFICE VISIT (OUTPATIENT)
Dept: WOUND CARE | Facility: HOSPITAL | Age: 65
End: 2025-06-24
Payer: MEDICARE

## 2025-06-24 PROCEDURE — G0463 HOSPITAL OUTPT CLINIC VISIT: HCPCS

## 2025-06-24 NOTE — PROGRESS NOTES
Subjective    Mr. Grossman is 65 y.o. male    Chief Complaint: Erectile dysfunction    History of Present Illness  Patient presents primarily to discuss erectile dysfunction patient has history of prostate cancer patient underwent radiation he was seen once by Dr. Cruz he was referred by Dr. Patel.  He now sees a physician in [facility].  He does not take any ED medications has not tried any treatments up to this point he is able to attain a partial erection but not enough for penetration or intercourse.  Has history of heart disease and other risk factors.  He does take nitroglycerin but has not taken it in 6 years.    Also states he had some discomfort with urinating at times he will burn his urine does not look infected is positive for ketones and proteinuria.    The following portions of the patient's history were reviewed and updated as appropriate: allergies, current medications, past family history, past medical history, past social history, past surgical history and problem list.    Review of Systems   Genitourinary:  Positive for dysuria.         Current Outpatient Medications:     aspirin 81 MG EC tablet, Take 1 tablet by mouth Daily., Disp: , Rfl:     carvedilol (COREG) 6.25 MG tablet, Take 1 tablet by mouth 2 (Two) Times a Day With Meals for 30 days., Disp: 60 tablet, Rfl: 0    furosemide (Lasix) 40 MG tablet, Take 1 tablet by mouth Daily for 30 days., Disp: 30 tablet, Rfl: 0    losartan (COZAAR) 25 MG tablet, Take 1 tablet by mouth Daily for 30 days., Disp: 30 tablet, Rfl: 0    nitroglycerin (NITROSTAT) 0.4 MG SL tablet, Place 1 tablet under the tongue Every 5 (Five) Minutes As Needed for Chest Pain. Take no more than 3 doses in 15 minutes., Disp: , Rfl:     rosuvastatin (CRESTOR) 10 MG tablet, Take 1 tablet by mouth Daily., Disp: 90 tablet, Rfl: 0    spironolactone (ALDACTONE) 25 MG tablet, Take 0.5 tablets by mouth Daily for 30 days., Disp: 15 tablet, Rfl: 0    amiodarone (PACERONE) 200 MG  "tablet, Take 1 tablet by mouth 2 (Two) Times a Day., Disp: , Rfl:     sildenafil (Viagra) 100 MG tablet, Take 1 tablet by mouth As Needed for Erectile Dysfunction., Disp: 20 tablet, Rfl: 1    Past Medical History:   Diagnosis Date    CHF (congestive heart failure)     Diabetes mellitus     Hyperlipidemia     Hypertension     Hypertrophic cardiomyopathy     s/p AICD    Migraine     Prostate cancer     Tachycardia     Ventricular tachycardia     Ventricular tachycardia, sustained 10/14/2016       Past Surgical History:   Procedure Laterality Date    CARDIAC ABLATION  01/28/2016 2/2016, 10/18/2016 (Dr. Doss in [place], [place])    CARDIAC ABLATION      2018    CARDIAC CATHETERIZATION      CARDIAC DEFIBRILLATOR PLACEMENT      CARDIAC DEFIBRILLATOR PLACEMENT      CARDIAC ELECTROPHYSIOLOGY PROCEDURE N/A 1/30/2025    Procedure: Ablation atrial flutter;  Surgeon: Chalo Rodas MD;  Location: [facility] CATH INVASIVE LOCATION;  Service: Cardiovascular;  Laterality: N/A;    PROSTATE BIOPSY  2019    PROSTATE BIOPSY  2023       Social History     Socioeconomic History    Marital status: Single   Tobacco Use    Smoking status: Never     Passive exposure: Never    Smokeless tobacco: Never   Vaping Use    Vaping status: Never Used   Substance and Sexual Activity    Alcohol use: No    Drug use: No    Sexual activity: Defer       Family History   Family history unknown: Yes       Objective    Temp 96.7 °F (35.9 °C)   Ht 190.5 cm (75\")   Wt 123 kg (270 lb 3.2 oz)   BMI 33.77 kg/m²     Physical Exam  Vitals reviewed.   Constitutional:       Appearance: Normal appearance.   HENT:      Head: Normocephalic and atraumatic.   Pulmonary:      Effort: Pulmonary effort is normal.   Neurological:      Mental Status: He is alert.   Psychiatric:         Mood and Affect: Mood normal.         Behavior: Behavior normal.             Results for orders placed or performed in visit on 06/27/25   POC Urinalysis Dipstick, Multipro    Collection Time: " 06/27/25 12:57 PM    Specimen: Urine   Result Value Ref Range    Color Yellow Yellow, Straw, Dark Yellow, Lalitha    Clarity, UA Clear Clear    Glucose, UA Negative Negative mg/dL    Bilirubin Negative Negative    Ketones, UA Trace (A) Negative    Specific Gravity  1.030 1.005 - 1.030    Blood, UA Negative Negative    pH, Urine 5.5 5.0 - 8.0    Protein,  mg/dL (A) Negative mg/dL    Urobilinogen, UA Normal Normal, 0.2 E.U./dL    Nitrite, UA Negative Negative    Leukocytes Negative Negative     Assessment and Plan    Diagnoses and all orders for this visit:    1. Erectile dysfunction due to arterial insufficiency (Primary)  -     POC Urinalysis Dipstick, Multipro  -     sildenafil (Viagra) 100 MG tablet; Take 1 tablet by mouth As Needed for Erectile Dysfunction.  Dispense: 20 tablet; Refill: 1    2. Proteinuria, unspecified type    3. History of prostate cancer      Patient here primarily to discuss erectile dysfunction patient can attain a partial erection but not a full erection sufficient for intercourse.  We discussed treatment options after discussion we will treat him with 100 mg Viagra would like him to break it in half if effective stay at the 50 mg dose if not effective we can increase to the 100 mg dose.  I discussed side effects include nasal, frontal headache, facial flushing.  Also should the erection lasts up to 4 hours he needs to come to this emergency department    I also advised and warned him about taking nitroglycerin with Viagra he is aware of this risk and it can cause unsafe drop in blood pressure.  It is on his med list he has not taken it for approximately 6 years.    Also has been having urine discomfort with urinating no infection seen however there is proteinuria and ketones I suspect some dehydration and concentrated urine I told him to increase his water intake we will recheck urine in 1 month.    Follow-up now with that outside urologist and son-in-law  For his prostate  cancer.

## 2025-06-27 ENCOUNTER — OFFICE VISIT (OUTPATIENT)
Dept: UROLOGY | Facility: CLINIC | Age: 65
End: 2025-06-27
Payer: MEDICARE

## 2025-06-27 VITALS — TEMPERATURE: 96.7 F | BODY MASS INDEX: 33.6 KG/M2 | WEIGHT: 270.2 LBS | HEIGHT: 75 IN

## 2025-06-27 DIAGNOSIS — N52.01 ERECTILE DYSFUNCTION DUE TO ARTERIAL INSUFFICIENCY: Primary | ICD-10-CM

## 2025-06-27 DIAGNOSIS — R80.9 PROTEINURIA, UNSPECIFIED TYPE: ICD-10-CM

## 2025-06-27 DIAGNOSIS — Z85.46 HISTORY OF PROSTATE CANCER: ICD-10-CM

## 2025-06-27 LAB
BILIRUB BLD-MCNC: NEGATIVE MG/DL
CLARITY, POC: CLEAR
COLOR UR: YELLOW
GLUCOSE UR STRIP-MCNC: NEGATIVE MG/DL
KETONES UR QL: ABNORMAL
LEUKOCYTE EST, POC: NEGATIVE
NITRITE UR-MCNC: NEGATIVE MG/ML
PH UR: 5.5 [PH] (ref 5–8)
PROT UR STRIP-MCNC: ABNORMAL MG/DL
RBC # UR STRIP: NEGATIVE /UL
SP GR UR: 1.03 (ref 1–1.03)
UROBILINOGEN UR QL: NORMAL

## 2025-06-27 RX ORDER — SILDENAFIL 100 MG/1
100 TABLET, FILM COATED ORAL AS NEEDED
Qty: 20 TABLET | Refills: 1 | Status: SHIPPED | OUTPATIENT
Start: 2025-06-27

## 2025-06-27 RX ORDER — AMIODARONE HYDROCHLORIDE 200 MG/1
200 TABLET ORAL 2 TIMES DAILY
COMMUNITY

## 2025-07-06 LAB
MC_CV_MDC_IDC_RATE_1: 113
MC_CV_MDC_IDC_RATE_1: 120
MC_CV_MDC_IDC_RATE_1: 171
MC_CV_MDC_IDC_RATE_1: 176
MC_CV_MDC_IDC_RATE_2: 120
MC_CV_MDC_IDC_RATE_2: 214
MC_CV_MDC_IDC_RV_HV_IMPEDANCE: 42
MC_CV_MDC_IDC_SVC_MEASURED_IMPEDANCE: 72
MC_CV_MDC_IDC_THERAPIES: NORMAL
MC_CV_MDC_IDC_ZONE_ID: 2
MC_CV_MDC_IDC_ZONE_ID: 3
MC_CV_MDC_IDC_ZONE_ID: 4
MC_CV_MDC_IDC_ZONE_ID: 5
MC_CV_MDC_IDC_ZONE_ID: 6
MDC_IDC_MSMT_BATTERY_REMAINING_LONGEVITY: 47 MO
MDC_IDC_MSMT_BATTERY_RRT_TRIGGER: 2.73
MDC_IDC_MSMT_BATTERY_STATUS: NORMAL
MDC_IDC_MSMT_BATTERY_VOLTAGE: 2.96
MDC_IDC_MSMT_CAP_CHARGE_TIME: 3.66
MDC_IDC_MSMT_LEADCHNL_RA_DTM: NORMAL
MDC_IDC_MSMT_LEADCHNL_RA_IMPEDANCE_VALUE: 399
MDC_IDC_MSMT_LEADCHNL_RA_PACING_THRESHOLD_AMPLITUDE: 0.75
MDC_IDC_MSMT_LEADCHNL_RA_PACING_THRESHOLD_POLARITY: NORMAL
MDC_IDC_MSMT_LEADCHNL_RA_PACING_THRESHOLD_PULSEWIDTH: 0.4
MDC_IDC_MSMT_LEADCHNL_RA_SENSING_INTR_AMPL: 3.88
MDC_IDC_MSMT_LEADCHNL_RV_DTM: NORMAL
MDC_IDC_MSMT_LEADCHNL_RV_IMPEDANCE_VALUE: 304
MDC_IDC_MSMT_LEADCHNL_RV_PACING_THRESHOLD_AMPLITUDE: 1.25
MDC_IDC_MSMT_LEADCHNL_RV_PACING_THRESHOLD_POLARITY: NORMAL
MDC_IDC_MSMT_LEADCHNL_RV_PACING_THRESHOLD_PULSEWIDTH: 0.4
MDC_IDC_MSMT_LEADCHNL_RV_SENSING_INTR_AMPL: 2.5
MDC_IDC_PG_IMPLANT_DTM: NORMAL
MDC_IDC_PG_MFG: NORMAL
MDC_IDC_PG_MODEL: NORMAL
MDC_IDC_PG_SERIAL: NORMAL
MDC_IDC_PG_TYPE: NORMAL
MDC_IDC_SESS_DTM: NORMAL
MDC_IDC_SESS_TYPE: NORMAL
MDC_IDC_SET_BRADY_AT_MODE_SWITCH_RATE: 171
MDC_IDC_SET_BRADY_LOWRATE: 50
MDC_IDC_SET_BRADY_MAX_SENSOR_RATE: 110
MDC_IDC_SET_BRADY_MAX_TRACKING_RATE: 110
MDC_IDC_SET_BRADY_MODE: NORMAL
MDC_IDC_SET_BRADY_PAV_DELAY: 180
MDC_IDC_SET_BRADY_SAV_DELAY: 150
MDC_IDC_SET_LEADCHNL_RA_PACING_AMPLITUDE: 1.5
MDC_IDC_SET_LEADCHNL_RA_PACING_POLARITY: NORMAL
MDC_IDC_SET_LEADCHNL_RA_PACING_PULSEWIDTH: 0.4
MDC_IDC_SET_LEADCHNL_RA_SENSING_POLARITY: NORMAL
MDC_IDC_SET_LEADCHNL_RA_SENSING_SENSITIVITY: 0.3
MDC_IDC_SET_LEADCHNL_RV_PACING_AMPLITUDE: 2.5
MDC_IDC_SET_LEADCHNL_RV_PACING_POLARITY: NORMAL
MDC_IDC_SET_LEADCHNL_RV_PACING_PULSEWIDTH: 0.4
MDC_IDC_SET_LEADCHNL_RV_SENSING_POLARITY: NORMAL
MDC_IDC_SET_LEADCHNL_RV_SENSING_SENSITIVITY: 0.3
MDC_IDC_SET_ZONE_STATUS: NORMAL
MDC_IDC_SET_ZONE_TYPE: NORMAL
MDC_IDC_STAT_AT_BURDEN_PERCENT: 0
MDC_IDC_STAT_BRADY_RA_PERCENT_PACED: 99.9
MDC_IDC_STAT_BRADY_RV_PERCENT_PACED: 88.22
MDC_IDC_STAT_TACHYTHERAPY_ATP_DELIVERED_RECENT: 0
MDC_IDC_STAT_TACHYTHERAPY_SHOCKS_ABORTED_RECENT: 0
MDC_IDC_STAT_TACHYTHERAPY_SHOCKS_DELIVERED_RECENT: 0

## 2025-07-10 NOTE — PROGRESS NOTES
Subjective    Mr. Grossman is 65 y.o. male    Chief Complaint: Erectile dysfunction    History of Present Illness  65-year-old male established patient follow-up for worsening organic erectile dysfunction refractory to sildenafil 100 mg dosing which has worsened after receiving radiation therapy up in [facility] for his prostate cancer.  He has done his research and is interested in penile implant.  He is afraid of needles and does not want to do penile injections.    The following portions of the patient's history were reviewed and updated as appropriate: allergies, current medications, past family history, past medical history, past social history, past surgical history and problem list.    Review of Systems      Current Outpatient Medications:     amiodarone (PACERONE) 200 MG tablet, Take 1 tablet by mouth 2 (Two) Times a Day., Disp: , Rfl:     aspirin 81 MG EC tablet, Take 1 tablet by mouth Daily., Disp: , Rfl:     nitroglycerin (NITROSTAT) 0.4 MG SL tablet, Place 1 tablet under the tongue Every 5 (Five) Minutes As Needed for Chest Pain. Take no more than 3 doses in 15 minutes., Disp: , Rfl:     rosuvastatin (CRESTOR) 10 MG tablet, Take 1 tablet by mouth Daily., Disp: 90 tablet, Rfl: 0    carvedilol (COREG) 6.25 MG tablet, Take 1 tablet by mouth 2 (Two) Times a Day With Meals for 30 days., Disp: 60 tablet, Rfl: 0    furosemide (Lasix) 40 MG tablet, Take 1 tablet by mouth Daily for 30 days., Disp: 30 tablet, Rfl: 0    losartan (COZAAR) 25 MG tablet, Take 1 tablet by mouth Daily for 30 days., Disp: 30 tablet, Rfl: 0    spironolactone (ALDACTONE) 25 MG tablet, Take 0.5 tablets by mouth Daily for 30 days., Disp: 15 tablet, Rfl: 0    tadalafil (Cialis) 20 MG tablet, 1/2 to 1 tab by mouth 1 hour prior to intercourse, Disp: 30 tablet, Rfl: 11    Past Medical History:   Diagnosis Date    CHF (congestive heart failure)     Diabetes mellitus     Hyperlipidemia     Hypertension     Hypertrophic cardiomyopathy      "s/p AICD    Migraine     Prostate cancer     Tachycardia     Ventricular tachycardia     Ventricular tachycardia, sustained 10/14/2016       Past Surgical History:   Procedure Laterality Date    CARDIAC ABLATION  01/28/2016 2/2016, 10/18/2016 (Dr. Doss in [place], [place])    CARDIAC ABLATION      2018    CARDIAC CATHETERIZATION      CARDIAC DEFIBRILLATOR PLACEMENT      CARDIAC DEFIBRILLATOR PLACEMENT      CARDIAC ELECTROPHYSIOLOGY PROCEDURE N/A 1/30/2025    Procedure: Ablation atrial flutter;  Surgeon: Chalo Roads MD;  Location:  [facility] CATH INVASIVE LOCATION;  Service: Cardiovascular;  Laterality: N/A;    PROSTATE BIOPSY  2019    PROSTATE BIOPSY  2023       Social History     Socioeconomic History    Marital status: Single   Tobacco Use    Smoking status: Never     Passive exposure: Never    Smokeless tobacco: Never   Vaping Use    Vaping status: Never Used   Substance and Sexual Activity    Alcohol use: No    Drug use: No    Sexual activity: Defer       Family History   Family history unknown: Yes       Objective    Temp 98.2 °F (36.8 °C)   Ht 190.5 cm (75\")   Wt 122 kg (270 lb)   BMI 33.75 kg/m²     Physical Exam  Penis normal with penoscrotal webbing present.  Testicles normal.  No inguinal hernia appreciable.      Results for orders placed or performed in visit on 06/30/25   Remote Device Check    Collection Time: 06/30/25  5:51 AM   Result Value Ref Range    Date Time Interrogation Session [id number]     Type Interrogation Session Remote     Implantable Pulse Generator  [employer]     Implantable Pulse Generator Type ICD     Implantable Pulse Generator Model Evera MRI XT  [id number]     Implantable Pulse Generator Serial Number [id number]     Implantable Pulse Generator Implant Date 20220222     Battery Remaining Longevity 47.0 mo    Battery Voltage 2.960     Battery RRT Trigger 2.727     Battery Status OK     Capacitor Charge Time 3.663     John Statistic RA Percent Paced 99.90     " John Statistic RV Percent Paced 88.22     Atrial Tachy Statistic AT/AF [place] Percent 0.00     Lead Channel RA Sensing Intrinsic Amplitude 3.875     Lead Channel Setting RA Sensing Sensitivity 0.30     Lead Channel RA Impedance Value 399     Lead Channel RA Pacing Threshold Amplitude 0.750     Lead Channel RA Pacing Threshold Pulse Width 0.4     Lead Channel RA Measurements Date and Time 20250420     Lead Channel Setting RA Pacing Amplitude 1.500     Lead Channel Setting RA Pacing Pulse Width 0.4     Lead Channel RV Sensing Intrinsic Amplitude 2.500     Lead Channel Setting RV Sensing Sensitivity 0.30     Lead Channel RV Impedance Value 304     Lead Channel RV Pacing Threshold Amplitude 1.250     Lead Channel RV Pacing Threshold Pulse Width 0.4     Lead Channel RV Measurements Date and Time 20250630     Lead Channel Setting RV Pacing Amplitude 2.500     Lead Channel Setting RV Pacing Pulse Width 0.4     John Setting Mode (NBG Code) AAIR<=>DDDR     John Setting Lower Rate Limit 50     John Setting AT Mode Switch Rate 171     John Setting Maximum Tracking Rate 110     John Setting Maximum Sensor Rate 110     John Setting PAV Delay 180     John Setting JADE Delay 150     Therapy Statistic Recent Shocks Delivered 0     Therapy Statistic Recent Shocks Aborted 0     Therapy Statistic Recent ATP Delivered 0     RV HV Impedance 42     SVC Measured Impedance 72     Lead Channel Setting RA Sensing Polarity Bipolar     Lead Channel Setting RV Sensing Polarity Bipolar     Lead Channel Setting RA Pacing Polarity Bipolar     Lead Channel Setting RV Pacing Polarity Bipolar     Lead Channel RA Pacing Threshold  Polarity Bipolar     Lead Channel RV Pacing Threshold  Polarity Bipolar     Zone Setting Type Category AT/AF     IDC RATE 1 171     THERAPIES All Rx Off     Zone Setting Status Monitor     Zone ID 2     Zone Setting Type Category VF     IDC RATE 1 176     THERAPIES ATP During Charging, 35Jx6     Zone Setting Status On      Zone ID 3     Zone Setting Type Category VT     Zone Setting Status Off     Zone ID 4     Zone Setting Type Category VT     IDC RATE 1 120     Rate 2 214     THERAPIES Ramp(5), Ramp(5), Ramp(3), 35Jx1     Zone Setting Status ENABLED     Zone ID 5     Zone Setting Type Category VT     IDC RATE 1 113     Rate 2 120     Zone Setting Status ENABLED     Zone ID 6      Assessment and Plan    Diagnoses and all orders for this visit:    1. Erectile dysfunction following radiation therapy (Primary)  -     tadalafil (Cialis) 20 MG tablet; 1/2 to 1 tab by mouth 1 hour prior to intercourse  Dispense: 30 tablet; Refill: 11  -     Case Request; Standing  -     CBC (No Diff); Future  -     Basic Metabolic Panel; Future  -     Urinalysis With Culture If Indicated -; Future  -     ceFAZolin (ANCEF) 2,000 mg in sodium chloride 0.9 % 100 mL IVPB  -     gentamicin (GARAMYCIN) injection 497.6 mg  -     Case Request    2. Penoscrotal webbing    Other orders  -     Follow Anesthesia Guidelines / Protocol; Future  -     Follow Anesthesia Guidelines / Protocol; Standing  -     Verify / Perform Chlorhexidine Skin Prep; Standing  -     Provide NPO Instructions to Patient; Future  -     Chlorhexidine Skin Prep; Future  -     Place Sequential Compression Device; Standing        Worsening organic erectile dysfunction refractory to PDE inhibitor therapy.  We had a long discussion regarding other treatment options including penile injections, intraurethral medications, JUAN MANUEL, and penile implant.  He is afraid of needles and does not want to do penile injections.  Patient has done his research and would like to proceed with three-piece inflatable penile implant.  We discussed risks of the procedure including but not limited to infection, bleeding, need for additional procedures, injury to urethra and/or adjacent structures, mechanical malfunction, device migration, chronic pain, complications of anesthesia.  We also discussed that penile length  with an implant would be no longer than current flaccid stretched penile length.  Patient voices understanding and provided informed consent to proceed with Coloplast Titan touch three-piece inflatable penile implant with concomitant scrotoplasty for repair of his penoscrotal web if tadalafil 20 mg is not efficacious.  He will call back to schedule.  He will need to hold his Eliquis 5 days preop and continue baby aspirin      This document has been signed by MARJORIE Beckford MD on July 16, 2025 16:02 CDT

## 2025-07-10 NOTE — H&P (VIEW-ONLY)
Subjective    Mr. Grossman is 65 y.o. male    Chief Complaint: Erectile dysfunction    History of Present Illness  65-year-old male established patient follow-up for worsening organic erectile dysfunction refractory to sildenafil 100 mg dosing which has worsened after receiving radiation therapy up in [facility] (formerly [facility]3] for his prostate cancer.  He has done his research and is interested in penile implant.  He is afraid of needles and does not want to do penile injections.    The following portions of the patient's history were reviewed and updated as appropriate: allergies, current medications, past family history, past medical history, past social history, past surgical history and problem list.    Review of Systems      Current Outpatient Medications:     amiodarone (PACERONE) 200 MG tablet, Take 1 tablet by mouth 2 (Two) Times a Day., Disp: , Rfl:     aspirin 81 MG EC tablet, Take 1 tablet by mouth Daily., Disp: , Rfl:     nitroglycerin (NITROSTAT) 0.4 MG SL tablet, Place 1 tablet under the tongue Every 5 (Five) Minutes As Needed for Chest Pain. Take no more than 3 doses in 15 minutes., Disp: , Rfl:     rosuvastatin (CRESTOR) 10 MG tablet, Take 1 tablet by mouth Daily., Disp: 90 tablet, Rfl: 0    carvedilol (COREG) 6.25 MG tablet, Take 1 tablet by mouth 2 (Two) Times a Day With Meals for 30 days., Disp: 60 tablet, Rfl: 0    furosemide (Lasix) 40 MG tablet, Take 1 tablet by mouth Daily for 30 days., Disp: 30 tablet, Rfl: 0    losartan (COZAAR) 25 MG tablet, Take 1 tablet by mouth Daily for 30 days., Disp: 30 tablet, Rfl: 0    spironolactone (ALDACTONE) 25 MG tablet, Take 0.5 tablets by mouth Daily for 30 days., Disp: 15 tablet, Rfl: 0    tadalafil (Cialis) 20 MG tablet, 1/2 to 1 tab by mouth 1 hour prior to intercourse, Disp: 30 tablet, Rfl: 11    Past Medical History:   Diagnosis Date    CHF (congestive heart failure)     Diabetes mellitus     Hyperlipidemia     Hypertension     Hypertrophic cardiomyopathy      "s/p AICD    Migraine     Prostate cancer     Tachycardia     Ventricular tachycardia     Ventricular tachycardia, sustained 10/14/2016       Past Surgical History:   Procedure Laterality Date    CARDIAC ABLATION  01/28/2016 2/2016, 10/18/2016 (Dr. Doss in [place], [place])    CARDIAC ABLATION      2018    CARDIAC CATHETERIZATION      CARDIAC DEFIBRILLATOR PLACEMENT      CARDIAC DEFIBRILLATOR PLACEMENT      CARDIAC ELECTROPHYSIOLOGY PROCEDURE N/A 1/30/2025    Procedure: Ablation atrial flutter;  Surgeon: Chalo Rodas MD;  Location:  [facility] CATH INVASIVE [facility];  Service: Cardiovascular;  Laterality: N/A;    PROSTATE BIOPSY  2019    PROSTATE BIOPSY  2023       Social History     Socioeconomic History    Marital status: Single   Tobacco Use    Smoking status: Never     Passive exposure: Never    Smokeless tobacco: Never   Vaping Use    Vaping status: Never Used   Substance and Sexual Activity    Alcohol use: No    Drug use: No    Sexual activity: Defer       Family History   Family history unknown: Yes       Objective    Temp 98.2 °F (36.8 °C)   Ht 190.5 cm (75\")   Wt 122 kg (270 lb)   BMI 33.75 kg/m²     Physical Exam  Penis normal with penoscrotal webbing present.  Testicles normal.  No inguinal hernia appreciable.      Results for orders placed or performed in visit on 06/30/25   Remote Device Check    Collection Time: 06/30/25  5:51 AM   Result Value Ref Range    Date Time Interrogation Session [id number]     Type Interrogation Session Remote     Implantable Pulse Generator  [employer]onic     Implantable Pulse Generator Type ICD     Implantable Pulse Generator Model Evera MRI XT  [id number]     Implantable Pulse Generator Serial Number [id number]     Implantable Pulse Generator Implant Date 20220222     Battery Remaining Longevity 47.0 mo    Battery Voltage 2.960     Battery RRT Trigger 2.727     Battery Status OK     Capacitor Charge Time 3.663     John Statistic RA Percent Paced 99.90     " John Statistic RV Percent Paced 88.22     Atrial Tachy Statistic AT/AF Elizabeth Percent 0.00     Lead Channel RA Sensing Intrinsic Amplitude 3.875     Lead Channel Setting RA Sensing Sensitivity 0.30     Lead Channel RA Impedance Value 399     Lead Channel RA Pacing Threshold Amplitude 0.750     Lead Channel RA Pacing Threshold Pulse Width 0.4     Lead Channel RA Measurements Date and Time 20250420     Lead Channel Setting RA Pacing Amplitude 1.500     Lead Channel Setting RA Pacing Pulse Width 0.4     Lead Channel RV Sensing Intrinsic Amplitude 2.500     Lead Channel Setting RV Sensing Sensitivity 0.30     Lead Channel RV Impedance Value 304     Lead Channel RV Pacing Threshold Amplitude 1.250     Lead Channel RV Pacing Threshold Pulse Width 0.4     Lead Channel RV Measurements Date and Time 20250630     Lead Channel Setting RV Pacing Amplitude 2.500     Lead Channel Setting RV Pacing Pulse Width 0.4     John Setting Mode (NBG Code) AAIR<=>DDDR     John Setting Lower Rate Limit 50     John Setting AT Mode Switch Rate 171     John Setting Maximum Tracking Rate 110     John Setting Maximum Sensor Rate 110     John Setting PAV Delay 180     John Setting JADE Delay 150     Therapy Statistic Recent Shocks Delivered 0     Therapy Statistic Recent Shocks Aborted 0     Therapy Statistic Recent ATP Delivered 0     RV HV Impedance 42     SVC Measured Impedance 72     Lead Channel Setting RA Sensing Polarity Bipolar     Lead Channel Setting RV Sensing Polarity Bipolar     Lead Channel Setting RA Pacing Polarity Bipolar     Lead Channel Setting RV Pacing Polarity Bipolar     Lead Channel RA Pacing Threshold  Polarity Bipolar     Lead Channel RV Pacing Threshold  Polarity Bipolar     Zone Setting Type Category AT/AF     IDC RATE 1 171     THERAPIES All Rx Off     Zone Setting Status Monitor     Zone ID 2     Zone Setting Type Category VF     IDC RATE 1 176     THERAPIES ATP During Charging, 35Jx6     Zone Setting Status On      Zone ID 3     Zone Setting Type Category VT     Zone Setting Status Off     Zone ID 4     Zone Setting Type Category VT     IDC RATE 1 120     Rate 2 214     THERAPIES Ramp(5), Ramp(5), Ramp(3), 35Jx1     Zone Setting Status ENABLED     Zone ID 5     Zone Setting Type Category VT     IDC RATE 1 113     Rate 2 120     Zone Setting Status ENABLED     Zone ID 6      Assessment and Plan    Diagnoses and all orders for this visit:    1. Erectile dysfunction following radiation therapy (Primary)  -     tadalafil (Cialis) 20 MG tablet; 1/2 to 1 tab by mouth 1 hour prior to intercourse  Dispense: 30 tablet; Refill: 11  -     Case Request; Standing  -     CBC (No Diff); Future  -     Basic Metabolic Panel; Future  -     Urinalysis With Culture If Indicated -; Future  -     ceFAZolin (ANCEF) 2,000 mg in sodium chloride 0.9 % 100 mL IVPB  -     gentamicin (GARAMYCIN) injection 497.6 mg  -     Case Request    2. Penoscrotal webbing    Other orders  -     Follow Anesthesia Guidelines / Protocol; Future  -     Follow Anesthesia Guidelines / Protocol; Standing  -     Verify / Perform Chlorhexidine Skin Prep; Standing  -     Provide NPO Instructions to Patient; Future  -     Chlorhexidine Skin Prep; Future  -     Place Sequential Compression Device; Standing        Worsening organic erectile dysfunction refractory to PDE inhibitor therapy.  We had a long discussion regarding other treatment options including penile injections, intraurethral medications, JUAN MANUEL, and penile implant.  He is afraid of needles and does not want to do penile injections.  Patient has done his research and would like to proceed with three-piece inflatable penile implant.  We discussed risks of the procedure including but not limited to infection, bleeding, need for additional procedures, injury to urethra and/or adjacent structures, mechanical malfunction, device migration, chronic pain, complications of anesthesia.  We also discussed that penile length  with an implant would be no longer than current flaccid stretched penile length.  Patient voices understanding and provided informed consent to proceed with Coloplast Titan touch three-piece inflatable penile implant with concomitant scrotoplasty for repair of his penoscrotal web if tadalafil 20 mg is not efficacious.  He will call back to schedule.  He will need to hold his Eliquis 5 days preop and continue baby aspirin      This document has been signed by MARJORIE Beckford MD on July 16, 2025 16:02 CDT

## 2025-07-16 ENCOUNTER — OFFICE VISIT (OUTPATIENT)
Dept: UROLOGY | Facility: CLINIC | Age: 65
End: 2025-07-16
Payer: MEDICARE

## 2025-07-16 VITALS — TEMPERATURE: 98.2 F | BODY MASS INDEX: 33.57 KG/M2 | WEIGHT: 270 LBS | HEIGHT: 75 IN

## 2025-07-16 DIAGNOSIS — N52.35 ERECTILE DYSFUNCTION FOLLOWING RADIATION THERAPY: Primary | ICD-10-CM

## 2025-07-16 DIAGNOSIS — Q55.69 PENOSCROTAL WEBBING: ICD-10-CM

## 2025-07-16 PROCEDURE — 1159F MED LIST DOCD IN RCRD: CPT | Performed by: UROLOGY

## 2025-07-16 PROCEDURE — 99214 OFFICE O/P EST MOD 30 MIN: CPT | Performed by: UROLOGY

## 2025-07-16 PROCEDURE — 1160F RVW MEDS BY RX/DR IN RCRD: CPT | Performed by: UROLOGY

## 2025-07-16 RX ORDER — TADALAFIL 20 MG/1
TABLET ORAL
Qty: 30 TABLET | Refills: 11 | Status: SHIPPED | OUTPATIENT
Start: 2025-07-16

## 2025-07-16 RX ORDER — GENTAMICIN 40 MG/ML
5 INJECTION, SOLUTION INTRAMUSCULAR; INTRAVENOUS ONCE
OUTPATIENT
Start: 2025-07-16 | End: 2025-07-16

## 2025-07-17 ENCOUNTER — TELEPHONE (OUTPATIENT)
Dept: UROLOGY | Facility: CLINIC | Age: 65
End: 2025-07-17
Payer: MEDICARE

## 2025-07-21 ENCOUNTER — TELEPHONE (OUTPATIENT)
Dept: UROLOGY | Facility: CLINIC | Age: 65
End: 2025-07-21
Payer: MEDICARE

## 2025-07-21 NOTE — TELEPHONE ENCOUNTER
Pt called in asking where he needs to go for PAT tomorrow. Advised pt of where to go, and that Fiorella will call him later on this week with an arrival time for surgery. Pt v/u

## 2025-07-21 NOTE — TELEPHONE ENCOUNTER
Called Patient to remind him to stop Medication Eliquis starting 7-24-25 for their surgery on 7-29-25 with Dr. Beckford.  Spoke with patient and patient understand.

## 2025-07-22 ENCOUNTER — PRE-ADMISSION TESTING (OUTPATIENT)
Dept: PREADMISSION TESTING | Facility: HOSPITAL | Age: 65
End: 2025-07-22
Payer: MEDICARE

## 2025-07-22 VITALS
WEIGHT: 274.47 LBS | OXYGEN SATURATION: 95 % | DIASTOLIC BLOOD PRESSURE: 63 MMHG | BODY MASS INDEX: 36.38 KG/M2 | RESPIRATION RATE: 16 BRPM | HEART RATE: 50 BPM | SYSTOLIC BLOOD PRESSURE: 115 MMHG | HEIGHT: 73 IN

## 2025-07-22 DIAGNOSIS — N52.35 ERECTILE DYSFUNCTION FOLLOWING RADIATION THERAPY: ICD-10-CM

## 2025-07-22 LAB
ANION GAP SERPL CALCULATED.3IONS-SCNC: 10 MMOL/L (ref 5–15)
BACTERIA UR QL AUTO: ABNORMAL /HPF
BILIRUB UR QL STRIP: NEGATIVE
BUN SERPL-MCNC: 10.6 MG/DL (ref 8–23)
BUN/CREAT SERPL: 8.3 (ref 7–25)
CALCIUM SPEC-SCNC: 8.7 MG/DL (ref 8.6–10.5)
CHLORIDE SERPL-SCNC: 104 MMOL/L (ref 98–107)
CLARITY UR: CLEAR
CO2 SERPL-SCNC: 22 MMOL/L (ref 22–29)
COLOR UR: ABNORMAL
CREAT SERPL-MCNC: 1.27 MG/DL (ref 0.76–1.27)
DEPRECATED RDW RBC AUTO: 60.5 FL (ref 37–54)
EGFRCR SERPLBLD CKD-EPI 2021: 62.3 ML/MIN/1.73
ERYTHROCYTE [DISTWIDTH] IN BLOOD BY AUTOMATED COUNT: 19.5 % (ref 12.3–15.4)
GLUCOSE SERPL-MCNC: 108 MG/DL (ref 65–99)
GLUCOSE UR STRIP-MCNC: NEGATIVE MG/DL
HCT VFR BLD AUTO: 41.2 % (ref 37.5–51)
HGB BLD-MCNC: 13.6 G/DL (ref 13–17.7)
HGB UR QL STRIP.AUTO: ABNORMAL
HYALINE CASTS UR QL AUTO: ABNORMAL /LPF
KETONES UR QL STRIP: NEGATIVE
LEUKOCYTE ESTERASE UR QL STRIP.AUTO: ABNORMAL
MCH RBC QN AUTO: 28.6 PG (ref 26.6–33)
MCHC RBC AUTO-ENTMCNC: 33 G/DL (ref 31.5–35.7)
MCV RBC AUTO: 86.6 FL (ref 79–97)
NITRITE UR QL STRIP: NEGATIVE
PH UR STRIP.AUTO: 8.5 [PH] (ref 5–8)
PLATELET # BLD AUTO: 172 10*3/MM3 (ref 140–450)
PMV BLD AUTO: 11.8 FL (ref 6–12)
POTASSIUM SERPL-SCNC: 5.1 MMOL/L (ref 3.5–5.2)
PROT UR QL STRIP: ABNORMAL
RBC # BLD AUTO: 4.76 10*6/MM3 (ref 4.14–5.8)
RBC # UR STRIP: ABNORMAL /HPF
REF LAB TEST METHOD: ABNORMAL
SODIUM SERPL-SCNC: 136 MMOL/L (ref 136–145)
SP GR UR STRIP: 1.01 (ref 1–1.03)
SQUAMOUS #/AREA URNS HPF: ABNORMAL /HPF
UROBILINOGEN UR QL STRIP: ABNORMAL
WBC # UR STRIP: ABNORMAL /HPF
WBC NRBC COR # BLD AUTO: 4.4 10*3/MM3 (ref 3.4–10.8)

## 2025-07-22 PROCEDURE — 80048 BASIC METABOLIC PNL TOTAL CA: CPT

## 2025-07-22 PROCEDURE — 85027 COMPLETE CBC AUTOMATED: CPT

## 2025-07-22 PROCEDURE — 36415 COLL VENOUS BLD VENIPUNCTURE: CPT

## 2025-07-22 PROCEDURE — 93005 ELECTROCARDIOGRAM TRACING: CPT

## 2025-07-22 PROCEDURE — 81001 URINALYSIS AUTO W/SCOPE: CPT

## 2025-07-22 NOTE — DISCHARGE INSTRUCTIONS

## 2025-07-23 LAB
QT INTERVAL: 598 MS
QTC INTERVAL: 561 MS

## 2025-07-24 ENCOUNTER — TELEPHONE (OUTPATIENT)
Dept: UROLOGY | Facility: CLINIC | Age: 65
End: 2025-07-24
Payer: MEDICARE

## 2025-07-24 NOTE — TELEPHONE ENCOUNTER
Called patient to remind them to arrive at patient registration on 7-29-25 at 700am for the procedure with Dr. Beckford. Spoke with patient.  Patient was informed that the surgery schedule fluctuates so there is no given start time. And to be prepared to be here all day. Told patient if they had any questions to please contact our office at [phone].

## 2025-07-28 NOTE — SIGNIFICANT NOTE
Home Rx;  Coreg (carvedilol); instructed to take, w/ sip of water the DOS-7/29/25.  Cozaar (losartan); instructed Not to take x 24 hours before DOS-7/29/25.  Cialis; has not taken in approx. 2 months.

## 2025-07-29 ENCOUNTER — ANESTHESIA EVENT (OUTPATIENT)
Dept: PERIOP | Facility: HOSPITAL | Age: 65
End: 2025-07-29
Payer: MEDICARE

## 2025-07-29 ENCOUNTER — ANESTHESIA (OUTPATIENT)
Dept: PERIOP | Facility: HOSPITAL | Age: 65
End: 2025-07-29
Payer: MEDICARE

## 2025-07-29 ENCOUNTER — HOSPITAL ENCOUNTER (OUTPATIENT)
Facility: HOSPITAL | Age: 65
Discharge: HOME | End: 2025-07-30
Attending: UROLOGY | Admitting: UROLOGY
Payer: MEDICARE

## 2025-07-29 DIAGNOSIS — N52.01 ERECTILE DYSFUNCTION DUE TO ARTERIAL INSUFFICIENCY: Primary | ICD-10-CM

## 2025-07-29 DIAGNOSIS — N52.35 ERECTILE DYSFUNCTION FOLLOWING RADIATION THERAPY: ICD-10-CM

## 2025-07-29 LAB — GLUCOSE BLDC GLUCOMTR-MCNC: 118 MG/DL (ref 70–130)

## 2025-07-29 PROCEDURE — 25010000002 ONDANSETRON PER 1 MG: Performed by: NURSE ANESTHETIST, CERTIFIED REGISTERED

## 2025-07-29 PROCEDURE — C1813 PROSTHESIS, PENILE, INFLATAB: HCPCS | Performed by: UROLOGY

## 2025-07-29 PROCEDURE — 25010000002 HYDROMORPHONE 1 MG/ML SOLUTION: Performed by: UROLOGY

## 2025-07-29 PROCEDURE — G0378 HOSPITAL OBSERVATION PER HR: HCPCS

## 2025-07-29 PROCEDURE — 82948 REAGENT STRIP/BLOOD GLUCOSE: CPT | Performed by: ANESTHESIOLOGY

## 2025-07-29 PROCEDURE — 25010000002 GENTAMICIN PER 80 MG: Performed by: UROLOGY

## 2025-07-29 PROCEDURE — 25010000002 PROPOFOL 10 MG/ML EMULSION: Performed by: NURSE ANESTHETIST, CERTIFIED REGISTERED

## 2025-07-29 PROCEDURE — 25010000002 HYDROMORPHONE PER 4 MG: Performed by: ANESTHESIOLOGY

## 2025-07-29 PROCEDURE — 25010000002 FENTANYL CITRATE (PF) 50 MCG/ML SOLUTION: Performed by: ANESTHESIOLOGY

## 2025-07-29 PROCEDURE — 55175 REVISION OF SCROTUM: CPT | Performed by: UROLOGY

## 2025-07-29 PROCEDURE — 54405 INSERT MULTI-COMP PENIS PROS: CPT | Performed by: UROLOGY

## 2025-07-29 PROCEDURE — 25010000002 BUPIVACAINE 0.5 % SOLUTION: Performed by: UROLOGY

## 2025-07-29 PROCEDURE — 25010000002 FENTANYL CITRATE (PF) 100 MCG/2ML SOLUTION: Performed by: NURSE ANESTHETIST, CERTIFIED REGISTERED

## 2025-07-29 PROCEDURE — 25010000002 SUGAMMADEX 200 MG/2ML SOLUTION: Performed by: NURSE ANESTHETIST, CERTIFIED REGISTERED

## 2025-07-29 PROCEDURE — 25010000002 LIDOCAINE PF 2% 2 % SOLUTION: Performed by: NURSE ANESTHETIST, CERTIFIED REGISTERED

## 2025-07-29 PROCEDURE — 25010000002 VANCOMYCIN 1 G RECONSTITUTED SOLUTION 1 EACH VIAL: Performed by: UROLOGY

## 2025-07-29 PROCEDURE — 25010000002 GLYCOPYRROLATE 0.4 MG/2ML SOLUTION: Performed by: NURSE ANESTHETIST, CERTIFIED REGISTERED

## 2025-07-29 PROCEDURE — 25810000003 LACTATED RINGERS PER 1000 ML: Performed by: UROLOGY

## 2025-07-29 DEVICE — IMPLANTABLE DEVICE
Type: IMPLANTABLE DEVICE | Site: PENIS | Status: FUNCTIONAL
Brand: TITAN

## 2025-07-29 DEVICE — CL RESERVOIR
Type: IMPLANTABLE DEVICE | Site: PENIS | Status: FUNCTIONAL
Brand: TITAN

## 2025-07-29 DEVICE — TITAN® TOUCH SCROTAL ZERO DEGREE ANGLE CYLINDER SET WITH PUMP
Type: IMPLANTABLE DEVICE | Site: PENIS | Status: FUNCTIONAL
Brand: TITAN

## 2025-07-29 RX ORDER — LABETALOL HYDROCHLORIDE 5 MG/ML
5 INJECTION, SOLUTION INTRAVENOUS
Status: DISCONTINUED | OUTPATIENT
Start: 2025-07-29 | End: 2025-07-29 | Stop reason: HOSPADM

## 2025-07-29 RX ORDER — NALOXONE HCL 0.4 MG/ML
0.1 VIAL (ML) INJECTION
Status: DISCONTINUED | OUTPATIENT
Start: 2025-07-29 | End: 2025-07-30 | Stop reason: HOSPADM

## 2025-07-29 RX ORDER — MIDAZOLAM HYDROCHLORIDE 2 MG/2ML
0.5 INJECTION, SOLUTION INTRAMUSCULAR; INTRAVENOUS
Status: DISCONTINUED | OUTPATIENT
Start: 2025-07-29 | End: 2025-07-29 | Stop reason: HOSPADM

## 2025-07-29 RX ORDER — SODIUM CHLORIDE 0.9 % (FLUSH) 0.9 %
3-10 SYRINGE (ML) INJECTION AS NEEDED
Status: DISCONTINUED | OUTPATIENT
Start: 2025-07-29 | End: 2025-07-29 | Stop reason: HOSPADM

## 2025-07-29 RX ORDER — NITROGLYCERIN 0.4 MG/1
0.4 TABLET SUBLINGUAL
Status: DISCONTINUED | OUTPATIENT
Start: 2025-07-29 | End: 2025-07-30 | Stop reason: HOSPADM

## 2025-07-29 RX ORDER — NEOSTIGMINE METHYLSULFATE 5 MG/5 ML
SYRINGE (ML) INTRAVENOUS AS NEEDED
Status: DISCONTINUED | OUTPATIENT
Start: 2025-07-29 | End: 2025-07-29 | Stop reason: SURG

## 2025-07-29 RX ORDER — MAGNESIUM HYDROXIDE 1200 MG/15ML
LIQUID ORAL AS NEEDED
Status: DISCONTINUED | OUTPATIENT
Start: 2025-07-29 | End: 2025-07-29 | Stop reason: HOSPADM

## 2025-07-29 RX ORDER — SODIUM CHLORIDE 0.9 % (FLUSH) 0.9 %
3 SYRINGE (ML) INJECTION EVERY 12 HOURS SCHEDULED
Status: DISCONTINUED | OUTPATIENT
Start: 2025-07-29 | End: 2025-07-29 | Stop reason: HOSPADM

## 2025-07-29 RX ORDER — DROPERIDOL 2.5 MG/ML
0.62 INJECTION, SOLUTION INTRAMUSCULAR; INTRAVENOUS ONCE AS NEEDED
Status: DISCONTINUED | OUTPATIENT
Start: 2025-07-29 | End: 2025-07-29 | Stop reason: HOSPADM

## 2025-07-29 RX ORDER — OXYCODONE AND ACETAMINOPHEN 10; 325 MG/1; MG/1
1 TABLET ORAL EVERY 4 HOURS PRN
Status: DISCONTINUED | OUTPATIENT
Start: 2025-07-29 | End: 2025-07-29 | Stop reason: HOSPADM

## 2025-07-29 RX ORDER — GLYCOPYRROLATE 0.2 MG/ML
INJECTION INTRAMUSCULAR; INTRAVENOUS AS NEEDED
Status: DISCONTINUED | OUTPATIENT
Start: 2025-07-29 | End: 2025-07-29 | Stop reason: SURG

## 2025-07-29 RX ORDER — CARVEDILOL 6.25 MG/1
6.25 TABLET ORAL 2 TIMES DAILY WITH MEALS
Status: DISCONTINUED | OUTPATIENT
Start: 2025-07-29 | End: 2025-07-30 | Stop reason: HOSPADM

## 2025-07-29 RX ORDER — DIPHENHYDRAMINE HYDROCHLORIDE 50 MG/ML
25 INJECTION, SOLUTION INTRAMUSCULAR; INTRAVENOUS EVERY 6 HOURS PRN
Status: DISCONTINUED | OUTPATIENT
Start: 2025-07-29 | End: 2025-07-30 | Stop reason: HOSPADM

## 2025-07-29 RX ORDER — HYDROCODONE BITARTRATE AND ACETAMINOPHEN 10; 325 MG/1; MG/1
1 TABLET ORAL EVERY 6 HOURS PRN
Refills: 0 | Status: DISCONTINUED | OUTPATIENT
Start: 2025-07-29 | End: 2025-07-30 | Stop reason: HOSPADM

## 2025-07-29 RX ORDER — LOSARTAN POTASSIUM 25 MG/1
25 TABLET ORAL NIGHTLY
Status: DISCONTINUED | OUTPATIENT
Start: 2025-07-29 | End: 2025-07-30 | Stop reason: HOSPADM

## 2025-07-29 RX ORDER — SODIUM CHLORIDE 0.9 % (FLUSH) 0.9 %
3 SYRINGE (ML) INJECTION AS NEEDED
Status: DISCONTINUED | OUTPATIENT
Start: 2025-07-29 | End: 2025-07-29 | Stop reason: HOSPADM

## 2025-07-29 RX ORDER — HYDROCODONE BITARTRATE AND ACETAMINOPHEN 7.5; 325 MG/1; MG/1
1 TABLET ORAL EVERY 4 HOURS PRN
Refills: 0 | Status: DISCONTINUED | OUTPATIENT
Start: 2025-07-29 | End: 2025-07-30 | Stop reason: HOSPADM

## 2025-07-29 RX ORDER — HYDROMORPHONE HYDROCHLORIDE 1 MG/ML
0.5 INJECTION, SOLUTION INTRAMUSCULAR; INTRAVENOUS; SUBCUTANEOUS
Status: DISCONTINUED | OUTPATIENT
Start: 2025-07-29 | End: 2025-07-29 | Stop reason: HOSPADM

## 2025-07-29 RX ORDER — SODIUM CHLORIDE 9 MG/ML
40 INJECTION, SOLUTION INTRAVENOUS AS NEEDED
Status: DISCONTINUED | OUTPATIENT
Start: 2025-07-29 | End: 2025-07-29 | Stop reason: HOSPADM

## 2025-07-29 RX ORDER — SULFAMETHOXAZOLE AND TRIMETHOPRIM 800; 160 MG/1; MG/1
1 TABLET ORAL EVERY 12 HOURS SCHEDULED
Status: DISCONTINUED | OUTPATIENT
Start: 2025-07-29 | End: 2025-07-30 | Stop reason: HOSPADM

## 2025-07-29 RX ORDER — NALOXONE HCL 0.4 MG/ML
0.04 VIAL (ML) INJECTION AS NEEDED
Status: DISCONTINUED | OUTPATIENT
Start: 2025-07-29 | End: 2025-07-29 | Stop reason: HOSPADM

## 2025-07-29 RX ORDER — FUROSEMIDE 40 MG/1
40 TABLET ORAL DAILY
Status: DISCONTINUED | OUTPATIENT
Start: 2025-07-29 | End: 2025-07-30 | Stop reason: HOSPADM

## 2025-07-29 RX ORDER — ONDANSETRON 2 MG/ML
INJECTION INTRAMUSCULAR; INTRAVENOUS AS NEEDED
Status: DISCONTINUED | OUTPATIENT
Start: 2025-07-29 | End: 2025-07-29 | Stop reason: SURG

## 2025-07-29 RX ORDER — DOCUSATE SODIUM 100 MG/1
100 CAPSULE, LIQUID FILLED ORAL 2 TIMES DAILY
Status: DISCONTINUED | OUTPATIENT
Start: 2025-07-29 | End: 2025-07-30 | Stop reason: HOSPADM

## 2025-07-29 RX ORDER — FLUMAZENIL 0.1 MG/ML
0.2 INJECTION INTRAVENOUS AS NEEDED
Status: DISCONTINUED | OUTPATIENT
Start: 2025-07-29 | End: 2025-07-29 | Stop reason: HOSPADM

## 2025-07-29 RX ORDER — SODIUM CHLORIDE, SODIUM LACTATE, POTASSIUM CHLORIDE, CALCIUM CHLORIDE 600; 310; 30; 20 MG/100ML; MG/100ML; MG/100ML; MG/100ML
1000 INJECTION, SOLUTION INTRAVENOUS CONTINUOUS
Status: DISCONTINUED | OUTPATIENT
Start: 2025-07-29 | End: 2025-07-29 | Stop reason: HOSPADM

## 2025-07-29 RX ORDER — ONDANSETRON 4 MG/1
4 TABLET, ORALLY DISINTEGRATING ORAL EVERY 6 HOURS PRN
Status: DISCONTINUED | OUTPATIENT
Start: 2025-07-29 | End: 2025-07-30 | Stop reason: HOSPADM

## 2025-07-29 RX ORDER — LIDOCAINE HYDROCHLORIDE 20 MG/ML
INJECTION, SOLUTION EPIDURAL; INFILTRATION; INTRACAUDAL; PERINEURAL AS NEEDED
Status: DISCONTINUED | OUTPATIENT
Start: 2025-07-29 | End: 2025-07-29 | Stop reason: SURG

## 2025-07-29 RX ORDER — LIDOCAINE HYDROCHLORIDE 10 MG/ML
0.5 INJECTION, SOLUTION EPIDURAL; INFILTRATION; INTRACAUDAL; PERINEURAL ONCE AS NEEDED
Status: DISCONTINUED | OUTPATIENT
Start: 2025-07-29 | End: 2025-07-29 | Stop reason: HOSPADM

## 2025-07-29 RX ORDER — FENTANYL CITRATE 50 UG/ML
50 INJECTION, SOLUTION INTRAMUSCULAR; INTRAVENOUS
Status: DISCONTINUED | OUTPATIENT
Start: 2025-07-29 | End: 2025-07-29 | Stop reason: HOSPADM

## 2025-07-29 RX ORDER — ZOLPIDEM TARTRATE 5 MG/1
5 TABLET ORAL NIGHTLY PRN
Status: DISCONTINUED | OUTPATIENT
Start: 2025-07-29 | End: 2025-07-30 | Stop reason: HOSPADM

## 2025-07-29 RX ORDER — BACITRACIN ZINC 500 [USP'U]/G
OINTMENT TOPICAL AS NEEDED
Status: DISCONTINUED | OUTPATIENT
Start: 2025-07-29 | End: 2025-07-29 | Stop reason: HOSPADM

## 2025-07-29 RX ORDER — PROPOFOL 10 MG/ML
VIAL (ML) INTRAVENOUS AS NEEDED
Status: DISCONTINUED | OUTPATIENT
Start: 2025-07-29 | End: 2025-07-29 | Stop reason: SURG

## 2025-07-29 RX ORDER — GENTAMICIN 40 MG/ML
5 INJECTION, SOLUTION INTRAMUSCULAR; INTRAVENOUS ONCE
Status: DISCONTINUED | OUTPATIENT
Start: 2025-07-29 | End: 2025-07-29

## 2025-07-29 RX ORDER — ONDANSETRON 2 MG/ML
4 INJECTION INTRAMUSCULAR; INTRAVENOUS EVERY 6 HOURS PRN
Status: DISCONTINUED | OUTPATIENT
Start: 2025-07-29 | End: 2025-07-30 | Stop reason: HOSPADM

## 2025-07-29 RX ORDER — ACETAMINOPHEN 500 MG
1000 TABLET ORAL ONCE
Status: COMPLETED | OUTPATIENT
Start: 2025-07-29 | End: 2025-07-29

## 2025-07-29 RX ORDER — PROCHLORPERAZINE EDISYLATE 5 MG/ML
10 INJECTION INTRAMUSCULAR; INTRAVENOUS EVERY 6 HOURS PRN
Status: DISCONTINUED | OUTPATIENT
Start: 2025-07-29 | End: 2025-07-30 | Stop reason: HOSPADM

## 2025-07-29 RX ORDER — ROCURONIUM BROMIDE 10 MG/ML
INJECTION, SOLUTION INTRAVENOUS AS NEEDED
Status: DISCONTINUED | OUTPATIENT
Start: 2025-07-29 | End: 2025-07-29 | Stop reason: SURG

## 2025-07-29 RX ORDER — FENTANYL CITRATE 50 UG/ML
INJECTION, SOLUTION INTRAMUSCULAR; INTRAVENOUS AS NEEDED
Status: DISCONTINUED | OUTPATIENT
Start: 2025-07-29 | End: 2025-07-29 | Stop reason: SURG

## 2025-07-29 RX ORDER — ONDANSETRON 2 MG/ML
4 INJECTION INTRAMUSCULAR; INTRAVENOUS
Status: DISCONTINUED | OUTPATIENT
Start: 2025-07-29 | End: 2025-07-29 | Stop reason: HOSPADM

## 2025-07-29 RX ORDER — BUPIVACAINE HYDROCHLORIDE 5 MG/ML
INJECTION, SOLUTION PERINEURAL AS NEEDED
Status: DISCONTINUED | OUTPATIENT
Start: 2025-07-29 | End: 2025-07-29 | Stop reason: HOSPADM

## 2025-07-29 RX ORDER — SODIUM CHLORIDE, SODIUM LACTATE, POTASSIUM CHLORIDE, CALCIUM CHLORIDE 600; 310; 30; 20 MG/100ML; MG/100ML; MG/100ML; MG/100ML
100 INJECTION, SOLUTION INTRAVENOUS CONTINUOUS
Status: DISCONTINUED | OUTPATIENT
Start: 2025-07-29 | End: 2025-07-29 | Stop reason: HOSPADM

## 2025-07-29 RX ORDER — AMIODARONE HYDROCHLORIDE 200 MG/1
200 TABLET ORAL 2 TIMES DAILY
Status: DISCONTINUED | OUTPATIENT
Start: 2025-07-29 | End: 2025-07-30 | Stop reason: HOSPADM

## 2025-07-29 RX ORDER — ASPIRIN 81 MG/1
81 TABLET ORAL DAILY
Status: DISCONTINUED | OUTPATIENT
Start: 2025-07-30 | End: 2025-07-30 | Stop reason: HOSPADM

## 2025-07-29 RX ADMIN — SUGAMMADEX 200 MG: 100 INJECTION, SOLUTION INTRAVENOUS at 12:22

## 2025-07-29 RX ADMIN — HYDROMORPHONE HYDROCHLORIDE 0.5 MG: 1 INJECTION, SOLUTION INTRAMUSCULAR; INTRAVENOUS; SUBCUTANEOUS at 13:05

## 2025-07-29 RX ADMIN — FENTANYL CITRATE 50 MCG: 50 INJECTION, SOLUTION INTRAMUSCULAR; INTRAVENOUS at 11:23

## 2025-07-29 RX ADMIN — CARVEDILOL 6.25 MG: 6.25 TABLET, FILM COATED ORAL at 17:44

## 2025-07-29 RX ADMIN — GENTAMICIN SULFATE 490 MG: 40 INJECTION, SOLUTION INTRAMUSCULAR; INTRAVENOUS at 10:42

## 2025-07-29 RX ADMIN — ONDANSETRON 4 MG: 2 INJECTION INTRAMUSCULAR; INTRAVENOUS at 11:57

## 2025-07-29 RX ADMIN — HYDROMORPHONE HYDROCHLORIDE 1 MG: 1 INJECTION, SOLUTION INTRAMUSCULAR; INTRAVENOUS; SUBCUTANEOUS at 22:08

## 2025-07-29 RX ADMIN — HYDROMORPHONE HYDROCHLORIDE 0.5 MG: 1 INJECTION, SOLUTION INTRAMUSCULAR; INTRAVENOUS; SUBCUTANEOUS at 12:54

## 2025-07-29 RX ADMIN — ROCURONIUM 50 MG: 50 INJECTION, SOLUTION INTRAVENOUS at 11:01

## 2025-07-29 RX ADMIN — LIDOCAINE HYDROCHLORIDE 100 MG: 20 INJECTION, SOLUTION EPIDURAL; INFILTRATION; INTRACAUDAL; PERINEURAL at 11:01

## 2025-07-29 RX ADMIN — GLYCOPYRROLATE 0.4 MG: 0.2 INJECTION INTRAMUSCULAR; INTRAVENOUS at 12:03

## 2025-07-29 RX ADMIN — AMIODARONE HYDROCHLORIDE 200 MG: 200 TABLET ORAL at 17:44

## 2025-07-29 RX ADMIN — DOCUSATE SODIUM 100 MG: 100 CAPSULE, LIQUID FILLED ORAL at 20:48

## 2025-07-29 RX ADMIN — SULFAMETHOXAZOLE AND TRIMETHOPRIM 1 TABLET: 800; 160 TABLET ORAL at 17:45

## 2025-07-29 RX ADMIN — ACETAMINOPHEN 1000 MG: 500 TABLET, FILM COATED ORAL at 10:44

## 2025-07-29 RX ADMIN — PROPOFOL 100 MG: 10 INJECTION, EMULSION INTRAVENOUS at 11:01

## 2025-07-29 RX ADMIN — FENTANYL CITRATE 50 MCG: 50 INJECTION, SOLUTION INTRAMUSCULAR; INTRAVENOUS at 13:04

## 2025-07-29 RX ADMIN — LOSARTAN POTASSIUM 25 MG: 25 TABLET, FILM COATED ORAL at 20:50

## 2025-07-29 RX ADMIN — FUROSEMIDE 40 MG: 40 TABLET ORAL at 14:38

## 2025-07-29 RX ADMIN — HYDROCODONE BITARTRATE AND ACETAMINOPHEN 1 TABLET: 10; 325 TABLET ORAL at 17:47

## 2025-07-29 RX ADMIN — FENTANYL CITRATE 50 MCG: 50 INJECTION, SOLUTION INTRAMUSCULAR; INTRAVENOUS at 11:01

## 2025-07-29 RX ADMIN — Medication 4 MG: at 12:03

## 2025-07-29 RX ADMIN — OXYCODONE AND ACETAMINOPHEN 1 TABLET: 325; 10 TABLET ORAL at 12:53

## 2025-07-29 RX ADMIN — SODIUM CHLORIDE, POTASSIUM CHLORIDE, SODIUM LACTATE AND CALCIUM CHLORIDE 1000 ML: 600; 310; 30; 20 INJECTION, SOLUTION INTRAVENOUS at 08:54

## 2025-07-29 RX ADMIN — Medication 12.5 MG: at 14:38

## 2025-07-29 NOTE — OP NOTE
PENILE PROSTHESIS PLACEMENT  Procedure Note    Jeanie Grossman  7/29/2025    Pre-op Diagnosis:   Erectile dysfunction following radiation therapy [N52.35]  Penoscrotal web    Post-op Diagnosis:     Post-Op Diagnosis Codes:     * Erectile dysfunction following radiation therapy [N52.35]  Penoscrotal web    Procedure/CPT® Codes:  Three-piece inflatable penile prosthesis placement    Procedure(s):  1) 3-PIECE INFLATABLE PENILE PROSTHESIS (COLOPLAST TITAN TOUCH)  2) SCROTOPLASTY    Surgeon(s):  David Beckford MD    Anesthesia: General    Staff:   Circulator: Ani Martinez RN  Scrub Person: Oziel Santos; Debbie Kumar  Orientee: Betzy Montgomery RN    Indications for procedure:  65-year-old male with organic erectile dysfunction after radiation therapy for prostate cancer refractory to all medical therapy also with significant penoscrotal webbing for which he would like to proceed with three-piece inflatable penile implant with concomitant scrotoplasty for penoscrotal web repair    Procedure details:  After the patient was correctly identified, he was given IV antibiotics and brought to the operating room and placed on the operative table.  After adequate induction of general anesthesia, he is placed into the supine frog-leg position and his genitalia were sterilely prepped and draped.  A final timeout was performed.  I placed a Shipman catheter.  There was significant penoscrotal webbing present.  I marked off a nora shaped segment of the penoscrotal web skin and excised this skin sharply via the penoscrotal approach.  A retractor was placed.  Corpora cavernosa were identified bilaterally.  Stay sutures of 2-0 PDS were placed vertically between which corporotomies were made with a knife and sequentially dilated starting with Metzenbaum scissors followed by Angeles dilators 8 mm to 12 mm.  Measurements were taken both in the left and right side.  The left corpora measured 21 cm and the right corpora  measured 21 cm.  Corpora were irrigated and there was no evidence of urethral injury.  I placed 30 mL of 0.5% bupivacaine for bilateral corporal and penile blocks.  I then used the 125 mL reservoir.  This was prepared in the standard fashion and dipped in antibiotic solution.  I placed the reservoir in the left retropubic space via the penoscrotal incision on the left.  Transversalis fascia was pierced and a space behind the pubic bone was created bluntly.  The reservoir was placed in the space and filled with 100 mL of filling solution.  The Coloplast Titan touch penoscrotal preconnect 20 cm cylinders were opened on the field.  A 1 cm rear-tip extender was used on the left and a 1 cm rear-tip extender on the right.  Components were prepared in the standard fashion and all air bubbles were removed.  The components were dipped in antibiotic solution.  I placed the cylinders in the left corpora followed by the right corpora in the standard fashion using the Skylar insertion tool.  A dartos pouch was created for the scrotal pump in the anterior scrotum.  The cylinders were seated properly and a test inflation was performed showing excellent positioning and good straight rigidity.  Cylinders were deflated and corporotomies were closed using the previously placed stay sutures of 2-0 PDS.  Tubing connections were made using the tubing connectors.  The pump was placed in the scrotal dartos pouch.  The wound was copiously irrigated.  Dartos was closed in 2 layers using running 3-0 Vicryl.  Skin was closed using running horizontal mattress stitch of 3-0 Monocryl in a vertical fashion in order to complete the scrotoplasty.  Sterile dressing consisting of bacitracin, Telfa, and Kerlix mummy wrap dressing with compressive tape was applied.  All sponge and needle counts were correct.  The Shipman catheter was placed to gravity drainage and the patient was taken to the recovery room in stable condition.      Estimated Blood Loss:  100ml    Specimens:                None      Drains: 16 Scottish Shipman catheter to gravity  Urethral Catheter Latex 16 Fr. (Active)   Site Assessment Clean;Skin intact 07/29/25 1225   Collection Container Standard drainage bag 07/29/25 1325   Securement Method Securing device 07/29/25 1325       Complications: none

## 2025-07-29 NOTE — ANESTHESIA PREPROCEDURE EVALUATION
Anesthesia Evaluation     NPO Solid Status: > 8 hours  NPO Liquid Status: > 8 hours           Airway   Mallampati: II  TM distance: >3 FB  Neck ROM: full  No difficulty expected  Dental    (+) poor dentition    Pulmonary    Cardiovascular   Exercise tolerance: poor (<4 METS)    (+) pacemaker pacemaker, ICD, hypertension, valvular problems/murmurs AI, dysrhythmias Atrial Flutter, Atrial Fib, Tachycardia, CHF Systolic <55%, hyperlipidemia      Neuro/Psych  (+) headaches, syncope  GI/Hepatic/Renal/Endo    (+) obesity, diabetes mellitus type 2    Musculoskeletal     (+) chronic pain  Abdominal   (+) obese   Substance History      OB/GYN          Other      history of cancer                      Anesthesia Plan    ASA 3     general     (31% . No expected interference with defib with today's surgical site, but magnet should be available in room. )    Anesthetic plan, risks, benefits, and alternatives have been provided, discussed and informed consent has been obtained with: patient.        CODE STATUS:

## 2025-07-29 NOTE — PLAN OF CARE
Goal Outcome Evaluation:  Plan of Care Reviewed With: patient           Outcome Evaluation: Pt received from pacu status post penile prosthesis and scrotalplasty. bulky dressing in place as well as toth catheter. Pt is alert and oriented. o2 at 3l currently. Pt states he is prediabetic. no family here presently. safety maintained

## 2025-07-29 NOTE — ANESTHESIA PROCEDURE NOTES
Airway  Reason: elective    Date/Time: 7/29/2025 11:02 AM  Airway not difficult    General Information and Staff    Patient location during procedure: OR  CRNA/CAA: Jonatan Arreola CRNA    Indications and Patient Condition  Indications for airway management: airway protection    Preoxygenated: yes    Mask difficulty assessment: 1 - vent by mask    Final Airway Details    Final airway type: endotracheal airway      Successful airway: ETT  Cuffed: yes   Successful intubation technique: video laryngoscopy  Endotracheal tube insertion site: oral  Blade: Suero  Blade size: 4  ETT size (mm): 7.5  Cormack-Lehane Classification: grade IIa - partial view of glottis  Placement verified by: capnometry   Measured from: lips  ETT/EBT  to lips (cm): 24  Number of attempts at approach: 1  Assessment: lips, teeth, and gum same as pre-op and atraumatic intubation

## 2025-07-29 NOTE — ANESTHESIA POSTPROCEDURE EVALUATION
"Patient: Jeanie Grossman    Procedure Summary       Date: 07/29/25 Room / Location:  [facility] OR  /  [facility] OR    Anesthesia Start: 1056 Anesthesia Stop: 1234    Procedure: 3-PIECE INFLATABLE PENILE PROSTHESIS PLACEMENT, SCROTOPLASTY (Penis) Diagnosis:       Erectile dysfunction following radiation therapy      (Erectile dysfunction following radiation therapy [N52.35])    Surgeons: David Beckford MD Provider: Jonatan Arreola CRNA    Anesthesia Type: general ASA Status: 3            Anesthesia Type: general    Vitals  Vitals Value Taken Time   /72 07/29/25 13:56   Temp 98 °F (36.7 °C) 07/29/25 12:25   Pulse 50 07/29/25 13:56   Resp 16 07/29/25 13:55   SpO2 97 % 07/29/25 13:56   Vitals shown include unfiled device data.        Post Anesthesia Care and Evaluation    PONV Status: none  Comments: Patient d/c from PACU prior to anes eval based on Kika score.  Please see RN notes for details of d/c criteria.    Blood pressure 140/70, pulse 50, temperature 97.6 °F (36.4 °C), temperature source Oral, resp. rate 16, height 185 cm (72.84\"), weight 122 kg (268 lb 8.3 oz), SpO2 96%.        "

## 2025-07-30 VITALS
SYSTOLIC BLOOD PRESSURE: 152 MMHG | DIASTOLIC BLOOD PRESSURE: 74 MMHG | WEIGHT: 268.52 LBS | HEIGHT: 73 IN | HEART RATE: 53 BPM | RESPIRATION RATE: 16 BRPM | TEMPERATURE: 98 F | OXYGEN SATURATION: 96 % | BODY MASS INDEX: 35.59 KG/M2

## 2025-07-30 PROCEDURE — G0378 HOSPITAL OBSERVATION PER HR: HCPCS

## 2025-07-30 PROCEDURE — 25010000002 HYDROMORPHONE 1 MG/ML SOLUTION: Performed by: UROLOGY

## 2025-07-30 PROCEDURE — 99238 HOSP IP/OBS DSCHRG MGMT 30/<: CPT | Performed by: UROLOGY

## 2025-07-30 RX ORDER — SPIRONOLACTONE 25 MG/1
12.5 TABLET ORAL DAILY
COMMUNITY

## 2025-07-30 RX ORDER — HYDROCODONE BITARTRATE AND ACETAMINOPHEN 7.5; 325 MG/1; MG/1
1 TABLET ORAL EVERY 6 HOURS PRN
Qty: 20 TABLET | Refills: 0 | Status: SHIPPED | OUTPATIENT
Start: 2025-07-30

## 2025-07-30 RX ORDER — DOCUSATE SODIUM 100 MG/1
100 CAPSULE, LIQUID FILLED ORAL 2 TIMES DAILY
Qty: 60 CAPSULE | Refills: 1 | Status: SHIPPED | OUTPATIENT
Start: 2025-07-30

## 2025-07-30 RX ORDER — CARVEDILOL 6.25 MG/1
6.25 TABLET ORAL 2 TIMES DAILY WITH MEALS
COMMUNITY

## 2025-07-30 RX ORDER — SILDENAFIL 100 MG/1
50-100 TABLET, FILM COATED ORAL DAILY PRN
COMMUNITY
End: 2025-07-30 | Stop reason: HOSPADM

## 2025-07-30 RX ORDER — ONDANSETRON 4 MG/1
4 TABLET, ORALLY DISINTEGRATING ORAL EVERY 6 HOURS PRN
Qty: 6 TABLET | Refills: 1 | Status: SHIPPED | OUTPATIENT
Start: 2025-07-30

## 2025-07-30 RX ORDER — SULFAMETHOXAZOLE AND TRIMETHOPRIM 800; 160 MG/1; MG/1
1 TABLET ORAL 2 TIMES DAILY
Qty: 28 TABLET | Refills: 0 | Status: SHIPPED | OUTPATIENT
Start: 2025-07-30 | End: 2025-08-13

## 2025-07-30 RX ORDER — ACETAMINOPHEN 325 MG/1
650 TABLET ORAL EVERY 6 HOURS PRN
Status: DISCONTINUED | OUTPATIENT
Start: 2025-07-30 | End: 2025-07-30 | Stop reason: HOSPADM

## 2025-07-30 RX ORDER — NAPROXEN 500 MG/1
500 TABLET ORAL 2 TIMES DAILY WITH MEALS
Qty: 60 TABLET | Refills: 0 | Status: SHIPPED | OUTPATIENT
Start: 2025-07-30

## 2025-07-30 RX ORDER — LOSARTAN POTASSIUM 25 MG/1
25 TABLET ORAL NIGHTLY
COMMUNITY

## 2025-07-30 RX ORDER — ROSUVASTATIN CALCIUM 10 MG/1
10 TABLET, COATED ORAL NIGHTLY
COMMUNITY

## 2025-07-30 RX ORDER — FUROSEMIDE 40 MG/1
40 TABLET ORAL DAILY
COMMUNITY

## 2025-07-30 RX ADMIN — ASPIRIN 81 MG: 81 TABLET, COATED ORAL at 09:11

## 2025-07-30 RX ADMIN — ACETAMINOPHEN 650 MG: 325 TABLET ORAL at 06:20

## 2025-07-30 RX ADMIN — Medication 12.5 MG: at 09:11

## 2025-07-30 RX ADMIN — DOCUSATE SODIUM 100 MG: 100 CAPSULE, LIQUID FILLED ORAL at 09:11

## 2025-07-30 RX ADMIN — AMIODARONE HYDROCHLORIDE 200 MG: 200 TABLET ORAL at 06:20

## 2025-07-30 RX ADMIN — HYDROMORPHONE HYDROCHLORIDE 1 MG: 1 INJECTION, SOLUTION INTRAMUSCULAR; INTRAVENOUS; SUBCUTANEOUS at 03:35

## 2025-07-30 RX ADMIN — FUROSEMIDE 40 MG: 40 TABLET ORAL at 09:11

## 2025-07-30 RX ADMIN — SULFAMETHOXAZOLE AND TRIMETHOPRIM 1 TABLET: 800; 160 TABLET ORAL at 09:11

## 2025-07-30 NOTE — NURSING NOTE
[facility]  INPATIENT WOUND & OSTOMY CARE    Today's Date: 07/30/25    Patient Name: Jeanie Grossman  MRN: [id number]  CSN: [id number]  PCP: Conor Denny DO  Attending Provider: David Beckford MD  Length of Stay: 0    Wound care consulted for wounds to bilateral legs. Nursing has uploaded picture to chart. Patient was admitted with erectile dysfunction on 7/29/2025. Patient is currently lying in bed with no family at bedside.     Patient follows with outpatient wound care for wounds to bilateral lower legs. Wounds look close to being healed. Patient states he has been being treated with therahoney and Unna boots. Patient encouraged to follow up with outpatient wound care after discharge.     Wound: non pressure wounds to bilateral lower legs  Base: red  Periwound: dry and intact  Edges: open and attached  Drainage:  scant and serous     Wound RN Orders (last 12 hours) (12h ago, onward)       Start     Ordered    07/30/25 1232  Wound Care  Daily      Question Answer Comment   Wound Locations open wounds to bilateral lower legs    Wound Care Instructions cleanse with normal saline and apply therahoney sheet and cover with a dry dressing    Cleanse Normal Saline    Intervention Thera Honey Sheet        07/30/25 1231                  Inpatient wound care will continue to follow during hospital stay.  Please contact if any issues or concerns arise.     This document has been electronically signed by Esperanza Iglesias RN on 7/30/2025 12:31 CDT

## 2025-07-30 NOTE — PLAN OF CARE
Goal Outcome Evaluation:  Plan of Care Reviewed With: patient      Patient A&O x4. Patient c/o pain in groin, PRN pain med given. Patient denies nausea. Groin dressing c/d/I. Shipman c/d/I. Patient on room air. SCDs on. Ambulated in truong with assist x1.

## 2025-07-30 NOTE — DISCHARGE SUMMARY
Date of Discharge:  7/30/2025    Discharge Diagnosis:   #1.  Erectile dysfunction due to arterial insufficiency status post three-piece inflatable penile implant with scrotoplasty for repair of penoscrotal webbing    Presenting Problem/History of Present Illness  Erectile dysfunction following radiation therapy [N52.35]  Erectile dysfunction due to arterial insufficiency [N52.01]       Hospital Course  Patient is a 65 y.o. male presented for three-piece inflatable penile implant on 7/29.  He tolerated surgery well and recovered uneventfully on the regular floor.  He was kept overnight for observation as he did not have a ride home.  He remained afebrile with stable vital signs.  Pain was controlled.  He tolerated oral diet and desired to go home on postoperative day 1.    Procedures Performed  Procedure(s):  3-PIECE INFLATABLE PENILE PROSTHESIS PLACEMENT, SCROTOPLASTY       Consults:   Consults       No orders found for last 30 day(s).            Condition on Discharge: Stable    Vital Signs  Temp:  [97.6 °F (36.4 °C)-99 °F (37.2 °C)] 99 °F (37.2 °C)  Heart Rate:  [50-59] 50  Resp:  [12-16] 16  BP: (122-174)/(54-89) 128/81      Discharge Disposition  Home or Self Care    Discharge Medications     Discharge Medications        New Medications        Instructions Start Date   docusate sodium 100 MG capsule  Commonly known as: Colace   100 mg, Oral, 2 Times Daily      HYDROcodone-acetaminophen 7.5-325 MG per tablet  Commonly known as: NORCO   1 tablet, Oral, Every 6 Hours PRN      naproxen 500 MG tablet  Commonly known as: Naprosyn   500 mg, Oral, 2 Times Daily With Meals      ondansetron ODT 4 MG disintegrating tablet  Commonly known as: ZOFRAN-ODT   4 mg, Translingual, Every 6 Hours PRN      sulfamethoxazole-trimethoprim 800-160 MG per tablet  Commonly known as: BACTRIM DS,SEPTRA DS   1 tablet, Oral, 2 Times Daily             Continue These Medications        Instructions Start Date   amiodarone 200 MG  tablet  Commonly known as: PACERONE   200 mg, Oral, 2 Times Daily      aspirin 81 MG EC tablet   81 mg, Daily      carvedilol 6.25 MG tablet  Commonly known as: COREG   6.25 mg, 2 Times Daily With Meals      empagliflozin 25 MG tablet tablet  Commonly known as: JARDIANCE   25 mg, Daily      furosemide 40 MG tablet  Commonly known as: LASIX   40 mg, Daily      losartan 25 MG tablet  Commonly known as: COZAAR   25 mg, Nightly      nitroglycerin 0.4 MG SL tablet  Commonly known as: NITROSTAT   0.4 mg, Every 5 Minutes PRN      rosuvastatin 10 MG tablet  Commonly known as: CRESTOR   10 mg, Nightly      spironolactone 25 MG tablet  Commonly known as: ALDACTONE   12.5 mg, Daily             Stop These Medications      sildenafil 100 MG tablet  Commonly known as: VIAGRA     tadalafil 20 MG tablet  Commonly known as: Cialis              Discharge Diet:     Activity at Discharge:   Activity Instructions       Discharge Activity      1) No driving for 5 days. Do not drive until you no longer require pain medication.    2) May shower tomorrow evening.  No tubs or pools for 6 weeks  3) Do not lift / push / pull more then 20 pounds for 2 weeks  4) Elevate scrotum on rolled towel and apply ice pack to scrotum every 30 minutes while awake for at least 72 hours  5) No sexual activity until cleared by Dr. Beckford  6) Do not restart your Eliquis blood thinner until you see Dr. Beckford            Follow-up Appointments  Future Appointments   Date Time Provider Department Center   8/15/2025  1:30 PM Aj Leon APRN [facility]   8/15/2025  2:00 PM [facility] DEVICE CHECK [facility]     Additional Instructions for the Follow-ups that You Need to Schedule       Discharge Follow-up with Specified Provider: Monday 8/11 with Dr. Beckford   As directed      To: Monday 8/11 with Dr. Beckford        Notify Physician or Go To The ED For the Following Conditions   As directed      #1. If you have fever >101.5 within three days of  procedure you should contact the office during business hours. If this occurs from [id number], you should come the [facility] Emergency Room.   #2. If you are unable to urinate, you should come to the [facility] Emergency Room.    Order Comments: #1. If you have fever >101.5 within three days of procedure you should contact the office during business hours. If this occurs from [id number], you should come the [facility] Emergency Room. #2. If you are unable to urinate, you should come to the [facility] Emergency Room.                 Test Results Pending at Discharge       David Beckford MD  07/30/25  10:10 CDT    Time: Discharge 25 min

## 2025-07-31 ENCOUNTER — TELEPHONE (OUTPATIENT)
Dept: UROLOGY | Facility: CLINIC | Age: 65
End: 2025-07-31
Payer: MEDICARE

## 2025-07-31 ENCOUNTER — READMISSION MANAGEMENT (OUTPATIENT)
Dept: CALL CENTER | Facility: HOSPITAL | Age: 65
End: 2025-07-31
Payer: MEDICARE

## 2025-07-31 NOTE — TELEPHONE ENCOUNTER
QUENTIN called and stated patient stated that he had implant device put in on Tuesday and he wants the device out, patient states he is in a lot of pain, he stated that he has spoken with Dr. Beckford and was advised that if he was in a lot of pain and wanted the device to be removed to call the office and we would get the device removed. Please advise

## 2025-07-31 NOTE — OUTREACH NOTE
Prep Survey      Flowsheet Row Responses   [facility] patient discharged from? [facility]   Is LACE score < 7 ? No   Eligibility Readm Mgmt   Discharge diagnosis Erectile dysfunction due to arterial insufficiency status post three-piece inflatable penile implant with scrotoplasty for repair of penoscrotal webbing   Does the patient have one of the following disease processes/diagnoses(primary or secondary)? Other   Does the patient have Home health ordered? No   Is there a DME ordered? No   Prep survey completed? Yes            NIDA ESPINOZA - Registered Nurse

## 2025-08-04 ENCOUNTER — TELEPHONE (OUTPATIENT)
Dept: UROLOGY | Facility: CLINIC | Age: 65
End: 2025-08-04
Payer: MEDICARE

## 2025-08-05 ENCOUNTER — TELEPHONE (OUTPATIENT)
Dept: UROLOGY | Facility: CLINIC | Age: 65
End: 2025-08-05
Payer: MEDICARE

## 2025-08-06 ENCOUNTER — READMISSION MANAGEMENT (OUTPATIENT)
Dept: CALL CENTER | Facility: HOSPITAL | Age: 65
End: 2025-08-06
Payer: MEDICARE

## 2025-08-07 RX ORDER — AMIODARONE HYDROCHLORIDE 200 MG/1
200 TABLET ORAL 2 TIMES DAILY
Status: CANCELLED | OUTPATIENT
Start: 2025-08-07

## 2025-08-08 RX ORDER — AMIODARONE HYDROCHLORIDE 200 MG/1
200 TABLET ORAL 2 TIMES DAILY
OUTPATIENT
Start: 2025-08-08

## 2025-08-08 RX ORDER — AMIODARONE HYDROCHLORIDE 200 MG/1
200 TABLET ORAL 2 TIMES DAILY
Qty: 60 TABLET | Refills: 0 | Status: SHIPPED | OUTPATIENT
Start: 2025-08-08

## 2025-08-13 ENCOUNTER — OFFICE VISIT (OUTPATIENT)
Dept: UROLOGY | Facility: CLINIC | Age: 65
End: 2025-08-13
Payer: MEDICARE

## 2025-08-13 VITALS — BODY MASS INDEX: 35.52 KG/M2 | WEIGHT: 268 LBS | TEMPERATURE: 98.2 F | HEIGHT: 73 IN

## 2025-08-13 DIAGNOSIS — N52.01 ERECTILE DYSFUNCTION DUE TO ARTERIAL INSUFFICIENCY: ICD-10-CM

## 2025-08-13 DIAGNOSIS — Z48.89 POSTOPERATIVE VISIT: Primary | ICD-10-CM

## 2025-08-13 PROCEDURE — 1160F RVW MEDS BY RX/DR IN RCRD: CPT | Performed by: UROLOGY

## 2025-08-13 PROCEDURE — 1159F MED LIST DOCD IN RCRD: CPT | Performed by: UROLOGY

## 2025-08-13 PROCEDURE — 99024 POSTOP FOLLOW-UP VISIT: CPT | Performed by: UROLOGY

## 2025-08-13 RX ORDER — HYDROCODONE BITARTRATE AND ACETAMINOPHEN 7.5; 325 MG/1; MG/1
1 TABLET ORAL EVERY 6 HOURS PRN
Qty: 30 TABLET | Refills: 0 | Status: SHIPPED | OUTPATIENT
Start: 2025-08-13

## 2025-08-13 RX ORDER — SULFAMETHOXAZOLE AND TRIMETHOPRIM 800; 160 MG/1; MG/1
1 TABLET ORAL 2 TIMES DAILY
Qty: 60 TABLET | Refills: 0 | Status: SHIPPED | OUTPATIENT
Start: 2025-08-13 | End: 2025-09-12

## 2025-08-15 ENCOUNTER — OFFICE VISIT (OUTPATIENT)
Dept: CARDIOLOGY | Facility: CLINIC | Age: 65
End: 2025-08-15
Payer: MEDICARE

## 2025-08-15 VITALS
WEIGHT: 269 LBS | HEART RATE: 56 BPM | OXYGEN SATURATION: 99 % | SYSTOLIC BLOOD PRESSURE: 154 MMHG | BODY MASS INDEX: 35.65 KG/M2 | HEIGHT: 73 IN | DIASTOLIC BLOOD PRESSURE: 88 MMHG

## 2025-08-15 DIAGNOSIS — I47.20 VT (VENTRICULAR TACHYCARDIA): ICD-10-CM

## 2025-08-15 DIAGNOSIS — T82.858A: ICD-10-CM

## 2025-08-15 DIAGNOSIS — I50.22 CHRONIC HFREF (HEART FAILURE WITH REDUCED EJECTION FRACTION): Primary | ICD-10-CM

## 2025-08-15 DIAGNOSIS — I50.22 CHRONIC HFREF (HEART FAILURE WITH REDUCED EJECTION FRACTION): ICD-10-CM

## 2025-08-15 DIAGNOSIS — I42.2 HYPERTROPHIC CARDIOMYOPATHY: ICD-10-CM

## 2025-08-15 DIAGNOSIS — Z95.810 AICD (AUTOMATIC CARDIOVERTER/DEFIBRILLATOR) PRESENT: ICD-10-CM

## 2025-08-15 DIAGNOSIS — Z95.810 AICD (AUTOMATIC CARDIOVERTER/DEFIBRILLATOR) PRESENT: Primary | ICD-10-CM

## 2025-08-15 RX ORDER — SODIUM CHLORIDE 9 MG/ML
40 INJECTION, SOLUTION INTRAVENOUS AS NEEDED
OUTPATIENT
Start: 2025-08-15

## 2025-08-15 RX ORDER — SODIUM CHLORIDE 0.9 % (FLUSH) 0.9 %
10 SYRINGE (ML) INJECTION AS NEEDED
OUTPATIENT
Start: 2025-08-15

## 2025-08-15 RX ORDER — SODIUM CHLORIDE 0.9 % (FLUSH) 0.9 %
10 SYRINGE (ML) INJECTION EVERY 12 HOURS SCHEDULED
OUTPATIENT
Start: 2025-08-15

## 2025-08-22 ENCOUNTER — TELEPHONE (OUTPATIENT)
Dept: CARDIOLOGY | Facility: CLINIC | Age: 65
End: 2025-08-22
Payer: MEDICARE

## 2025-08-25 ENCOUNTER — TELEPHONE (OUTPATIENT)
Dept: UROLOGY | Facility: CLINIC | Age: 65
End: 2025-08-25
Payer: MEDICARE

## 2025-08-26 ENCOUNTER — OFFICE VISIT (OUTPATIENT)
Dept: WOUND CARE | Facility: HOSPITAL | Age: 65
End: 2025-08-26
Payer: MEDICARE

## 2025-08-27 ENCOUNTER — OFFICE VISIT (OUTPATIENT)
Dept: UROLOGY | Facility: CLINIC | Age: 65
End: 2025-08-27
Payer: MEDICARE

## 2025-08-27 VITALS — WEIGHT: 267.6 LBS | HEIGHT: 74 IN | BODY MASS INDEX: 34.34 KG/M2 | TEMPERATURE: 96.1 F

## 2025-08-27 DIAGNOSIS — Z48.89 POSTOPERATIVE VISIT: Primary | ICD-10-CM

## 2025-08-27 PROCEDURE — 1159F MED LIST DOCD IN RCRD: CPT | Performed by: UROLOGY

## 2025-08-27 PROCEDURE — 1160F RVW MEDS BY RX/DR IN RCRD: CPT | Performed by: UROLOGY

## 2025-08-27 PROCEDURE — 99024 POSTOP FOLLOW-UP VISIT: CPT | Performed by: UROLOGY

## 2025-08-27 RX ORDER — OXYCODONE AND ACETAMINOPHEN 7.5; 325 MG/1; MG/1
1 TABLET ORAL EVERY 4 HOURS PRN
Qty: 30 TABLET | Refills: 0 | Status: SHIPPED | OUTPATIENT
Start: 2025-08-27

## (undated) DEVICE — KT NDL GUIDE STRL 18GA

## (undated) DEVICE — SYR LL TP 10ML STRL

## (undated) DEVICE — DRAPE,FEM ANGIO,2 WIND,STERILE: Brand: MEDLINE

## (undated) DEVICE — TBG PRESS/MONITR FIX M/F LL A/ 48IN STRL

## (undated) DEVICE — Device: Brand: WEBSTER CS

## (undated) DEVICE — Device: Brand: SOUNDSTAR

## (undated) DEVICE — DISPOSABLE INSERTION TOOL: Brand: FURLOW

## (undated) DEVICE — GLOVE SURG SZ 75 L12IN FNGR THK94MIL TRNSLUC YEL LTX

## (undated) DEVICE — STPCK 3/WY HP M/RA W/OFF/HNDL 1050PSI STRL

## (undated) DEVICE — COVER,TABLE,44X90,STERILE: Brand: MEDLINE

## (undated) DEVICE — PAD, DEFIB, ADULT, RADIOTRANS, PHYSIO: Brand: MEDLINE

## (undated) DEVICE — GAUZE,SPONGE,4"X4",8PLY,STRL,LF,10/TRAY: Brand: MEDLINE

## (undated) DEVICE — SUT MNCRYL 3/0 PS2 18IN MCP497G

## (undated) DEVICE — GOWN,PREVENTION PLUS,XLN/2XL,ST,22/CS: Brand: MEDLINE

## (undated) DEVICE — PK CATH CARD 30 CA/4

## (undated) DEVICE — SOL NS 500ML

## (undated) DEVICE — PAD MAJOR: Brand: MEDLINE INDUSTRIES, INC.

## (undated) DEVICE — SUREFIT, DUAL DISPERSIVE ELECTRODE, CONTACT QUALITY MONITOR: Brand: SUREFIT

## (undated) DEVICE — INTRO STEER AGILIS NXT MED/CURL 8.5F

## (undated) DEVICE — PREP RAZOR: Brand: DEROYAL

## (undated) DEVICE — COVER,MAYO STAND,STERILE: Brand: MEDLINE

## (undated) DEVICE — 450 ML BOTTLE OF 0.05% CHLORHEXIDINE GLUCONATE IN 99.95% STERILE WATER FOR IRRIGATION, USP AND APPLICATOR.: Brand: IRRISEPT ANTIMICROBIAL WOUND LAVAGE

## (undated) DEVICE — ENDO KIT,LOURDES HOSPITAL: Brand: MEDLINE INDUSTRIES, INC.

## (undated) DEVICE — 1LYRTR 16FR10ML100%SIL UMS SNP: Brand: MEDLINE INDUSTRIES, INC.

## (undated) DEVICE — SUT PDS 2/0 CT2 27IN VIL PDP333H

## (undated) DEVICE — GLV SURG DERMASSURE GRN LF PF 8.0

## (undated) DEVICE — SYR LUERLOK 50ML

## (undated) DEVICE — SURGICAL SUCTION CONNECTING TUBE WITH MALE CONNECTOR AND SUCTION CLAMP, 2 FT. LONG (.6 M), 5 MM I.D.: Brand: CONMED

## (undated) DEVICE — MAX-CORE® DISPOSABLE CORE BIOPSY INSTRUMENT, 18G X 20CM: Brand: MAX-CORE

## (undated) DEVICE — GLOVE SURG SZ 75 CRM LTX FREE POLYISOPRENE POLYMER BEAD ANTI

## (undated) DEVICE — Device: Brand: REFERENCE PATCH CARTO 3

## (undated) DEVICE — PAD,NON-ADHERENT,3X8,STERILE,LF,1/PK: Brand: MEDLINE

## (undated) DEVICE — SLV CBL IVL 5X96IN

## (undated) DEVICE — PLUG,CATHETER,DRAINAGE PROTECTOR,TUBE: Brand: MEDLINE

## (undated) DEVICE — SI AVANTI+ 7F STD W/GW  NO OBT: Brand: AVANTI

## (undated) DEVICE — ANTIBACTERIAL UNDYED BRAIDED (POLYGLACTIN 910), SYNTHETIC ABSORBABLE SUTURE: Brand: COATED VICRYL

## (undated) DEVICE — BANDAGE,GAUZE,BULKEE II,4.5"X4.1YD,STRL: Brand: MEDLINE

## (undated) DEVICE — GOWN,PREVENTION PLUS,XL,ST,24/CS: Brand: MEDLINE

## (undated) DEVICE — SOL IRR NACL 0.9PCT BO 1000ML

## (undated) DEVICE — SI AVANTI+ 8F STD W/GW  NO OBT: Brand: AVANTI

## (undated) DEVICE — SOLIDIFIER LIQ LIQUILOC/PLUS W/TREAT 2000CC

## (undated) DEVICE — Device: Brand: SMARTABLATE

## (undated) DEVICE — RETR DEEP SCROTAL SKW

## (undated) DEVICE — GLV SURG BIOGEL M LTX PF 7 1/2

## (undated) DEVICE — TRAP FLD MINIVAC MEGADYNE 100ML

## (undated) DEVICE — APPL CHLORAPREP HI/LITE 26ML ORNG

## (undated) DEVICE — SYR LUERLOK 30CC

## (undated) DEVICE — LIMB HOLDER, WRIST/ANKLE: Brand: DEROYAL

## (undated) DEVICE — BI-DIRECTIONAL NAVIGATION CATHETER, NAV, D-F: Brand: QDOT MICRO

## (undated) DEVICE — SI AVANTI+ 10F STD W/GW: Brand: AVANTI